# Patient Record
Sex: FEMALE | Race: AMERICAN INDIAN OR ALASKA NATIVE | NOT HISPANIC OR LATINO | Employment: OTHER | URBAN - METROPOLITAN AREA
[De-identification: names, ages, dates, MRNs, and addresses within clinical notes are randomized per-mention and may not be internally consistent; named-entity substitution may affect disease eponyms.]

---

## 2019-05-13 PROBLEM — E11.9 DIABETES MELLITUS TYPE 2, NONINSULIN DEPENDENT: Status: ACTIVE | Noted: 2019-05-13

## 2019-05-13 PROBLEM — G89.4 CHRONIC PAIN SYNDROME: Status: ACTIVE | Noted: 2019-05-13

## 2019-05-13 PROBLEM — F41.9 ANXIETY AND DEPRESSION: Status: ACTIVE | Noted: 2019-05-13

## 2019-05-13 PROBLEM — F51.01 PRIMARY INSOMNIA: Status: ACTIVE | Noted: 2019-05-13

## 2019-05-13 PROBLEM — R32 ABSENCE OF BLADDER CONTINENCE: Status: ACTIVE | Noted: 2019-05-13

## 2019-05-13 PROBLEM — F32.A ANXIETY AND DEPRESSION: Status: ACTIVE | Noted: 2019-05-13

## 2019-05-13 PROBLEM — M19.90 ARTHRITIS: Status: ACTIVE | Noted: 2019-05-13

## 2019-05-13 PROBLEM — I10 ESSENTIAL HYPERTENSION: Status: ACTIVE | Noted: 2019-05-13

## 2019-05-13 PROBLEM — E66.9 OBESITY (BMI 30-39.9): Status: ACTIVE | Noted: 2019-05-13

## 2019-06-10 PROBLEM — E66.01 SEVERE OBESITY (BMI 35.0-35.9 WITH COMORBIDITY): Status: ACTIVE | Noted: 2019-06-10

## 2019-06-10 PROBLEM — E78.5 DYSLIPIDEMIA: Status: ACTIVE | Noted: 2019-06-10

## 2019-06-10 PROBLEM — E66.9 OBESITY (BMI 30-39.9): Status: RESOLVED | Noted: 2019-05-13 | Resolved: 2019-06-10

## 2019-06-10 PROBLEM — E11.65 UNCONTROLLED TYPE 2 DIABETES MELLITUS WITH HYPERGLYCEMIA: Status: ACTIVE | Noted: 2019-06-10

## 2019-06-10 PROBLEM — T38.3X5A ADVERSE EFFECT OF METFORMIN: Status: ACTIVE | Noted: 2019-06-10

## 2019-06-10 PROBLEM — E11.9 DIABETES MELLITUS TYPE 2, NONINSULIN DEPENDENT: Status: RESOLVED | Noted: 2019-05-13 | Resolved: 2019-06-10

## 2019-10-14 PROBLEM — A41.9 SEPSIS WITH ACUTE ORGAN DYSFUNCTION WITHOUT SEPTIC SHOCK: Status: ACTIVE | Noted: 2019-10-14

## 2019-10-14 PROBLEM — N12 PYELONEPHRITIS: Status: ACTIVE | Noted: 2019-10-14

## 2019-10-14 PROBLEM — E87.1 HYPONATREMIA: Status: ACTIVE | Noted: 2019-10-14

## 2019-10-14 PROBLEM — R65.20 SEPSIS WITH ACUTE ORGAN DYSFUNCTION WITHOUT SEPTIC SHOCK: Status: ACTIVE | Noted: 2019-10-14

## 2019-10-14 PROBLEM — I21.4 NSTEMI (NON-ST ELEVATED MYOCARDIAL INFARCTION): Status: ACTIVE | Noted: 2019-10-14

## 2019-10-14 PROBLEM — A41.9 SEPSIS: Status: ACTIVE | Noted: 2019-10-14

## 2019-10-14 PROBLEM — E87.29 HIGH ANION GAP METABOLIC ACIDOSIS: Status: ACTIVE | Noted: 2019-10-14

## 2019-10-14 PROBLEM — E11.9 TYPE 2 DIABETES MELLITUS, WITHOUT LONG-TERM CURRENT USE OF INSULIN: Status: ACTIVE | Noted: 2019-10-14

## 2019-10-15 PROBLEM — N17.9 AKI (ACUTE KIDNEY INJURY): Status: ACTIVE | Noted: 2019-10-15

## 2019-10-15 PROBLEM — R11.0 NAUSEA: Status: ACTIVE | Noted: 2019-10-15

## 2019-10-16 PROBLEM — A41.9 SEPSIS: Status: ACTIVE | Noted: 2019-10-16

## 2019-10-17 PROBLEM — R78.81 BACTEREMIA DUE TO ESCHERICHIA COLI: Status: ACTIVE | Noted: 2019-10-17

## 2019-10-17 PROBLEM — B96.20 BACTEREMIA DUE TO ESCHERICHIA COLI: Status: ACTIVE | Noted: 2019-10-17

## 2019-10-19 ENCOUNTER — NURSE TRIAGE (OUTPATIENT)
Dept: ADMINISTRATIVE | Facility: CLINIC | Age: 79
End: 2019-10-19

## 2019-10-19 NOTE — TELEPHONE ENCOUNTER
Reason for Disposition   General information question, no triage required and triager able to answer question    Additional Information   Negative: [1] Caller is not with the adult (patient) AND [2] reporting urgent symptoms   Negative: Lab result questions   Negative: Medication questions   Negative: Caller can't be reached by phone   Negative: Caller has already spoken to PCP or another triager   Negative: RN needs further essential information from caller in order to complete triage   Negative: Requesting regular office appointment   Negative: [1] Caller requesting NON-URGENT health information AND [2] PCP's office is the best resource   Negative: Health Information question, no triage required and triager able to answer question    Protocols used: INFORMATION ONLY CALL-A-    Patient's mother in the hospital at [facility] and was trying to get in touch with the nursing station

## 2019-11-11 PROBLEM — E87.1 HYPONATREMIA: Status: RESOLVED | Noted: 2019-10-14 | Resolved: 2019-11-11

## 2019-11-11 PROBLEM — A41.9 SEPSIS WITH ACUTE ORGAN DYSFUNCTION WITHOUT SEPTIC SHOCK: Status: RESOLVED | Noted: 2019-10-14 | Resolved: 2019-11-11

## 2019-11-11 PROBLEM — E87.29 HIGH ANION GAP METABOLIC ACIDOSIS: Status: RESOLVED | Noted: 2019-10-14 | Resolved: 2019-11-11

## 2019-11-11 PROBLEM — R65.20 SEPSIS WITH ACUTE ORGAN DYSFUNCTION WITHOUT SEPTIC SHOCK: Status: RESOLVED | Noted: 2019-10-14 | Resolved: 2019-11-11

## 2019-11-13 PROBLEM — J45.909 REACTIVE AIRWAY DISEASE WITHOUT COMPLICATION: Status: ACTIVE | Noted: 2019-11-13

## 2019-11-13 PROBLEM — R29.818 SUSPECTED SLEEP APNEA: Status: ACTIVE | Noted: 2019-11-13

## 2019-11-13 PROBLEM — K58.0 IRRITABLE BOWEL SYNDROME WITH DIARRHEA: Status: ACTIVE | Noted: 2019-11-13

## 2019-11-13 PROBLEM — I27.20 PULMONARY HYPERTENSION: Status: ACTIVE | Noted: 2019-11-13

## 2019-11-13 PROBLEM — Z79.4 LONG TERM (CURRENT) USE OF INSULIN: Status: ACTIVE | Noted: 2019-11-13

## 2019-11-13 PROBLEM — F32.4 MAJOR DEPRESSIVE DISORDER WITH SINGLE EPISODE, IN PARTIAL REMISSION: Status: ACTIVE | Noted: 2019-05-13

## 2019-11-13 PROBLEM — I50.30 HEART FAILURE WITH PRESERVED EJECTION FRACTION, NYHA CLASS II: Status: ACTIVE | Noted: 2019-11-13

## 2019-11-13 PROBLEM — E55.9 VITAMIN D DEFICIENCY: Status: ACTIVE | Noted: 2019-11-13

## 2019-11-15 PROBLEM — N39.0 RECURRENT UTI: Status: ACTIVE | Noted: 2019-11-15

## 2019-11-27 PROBLEM — I51.9 LEFT VENTRICULAR SYSTOLIC DYSFUNCTION: Status: ACTIVE | Noted: 2019-11-27

## 2019-11-27 PROBLEM — I51.89 LEFT VENTRICULAR SYSTOLIC DYSFUNCTION: Status: ACTIVE | Noted: 2019-11-27

## 2019-12-16 PROBLEM — G47.33 OSA ON CPAP: Status: ACTIVE | Noted: 2019-11-13

## 2019-12-16 PROBLEM — E11.9 TYPE 2 DIABETES MELLITUS, WITHOUT LONG-TERM CURRENT USE OF INSULIN: Status: RESOLVED | Noted: 2019-10-14 | Resolved: 2019-12-16

## 2019-12-16 PROBLEM — N17.9 AKI (ACUTE KIDNEY INJURY): Status: RESOLVED | Noted: 2019-10-15 | Resolved: 2019-12-16

## 2019-12-19 PROBLEM — R35.0 URINARY FREQUENCY: Status: ACTIVE | Noted: 2019-12-19

## 2019-12-31 PROBLEM — N81.10 FEMALE CYSTOCELE: Status: ACTIVE | Noted: 2019-12-31

## 2019-12-31 PROBLEM — Q64.32 CONGENITAL URETHRAL STENOSIS: Status: ACTIVE | Noted: 2019-12-31

## 2020-01-16 PROBLEM — K11.20 PAROTIDITIS: Status: ACTIVE | Noted: 2020-01-16

## 2020-02-04 PROBLEM — Z85.9 HISTORY OF CANCER: Status: ACTIVE | Noted: 2020-02-04

## 2020-02-24 ENCOUNTER — TELEPHONE (OUTPATIENT)
Dept: HOME HEALTH SERVICES | Facility: HOSPITAL | Age: 80
End: 2020-02-24

## 2020-02-26 PROBLEM — I25.10 NON-OCCLUSIVE CORONARY ARTERY DISEASE: Status: ACTIVE | Noted: 2020-02-26

## 2020-03-30 ENCOUNTER — EXTERNAL HOME HEALTH (OUTPATIENT)
Dept: HOME HEALTH SERVICES | Facility: HOSPITAL | Age: 80
End: 2020-03-30

## 2020-04-15 PROBLEM — D47.2 MGUS (MONOCLONAL GAMMOPATHY OF UNKNOWN SIGNIFICANCE): Status: ACTIVE | Noted: 2020-04-15

## 2020-04-15 PROBLEM — G60.9 IDIOPATHIC PERIPHERAL NEUROPATHY: Status: ACTIVE | Noted: 2020-04-15

## 2020-06-04 ENCOUNTER — EXTERNAL HOME HEALTH (OUTPATIENT)
Dept: HOME HEALTH SERVICES | Facility: HOSPITAL | Age: 80
End: 2020-06-04

## 2020-06-24 PROBLEM — F32.5 MAJOR DEPRESSIVE DISORDER WITH SINGLE EPISODE, IN FULL REMISSION: Status: ACTIVE | Noted: 2019-05-13

## 2020-06-24 PROBLEM — R35.0 URINARY FREQUENCY: Status: RESOLVED | Noted: 2019-12-19 | Resolved: 2020-06-24

## 2020-06-24 PROBLEM — N12 PYELONEPHRITIS: Status: RESOLVED | Noted: 2019-10-14 | Resolved: 2020-06-24

## 2020-06-24 PROBLEM — R78.81 BACTEREMIA DUE TO ESCHERICHIA COLI: Status: RESOLVED | Noted: 2019-10-17 | Resolved: 2020-06-24

## 2020-06-24 PROBLEM — B96.20 BACTEREMIA DUE TO ESCHERICHIA COLI: Status: RESOLVED | Noted: 2019-10-17 | Resolved: 2020-06-24

## 2020-09-09 ENCOUNTER — EXTERNAL HOME HEALTH (OUTPATIENT)
Dept: HOME HEALTH SERVICES | Facility: HOSPITAL | Age: 80
End: 2020-09-09

## 2020-10-02 ENCOUNTER — LAB VISIT (OUTPATIENT)
Dept: LAB | Facility: HOSPITAL | Age: 80
End: 2020-10-02
Attending: NURSE PRACTITIONER
Payer: MEDICARE

## 2020-10-02 DIAGNOSIS — R82.90 FOUL SMELLING URINE: ICD-10-CM

## 2020-10-02 LAB
BACTERIA #/AREA URNS HPF: ABNORMAL /HPF
BILIRUB UR QL STRIP: NEGATIVE
CLARITY UR: ABNORMAL
COLOR UR: YELLOW
GLUCOSE UR QL STRIP: NEGATIVE
HGB UR QL STRIP: NEGATIVE
HYALINE CASTS #/AREA URNS LPF: 0 /LPF
KETONES UR QL STRIP: NEGATIVE
LEUKOCYTE ESTERASE UR QL STRIP: ABNORMAL
MICROSCOPIC COMMENT: ABNORMAL
NITRITE UR QL STRIP: POSITIVE
PH UR STRIP: 6 [PH] (ref 5–8)
PROT UR QL STRIP: ABNORMAL
RBC #/AREA URNS HPF: 9 /HPF (ref 0–4)
SP GR UR STRIP: 1.02 (ref 1–1.03)
SQUAMOUS #/AREA URNS HPF: 17 /HPF
URN SPEC COLLECT METH UR: ABNORMAL
UROBILINOGEN UR STRIP-ACNC: NEGATIVE EU/DL
WBC #/AREA URNS HPF: >100 /HPF (ref 0–5)

## 2020-10-02 PROCEDURE — 87086 URINE CULTURE/COLONY COUNT: CPT

## 2020-10-02 PROCEDURE — 81000 URINALYSIS NONAUTO W/SCOPE: CPT

## 2020-10-04 LAB
BACTERIA UR CULT: NORMAL
BACTERIA UR CULT: NORMAL

## 2020-10-14 ENCOUNTER — EXTERNAL HOME HEALTH (OUTPATIENT)
Dept: HOME HEALTH SERVICES | Facility: HOSPITAL | Age: 80
End: 2020-10-14

## 2020-10-19 ENCOUNTER — HOSPITAL ENCOUNTER (OUTPATIENT)
Dept: PREADMISSION TESTING | Facility: HOSPITAL | Age: 80
Discharge: HOME | End: 2020-10-19
Attending: INTERNAL MEDICINE
Payer: MEDICARE

## 2020-10-19 DIAGNOSIS — Z01.812 PRE-PROCEDURE LAB EXAM: ICD-10-CM

## 2020-10-19 DIAGNOSIS — D47.2 MONOCLONAL GAMMOPATHY OF UNKNOWN SIGNIFICANCE (MGUS): ICD-10-CM

## 2020-10-19 DIAGNOSIS — E78.5 DYSLIPIDEMIA: ICD-10-CM

## 2020-10-19 DIAGNOSIS — E11.65 UNCONTROLLED TYPE 2 DIABETES MELLITUS WITH HYPERGLYCEMIA: ICD-10-CM

## 2020-10-19 LAB — SARS-COV-2 RNA RESP QL NAA+PROBE: NOT DETECTED

## 2020-10-19 PROCEDURE — U0002 COVID-19 LAB TEST NON-CDC: HCPCS

## 2020-10-21 ENCOUNTER — DOCUMENT SCAN (OUTPATIENT)
Dept: HOME HEALTH SERVICES | Facility: HOSPITAL | Age: 80
End: 2020-10-21

## 2020-10-21 PROBLEM — Z85.3 HISTORY OF BREAST CANCER: Chronic | Status: ACTIVE | Noted: 2020-02-04

## 2020-10-21 PROBLEM — Z85.038 HISTORY OF COLON CANCER: Status: ACTIVE | Noted: 2020-10-21

## 2020-10-21 PROBLEM — J30.1 SEASONAL ALLERGIC RHINITIS DUE TO POLLEN: Status: ACTIVE | Noted: 2020-10-21

## 2020-10-21 PROBLEM — R93.89 ABNORMAL CT OF THE CHEST: Status: ACTIVE | Noted: 2020-10-21

## 2020-12-08 ENCOUNTER — DOCUMENT SCAN (OUTPATIENT)
Dept: HOME HEALTH SERVICES | Facility: HOSPITAL | Age: 80
End: 2020-12-08

## 2020-12-17 ENCOUNTER — EXTERNAL HOME HEALTH (OUTPATIENT)
Dept: HOME HEALTH SERVICES | Facility: HOSPITAL | Age: 80
End: 2020-12-17

## 2021-01-06 ENCOUNTER — HOSPITAL ENCOUNTER (EMERGENCY)
Facility: HOSPITAL | Age: 81
Discharge: HOME | End: 2021-01-06
Attending: EMERGENCY MEDICINE
Payer: MEDICARE

## 2021-01-06 VITALS
RESPIRATION RATE: 18 BRPM | TEMPERATURE: 98 F | DIASTOLIC BLOOD PRESSURE: 65 MMHG | HEIGHT: 64 IN | SYSTOLIC BLOOD PRESSURE: 143 MMHG | HEART RATE: 68 BPM | BODY MASS INDEX: 37.39 KG/M2 | WEIGHT: 219 LBS | OXYGEN SATURATION: 95 %

## 2021-01-06 DIAGNOSIS — M79.89 FOOT SWELLING: Primary | ICD-10-CM

## 2021-01-06 DIAGNOSIS — R60.9 SWELLING: ICD-10-CM

## 2021-01-06 PROBLEM — R33.9 URINARY RETENTION: Status: ACTIVE | Noted: 2021-01-06

## 2021-01-06 PROCEDURE — 99283 EMERGENCY DEPT VISIT LOW MDM: CPT | Mod: 25

## 2021-01-06 RX ORDER — SULFAMETHOXAZOLE AND TRIMETHOPRIM 800; 160 MG/1; MG/1
1 TABLET ORAL 2 TIMES DAILY
Qty: 20 TABLET | Refills: 0 | Status: SHIPPED | OUTPATIENT
Start: 2021-01-06 | End: 2021-01-16

## 2021-02-05 ENCOUNTER — DOCUMENT SCAN (OUTPATIENT)
Dept: HOME HEALTH SERVICES | Facility: HOSPITAL | Age: 81
End: 2021-02-05

## 2021-02-11 ENCOUNTER — EXTERNAL HOME HEALTH (OUTPATIENT)
Dept: HOME HEALTH SERVICES | Facility: HOSPITAL | Age: 81
End: 2021-02-11

## 2021-02-24 ENCOUNTER — LAB VISIT (OUTPATIENT)
Dept: LAB | Facility: HOSPITAL | Age: 81
End: 2021-02-24
Attending: INTERNAL MEDICINE
Payer: MEDICARE

## 2021-02-24 DIAGNOSIS — E11.69 TYPE 2 DIABETES MELLITUS WITH OTHER SPECIFIED COMPLICATION, WITH LONG-TERM CURRENT USE OF INSULIN: ICD-10-CM

## 2021-02-24 DIAGNOSIS — Z79.4 TYPE 2 DIABETES MELLITUS WITH OTHER SPECIFIED COMPLICATION, WITH LONG-TERM CURRENT USE OF INSULIN: ICD-10-CM

## 2021-02-24 DIAGNOSIS — I25.10 NON-OCCLUSIVE CORONARY ARTERY DISEASE: ICD-10-CM

## 2021-02-24 DIAGNOSIS — E66.01 CLASS 2 SEVERE OBESITY DUE TO EXCESS CALORIES WITH SERIOUS COMORBIDITY AND BODY MASS INDEX (BMI) OF 35.0 TO 35.9 IN ADULT: ICD-10-CM

## 2021-02-24 DIAGNOSIS — I51.9 LEFT VENTRICULAR SYSTOLIC DYSFUNCTION: ICD-10-CM

## 2021-02-24 DIAGNOSIS — I10 ESSENTIAL HYPERTENSION: ICD-10-CM

## 2021-02-24 DIAGNOSIS — I50.30 HEART FAILURE WITH PRESERVED EJECTION FRACTION, NYHA CLASS II: ICD-10-CM

## 2021-02-24 LAB
ALBUMIN SERPL BCP-MCNC: 3.4 G/DL (ref 3.5–5.2)
ALP SERPL-CCNC: 191 U/L (ref 55–135)
ALT SERPL W/O P-5'-P-CCNC: 21 U/L (ref 10–44)
ANION GAP SERPL CALC-SCNC: 6 MMOL/L (ref 8–16)
ANION GAP SERPL CALC-SCNC: 6 MMOL/L (ref 8–16)
AST SERPL-CCNC: 12 U/L (ref 10–40)
BASOPHILS # BLD AUTO: 0.09 K/UL (ref 0–0.2)
BASOPHILS NFR BLD: 0.7 % (ref 0–1.9)
BILIRUB SERPL-MCNC: 0.4 MG/DL (ref 0.1–1)
BUN SERPL-MCNC: 42 MG/DL (ref 8–23)
BUN SERPL-MCNC: 42 MG/DL (ref 8–23)
CALCIUM SERPL-MCNC: 9.6 MG/DL (ref 8.7–10.5)
CALCIUM SERPL-MCNC: 9.6 MG/DL (ref 8.7–10.5)
CHLORIDE SERPL-SCNC: 103 MMOL/L (ref 95–110)
CHLORIDE SERPL-SCNC: 103 MMOL/L (ref 95–110)
CHOLEST SERPL-MCNC: 141 MG/DL (ref 120–199)
CHOLEST/HDLC SERPL: 2.6 {RATIO} (ref 2–5)
CO2 SERPL-SCNC: 29 MMOL/L (ref 23–29)
CO2 SERPL-SCNC: 29 MMOL/L (ref 23–29)
CREAT SERPL-MCNC: 1.3 MG/DL (ref 0.5–1.4)
CREAT SERPL-MCNC: 1.3 MG/DL (ref 0.5–1.4)
DIFFERENTIAL METHOD: ABNORMAL
EOSINOPHIL # BLD AUTO: 0.4 K/UL (ref 0–0.5)
EOSINOPHIL NFR BLD: 3.4 % (ref 0–8)
ERYTHROCYTE [DISTWIDTH] IN BLOOD BY AUTOMATED COUNT: 14 % (ref 11.5–14.5)
EST. GFR  (AFRICAN AMERICAN): 45.4 ML/MIN/1.73 M^2
EST. GFR  (AFRICAN AMERICAN): 45.4 ML/MIN/1.73 M^2
EST. GFR  (NON AFRICAN AMERICAN): 39.4 ML/MIN/1.73 M^2
EST. GFR  (NON AFRICAN AMERICAN): 39.4 ML/MIN/1.73 M^2
ESTIMATED AVG GLUCOSE: 157 MG/DL (ref 68–131)
GLUCOSE SERPL-MCNC: 174 MG/DL (ref 70–110)
GLUCOSE SERPL-MCNC: 174 MG/DL (ref 70–110)
HBA1C MFR BLD: 7.1 % (ref 4–5.6)
HCT VFR BLD AUTO: 37.9 % (ref 37–48.5)
HDLC SERPL-MCNC: 55 MG/DL (ref 40–75)
HDLC SERPL: 39 % (ref 20–50)
HGB BLD-MCNC: 12.3 G/DL (ref 12–16)
IMM GRANULOCYTES # BLD AUTO: 0.07 K/UL (ref 0–0.04)
IMM GRANULOCYTES NFR BLD AUTO: 0.5 % (ref 0–0.5)
LDLC SERPL CALC-MCNC: 58 MG/DL (ref 63–159)
LYMPHOCYTES # BLD AUTO: 2 K/UL (ref 1–4.8)
LYMPHOCYTES NFR BLD: 15.8 % (ref 18–48)
MCH RBC QN AUTO: 27.2 PG (ref 27–31)
MCHC RBC AUTO-ENTMCNC: 32.5 G/DL (ref 32–36)
MCV RBC AUTO: 84 FL (ref 82–98)
MONOCYTES # BLD AUTO: 0.6 K/UL (ref 0.3–1)
MONOCYTES NFR BLD: 4.7 % (ref 4–15)
NEUTROPHILS # BLD AUTO: 9.7 K/UL (ref 1.8–7.7)
NEUTROPHILS NFR BLD: 74.9 % (ref 38–73)
NONHDLC SERPL-MCNC: 86 MG/DL
NRBC BLD-RTO: 0 /100 WBC
PLATELET # BLD AUTO: 370 K/UL (ref 150–350)
PMV BLD AUTO: 10.3 FL (ref 9.2–12.9)
POTASSIUM SERPL-SCNC: 3.9 MMOL/L (ref 3.5–5.1)
POTASSIUM SERPL-SCNC: 3.9 MMOL/L (ref 3.5–5.1)
PROT SERPL-MCNC: 8.1 G/DL (ref 6–8.4)
RBC # BLD AUTO: 4.52 M/UL (ref 4–5.4)
SODIUM SERPL-SCNC: 138 MMOL/L (ref 136–145)
SODIUM SERPL-SCNC: 138 MMOL/L (ref 136–145)
TRIGL SERPL-MCNC: 140 MG/DL (ref 30–150)
WBC # BLD AUTO: 12.89 K/UL (ref 3.9–12.7)

## 2021-02-24 PROCEDURE — 83036 HEMOGLOBIN GLYCOSYLATED A1C: CPT

## 2021-02-24 PROCEDURE — 85025 COMPLETE CBC W/AUTO DIFF WBC: CPT

## 2021-02-24 PROCEDURE — 80061 LIPID PANEL: CPT

## 2021-02-24 PROCEDURE — 80053 COMPREHEN METABOLIC PANEL: CPT

## 2021-02-24 PROCEDURE — 36415 COLL VENOUS BLD VENIPUNCTURE: CPT

## 2021-03-10 PROBLEM — Z79.4 TYPE 2 DIABETES MELLITUS WITHOUT COMPLICATION, WITH LONG-TERM CURRENT USE OF INSULIN: Status: ACTIVE | Noted: 2019-06-10

## 2021-03-10 PROBLEM — E11.9 TYPE 2 DIABETES MELLITUS WITHOUT COMPLICATION, WITH LONG-TERM CURRENT USE OF INSULIN: Status: ACTIVE | Noted: 2019-06-10

## 2021-03-11 PROBLEM — N18.32 STAGE 3B CHRONIC KIDNEY DISEASE: Status: ACTIVE | Noted: 2021-03-11

## 2021-04-09 ENCOUNTER — HOSPITAL ENCOUNTER (EMERGENCY)
Facility: HOSPITAL | Age: 81
Discharge: HOME | End: 2021-04-09
Attending: FAMILY MEDICINE
Payer: MEDICARE

## 2021-04-09 VITALS
HEART RATE: 62 BPM | HEIGHT: 64 IN | DIASTOLIC BLOOD PRESSURE: 66 MMHG | OXYGEN SATURATION: 96 % | WEIGHT: 202 LBS | TEMPERATURE: 98 F | BODY MASS INDEX: 34.49 KG/M2 | SYSTOLIC BLOOD PRESSURE: 164 MMHG | RESPIRATION RATE: 16 BRPM

## 2021-04-09 DIAGNOSIS — Z93.59 SUPRAPUBIC CATHETER: Primary | ICD-10-CM

## 2021-04-09 PROCEDURE — 99283 EMERGENCY DEPT VISIT LOW MDM: CPT

## 2021-06-11 ENCOUNTER — EXTERNAL HOME HEALTH (OUTPATIENT)
Dept: HOME HEALTH SERVICES | Facility: HOSPITAL | Age: 81
End: 2021-06-11

## 2021-06-14 ENCOUNTER — HOSPITAL ENCOUNTER (EMERGENCY)
Facility: HOSPITAL | Age: 81
Discharge: HOME | End: 2021-06-14
Attending: FAMILY MEDICINE
Payer: MEDICARE

## 2021-06-14 VITALS
DIASTOLIC BLOOD PRESSURE: 81 MMHG | RESPIRATION RATE: 18 BRPM | TEMPERATURE: 99 F | HEIGHT: 64 IN | OXYGEN SATURATION: 98 % | HEART RATE: 80 BPM | BODY MASS INDEX: 36.7 KG/M2 | WEIGHT: 215 LBS | SYSTOLIC BLOOD PRESSURE: 167 MMHG

## 2021-06-14 DIAGNOSIS — R19.7 DIARRHEA, UNSPECIFIED TYPE: ICD-10-CM

## 2021-06-14 DIAGNOSIS — R11.10 VOMITING, INTRACTABILITY OF VOMITING NOT SPECIFIED, PRESENCE OF NAUSEA NOT SPECIFIED, UNSPECIFIED VOMITING TYPE: Primary | ICD-10-CM

## 2021-06-14 DIAGNOSIS — R53.1 WEAKNESS: ICD-10-CM

## 2021-06-14 DIAGNOSIS — E86.0 DEHYDRATION: ICD-10-CM

## 2021-06-14 DIAGNOSIS — N30.00 ACUTE CYSTITIS WITHOUT HEMATURIA: ICD-10-CM

## 2021-06-14 LAB
ALBUMIN SERPL BCP-MCNC: 3 G/DL (ref 3.5–5.2)
ALP SERPL-CCNC: 123 U/L (ref 55–135)
ALT SERPL W/O P-5'-P-CCNC: 20 U/L (ref 10–44)
AMORPH CRY UR QL COMP ASSIST: ABNORMAL
ANION GAP SERPL CALC-SCNC: 14 MMOL/L (ref 8–16)
AST SERPL-CCNC: 18 U/L (ref 10–40)
BACTERIA #/AREA URNS HPF: ABNORMAL /HPF
BASOPHILS # BLD AUTO: 0.06 K/UL (ref 0–0.2)
BASOPHILS NFR BLD: 0.5 % (ref 0–1.9)
BILIRUB SERPL-MCNC: 0.7 MG/DL (ref 0.1–1)
BILIRUB UR QL STRIP: NEGATIVE
BUN SERPL-MCNC: 21 MG/DL (ref 8–23)
CALCIUM SERPL-MCNC: 8.8 MG/DL (ref 8.7–10.5)
CHLORIDE SERPL-SCNC: 110 MMOL/L (ref 95–110)
CLARITY UR: ABNORMAL
CO2 SERPL-SCNC: 18 MMOL/L (ref 23–29)
COLOR UR: YELLOW
CREAT SERPL-MCNC: 0.9 MG/DL (ref 0.5–1.4)
DIFFERENTIAL METHOD: ABNORMAL
EOSINOPHIL # BLD AUTO: 0 K/UL (ref 0–0.5)
EOSINOPHIL NFR BLD: 0.3 % (ref 0–8)
ERYTHROCYTE [DISTWIDTH] IN BLOOD BY AUTOMATED COUNT: 13.8 % (ref 11.5–14.5)
EST. GFR  (AFRICAN AMERICAN): >60 ML/MIN/1.73 M^2
EST. GFR  (NON AFRICAN AMERICAN): >60 ML/MIN/1.73 M^2
GLUCOSE SERPL-MCNC: 165 MG/DL (ref 70–110)
GLUCOSE UR QL STRIP: ABNORMAL
HCT VFR BLD AUTO: 38.7 % (ref 37–48.5)
HGB BLD-MCNC: 12.5 G/DL (ref 12–16)
HGB UR QL STRIP: NEGATIVE
HYALINE CASTS #/AREA URNS LPF: 0 /LPF
IMM GRANULOCYTES # BLD AUTO: 0.05 K/UL (ref 0–0.04)
IMM GRANULOCYTES NFR BLD AUTO: 0.4 % (ref 0–0.5)
KETONES UR QL STRIP: ABNORMAL
LACTATE SERPL-SCNC: 2.1 MMOL/L (ref 0.5–2.2)
LEUKOCYTE ESTERASE UR QL STRIP: ABNORMAL
LIPASE SERPL-CCNC: 63 U/L (ref 23–300)
LYMPHOCYTES # BLD AUTO: 2 K/UL (ref 1–4.8)
LYMPHOCYTES NFR BLD: 15.3 % (ref 18–48)
MCH RBC QN AUTO: 26.5 PG (ref 27–31)
MCHC RBC AUTO-ENTMCNC: 32.3 G/DL (ref 32–36)
MCV RBC AUTO: 82 FL (ref 82–98)
MICROSCOPIC COMMENT: ABNORMAL
MONOCYTES # BLD AUTO: 0.8 K/UL (ref 0.3–1)
MONOCYTES NFR BLD: 5.8 % (ref 4–15)
NEUTROPHILS # BLD AUTO: 10 K/UL (ref 1.8–7.7)
NEUTROPHILS NFR BLD: 77.7 % (ref 38–73)
NITRITE UR QL STRIP: POSITIVE
NRBC BLD-RTO: 0 /100 WBC
PH UR STRIP: >8 [PH] (ref 5–8)
PLATELET # BLD AUTO: 310 K/UL (ref 150–450)
PMV BLD AUTO: 10.3 FL (ref 9.2–12.9)
POTASSIUM SERPL-SCNC: 3.7 MMOL/L (ref 3.5–5.1)
PROT SERPL-MCNC: 7.3 G/DL (ref 6–8.4)
PROT UR QL STRIP: ABNORMAL
RBC # BLD AUTO: 4.71 M/UL (ref 4–5.4)
RBC #/AREA URNS HPF: 0 /HPF (ref 0–4)
SODIUM SERPL-SCNC: 142 MMOL/L (ref 136–145)
SP GR UR STRIP: 1.02 (ref 1–1.03)
SQUAMOUS #/AREA URNS HPF: 18 /HPF
TRI-PHOS CRY UR QL COMP ASSIST: ABNORMAL
TROPONIN I SERPL DL<=0.01 NG/ML-MCNC: <0.02 NG/ML (ref 0–0.03)
URN SPEC COLLECT METH UR: ABNORMAL
UROBILINOGEN UR STRIP-ACNC: 1 EU/DL
WBC # BLD AUTO: 12.87 K/UL (ref 3.9–12.7)
WBC #/AREA URNS HPF: 12 /HPF (ref 0–5)

## 2021-06-14 PROCEDURE — 36415 COLL VENOUS BLD VENIPUNCTURE: CPT | Performed by: FAMILY MEDICINE

## 2021-06-14 PROCEDURE — 80053 COMPREHEN METABOLIC PANEL: CPT | Performed by: FAMILY MEDICINE

## 2021-06-14 PROCEDURE — 99285 EMERGENCY DEPT VISIT HI MDM: CPT | Mod: 25

## 2021-06-14 PROCEDURE — 93010 EKG 12-LEAD: ICD-10-PCS | Mod: ,,, | Performed by: INTERNAL MEDICINE

## 2021-06-14 PROCEDURE — 25000003 PHARM REV CODE 250: Performed by: FAMILY MEDICINE

## 2021-06-14 PROCEDURE — 93005 ELECTROCARDIOGRAM TRACING: CPT

## 2021-06-14 PROCEDURE — 25500020 PHARM REV CODE 255: Performed by: FAMILY MEDICINE

## 2021-06-14 PROCEDURE — 83605 ASSAY OF LACTIC ACID: CPT | Performed by: FAMILY MEDICINE

## 2021-06-14 PROCEDURE — 96367 TX/PROPH/DG ADDL SEQ IV INF: CPT

## 2021-06-14 PROCEDURE — 96365 THER/PROPH/DIAG IV INF INIT: CPT

## 2021-06-14 PROCEDURE — 81000 URINALYSIS NONAUTO W/SCOPE: CPT | Performed by: FAMILY MEDICINE

## 2021-06-14 PROCEDURE — 63600175 PHARM REV CODE 636 W HCPCS: Performed by: FAMILY MEDICINE

## 2021-06-14 PROCEDURE — 85025 COMPLETE CBC W/AUTO DIFF WBC: CPT | Performed by: FAMILY MEDICINE

## 2021-06-14 PROCEDURE — 84484 ASSAY OF TROPONIN QUANT: CPT | Performed by: FAMILY MEDICINE

## 2021-06-14 PROCEDURE — 93010 ELECTROCARDIOGRAM REPORT: CPT | Mod: ,,, | Performed by: INTERNAL MEDICINE

## 2021-06-14 PROCEDURE — 83690 ASSAY OF LIPASE: CPT | Performed by: FAMILY MEDICINE

## 2021-06-14 RX ORDER — CIPROFLOXACIN 500 MG/1
500 TABLET ORAL 2 TIMES DAILY
Qty: 20 TABLET | Refills: 0 | Status: SHIPPED | OUTPATIENT
Start: 2021-06-14 | End: 2021-06-24

## 2021-06-14 RX ORDER — ONDANSETRON 4 MG/1
4 TABLET, ORALLY DISINTEGRATING ORAL EVERY 8 HOURS PRN
Qty: 12 TABLET | Refills: 0 | Status: SHIPPED | OUTPATIENT
Start: 2021-06-14 | End: 2022-01-20

## 2021-06-14 RX ORDER — PROMETHAZINE HYDROCHLORIDE 25 MG/1
12.5 TABLET ORAL EVERY 6 HOURS PRN
Qty: 5 TABLET | Refills: 0 | Status: SHIPPED | OUTPATIENT
Start: 2021-06-14 | End: 2021-06-14 | Stop reason: SDUPTHER

## 2021-06-14 RX ADMIN — PROMETHAZINE HYDROCHLORIDE 12.5 MG: 25 INJECTION INTRAMUSCULAR; INTRAVENOUS at 03:06

## 2021-06-14 RX ADMIN — CEFTRIAXONE 1 G: 1 INJECTION, SOLUTION INTRAVENOUS at 06:06

## 2021-06-14 RX ADMIN — IOHEXOL 100 ML: 350 INJECTION, SOLUTION INTRAVENOUS at 04:06

## 2021-06-14 RX ADMIN — SODIUM CHLORIDE 500 ML: 0.9 INJECTION, SOLUTION INTRAVENOUS at 03:06

## 2021-11-15 ENCOUNTER — OFFICE VISIT (OUTPATIENT)
Dept: OBSTETRICS AND GYNECOLOGY | Facility: CLINIC | Age: 81
End: 2021-11-15
Payer: MEDICARE

## 2021-11-15 VITALS
WEIGHT: 222 LBS | SYSTOLIC BLOOD PRESSURE: 132 MMHG | HEIGHT: 64 IN | DIASTOLIC BLOOD PRESSURE: 59 MMHG | HEART RATE: 85 BPM | BODY MASS INDEX: 37.9 KG/M2

## 2021-11-15 DIAGNOSIS — L90.0 LICHEN SCLEROSUS: ICD-10-CM

## 2021-11-15 DIAGNOSIS — N63.0 LUMP OR MASS IN BREAST: Primary | ICD-10-CM

## 2021-11-15 DIAGNOSIS — N63.11 UNSPECIFIED LUMP IN THE RIGHT BREAST, UPPER OUTER QUADRANT: ICD-10-CM

## 2021-11-15 PROCEDURE — 99999 PR PBB SHADOW E&M-EST. PATIENT-LVL V: CPT | Mod: PBBFAC,,, | Performed by: OBSTETRICS & GYNECOLOGY

## 2021-11-15 PROCEDURE — 99204 OFFICE O/P NEW MOD 45 MIN: CPT | Mod: S$PBB,,, | Performed by: OBSTETRICS & GYNECOLOGY

## 2021-11-15 PROCEDURE — 99999 PR PBB SHADOW E&M-EST. PATIENT-LVL V: ICD-10-PCS | Mod: PBBFAC,,, | Performed by: OBSTETRICS & GYNECOLOGY

## 2021-11-15 PROCEDURE — 99215 OFFICE O/P EST HI 40 MIN: CPT | Mod: PBBFAC | Performed by: OBSTETRICS & GYNECOLOGY

## 2021-11-15 PROCEDURE — 99204 PR OFFICE/OUTPT VISIT, NEW, LEVL IV, 45-59 MIN: ICD-10-PCS | Mod: S$PBB,,, | Performed by: OBSTETRICS & GYNECOLOGY

## 2021-11-15 RX ORDER — CLOBETASOL PROPIONATE 0.5 MG/G
CREAM TOPICAL
Qty: 60 G | Refills: 2 | Status: SHIPPED | OUTPATIENT
Start: 2021-11-15

## 2021-11-19 ENCOUNTER — HOSPITAL ENCOUNTER (EMERGENCY)
Facility: HOSPITAL | Age: 81
Discharge: HOME | End: 2021-11-19
Attending: EMERGENCY MEDICINE
Payer: MEDICARE

## 2021-11-19 VITALS
DIASTOLIC BLOOD PRESSURE: 65 MMHG | BODY MASS INDEX: 37.56 KG/M2 | TEMPERATURE: 98 F | RESPIRATION RATE: 20 BRPM | WEIGHT: 220 LBS | SYSTOLIC BLOOD PRESSURE: 134 MMHG | HEIGHT: 64 IN | HEART RATE: 64 BPM | OXYGEN SATURATION: 97 %

## 2021-11-19 DIAGNOSIS — N39.0 URINARY TRACT INFECTION ASSOCIATED WITH INDWELLING URETHRAL CATHETER, INITIAL ENCOUNTER: ICD-10-CM

## 2021-11-19 DIAGNOSIS — T83.511A URINARY TRACT INFECTION ASSOCIATED WITH INDWELLING URETHRAL CATHETER, INITIAL ENCOUNTER: ICD-10-CM

## 2021-11-19 DIAGNOSIS — N30.00 ACUTE CYSTITIS WITHOUT HEMATURIA: Primary | ICD-10-CM

## 2021-11-19 LAB
BACTERIA #/AREA URNS HPF: NEGATIVE /HPF
BILIRUB UR QL STRIP: NEGATIVE
CLARITY UR: CLEAR
COLOR UR: YELLOW
GLUCOSE UR QL STRIP: NEGATIVE
HGB UR QL STRIP: ABNORMAL
HYALINE CASTS #/AREA URNS LPF: 5 /LPF
KETONES UR QL STRIP: NEGATIVE
LEUKOCYTE ESTERASE UR QL STRIP: ABNORMAL
MICROSCOPIC COMMENT: ABNORMAL
NITRITE UR QL STRIP: NEGATIVE
PH UR STRIP: 6 [PH] (ref 5–8)
PROT UR QL STRIP: ABNORMAL
RBC #/AREA URNS HPF: 6 /HPF (ref 0–4)
SP GR UR STRIP: 1.01 (ref 1–1.03)
SQUAMOUS #/AREA URNS HPF: 1 /HPF
URN SPEC COLLECT METH UR: ABNORMAL
UROBILINOGEN UR STRIP-ACNC: NEGATIVE EU/DL
WBC #/AREA URNS HPF: 65 /HPF (ref 0–5)

## 2021-11-19 PROCEDURE — 51702 INSERT TEMP BLADDER CATH: CPT

## 2021-11-19 PROCEDURE — 96372 THER/PROPH/DIAG INJ SC/IM: CPT

## 2021-11-19 PROCEDURE — 87086 URINE CULTURE/COLONY COUNT: CPT | Performed by: EMERGENCY MEDICINE

## 2021-11-19 PROCEDURE — 99284 EMERGENCY DEPT VISIT MOD MDM: CPT | Mod: 25

## 2021-11-19 PROCEDURE — 81000 URINALYSIS NONAUTO W/SCOPE: CPT | Performed by: EMERGENCY MEDICINE

## 2021-11-19 PROCEDURE — 63600175 PHARM REV CODE 636 W HCPCS: Performed by: EMERGENCY MEDICINE

## 2021-11-19 RX ORDER — CEFTRIAXONE 1 G/1
1 INJECTION, POWDER, FOR SOLUTION INTRAMUSCULAR; INTRAVENOUS
Status: COMPLETED | OUTPATIENT
Start: 2021-11-19 | End: 2021-11-19

## 2021-11-19 RX ORDER — LEVOFLOXACIN 500 MG/1
500 TABLET, FILM COATED ORAL DAILY
Qty: 7 TABLET | Refills: 0 | Status: SHIPPED | OUTPATIENT
Start: 2021-11-19 | End: 2021-11-26

## 2021-11-19 RX ADMIN — CEFTRIAXONE SODIUM 1 G: 1 INJECTION, POWDER, FOR SOLUTION INTRAMUSCULAR; INTRAVENOUS at 10:11

## 2021-11-21 LAB
BACTERIA UR CULT: NORMAL
BACTERIA UR CULT: NORMAL

## 2021-11-24 ENCOUNTER — HOSPITAL ENCOUNTER (OUTPATIENT)
Dept: RADIOLOGY | Facility: HOSPITAL | Age: 81
Discharge: HOME | End: 2021-11-24
Attending: OBSTETRICS & GYNECOLOGY
Payer: MEDICARE

## 2021-11-24 VITALS — HEIGHT: 64 IN | WEIGHT: 222 LBS | BODY MASS INDEX: 37.9 KG/M2

## 2021-11-24 DIAGNOSIS — N63.11 UNSPECIFIED LUMP IN THE RIGHT BREAST, UPPER OUTER QUADRANT: ICD-10-CM

## 2021-11-24 DIAGNOSIS — N63.0 LUMP OR MASS IN BREAST: ICD-10-CM

## 2021-11-24 PROCEDURE — 77066 DX MAMMO INCL CAD BI: CPT | Mod: TC

## 2021-11-24 PROCEDURE — 76642 ULTRASOUND BREAST LIMITED: CPT | Mod: TC,RT

## 2021-11-26 DIAGNOSIS — N63.0 LUMP OR MASS IN BREAST: Primary | ICD-10-CM

## 2021-12-03 ENCOUNTER — OFFICE VISIT (OUTPATIENT)
Dept: SURGERY | Facility: CLINIC | Age: 81
End: 2021-12-03
Payer: MEDICARE

## 2021-12-03 VITALS
BODY MASS INDEX: 37.9 KG/M2 | WEIGHT: 222 LBS | SYSTOLIC BLOOD PRESSURE: 179 MMHG | DIASTOLIC BLOOD PRESSURE: 74 MMHG | OXYGEN SATURATION: 96 % | HEART RATE: 77 BPM | HEIGHT: 64 IN

## 2021-12-03 DIAGNOSIS — N63.0 LUMP OR MASS IN BREAST: Primary | ICD-10-CM

## 2021-12-03 DIAGNOSIS — R22.31 MASS OF AXILLA, RIGHT: ICD-10-CM

## 2021-12-03 DIAGNOSIS — N63.10 BREAST MASS, RIGHT: ICD-10-CM

## 2021-12-03 PROCEDURE — 19100 BX BREAST PERCUT W/O IMAGE: CPT | Mod: PBBFAC | Performed by: STUDENT IN AN ORGANIZED HEALTH CARE EDUCATION/TRAINING PROGRAM

## 2021-12-03 PROCEDURE — 99215 OFFICE O/P EST HI 40 MIN: CPT | Mod: PBBFAC | Performed by: STUDENT IN AN ORGANIZED HEALTH CARE EDUCATION/TRAINING PROGRAM

## 2021-12-03 PROCEDURE — 19100 PR BIOPSY OF BREAST, NEEDLE CORE: ICD-10-PCS | Mod: S$PBB,RT,, | Performed by: STUDENT IN AN ORGANIZED HEALTH CARE EDUCATION/TRAINING PROGRAM

## 2021-12-03 PROCEDURE — 99999 PR PBB SHADOW E&M-EST. PATIENT-LVL V: ICD-10-PCS | Mod: PBBFAC,,, | Performed by: STUDENT IN AN ORGANIZED HEALTH CARE EDUCATION/TRAINING PROGRAM

## 2021-12-03 PROCEDURE — 99205 OFFICE O/P NEW HI 60 MIN: CPT | Mod: 25,S$PBB,, | Performed by: STUDENT IN AN ORGANIZED HEALTH CARE EDUCATION/TRAINING PROGRAM

## 2021-12-03 PROCEDURE — 99205 PR OFFICE/OUTPT VISIT, NEW, LEVL V, 60-74 MIN: ICD-10-PCS | Mod: 25,S$PBB,, | Performed by: STUDENT IN AN ORGANIZED HEALTH CARE EDUCATION/TRAINING PROGRAM

## 2021-12-03 PROCEDURE — 99999 PR PBB SHADOW E&M-EST. PATIENT-LVL V: CPT | Mod: PBBFAC,,, | Performed by: STUDENT IN AN ORGANIZED HEALTH CARE EDUCATION/TRAINING PROGRAM

## 2021-12-03 PROCEDURE — 19100 BX BREAST PERCUT W/O IMAGE: CPT | Mod: S$PBB,RT,, | Performed by: STUDENT IN AN ORGANIZED HEALTH CARE EDUCATION/TRAINING PROGRAM

## 2021-12-09 ENCOUNTER — TELEPHONE (OUTPATIENT)
Dept: SURGERY | Facility: CLINIC | Age: 81
End: 2021-12-09
Payer: MEDICARE

## 2021-12-09 ENCOUNTER — EXTERNAL HOME HEALTH (OUTPATIENT)
Dept: HOME HEALTH SERVICES | Facility: HOSPITAL | Age: 81
End: 2021-12-09
Payer: MEDICARE

## 2021-12-10 ENCOUNTER — DOCUMENT SCAN (OUTPATIENT)
Dept: HOME HEALTH SERVICES | Facility: HOSPITAL | Age: 81
End: 2021-12-10
Payer: MEDICARE

## 2021-12-13 ENCOUNTER — TELEPHONE (OUTPATIENT)
Dept: OBSTETRICS AND GYNECOLOGY | Facility: CLINIC | Age: 81
End: 2021-12-13
Payer: MEDICARE

## 2021-12-16 ENCOUNTER — OFFICE VISIT (OUTPATIENT)
Dept: SURGERY | Facility: CLINIC | Age: 81
End: 2021-12-16
Payer: MEDICARE

## 2021-12-16 ENCOUNTER — EXTERNAL HOME HEALTH (OUTPATIENT)
Dept: HOME HEALTH SERVICES | Facility: HOSPITAL | Age: 81
End: 2021-12-16
Payer: MEDICARE

## 2021-12-16 VITALS
HEART RATE: 76 BPM | SYSTOLIC BLOOD PRESSURE: 151 MMHG | DIASTOLIC BLOOD PRESSURE: 67 MMHG | WEIGHT: 222 LBS | OXYGEN SATURATION: 95 % | HEIGHT: 64 IN | BODY MASS INDEX: 37.9 KG/M2

## 2021-12-16 DIAGNOSIS — R59.0 AXILLARY ADENOPATHY: Primary | ICD-10-CM

## 2021-12-16 DIAGNOSIS — C50.111 MALIGNANT NEOPLASM OF CENTRAL PORTION OF RIGHT FEMALE BREAST, UNSPECIFIED ESTROGEN RECEPTOR STATUS: ICD-10-CM

## 2021-12-16 PROCEDURE — 99215 OFFICE O/P EST HI 40 MIN: CPT | Mod: PBBFAC | Performed by: STUDENT IN AN ORGANIZED HEALTH CARE EDUCATION/TRAINING PROGRAM

## 2021-12-16 PROCEDURE — 99999 PR PBB SHADOW E&M-EST. PATIENT-LVL V: ICD-10-PCS | Mod: PBBFAC,,, | Performed by: STUDENT IN AN ORGANIZED HEALTH CARE EDUCATION/TRAINING PROGRAM

## 2021-12-16 PROCEDURE — 99214 OFFICE O/P EST MOD 30 MIN: CPT | Mod: S$PBB,,, | Performed by: STUDENT IN AN ORGANIZED HEALTH CARE EDUCATION/TRAINING PROGRAM

## 2021-12-16 PROCEDURE — 99214 PR OFFICE/OUTPT VISIT, EST, LEVL IV, 30-39 MIN: ICD-10-PCS | Mod: S$PBB,,, | Performed by: STUDENT IN AN ORGANIZED HEALTH CARE EDUCATION/TRAINING PROGRAM

## 2021-12-16 PROCEDURE — 99999 PR PBB SHADOW E&M-EST. PATIENT-LVL V: CPT | Mod: PBBFAC,,, | Performed by: STUDENT IN AN ORGANIZED HEALTH CARE EDUCATION/TRAINING PROGRAM

## 2021-12-20 ENCOUNTER — HOSPITAL ENCOUNTER (OUTPATIENT)
Dept: RADIOLOGY | Facility: HOSPITAL | Age: 81
Discharge: HOME | End: 2021-12-20
Attending: STUDENT IN AN ORGANIZED HEALTH CARE EDUCATION/TRAINING PROGRAM
Payer: MEDICARE

## 2021-12-20 DIAGNOSIS — R59.0 AXILLARY ADENOPATHY: ICD-10-CM

## 2021-12-20 DIAGNOSIS — C50.111 MALIGNANT NEOPLASM OF CENTRAL PORTION OF RIGHT FEMALE BREAST, UNSPECIFIED ESTROGEN RECEPTOR STATUS: ICD-10-CM

## 2021-12-20 PROCEDURE — 76882 US LMTD JT/FCL EVL NVASC XTR: CPT | Mod: TC,RT

## 2021-12-27 ENCOUNTER — TELEPHONE (OUTPATIENT)
Dept: SURGERY | Facility: CLINIC | Age: 81
End: 2021-12-27
Payer: MEDICARE

## 2022-01-10 ENCOUNTER — OFFICE VISIT (OUTPATIENT)
Dept: SURGERY | Facility: CLINIC | Age: 82
End: 2022-01-10
Payer: MEDICARE

## 2022-01-10 VITALS
DIASTOLIC BLOOD PRESSURE: 76 MMHG | WEIGHT: 203 LBS | BODY MASS INDEX: 34.66 KG/M2 | HEIGHT: 64 IN | OXYGEN SATURATION: 96 % | HEART RATE: 73 BPM | SYSTOLIC BLOOD PRESSURE: 184 MMHG

## 2022-01-10 DIAGNOSIS — C50.111 MALIGNANT NEOPLASM OF CENTRAL PORTION OF RIGHT BREAST IN FEMALE, ESTROGEN RECEPTOR POSITIVE: Primary | ICD-10-CM

## 2022-01-10 DIAGNOSIS — Z01.818 PRE-OP TESTING: ICD-10-CM

## 2022-01-10 DIAGNOSIS — Z17.0 MALIGNANT NEOPLASM OF CENTRAL PORTION OF RIGHT BREAST IN FEMALE, ESTROGEN RECEPTOR POSITIVE: Primary | ICD-10-CM

## 2022-01-10 DIAGNOSIS — Z97.8 CHRONIC INDWELLING FOLEY CATHETER: ICD-10-CM

## 2022-01-10 PROCEDURE — 99999 PR PBB SHADOW E&M-EST. PATIENT-LVL V: ICD-10-PCS | Mod: PBBFAC,,, | Performed by: STUDENT IN AN ORGANIZED HEALTH CARE EDUCATION/TRAINING PROGRAM

## 2022-01-10 PROCEDURE — 99214 OFFICE O/P EST MOD 30 MIN: CPT | Mod: S$PBB,,, | Performed by: STUDENT IN AN ORGANIZED HEALTH CARE EDUCATION/TRAINING PROGRAM

## 2022-01-10 PROCEDURE — 99215 OFFICE O/P EST HI 40 MIN: CPT | Mod: PBBFAC | Performed by: STUDENT IN AN ORGANIZED HEALTH CARE EDUCATION/TRAINING PROGRAM

## 2022-01-10 PROCEDURE — 99214 PR OFFICE/OUTPT VISIT, EST, LEVL IV, 30-39 MIN: ICD-10-PCS | Mod: S$PBB,,, | Performed by: STUDENT IN AN ORGANIZED HEALTH CARE EDUCATION/TRAINING PROGRAM

## 2022-01-10 PROCEDURE — 99999 PR PBB SHADOW E&M-EST. PATIENT-LVL V: CPT | Mod: PBBFAC,,, | Performed by: STUDENT IN AN ORGANIZED HEALTH CARE EDUCATION/TRAINING PROGRAM

## 2022-01-10 RX ORDER — INSULIN GLARGINE 100 [IU]/ML
24 INJECTION, SOLUTION SUBCUTANEOUS NIGHTLY
Status: ON HOLD | COMMUNITY
End: 2022-01-28 | Stop reason: SDUPTHER

## 2022-01-11 RX ORDER — SODIUM CHLORIDE 9 MG/ML
INJECTION, SOLUTION INTRAVENOUS CONTINUOUS
Status: CANCELLED | OUTPATIENT
Start: 2022-01-11

## 2022-01-13 ENCOUNTER — HOSPITAL ENCOUNTER (EMERGENCY)
Facility: HOSPITAL | Age: 82
Discharge: HOME | End: 2022-01-14
Attending: EMERGENCY MEDICINE
Payer: MEDICARE

## 2022-01-13 DIAGNOSIS — R06.82 TACHYPNEA: ICD-10-CM

## 2022-01-13 DIAGNOSIS — R53.83 MALAISE AND FATIGUE: Primary | ICD-10-CM

## 2022-01-13 DIAGNOSIS — R11.0 NAUSEA: ICD-10-CM

## 2022-01-13 DIAGNOSIS — R53.81 MALAISE AND FATIGUE: Primary | ICD-10-CM

## 2022-01-13 LAB
ALBUMIN SERPL BCP-MCNC: 2.9 G/DL (ref 3.5–5.2)
ALP SERPL-CCNC: 179 U/L (ref 55–135)
ALT SERPL W/O P-5'-P-CCNC: 26 U/L (ref 10–44)
ANION GAP SERPL CALC-SCNC: 6 MMOL/L (ref 8–16)
AST SERPL-CCNC: 17 U/L (ref 10–40)
BACTERIA #/AREA URNS HPF: NEGATIVE /HPF
BASOPHILS # BLD AUTO: 0.02 K/UL (ref 0–0.2)
BASOPHILS NFR BLD: 0.2 % (ref 0–1.9)
BILIRUB SERPL-MCNC: 0.3 MG/DL (ref 0.1–1)
BILIRUB UR QL STRIP: NEGATIVE
BUN SERPL-MCNC: 25 MG/DL (ref 8–23)
CALCIUM SERPL-MCNC: 8.9 MG/DL (ref 8.7–10.5)
CHLORIDE SERPL-SCNC: 105 MMOL/L (ref 95–110)
CLARITY UR: CLEAR
CO2 SERPL-SCNC: 26 MMOL/L (ref 23–29)
COLOR UR: YELLOW
CREAT SERPL-MCNC: 1.1 MG/DL (ref 0.5–1.4)
CTP QC/QA: YES
CTP QC/QA: YES
DIFFERENTIAL METHOD: ABNORMAL
EOSINOPHIL # BLD AUTO: 0.3 K/UL (ref 0–0.5)
EOSINOPHIL NFR BLD: 3.3 % (ref 0–8)
ERYTHROCYTE [DISTWIDTH] IN BLOOD BY AUTOMATED COUNT: 14.6 % (ref 11.5–14.5)
EST. GFR  (AFRICAN AMERICAN): 55.2 ML/MIN/1.73 M^2
EST. GFR  (NON AFRICAN AMERICAN): 47.9 ML/MIN/1.73 M^2
GLUCOSE SERPL-MCNC: 183 MG/DL (ref 70–110)
GLUCOSE UR QL STRIP: NEGATIVE
HCT VFR BLD AUTO: 37.2 % (ref 37–48.5)
HGB BLD-MCNC: 12 G/DL (ref 12–16)
HGB UR QL STRIP: NEGATIVE
HYALINE CASTS #/AREA URNS LPF: 2 /LPF
IMM GRANULOCYTES # BLD AUTO: 0.04 K/UL (ref 0–0.04)
IMM GRANULOCYTES NFR BLD AUTO: 0.4 % (ref 0–0.5)
KETONES UR QL STRIP: NEGATIVE
LACTATE SERPL-SCNC: 1.9 MMOL/L (ref 0.5–2.2)
LEUKOCYTE ESTERASE UR QL STRIP: ABNORMAL
LIPASE SERPL-CCNC: 96 U/L (ref 23–300)
LYMPHOCYTES # BLD AUTO: 0.2 K/UL (ref 1–4.8)
LYMPHOCYTES NFR BLD: 2.4 % (ref 18–48)
MAGNESIUM SERPL-MCNC: 1.8 MG/DL (ref 1.6–2.6)
MCH RBC QN AUTO: 27.2 PG (ref 27–31)
MCHC RBC AUTO-ENTMCNC: 32.3 G/DL (ref 32–36)
MCV RBC AUTO: 84 FL (ref 82–98)
MICROSCOPIC COMMENT: ABNORMAL
MONOCYTES # BLD AUTO: 0.2 K/UL (ref 0.3–1)
MONOCYTES NFR BLD: 2.5 % (ref 4–15)
NEUTROPHILS # BLD AUTO: 8.8 K/UL (ref 1.8–7.7)
NEUTROPHILS NFR BLD: 91.2 % (ref 38–73)
NITRITE UR QL STRIP: NEGATIVE
NRBC BLD-RTO: 0 /100 WBC
PH UR STRIP: 6 [PH] (ref 5–8)
PLATELET # BLD AUTO: 278 K/UL (ref 150–450)
PMV BLD AUTO: 10 FL (ref 9.2–12.9)
POC MOLECULAR INFLUENZA A AGN: NEGATIVE
POC MOLECULAR INFLUENZA B AGN: NEGATIVE
POTASSIUM SERPL-SCNC: 3.9 MMOL/L (ref 3.5–5.1)
PROT SERPL-MCNC: 7.5 G/DL (ref 6–8.4)
PROT UR QL STRIP: ABNORMAL
RBC # BLD AUTO: 4.41 M/UL (ref 4–5.4)
RBC #/AREA URNS HPF: 4 /HPF (ref 0–4)
SARS-COV-2 RDRP RESP QL NAA+PROBE: NEGATIVE
SODIUM SERPL-SCNC: 137 MMOL/L (ref 136–145)
SP GR UR STRIP: 1.02 (ref 1–1.03)
SQUAMOUS #/AREA URNS HPF: 2 /HPF
URN SPEC COLLECT METH UR: ABNORMAL
UROBILINOGEN UR STRIP-ACNC: NEGATIVE EU/DL
WBC # BLD AUTO: 9.61 K/UL (ref 3.9–12.7)
WBC #/AREA URNS HPF: 18 /HPF (ref 0–5)

## 2022-01-13 PROCEDURE — 25000003 PHARM REV CODE 250: Performed by: EMERGENCY MEDICINE

## 2022-01-13 PROCEDURE — U0002 COVID-19 LAB TEST NON-CDC: HCPCS | Performed by: EMERGENCY MEDICINE

## 2022-01-13 PROCEDURE — 85025 COMPLETE CBC W/AUTO DIFF WBC: CPT | Performed by: EMERGENCY MEDICINE

## 2022-01-13 PROCEDURE — 93010 EKG 12-LEAD: ICD-10-PCS | Mod: ,,, | Performed by: INTERNAL MEDICINE

## 2022-01-13 PROCEDURE — 83735 ASSAY OF MAGNESIUM: CPT | Performed by: EMERGENCY MEDICINE

## 2022-01-13 PROCEDURE — 63600175 PHARM REV CODE 636 W HCPCS: Performed by: EMERGENCY MEDICINE

## 2022-01-13 PROCEDURE — 83690 ASSAY OF LIPASE: CPT | Performed by: EMERGENCY MEDICINE

## 2022-01-13 PROCEDURE — 81000 URINALYSIS NONAUTO W/SCOPE: CPT | Performed by: EMERGENCY MEDICINE

## 2022-01-13 PROCEDURE — 99285 EMERGENCY DEPT VISIT HI MDM: CPT | Mod: 25

## 2022-01-13 PROCEDURE — 87040 BLOOD CULTURE FOR BACTERIA: CPT | Performed by: EMERGENCY MEDICINE

## 2022-01-13 PROCEDURE — 80053 COMPREHEN METABOLIC PANEL: CPT | Performed by: EMERGENCY MEDICINE

## 2022-01-13 PROCEDURE — 96374 THER/PROPH/DIAG INJ IV PUSH: CPT

## 2022-01-13 PROCEDURE — 93010 ELECTROCARDIOGRAM REPORT: CPT | Mod: ,,, | Performed by: INTERNAL MEDICINE

## 2022-01-13 PROCEDURE — 83605 ASSAY OF LACTIC ACID: CPT | Performed by: EMERGENCY MEDICINE

## 2022-01-13 PROCEDURE — 36415 COLL VENOUS BLD VENIPUNCTURE: CPT | Performed by: EMERGENCY MEDICINE

## 2022-01-13 PROCEDURE — 93005 ELECTROCARDIOGRAM TRACING: CPT

## 2022-01-13 PROCEDURE — 87086 URINE CULTURE/COLONY COUNT: CPT | Performed by: EMERGENCY MEDICINE

## 2022-01-13 PROCEDURE — 96361 HYDRATE IV INFUSION ADD-ON: CPT

## 2022-01-13 RX ORDER — ONDANSETRON 4 MG/1
8 TABLET, FILM COATED ORAL EVERY 6 HOURS PRN
Qty: 8 TABLET | Refills: 0 | Status: SHIPPED | OUTPATIENT
Start: 2022-01-13 | End: 2022-01-20

## 2022-01-13 RX ORDER — ONDANSETRON 2 MG/ML
8 INJECTION INTRAMUSCULAR; INTRAVENOUS
Status: COMPLETED | OUTPATIENT
Start: 2022-01-13 | End: 2022-01-13

## 2022-01-13 RX ORDER — ACETAMINOPHEN 500 MG
1000 TABLET ORAL
Status: COMPLETED | OUTPATIENT
Start: 2022-01-13 | End: 2022-01-13

## 2022-01-13 RX ADMIN — SODIUM CHLORIDE 1000 ML: 0.9 INJECTION, SOLUTION INTRAVENOUS at 11:01

## 2022-01-13 RX ADMIN — ACETAMINOPHEN 1000 MG: 500 TABLET ORAL at 10:01

## 2022-01-13 RX ADMIN — ONDANSETRON HYDROCHLORIDE 8 MG: 2 SOLUTION INTRAMUSCULAR; INTRAVENOUS at 10:01

## 2022-01-14 VITALS
TEMPERATURE: 98 F | RESPIRATION RATE: 18 BRPM | DIASTOLIC BLOOD PRESSURE: 72 MMHG | SYSTOLIC BLOOD PRESSURE: 164 MMHG | HEART RATE: 88 BPM | BODY MASS INDEX: 37.76 KG/M2 | OXYGEN SATURATION: 92 % | WEIGHT: 220 LBS

## 2022-01-14 NOTE — ED NOTES
Patient discharged home. Patient advised to f/u with pcp and return to ER if symptoms worsen. Patient verbalized understanding. GCS 15, pt voices no concerns at this time.

## 2022-01-14 NOTE — DISCHARGE INSTRUCTIONS
Try to rest, stay hydrated, and treat your symptoms at home.  If you feel like your symptoms are progressing, getting worse, or becoming more concerning please return to the ER for re-evaluation.

## 2022-01-14 NOTE — ED PROVIDER NOTES
EMERGENCY DEPARTMENT HISTORY AND PHYSICAL EXAM          Date: 1/13/2022   Patient Name: Kelsea Cadet       History of Presenting Illness           Chief Complaint   Patient presents with    Fatigue     Pt c/o weakness, nausea, vomiting, and ams beginning today. Pt has Land catheter and was giving antibiotics yesterday for possible UTI.         History Provided By: Patient and Daughter    0423   Kelsea Cadet is a 81 y.o. female with PMHX of hypertension, hyperlipidemia, ?  Neurogenic bladder with indwelling Land for the past several years, recent diagnosis of right-sided breast cancer without Mets, previous diagnosis of left-sided breast cancer and colon cancer who presents to the emergency department C/O malaise.    Patient in dire report flu-like illness that began acutely this afternoon.  Patient complaining of pain all over, chills, nausea and dry heaves.  Patient also reports foul-smelling urine in Land.  Daughter states patient has a history of recurrent UTIs and sepsis from UTIs.  She has had a Land in for the past 2 weeks it is typically changed monthly.  Was seen at her doctor's offices yesterday and saw her urologist yesterday who gave her Macrobid and did not exchange the Land.    No fever however daughter notes that patient rarely has a fever and her symptoms today are similar to her prior symptoms when she had urinary tract infections and sepsis      PCP: Merry Leary MD        Current Facility-Administered Medications   Medication Dose Route Frequency Provider Last Rate Last Admin    sodium chloride 0.9% bolus 1,000 mL  1,000 mL Intravenous ED 1 Time Reynold Crooks  mL/hr at 01/13/22 2305 1,000 mL at 01/13/22 2305     Current Outpatient Medications   Medication Sig Dispense Refill    albuterol (PROVENTIL/VENTOLIN HFA) 90 mcg/actuation inhaler Inhale 2 puffs into the lungs every 6 hours as needed for wheezing 18 g 11    ALLERGY RELIEF, LORATADINE, 10 mg tablet TAKE 1  TABLET BY MOUTH ONCE DAILY AS NEEDED FOR ALLERGIES 90 tablet 0    aspirin (ECOTRIN) 81 MG EC tablet Take 81 mg by mouth once daily.      atorvastatin (LIPITOR) 80 MG tablet Take 1 tablet by mouth in the evening 90 tablet 0    blood sugar diagnostic (ONETOUCH ULTRA BLUE TEST STRIP) Strp USE 1 STRIP TO CHECK GLUCOSE ONCE DAILY. DX Code: E11.9 100 strip 11    blood-glucose meter Misc 1 Device by Misc.(Non-Drug; Combo Route) route once daily. One Touch Dx. Code:E11.9 1 each 0    brivaracetam (BRIVIACT) 100 mg Tab Take 50 mg by mouth every evening.       carvediloL (COREG) 6.25 MG tablet Take 1 tablet (6.25 mg total) by mouth 2 (two) times daily with meals. 180 tablet 3    cholecalciferol, vitamin D3, (VITAMIN D3) 2,000 unit Tab Take 1 tablet (2,000 Units total) by mouth once daily. (Patient taking differently: Take 1 tablet by mouth twice a week.)      clobetasoL (TEMOVATE) 0.05 % cream Apply twice a day for 2 weeks then nightly for 2 months. May decrease to three nights a week after that. 60 g 2    co-enzyme Q-10 30 mg capsule Take 1 capsule (30 mg total) by mouth every evening.      dicyclomine (BENTYL) 20 mg tablet       docusate sodium (COLACE) 100 MG capsule Take 100 mg by mouth daily as needed.       FLUoxetine 10 MG capsule Take 1 capsule (10 mg total) by mouth once daily. 90 capsule 3    furosemide (LASIX) 40 MG tablet Take 1 tablet (40 mg total) by mouth once daily. 30 tablet 1    gabapentin (NEURONTIN) 600 MG tablet Take 1 tablet (600 mg total) by mouth 3 (three) times daily. 90 tablet 11    hydroCHLOROthiazide (HYDRODIURIL) 25 MG tablet Take 1 tablet by mouth once daily 90 tablet 3    insulin (LANTUS SOLOSTAR U-100 INSULIN) glargine 100 units/mL (3mL) SubQ pen Inject 24 mLs into the skin once daily.      lacosamide (VIMPAT) 100 mg Tab Take 100 mg by mouth every 12 (twelve) hours.       lancets (ONETOUCH DELICA PLUS LANCET) 33 gauge Misc USE 1 LANCET TO CHECK GLUCOSE ONCE DAILY, Dx:  E11.9  "100 each 11    LEVEMIR FLEXTOUCH U-100 INSULN 100 unit/mL (3 mL) InPn pen INJECT 22 UNITS SUBCUTANEOUSLY IN THE EVENING 15 mL 0    linaGLIPtin (TRADJENTA) 5 mg Tab tablet Take 1 tablet (5 mg total) by mouth once daily. 90 tablet 0    lisinopriL (PRINIVIL,ZESTRIL) 20 MG tablet TAKE 1 TABLET BY MOUTH ONCE DAILY IN THE EVENING 90 tablet 3    loperamide (IMODIUM) 2 mg capsule Take 2 mg by mouth 4 (four) times daily as needed for Diarrhea.      miconazole NITRATE 2 % (MICOTIN) 2 % top powder Apply topically as needed for Itching.      mucus clearing device (AEROBIKA OSCILLATING PEP SYSTM MISC) 12 puffs by Misc.(Non-Drug; Combo Route) route daily as needed.      multivitamin capsule Take 1 capsule by mouth once daily.      nebulizer accessories Kit Use as directed 1 kit 5    nystatin (MYCOSTATIN) cream Apply topically 2 (two) times daily. Apply to affected area 30 g 1    ondansetron (ZOFRAN) 4 MG tablet Take 2 tablets (8 mg total) by mouth every 6 (six) hours as needed for Nausea. 8 tablet 0    ondansetron (ZOFRAN-ODT) 4 MG TbDL Take 1 tablet (4 mg total) by mouth every 8 (eight) hours as needed (Nausea and vomiting). 12 tablet 0    pen needle, diabetic (ULTICARE PEN NEEDLE) 31 gauge x 1/4" Ndle USE 1 PEN NEEDLE  AT BEDTIME, Dx:  E11.9 100 each 11    sulfamethoxazole-trimethoprim 800-160mg (BACTRIM DS) 800-160 mg Tab Take 1 tablet by mouth 2 (two) times daily. 10 tablet 0    traMADoL (ULTRAM) 50 mg tablet Take 1 tablet (50 mg total) by mouth every 12 (twelve) hours as needed for Pain. 60 tablet 0           Past History     Past Medical History:   Past Medical History:   Diagnosis Date    Arthritis     Asthmatic bronchitis     Breast cancer 1996    bilateral - radiation pellets for tx    Colon cancer 1963    Congestive heart failure (CHF)     Coronary artery disease     Depression     Diabetes mellitus, type 2     Encounter for blood transfusion     Environmental and seasonal allergies     Hard of " hearing     Hyperlipidemia     Hypertension     IBS (irritable bowel syndrome)     Kidney disease     Neuropathy     Presence of indwelling Land catheter     Shingles     Stroke 1993    tia    UTI (urinary tract infection)     with sepsis    Vitamin D deficiency     Wound, open, buttock     being treated    Yeast infection         Past Surgical History:   Past Surgical History:   Procedure Laterality Date    BLADDER SUSPENSION      BREAST BIOPSY Bilateral 1996    BREAST SURGERY      lumpectomy, isotopes    CHOLECYSTECTOMY      had gal bladder removed    COLECTOMY      had part of colon removed    CYSTOSCOPY W/ RETROGRADES Bilateral 12/19/2019    Procedure: CYSTOSCOPY, WITH RETROGRADE PYELOGRAM;  Surgeon: Prasad Rocha MD;  Location: [facility] OR;  Service: Urology;  Laterality: Bilateral;    DILATION OF URETHRA N/A 12/19/2019    Procedure: DILATION, URETHRA;  Surgeon: Prasad Rocha MD;  Location: [facility] OR;  Service: Urology;  Laterality: N/A;    HYSTERECTOMY      LEFT HEART CATHETERIZATION Left 11/27/2019    Procedure: Left heart cath;  Surgeon: Urban Paiz MD;  Location[facility] CATH LAB;  Service: Cardiology;  Laterality: Left;    OOPHORECTOMY          Family History:   Family History   Problem Relation Age of Onset    Cancer Mother         Bone    Bone cancer Mother     Breast cancer Mother     Cancer Father         Bone    Bone cancer Father     Breast cancer Sister     Bell's palsy Sister     Breast cancer Sister         Social History:   Social History     Tobacco Use    Smoking status: Never Smoker    Smokeless tobacco: Never Used   Substance Use Topics    Alcohol use: Not Currently     Comment: rare    Drug use: Never        Allergies:   Review of patient's allergies indicates:   Allergen Reactions    Codeine     Latex, natural rubber           Review of Systems   Review of Systems   Constitutional: Positive for activity change, appetite change, chills and  fatigue. Negative for fever.   Respiratory: Negative for cough, chest tightness and shortness of breath.    Cardiovascular: Positive for leg swelling (chronic). Negative for chest pain.   Gastrointestinal: Positive for nausea. Negative for abdominal pain, diarrhea and vomiting.   Genitourinary: Negative for decreased urine volume.   Musculoskeletal: Positive for arthralgias, back pain, myalgias and neck pain.   Skin: Negative for rash and wound.   Neurological: Negative for facial asymmetry, weakness and light-headedness.   All other systems reviewed and are negative.               Physical Exam     Vitals:    01/13/22 2208 01/13/22 2211 01/13/22 2213   BP:  (!) 154/70    Pulse: 100     Resp: (!) 22     Temp:  98.2 °F (36.8 °C)    TempSrc:  Oral    SpO2: (!) 92%     Weight:   99.8 kg (220 lb)      Physical Exam  Vitals and nursing note reviewed.   Constitutional:       General: She is not in acute distress.     Appearance: Normal appearance. She is overweight. She is not ill-appearing.   HENT:      Head: Normocephalic and atraumatic.      Nose: Nose normal. No congestion or rhinorrhea.      Mouth/Throat:      Mouth: Mucous membranes are moist.   Eyes:      Extraocular Movements: Extraocular movements intact.      Pupils: Pupils are equal, round, and reactive to light.   Cardiovascular:      Rate and Rhythm: Normal rate and regular rhythm.      Pulses: Normal pulses.      Heart sounds: Normal heart sounds.   Pulmonary:      Effort: Pulmonary effort is normal. No respiratory distress.      Breath sounds: Normal breath sounds.   Abdominal:      General: Bowel sounds are normal. There is no distension.      Palpations: Abdomen is soft.      Tenderness: There is no abdominal tenderness. There is no guarding or rebound.   Musculoskeletal:         General: No tenderness or deformity. Normal range of motion.      Cervical back: Normal range of motion.      Right lower leg: Edema present.      Left lower leg: Edema present.    Skin:     General: Skin is warm and dry.      Capillary Refill: Capillary refill takes less than 2 seconds.      Findings: No rash.   Neurological:      General: No focal deficit present.      Mental Status: She is alert and oriented to person, place, and time. Mental status is at baseline.   Psychiatric:         Mood and Affect: Mood normal.         Behavior: Behavior normal.              Diagnostic Study Results      Labs -   Recent Results (from the past 12 hour(s))   Comprehensive metabolic panel    Collection Time: 01/13/22 10:20 PM   Result Value Ref Range    Sodium 137 136 - 145 mmol/L    Potassium 3.9 3.5 - 5.1 mmol/L    Chloride 105 95 - 110 mmol/L    CO2 26 23 - 29 mmol/L    Glucose 183 (H) 70 - 110 mg/dL    BUN 25 (H) 8 - 23 mg/dL    Creatinine 1.1 0.5 - 1.4 mg/dL    Calcium 8.9 8.7 - 10.5 mg/dL    Total Protein 7.5 6.0 - 8.4 g/dL    Albumin 2.9 (L) 3.5 - 5.2 g/dL    Total Bilirubin 0.3 0.1 - 1.0 mg/dL    Alkaline Phosphatase 179 (H) 55 - 135 U/L    AST 17 10 - 40 U/L    ALT 26 10 - 44 U/L    Anion Gap 6 (L) 8 - 16 mmol/L    eGFR if African American 55.2 (A) >60 mL/min/1.73 m^2    eGFR if non  47.9 (A) >60 mL/min/1.73 m^2   CBC auto differential    Collection Time: 01/13/22 10:20 PM   Result Value Ref Range    WBC 9.61 3.90 - 12.70 K/uL    RBC 4.41 4.00 - 5.40 M/uL    Hemoglobin 12.0 12.0 - 16.0 g/dL    Hematocrit 37.2 37.0 - 48.5 %    MCV 84 82 - 98 fL    MCH 27.2 27.0 - 31.0 pg    MCHC 32.3 32.0 - 36.0 g/dL    RDW 14.6 (H) 11.5 - 14.5 %    Platelets 278 150 - 450 K/uL    MPV 10.0 9.2 - 12.9 fL    Immature Granulocytes 0.4 0.0 - 0.5 %    Gran # (ANC) 8.8 (H) 1.8 - 7.7 K/uL    Immature Grans (Abs) 0.04 0.00 - 0.04 K/uL    Lymph # 0.2 (L) 1.0 - 4.8 K/uL    Mono # 0.2 (L) 0.3 - 1.0 K/uL    Eos # 0.3 0.0 - 0.5 K/uL    Baso # 0.02 0.00 - 0.20 K/uL    nRBC 0 0 /100 WBC    Gran % 91.2 (H) 38.0 - 73.0 %    Lymph % 2.4 (L) 18.0 - 48.0 %    Mono % 2.5 (L) 4.0 - 15.0 %    Eosinophil % 3.3 0.0 -  8.0 %    Basophil % 0.2 0.0 - 1.9 %    Differential Method Automated    Magnesium    Collection Time: 01/13/22 10:20 PM   Result Value Ref Range    Magnesium 1.8 1.6 - 2.6 mg/dL   Lipase    Collection Time: 01/13/22 10:20 PM   Result Value Ref Range    Lipase Result 96 23 - 300 U/L   POCT COVID-19 Rapid Screening    Collection Time: 01/13/22 10:29 PM   Result Value Ref Range    POC Rapid COVID Negative Negative     Acceptable Yes    Urinalysis, Reflex to Urine Culture Urine, Clean Catch    Collection Time: 01/13/22 10:49 PM    Specimen: Urine   Result Value Ref Range    Specimen UA Urine, Clean Catch     Color, UA Yellow Yellow, Straw, Neli    Appearance, UA Clear Clear    pH, UA 6.0 5.0 - 8.0    Specific Gravity, UA 1.020 1.005 - 1.030    Protein, UA Trace (A) Negative    Glucose, UA Negative Negative    Ketones, UA Negative Negative    Bilirubin (UA) Negative Negative    Occult Blood UA Negative Negative    Nitrite, UA Negative Negative    Urobilinogen, UA Negative <2.0 EU/dL    Leukocytes, UA 1+ (A) Negative   Urinalysis Microscopic    Collection Time: 01/13/22 10:49 PM   Result Value Ref Range    RBC, UA 4 0 - 4 /hpf    WBC, UA 18 (H) 0 - 5 /hpf    Bacteria Negative None-Occ /hpf    Squam Epithel, UA 2 /hpf    Hyaline Casts, UA 2 (A) 0-1/lpf /lpf    Microscopic Comment SEE COMMENT    Lactic acid, plasma #1    Collection Time: 01/13/22 10:52 PM   Result Value Ref Range    Lactate (Lactic Acid) 1.9 0.5 - 2.2 mmol/L   POCT Influenza A/B Molecular    Collection Time: 01/13/22 11:41 PM   Result Value Ref Range    POC Molecular Influenza A Ag Negative Negative, Not Reported    POC Molecular Influenza B Ag Negative Negative, Not Reported     Acceptable Yes         Radiologic Studies -    X-Ray Chest 1 View    (Results Pending)        Medications given in the ED-   Medications   sodium chloride 0.9% bolus 1,000 mL (1,000 mLs Intravenous New Bag 1/13/22 6155)   acetaminophen tablet 1,000 mg  (1,000 mg Oral Given 1/13/22 2259)   ondansetron injection 8 mg (8 mg Intravenous Given 1/13/22 2259)           Medical Decision Making    I am the first provider for this patient.     I reviewed the vital signs, available nursing notes, past medical history, past surgical history, family history and social history.     Vital Signs-Reviewed the patient's vital signs.     Pulse Oximetry Analysis and Interpretation:    92% on Room Air, low normal    EKG interpretation: (Preliminary)   Interpreted by Reynold Crooks MD at 2254   Sinus tachycardia rate of 101, right bundle branch block pattern, no ST elevation, normal axis, nonspecific EKG     CXR  interpretation: (Preliminary)   CXR read by Dr. Reynold Crooks      Normal cardiac silhouette, no pneumothorax, no focal consolidation, no acute findings    Records Reviewed: Old medical records.  Previous electrocardiograms.  Nursing notes.  Previous radiology studies.    Provider Notes (Medical Decision Making): Kelsea Cadet is a 81 y.o. female here with flu-like illness and malaise with in the setting of recurrent UTI infections.  COVID test negative.  Patient initially slightly tachycardic and tachypneic so sepsis protocol started.  Patient nontoxic appearing.  Complaining of body aches and chills all over.  Will plan on fluids, we have replaced her Land and sent a urine culture, labs, and sepsis workup      Procedures:   Procedures      ED Course:    11:52 PM  Patient remains hemodynamically stable.  Symptoms have improved.  Still having mild nausea but has not thrown up in the ER.  Workup overall has been unremarkable.  CBC normal.  Chemistry demonstrates mild hyperglycemia and mildly elevated alk-phos otherwise normal electrolytes and normal renal function.  COVID and flu were both negative.  Her urine was not consistent with UTI.    Patient not not having any anginal-type symptoms or chest pain.  She denies shortness of breath.  She is not having a pleurisy  or PE symptoms.    Workup is more consistent with a viral syndrome type of picture due to her myalgias and nausea.  Advised family to stay hydrated and take symptomatic medications at home.  Blood cultures were sent if a them come back positive we will call the patient back in.  Her lactate was normal.  Overall patient is safe for discharge.  Return precautions given to patient and daughter and they are comfortable with being discharged home.           Diagnosis and Disposition     Critical Care:      DISCHARGE NOTE:       Kelsea Cadet's  results have been reviewed with her.  She has been counseled regarding her diagnosis, treatment, and plan.  She verbally conveys understanding and agreement of the signs, symptoms, diagnosis, treatment and prognosis and additionally agrees to follow up as discussed.  She also agrees with the care-plan and conveys that all of her questions have been answered.  I have also provided discharge instructions for her that include: educational information regarding their diagnosis and treatment, and list of reasons why they would want to return to the ED prior to their follow-up appointment, should her condition change. She has been provided with education for proper emergency department utilization.         CLINICAL IMPRESSION:        1. Malaise and fatigue    2. Tachypnea    3. Nausea              PLAN:   1. Discharge Home  2.      Medication List      START taking these medications    ondansetron 4 MG tablet  Commonly known as: ZOFRAN  Take 2 tablets (8 mg total) by mouth every 6 (six) hours as needed for Nausea.        ASK your doctor about these medications    AEROBIKA OSCILLATING PEP SYSTM MISC     albuterol 90 mcg/actuation inhaler  Commonly known as: PROVENTIL/VENTOLIN HFA  Inhale 2 puffs into the lungs every 6 hours as needed for wheezing     ALLERGY RELIEF (LORATADINE) 10 mg tablet  Generic drug: loratadine  TAKE 1 TABLET BY MOUTH ONCE DAILY AS NEEDED FOR ALLERGIES      aspirin 81 MG EC tablet  Commonly known as: ECOTRIN     atorvastatin 80 MG tablet  Commonly known as: LIPITOR  Take 1 tablet by mouth in the evening     blood sugar diagnostic Strp  Commonly known as: ONETOUCH ULTRA BLUE TEST STRIP  USE 1 STRIP TO CHECK GLUCOSE ONCE DAILY. DX Code: E11.9     blood-glucose meter Misc  1 Device by Misc.(Non-Drug; Combo Route) route once daily. One Touch Dx. Code:E11.9     brivaracetam 100 mg Tab  Commonly known as: Briviact     carvediloL 6.25 MG tablet  Commonly known as: COREG  Take 1 tablet (6.25 mg total) by mouth 2 (two) times daily with meals.     cholecalciferol (vitamin D3) 50 mcg (2,000 unit) Tab  Commonly known as: VITAMIN D3  Take 1 tablet (2,000 Units total) by mouth once daily.     clobetasoL 0.05 % cream  Commonly known as: TEMOVATE  Apply twice a day for 2 weeks then nightly for 2 months. May decrease to three nights a week after that.     co-enzyme Q-10 30 mg capsule  Take 1 capsule (30 mg total) by mouth every evening.     dicyclomine 20 mg tablet  Commonly known as: BENTYL     docusate sodium 100 MG capsule  Commonly known as: COLACE     FLUoxetine 10 MG capsule  Take 1 capsule (10 mg total) by mouth once daily.     furosemide 40 MG tablet  Commonly known as: LASIX  Take 1 tablet (40 mg total) by mouth once daily.     gabapentin 600 MG tablet  Commonly known as: NEURONTIN  Take 1 tablet (600 mg total) by mouth 3 (three) times daily.     hydroCHLOROthiazide 25 MG tablet  Commonly known as: HYDRODIURIL  Take 1 tablet by mouth once daily     lacosamide 100 mg Tab  Commonly known as: VIMPAT     lancets 33 gauge Misc  Commonly known as: ONETOUCH DELICA PLUS LANCET  USE 1 LANCET TO CHECK GLUCOSE ONCE DAILY, Dx:  E11.9     LANTUS SOLOSTAR U-100 INSULIN glargine 100 units/mL (3mL) SubQ pen  Generic drug: insulin     LEVEMIR FLEXTOUCH U-100 INSULN 100 unit/mL (3 mL) Inpn pen  Generic drug: insulin detemir U-100  INJECT 22 UNITS SUBCUTANEOUSLY IN THE EVENING     lisinopriL  "20 MG tablet  Commonly known as: PRINIVIL,ZESTRIL  TAKE 1 TABLET BY MOUTH ONCE DAILY IN THE EVENING     loperamide 2 mg capsule  Commonly known as: IMODIUM     miconazole NITRATE 2 % 2 % top powder  Commonly known as: MICOTIN     multivitamin capsule     nebulizer accessories Kit  Use as directed     nystatin cream  Commonly known as: MYCOSTATIN  Apply topically 2 (two) times daily. Apply to affected area     ondansetron 4 MG Tbdl  Commonly known as: ZOFRAN-ODT  Take 1 tablet (4 mg total) by mouth every 8 (eight) hours as needed (Nausea and vomiting).     pen needle, diabetic 31 gauge x 1/4" Ndle  Commonly known as: ULTICARE PEN NEEDLE  USE 1 PEN NEEDLE  AT BEDTIME, Dx:  E11.9     sulfamethoxazole-trimethoprim 800-160mg 800-160 mg Tab  Commonly known as: BACTRIM DS  Take 1 tablet by mouth 2 (two) times daily.     TRADJENTA 5 mg Tab tablet  Generic drug: linaGLIPtin  Take 1 tablet (5 mg total) by mouth once daily.     traMADoL 50 mg tablet  Commonly known as: ULTRAM  Take 1 tablet (50 mg total) by mouth every 12 (twelve) hours as needed for Pain.           Where to Get Your Medications      These medications were sent to [facility] [address] [place] [id number]    Phone: [phone]   · ondansetron 4 MG tablet        3. Merry Leary MD  [address] [address]  [phone]    Call   Primary care follow up    [facility] Department  [address] [place] [id number] [phone]  Go to   If symptoms worsen       _______________________________     Please note that this dictation was completed with [employer], the computer voice recognition software.  Quite often unanticipated grammatical, syntax, homophones, and other interpretive errors are inadvertently transcribed by the computer software.  Please disregard these errors.  Please excuse any errors that have escaped final proofreading.           "   Reynold Crooks MD  01/13/22 8467

## 2022-01-15 LAB — BACTERIA UR CULT: NO GROWTH

## 2022-01-18 NOTE — H&P
[facility] H&P        HPI: Pt is a 81 y.o.   female with PMH sig for L breast cancer and newly diagnosed R invasive lobular carcinoma, ER/ND +ivan, Gd 1, HER2 neg who presents for pre-op planning.  PET/CT obtained via oncology negative for metastatic disease.  US R axilla also negative for adenopathy.  Patient currently reports weakness and also depression over gravity of diagnosis.  Says she can no longer feel right axillary abnormality.        PMH:   Past Medical History:   Diagnosis Date    Arthritis     Asthmatic bronchitis     Breast cancer 1996    bilateral - radiation pellets for tx    Colon cancer 1963    Congestive heart failure (CHF)     Coronary artery disease     Depression     Diabetes mellitus, type 2     Encounter for blood transfusion     Environmental and seasonal allergies     Hard of hearing     Hyperlipidemia     Hypertension     IBS (irritable bowel syndrome)     Kidney disease     Neuropathy     Presence of indwelling Land catheter     Shingles     Stroke 1993    tia    UTI (urinary tract infection)     with sepsis    Vitamin D deficiency     Wound, open, buttock     being treated    Yeast infection        PSH:   Past Surgical History:   Procedure Laterality Date    BLADDER SUSPENSION      BREAST BIOPSY Bilateral 1996    BREAST SURGERY      lumpectomy, isotopes    CHOLECYSTECTOMY      had gal bladder removed    COLECTOMY      had part of colon removed    CYSTOSCOPY W/ RETROGRADES Bilateral 12/19/2019    Procedure: CYSTOSCOPY, WITH RETROGRADE PYELOGRAM;  Surgeon: Prasad Rocha MD;  Location: [facility] OR;  Service: Urology;  Laterality: Bilateral;    DILATION OF URETHRA N/A 12/19/2019    Procedure: DILATION, URETHRA;  Surgeon: Prasad Rocha MD;  Location: [facility] OR;  Service: Urology;  Laterality: N/A;    HYSTERECTOMY      LEFT HEART CATHETERIZATION Left 11/27/2019    Procedure: Left heart cath;  Surgeon: Urban Paiz MD;   "Location: [facility] CATH LAB;  Service: Cardiology;  Laterality: Left;    OOPHORECTOMY         Meds: See medication list;  On ASA    ALL:   Review of patient's allergies indicates:   Allergen Reactions    Codeine     Latex, natural rubber        FHX: non contributory    SOC:   Social History     Socioeconomic History    Marital status:    Tobacco Use    Smoking status: Never Smoker    Smokeless tobacco: Never Used   Substance and Sexual Activity    Alcohol use: Not Currently     Comment: rare    Drug use: Never    Sexual activity: Not Currently     Partners: Male       ROS: As per HPI    Physical Exam:  BP (!) 184/76 (BP Location: Right arm, Patient Position: Sitting, BP Method: Large (Automatic))   Pulse 73   Ht 5' 4" (1.626 m)   Wt 92.1 kg (203 lb)   LMP  (LMP Unknown)   SpO2 96%   BMI 34.84 kg/m²   GEN: NAD, A/Ox4  HEENT: Anicteric sclera, EOMI  Neck: Supple, no LAD  CV: RRR  Pulm: CTAB; breaths equal and nonlabored  ABD: Soft, NTTP, ND  Ext: no c/c/e  Breast: Firm, palpable periareolar R breast mass.  Mild nipple retraction.  No overlying skin changes or axillary adenopathy  Left - no palpable masses, skin changes, nipple discharge or inversion or axillary lymphadenopathy      Imaging:   PET/CT 1/5/2022:  Impression:     Mild increased metabolic activity within the right breast likely corresponding to the location of previously demonstrated right breast mass.  No identified evidence for hypermetabolic geno/metastatic disease.    MRI Brain 1/3/2022:    Impression:     1. Evidence of chronic microvascular disease.  No evidence of acute ischemia.  2. No evidence of intracranial metastatic disease.       US Axilla 12/20/2021:  Impression:  No right axillary lymph node enlargement.     MMG R Breast/US R breast 11/24/2021:  Ultrasound: Spiculated 3 cm shadowing hypoechoic solid mass 12:00 o'clock right breast corresponds to the mammographic abnormality.   IMPRESSION: Spiculated 3 cm mass suspicious " for malignancy, 12:00 o'clock right breast.   Biopsy recommended.   BI-RADS 4. Suspicious for malignancy.       A/P: Pt is a 81 y.o.   female with R lobular Ca ER/OH+ who presents for surgical planning  --Recommend Right mastectomy due to size of mass and potential undesirable cosmetic defect.  Patient also requesting left mastectomy due to history of cancer in the breast and fear of later recurrence  --To OR 1/26/2022 for right mastectomy with R SNL Bx with possible axillary dissection with L simple mastectomy   --Pathology on site for frozen LN analysis to evaluate need for axillary dissection  --Cardiac clearance obtained - patient deemed low to moderate risk -   --Hold ASA 1wk prior to procedure  --Procedure in detail including risks and benefits explained in detail to patient - patient voiced understanding and cardiac risk worth benefits of undergoing procedure   --Pt with recent ED visit for UTI - will repeat UA prior to procedure  --CXR, EKG UTD  --Bowel prep given  --CLD day before procedure  --Hold ASA 7 days prior to procedure  --Pt to be admitted post-op   --NPO midnight   --Farhan Cortez MD  General Surgery   [phone]

## 2022-01-18 NOTE — H&P (VIEW-ONLY)
[facility] H&P        HPI: Pt is a 81 y.o.   female with PMH sig for L breast cancer and newly diagnosed R invasive lobular carcinoma, ER/[place] +ivan, Gd 1, HER2 neg who presents for pre-op planning.  PET/CT obtained via oncology negative for metastatic disease.  US R axilla also negative for adenopathy.  Patient currently reports weakness and also depression over gravity of diagnosis.  Says she can no longer feel right axillary abnormality.        PMH:   Past Medical History:   Diagnosis Date    Arthritis     Asthmatic bronchitis     Breast cancer 1996    bilateral - radiation pellets for tx    Colon cancer 1963    Congestive heart failure (CHF)     Coronary artery disease     Depression     Diabetes mellitus, type 2     Encounter for blood transfusion     Environmental and seasonal allergies     Hard of hearing     Hyperlipidemia     Hypertension     IBS (irritable bowel syndrome)     Kidney disease     Neuropathy     Presence of indwelling Land catheter     Shingles     Stroke 1993    tia    UTI (urinary tract infection)     with sepsis    Vitamin D deficiency     Wound, open, buttock     being treated    Yeast infection        PSH:   Past Surgical History:   Procedure Laterality Date    BLADDER SUSPENSION      BREAST BIOPSY Bilateral 1996    BREAST SURGERY      lumpectomy, isotopes    CHOLECYSTECTOMY      had gal bladder removed    COLECTOMY      had part of colon removed    CYSTOSCOPY W/ RETROGRADES Bilateral 12/19/2019    Procedure: CYSTOSCOPY, WITH RETROGRADE PYELOGRAM;  Surgeon: Prasad Rocha MD;  Location: [facility] OR;  Service: Urology;  Laterality: Bilateral;    DILATION OF URETHRA N/A 12/19/2019    Procedure: DILATION, URETHRA;  Surgeon: Prasad Rocha MD;  Location: [facility] OR;  Service: Urology;  Laterality: N/A;    HYSTERECTOMY      LEFT HEART CATHETERIZATION Left 11/27/2019    Procedure: Left heart cath;  Surgeon: Urban Paiz MD;   "Location: [facility] CATH LAB;  Service: Cardiology;  Laterality: Left;    OOPHORECTOMY         Meds: See medication list;  On ASA    ALL:   Review of patient's allergies indicates:   Allergen Reactions    Codeine     Latex, natural rubber        FHX: non contributory    SOC:   Social History     Socioeconomic History    Marital status:    Tobacco Use    Smoking status: Never Smoker    Smokeless tobacco: Never Used   Substance and Sexual Activity    Alcohol use: Not Currently     Comment: rare    Drug use: Never    Sexual activity: Not Currently     Partners: Male       ROS: As per HPI    Physical Exam:  BP (!) 184/76 (BP Location: Right arm, Patient Position: Sitting, BP Method: Large (Automatic))   Pulse 73   Ht 5' 4" (1.626 m)   Wt 92.1 kg (203 lb)   LMP  (LMP Unknown)   SpO2 96%   BMI 34.84 kg/m²   GEN: NAD, A/Ox4  HEENT: Anicteric sclera, EOMI  Neck: Supple, no LAD  CV: RRR  Pulm: CTAB; breaths equal and nonlabored  ABD: Soft, NTTP, ND  Ext: no c/c/e  Breast: Firm, palpable periareolar R breast mass.  Mild nipple retraction.  No overlying skin changes or axillary adenopathy  Left - no palpable masses, skin changes, nipple discharge or inversion or axillary lymphadenopathy      Imaging:   PET/CT 1/5/2022:  Impression:     Mild increased metabolic activity within the right breast likely corresponding to the location of previously demonstrated right breast mass.  No identified evidence for hypermetabolic geno/metastatic disease.    MRI Brain 1/3/2022:    Impression:     1. Evidence of chronic microvascular disease.  No evidence of acute ischemia.  2. No evidence of intracranial metastatic disease.       US Axilla 12/20/2021:  Impression:  No right axillary lymph node enlargement.     MMG R Breast/US R breast 11/24/2021:  Ultrasound: Spiculated 3 cm shadowing hypoechoic solid mass 12:00 o'clock right breast corresponds to the mammographic abnormality.   IMPRESSION: Spiculated 3 cm mass suspicious " for malignancy, 12:00 o'clock right breast.   Biopsy recommended.   BI-RADS 4. Suspicious for malignancy.       A/P: Pt is a 81 y.o.   female with R lobular Ca ER/ND+ who presents for surgical planning  --Recommend Right mastectomy due to size of mass and potential undesirable cosmetic defect.  Patient also requesting left mastectomy due to history of cancer in the breast and fear of later recurrence  --To OR 1/26/2022 for right mastectomy with R SNL Bx with possible axillary dissection with L simple mastectomy   --Pathology on site for frozen LN analysis to evaluate need for axillary dissection  --Cardiac clearance obtained - patient deemed low to moderate risk -   --Hold ASA 1wk prior to procedure  --Procedure in detail including risks and benefits explained in detail to patient - patient voiced understanding and cardiac risk worth benefits of undergoing procedure   --Pt with recent ED visit for UTI - will repeat UA prior to procedure  --CXR, EKG UTD  --Bowel prep given  --CLD day before procedure  --Hold ASA 7 days prior to procedure  --Pt to be admitted post-op   --NPO midnight   --Farhan Cortez MD  General Surgery   [phone]

## 2022-01-19 LAB
BACTERIA BLD CULT: NORMAL
BACTERIA BLD CULT: NORMAL

## 2022-01-19 NOTE — DISCHARGE INSTRUCTIONS
BEFORE THE PROCEDURE:    REPORT ANY CHANGE IN YOUR PHYSICAL CONDITION TO YOUR DOCTOR IMMEDIATELY.  SELF ISOLATE AND CHECK TEMPERATURE DAILY, IF TEMP OVER 100, CALL PHYSICIAN IMMEDIATELY.   NO SMOKING OR ALCOHOL FOR 72 HOURS BEFORE YOUR PROCEDURE.   DO NOT EAT OR DRINK ANYTHING AFTER MIDNIGHT THE NIGHT BEFORE YOUR PROCEDURE.  NO MAKE UP, NAIL POLISH OR JEWELRY.      SURGERY PREP    SOMEONE WILL CALL YOU THE DAY BEFORE YOUR PROCEDURE WITH A CHECK-IN TIME FOR YOUR PROCEDURE.    DAY OF YOUR PROCEDURE:    TAKE BLOOD PRESSURE MEDICATIONS THE MORNING OF YOUR PROCEDURE, WITH SMALL SIPS WATER, AS DIRECTED BY YOUR PHYSICIAN.   DO NOT TAKE ANY DIABETIC MEDICATIONS UNLESS DIRECTED TO DO SO BY YOUR PHYSICIAN.   CONTACT LENSES AND DENTURES MUST BE REMOVED.  A RESPONSIBLE ADULT MUST ACCOMPANY YOU HOME UPON DISCHARGE.   ONLY 1 VISITOR ALLOWED PER ROOM.     COVID TESTING Tuesday January 25, 2022    YOUR THOUGHTS AND OPINIONS HELP US TO BETTER SERVE YOU.     PLEASE PARTICIPATE IN SURVEYS ABOUT YOUR CARE.    THANK YOU FOR CHOOSING [facility].

## 2022-01-20 ENCOUNTER — HOSPITAL ENCOUNTER (OUTPATIENT)
Dept: PREADMISSION TESTING | Facility: HOSPITAL | Age: 82
Discharge: HOME | End: 2022-01-20
Attending: STUDENT IN AN ORGANIZED HEALTH CARE EDUCATION/TRAINING PROGRAM
Payer: MEDICARE

## 2022-01-20 VITALS — BODY MASS INDEX: 37.56 KG/M2 | WEIGHT: 220 LBS | HEIGHT: 64 IN

## 2022-01-20 DIAGNOSIS — Z01.818 PRE-OP TESTING: ICD-10-CM

## 2022-01-20 DIAGNOSIS — Z01.818 PRE-OP EXAM: Primary | ICD-10-CM

## 2022-01-20 LAB — TISSUE SPECIMEN TO PATHOLOGY: NORMAL

## 2022-01-20 RX ORDER — MAGNESIUM 30 MG
200 TABLET ORAL ONCE
COMMUNITY
End: 2022-10-26

## 2022-01-20 RX ORDER — POTASSIUM CHLORIDE 750 MG/1
99 TABLET, EXTENDED RELEASE ORAL ONCE
COMMUNITY

## 2022-01-24 ENCOUNTER — ANESTHESIA EVENT (OUTPATIENT)
Dept: SURGERY | Facility: HOSPITAL | Age: 82
End: 2022-01-24
Payer: MEDICARE

## 2022-01-24 RX ORDER — SULFAMETHOXAZOLE AND TRIMETHOPRIM 800; 160 MG/1; MG/1
1 TABLET ORAL 2 TIMES DAILY
Qty: 10 TABLET | Refills: 0 | Status: SHIPPED | OUTPATIENT
Start: 2022-01-24 | End: 2022-01-29

## 2022-01-24 NOTE — ANESTHESIA PREPROCEDURE EVALUATION
01/24/2022  Kelsea Cadet is a 81 y.o., female.    Anesthesia Evaluation    I have reviewed the Patient Summary Reports.   I have reviewed the NPO Status.   I have reviewed the Medications.     Review of Systems  Anesthesia Hx:  No problems with previous Anesthesia Hx of Anesthetic complications  Denies Family Hx of Anesthesia complications.  Personal Hx of Anesthesia complications, Post-Operative Nausea/Vomiting, with every anesthetic, treatment not known   Social:  Non-Smoker    Hematology/Oncology:        Current/Recent Cancer. Oncology Comments: HX COLON CA   Cardiovascular:   Hypertension Past MI CAD asymptomatic  CHF ECG has been reviewed. CIS CLEARANCE   Pulmonary:  Pulmonary Normal Asthma mild Sleep Apnea    Education provided regarding risk of obstructive sleep apnea     Renal/:   Chronic Renal Disease, CRI    Hepatic/GI:  Hepatic/GI Normal    Musculoskeletal:   Arthritis     Neurological:   CVA, no residual symptoms   Peripheral Neuropathy    Endocrine:   Diabetes, well controlled, type 2    Psych:   Psychiatric History depression        Lab Results   Component Value Date    WBC 9.61 01/13/2022    HGB 12.0 01/13/2022    HCT 37.2 01/13/2022    MCV 84 01/13/2022     01/13/2022     CMP  Sodium   Date Value Ref Range Status   01/20/2022 142 136 - 145 mmol/L Final     Potassium   Date Value Ref Range Status   01/20/2022 3.9 3.5 - 5.1 mmol/L Final     Chloride   Date Value Ref Range Status   01/20/2022 104 95 - 110 mmol/L Final     CO2   Date Value Ref Range Status   01/20/2022 26 23 - 29 mmol/L Final     Glucose   Date Value Ref Range Status   01/20/2022 185 (H) 70 - 110 mg/dL Final     BUN   Date Value Ref Range Status   01/20/2022 17 8 - 23 mg/dL Final     Creatinine   Date Value Ref Range Status   01/20/2022 1.1 0.5 - 1.4 mg/dL Final     Calcium   Date Value Ref Range Status   01/20/2022  Presentation/Diagnosis: Pt admitted 2/27 due to . Pt diagnosed with alcohol withdrawal.   History: Alcohol abuse (drinks half gallon of vodka per day, last drink 2/27 at 0100), chronic back pain due to DDD, brain aneurysm (clipped in 2013), cocaine use December 2018, current everyday smoker (Nicotine patch in place to R arm).   Labs/Protocols: K and Mag protocol. K: 4.4, Hgb: 15.4, Ethanol: 0.33, Troponin negative x2.   Vitals: BP elevated. Tachycardic in 110s-130s. Other VSS on RA. Pt complains of pain to back throughout shift (chronic back pain). Lidocaine patches in place to lower back. PRN ibuprofen given once this shift, next available at 1900. PRN tylenol given once this shift, next available at 1830.  Cardiac: Tachycardic.   Telemetry: Sinus tachycardia.   Respiratory: WDL except frequent cough.   Neuro: A&Ox4. Calm and cooperative with cares. CIWA protocol in place. Scores this shift: 12 and 5. 1.5mg PO ativan given this shift. Pt admits to suicidal thoughts within the past month. Denies current ideation or plan.   GI/: Intermittent nausea. PRN zofran given once this shift.   Skin: Scabbing to R lower leg. Bruising from lab draws.   LDAs: PIV to L hand, infusing NS+KCl at 125mL/hr.   Diet: Regular diet.   Activity: SBA with transfers and ambulation.   Teaching: Pt oriented to room. Pt educated on call light use. Admission packet reviewed with pt. Pt educated on plan of care.   Plan: Continue CIWA protocol and scheduled ativan for detox.     Will continue to monitor.          8.9 8.7 - 10.5 mg/dL Final     Total Protein   Date Value Ref Range Status   01/20/2022 7.6 6.0 - 8.4 g/dL Final     Albumin   Date Value Ref Range Status   01/20/2022 2.9 (L) 3.5 - 5.2 g/dL Final     Total Bilirubin   Date Value Ref Range Status   01/20/2022 0.5 0.1 - 1.0 mg/dL Final     Comment:     For infants and newborns, interpretation of results should be based  on gestational age, weight and in agreement with clinical  observations.    Premature Infant recommended reference ranges:  Up to 24 hours.............<8.0 mg/dL  Up to 48 hours............<12.0 mg/dL  3-5 days..................<15.0 mg/dL  6-29 days.................<15.0 mg/dL    For patients on Eltrombopag therapy, use of Dimension [place] TBIL is   not   recommended.       Alkaline Phosphatase   Date Value Ref Range Status   01/20/2022 178 (H) 55 - 135 U/L Final     AST   Date Value Ref Range Status   01/20/2022 28 10 - 40 U/L Final     ALT   Date Value Ref Range Status   01/20/2022 61 (H) 10 - 44 U/L Final     Anion Gap   Date Value Ref Range Status   01/20/2022 12 8 - 16 mmol/L Final     eGFR if    Date Value Ref Range Status   01/20/2022 55.2 (A) >60 mL/min/1.73 m^2 Final     eGFR if non    Date Value Ref Range Status   01/20/2022 47.9 (A) >60 mL/min/1.73 m^2 Final     Comment:     Calculation used to obtain the estimated glomerular filtration  rate (eGFR) is the CKD-EPI equation.          Physical Exam  General:  Well nourished    Airway/Jaw/Neck:  Airway Findings: Mouth Opening: Normal Tongue: Normal  General Airway Assessment: Adult  Mallampati: III  Improves to III with phonation.  TM Distance: Normal, at least 6 cm  Jaw/Neck Findings:  Neck ROM: Normal ROM      Dental:  Dental Findings: In tact, Upper partial dentures   Chest/Lungs:  Chest/Lungs Findings: Clear to auscultation, Normal Respiratory Rate     Heart/Vascular:  Heart Findings: Rate: Normal  Rhythm: Regular Rhythm  Sounds: Normal        Mental  Status:  Mental Status Findings:  Cooperative         Anesthesia Plan  Type of Anesthesia, risks & benefits discussed:  Anesthesia Type:  general    Patient's Preference:   Plan Factors:          Intra-op Monitoring Plan: standard ASA monitors  Intra-op Monitoring Plan Comments:   Post Op Pain Control Plan: multimodal analgesia  Post Op Pain Control Plan Comments:     Induction:    Beta Blocker:  Patient is on a Beta-Blocker and has received one dose within the past 24 hours (No further documentation required).       Informed Consent: Patient understands risks and agrees with Anesthesia plan.  Questions answered. Anesthesia consent signed with patient.  ASA Score: 4     Day of Surgery Review of History & Physical: I have interviewed and examined the patient. I have reviewed the patient's H&P dated:  There are no significant changes.  H&P update referred to the surgeon.         Ready For Surgery From Anesthesia Perspective.

## 2022-01-25 ENCOUNTER — LAB VISIT (OUTPATIENT)
Dept: LAB | Facility: HOSPITAL | Age: 82
End: 2022-01-25
Attending: STUDENT IN AN ORGANIZED HEALTH CARE EDUCATION/TRAINING PROGRAM
Payer: MEDICARE

## 2022-01-25 DIAGNOSIS — Z01.818 PRE-OP TESTING: ICD-10-CM

## 2022-01-25 LAB — SARS-COV-2 RNA RESP QL NAA+PROBE: NOT DETECTED

## 2022-01-25 PROCEDURE — U0002 COVID-19 LAB TEST NON-CDC: HCPCS | Performed by: STUDENT IN AN ORGANIZED HEALTH CARE EDUCATION/TRAINING PROGRAM

## 2022-01-26 ENCOUNTER — ANESTHESIA (OUTPATIENT)
Dept: SURGERY | Facility: HOSPITAL | Age: 82
End: 2022-01-26
Payer: MEDICARE

## 2022-01-26 ENCOUNTER — HOSPITAL ENCOUNTER (OUTPATIENT)
Facility: HOSPITAL | Age: 82
Discharge: HOME | End: 2022-01-28
Attending: STUDENT IN AN ORGANIZED HEALTH CARE EDUCATION/TRAINING PROGRAM | Admitting: STUDENT IN AN ORGANIZED HEALTH CARE EDUCATION/TRAINING PROGRAM
Payer: MEDICARE

## 2022-01-26 ENCOUNTER — HOSPITAL ENCOUNTER (OUTPATIENT)
Dept: RADIOLOGY | Facility: HOSPITAL | Age: 82
Discharge: HOME | End: 2022-01-26
Attending: STUDENT IN AN ORGANIZED HEALTH CARE EDUCATION/TRAINING PROGRAM
Payer: MEDICARE

## 2022-01-26 DIAGNOSIS — C50.911 BREAST CANCER, RIGHT: ICD-10-CM

## 2022-01-26 DIAGNOSIS — R59.0 AXILLARY ADENOPATHY: ICD-10-CM

## 2022-01-26 DIAGNOSIS — C50.919 BREAST CANCER: ICD-10-CM

## 2022-01-26 DIAGNOSIS — C50.111 MALIGNANT NEOPLASM OF CENTRAL PORTION OF RIGHT FEMALE BREAST: Primary | ICD-10-CM

## 2022-01-26 LAB
ABO + RH BLD: NORMAL
BLD GP AB SCN CELLS X3 SERPL QL: NORMAL
POCT GLUCOSE: 169 MG/DL (ref 70–110)

## 2022-01-26 PROCEDURE — 88307 TISSUE EXAM BY PATHOLOGIST: CPT | Mod: TC | Performed by: ANESTHESIOLOGY

## 2022-01-26 PROCEDURE — 25000003 PHARM REV CODE 250: Performed by: STUDENT IN AN ORGANIZED HEALTH CARE EDUCATION/TRAINING PROGRAM

## 2022-01-26 PROCEDURE — 19303 PR MASTECTOMY, SIMPLE, COMPLETE: ICD-10-PCS | Mod: 51,LT,, | Performed by: STUDENT IN AN ORGANIZED HEALTH CARE EDUCATION/TRAINING PROGRAM

## 2022-01-26 PROCEDURE — 63600175 PHARM REV CODE 636 W HCPCS: Performed by: NURSE ANESTHETIST, CERTIFIED REGISTERED

## 2022-01-26 PROCEDURE — 63600175 PHARM REV CODE 636 W HCPCS: Performed by: STUDENT IN AN ORGANIZED HEALTH CARE EDUCATION/TRAINING PROGRAM

## 2022-01-26 PROCEDURE — G0378 HOSPITAL OBSERVATION PER HR: HCPCS

## 2022-01-26 PROCEDURE — 19307 PR MASTECTOMY, MODIFIED RADICAL: ICD-10-PCS | Mod: RT,,, | Performed by: STUDENT IN AN ORGANIZED HEALTH CARE EDUCATION/TRAINING PROGRAM

## 2022-01-26 PROCEDURE — 36000706: Performed by: STUDENT IN AN ORGANIZED HEALTH CARE EDUCATION/TRAINING PROGRAM

## 2022-01-26 PROCEDURE — 36415 COLL VENOUS BLD VENIPUNCTURE: CPT | Performed by: STUDENT IN AN ORGANIZED HEALTH CARE EDUCATION/TRAINING PROGRAM

## 2022-01-26 PROCEDURE — 25000242 PHARM REV CODE 250 ALT 637 W/ HCPCS: Performed by: STUDENT IN AN ORGANIZED HEALTH CARE EDUCATION/TRAINING PROGRAM

## 2022-01-26 PROCEDURE — 38792 RA TRACER ID OF SENTINL NODE: CPT | Mod: TC

## 2022-01-26 PROCEDURE — 27000221 HC OXYGEN, UP TO 24 HOURS

## 2022-01-26 PROCEDURE — 88342 IMHCHEM/IMCYTCHM 1ST ANTB: CPT | Mod: TC | Performed by: ANESTHESIOLOGY

## 2022-01-26 PROCEDURE — 86901 BLOOD TYPING SEROLOGIC RH(D): CPT | Performed by: STUDENT IN AN ORGANIZED HEALTH CARE EDUCATION/TRAINING PROGRAM

## 2022-01-26 PROCEDURE — 37000008 HC ANESTHESIA 1ST 15 MINUTES: Performed by: STUDENT IN AN ORGANIZED HEALTH CARE EDUCATION/TRAINING PROGRAM

## 2022-01-26 PROCEDURE — 94640 AIRWAY INHALATION TREATMENT: CPT

## 2022-01-26 PROCEDURE — 36000707: Performed by: STUDENT IN AN ORGANIZED HEALTH CARE EDUCATION/TRAINING PROGRAM

## 2022-01-26 PROCEDURE — 99900035 HC TECH TIME PER 15 MIN (STAT)

## 2022-01-26 PROCEDURE — 94761 N-INVAS EAR/PLS OXIMETRY MLT: CPT

## 2022-01-26 PROCEDURE — 71000033 HC RECOVERY, INTIAL HOUR: Performed by: STUDENT IN AN ORGANIZED HEALTH CARE EDUCATION/TRAINING PROGRAM

## 2022-01-26 PROCEDURE — C1729 CATH, DRAINAGE: HCPCS | Performed by: STUDENT IN AN ORGANIZED HEALTH CARE EDUCATION/TRAINING PROGRAM

## 2022-01-26 PROCEDURE — 25000003 PHARM REV CODE 250: Performed by: NURSE ANESTHETIST, CERTIFIED REGISTERED

## 2022-01-26 PROCEDURE — 19303 MAST SIMPLE COMPLETE: CPT | Mod: 51,LT,, | Performed by: STUDENT IN AN ORGANIZED HEALTH CARE EDUCATION/TRAINING PROGRAM

## 2022-01-26 PROCEDURE — 88331 PATH CONSLTJ SURG 1 BLK 1SPC: CPT | Mod: TC,59 | Performed by: ANESTHESIOLOGY

## 2022-01-26 PROCEDURE — 88341 IMHCHEM/IMCYTCHM EA ADD ANTB: CPT | Mod: TC | Performed by: ANESTHESIOLOGY

## 2022-01-26 PROCEDURE — 37000009 HC ANESTHESIA EA ADD 15 MINS: Performed by: STUDENT IN AN ORGANIZED HEALTH CARE EDUCATION/TRAINING PROGRAM

## 2022-01-26 PROCEDURE — 25000003 PHARM REV CODE 250: Performed by: ANESTHESIOLOGY

## 2022-01-26 PROCEDURE — 25000003 PHARM REV CODE 250

## 2022-01-26 PROCEDURE — 19307 MAST MOD RAD: CPT | Mod: RT,,, | Performed by: STUDENT IN AN ORGANIZED HEALTH CARE EDUCATION/TRAINING PROGRAM

## 2022-01-26 PROCEDURE — 99900031 HC PATIENT EDUCATION (STAT)

## 2022-01-26 PROCEDURE — 88309 TISSUE EXAM BY PATHOLOGIST: CPT | Mod: TC | Performed by: ANESTHESIOLOGY

## 2022-01-26 RX ORDER — SODIUM CHLORIDE 9 MG/ML
INJECTION, SOLUTION INTRAVENOUS CONTINUOUS
Status: DISCONTINUED | OUTPATIENT
Start: 2022-01-26 | End: 2022-01-26

## 2022-01-26 RX ORDER — ONDANSETRON 2 MG/ML
4 INJECTION INTRAMUSCULAR; INTRAVENOUS DAILY PRN
Status: DISCONTINUED | OUTPATIENT
Start: 2022-01-26 | End: 2022-01-26

## 2022-01-26 RX ORDER — EPHEDRINE SULFATE 50 MG/ML
INJECTION, SOLUTION INTRAVENOUS
Status: DISCONTINUED | OUTPATIENT
Start: 2022-01-26 | End: 2022-01-26

## 2022-01-26 RX ORDER — CEFAZOLIN SODIUM 2 G/50ML
2 SOLUTION INTRAVENOUS
Status: COMPLETED | OUTPATIENT
Start: 2022-01-26 | End: 2022-01-28

## 2022-01-26 RX ORDER — FUROSEMIDE 40 MG/1
40 TABLET ORAL DAILY
Status: DISCONTINUED | OUTPATIENT
Start: 2022-01-27 | End: 2022-01-27

## 2022-01-26 RX ORDER — MEPERIDINE HYDROCHLORIDE 25 MG/ML
25 INJECTION INTRAMUSCULAR; INTRAVENOUS; SUBCUTANEOUS EVERY 10 MIN PRN
Status: DISCONTINUED | OUTPATIENT
Start: 2022-01-26 | End: 2022-01-26

## 2022-01-26 RX ORDER — METHOCARBAMOL 500 MG/1
500 TABLET, FILM COATED ORAL 4 TIMES DAILY
Status: DISCONTINUED | OUTPATIENT
Start: 2022-01-26 | End: 2022-01-26

## 2022-01-26 RX ORDER — MEPERIDINE HYDROCHLORIDE 25 MG/ML
25 INJECTION INTRAMUSCULAR; INTRAVENOUS; SUBCUTANEOUS EVERY 10 MIN PRN
Status: ACTIVE | OUTPATIENT
Start: 2022-01-26 | End: 2022-01-26

## 2022-01-26 RX ORDER — DIAZEPAM 5 MG/1
10 TABLET ORAL
Status: COMPLETED | OUTPATIENT
Start: 2022-01-26 | End: 2022-01-26

## 2022-01-26 RX ORDER — FLUOXETINE 10 MG/1
10 CAPSULE ORAL DAILY
Status: DISCONTINUED | OUTPATIENT
Start: 2022-01-27 | End: 2022-01-28 | Stop reason: HOSPADM

## 2022-01-26 RX ORDER — SCOLOPAMINE TRANSDERMAL SYSTEM 1 MG/1
1 PATCH, EXTENDED RELEASE TRANSDERMAL
Status: DISCONTINUED | OUTPATIENT
Start: 2022-01-26 | End: 2022-01-28 | Stop reason: HOSPADM

## 2022-01-26 RX ORDER — INSULIN ASPART 100 [IU]/ML
1-10 INJECTION, SOLUTION INTRAVENOUS; SUBCUTANEOUS
Status: DISCONTINUED | OUTPATIENT
Start: 2022-01-26 | End: 2022-01-28 | Stop reason: HOSPADM

## 2022-01-26 RX ORDER — ONDANSETRON 4 MG/1
8 TABLET, ORALLY DISINTEGRATING ORAL EVERY 8 HOURS PRN
Status: DISCONTINUED | OUTPATIENT
Start: 2022-01-26 | End: 2022-01-28 | Stop reason: HOSPADM

## 2022-01-26 RX ORDER — ACETAMINOPHEN 10 MG/ML
1000 INJECTION, SOLUTION INTRAVENOUS ONCE
Status: COMPLETED | OUTPATIENT
Start: 2022-01-26 | End: 2022-01-26

## 2022-01-26 RX ORDER — MORPHINE SULFATE 10 MG/ML
4 INJECTION INTRAMUSCULAR; INTRAVENOUS; SUBCUTANEOUS EVERY 4 HOURS PRN
Status: DISCONTINUED | OUTPATIENT
Start: 2022-01-26 | End: 2022-01-28 | Stop reason: HOSPADM

## 2022-01-26 RX ORDER — ONDANSETRON HYDROCHLORIDE 2 MG/ML
INJECTION, SOLUTION INTRAMUSCULAR; INTRAVENOUS
Status: DISCONTINUED | OUTPATIENT
Start: 2022-01-26 | End: 2022-01-26

## 2022-01-26 RX ORDER — PROPOFOL 10 MG/ML
VIAL (ML) INTRAVENOUS
Status: DISCONTINUED | OUTPATIENT
Start: 2022-01-26 | End: 2022-01-26

## 2022-01-26 RX ORDER — MIDAZOLAM HYDROCHLORIDE 1 MG/ML
INJECTION INTRAMUSCULAR; INTRAVENOUS
Status: DISCONTINUED | OUTPATIENT
Start: 2022-01-26 | End: 2022-01-26

## 2022-01-26 RX ORDER — IBUPROFEN 200 MG
16 TABLET ORAL
Status: DISCONTINUED | OUTPATIENT
Start: 2022-01-26 | End: 2022-01-28 | Stop reason: HOSPADM

## 2022-01-26 RX ORDER — ACETAMINOPHEN 325 MG/1
650 TABLET ORAL EVERY 8 HOURS PRN
Status: DISCONTINUED | OUTPATIENT
Start: 2022-01-26 | End: 2022-01-26

## 2022-01-26 RX ORDER — GLUCAGON 1 MG
1 KIT INJECTION
Status: DISCONTINUED | OUTPATIENT
Start: 2022-01-26 | End: 2022-01-28 | Stop reason: HOSPADM

## 2022-01-26 RX ORDER — LIDOCAINE HYDROCHLORIDE 10 MG/ML
1 INJECTION, SOLUTION EPIDURAL; INFILTRATION; INTRACAUDAL; PERINEURAL ONCE
Status: DISCONTINUED | OUTPATIENT
Start: 2022-01-26 | End: 2022-01-28 | Stop reason: HOSPADM

## 2022-01-26 RX ORDER — SODIUM CHLORIDE 9 MG/ML
INJECTION, SOLUTION INTRAVENOUS CONTINUOUS
Status: DISCONTINUED | OUTPATIENT
Start: 2022-01-26 | End: 2022-01-27

## 2022-01-26 RX ORDER — ATORVASTATIN CALCIUM 80 MG/1
80 TABLET, FILM COATED ORAL NIGHTLY
Status: DISCONTINUED | OUTPATIENT
Start: 2022-01-27 | End: 2022-01-28 | Stop reason: HOSPADM

## 2022-01-26 RX ORDER — ACETAMINOPHEN 325 MG/1
650 TABLET ORAL EVERY 4 HOURS
Status: DISCONTINUED | OUTPATIENT
Start: 2022-01-26 | End: 2022-01-28 | Stop reason: HOSPADM

## 2022-01-26 RX ORDER — IPRATROPIUM BROMIDE AND ALBUTEROL SULFATE 2.5; .5 MG/3ML; MG/3ML
3 SOLUTION RESPIRATORY (INHALATION)
Status: DISCONTINUED | OUTPATIENT
Start: 2022-01-26 | End: 2022-01-28 | Stop reason: HOSPADM

## 2022-01-26 RX ORDER — HYDROMORPHONE HYDROCHLORIDE 2 MG/ML
INJECTION, SOLUTION INTRAMUSCULAR; INTRAVENOUS; SUBCUTANEOUS
Status: DISCONTINUED | OUTPATIENT
Start: 2022-01-26 | End: 2022-01-26

## 2022-01-26 RX ORDER — TALC
6 POWDER (GRAM) TOPICAL NIGHTLY
Status: DISCONTINUED | OUTPATIENT
Start: 2022-01-26 | End: 2022-01-28 | Stop reason: HOSPADM

## 2022-01-26 RX ORDER — GABAPENTIN 300 MG/1
600 CAPSULE ORAL 3 TIMES DAILY
Status: DISCONTINUED | OUTPATIENT
Start: 2022-01-26 | End: 2022-01-28 | Stop reason: HOSPADM

## 2022-01-26 RX ORDER — CEFAZOLIN SODIUM 1 G/3ML
INJECTION, POWDER, FOR SOLUTION INTRAMUSCULAR; INTRAVENOUS
Status: DISCONTINUED | OUTPATIENT
Start: 2022-01-26 | End: 2022-01-26

## 2022-01-26 RX ORDER — METHOCARBAMOL 750 MG/1
750 TABLET, FILM COATED ORAL 4 TIMES DAILY
Status: DISCONTINUED | OUTPATIENT
Start: 2022-01-26 | End: 2022-01-28 | Stop reason: HOSPADM

## 2022-01-26 RX ORDER — DIPHENHYDRAMINE HYDROCHLORIDE 50 MG/ML
25 INJECTION INTRAMUSCULAR; INTRAVENOUS EVERY 6 HOURS PRN
Status: DISCONTINUED | OUTPATIENT
Start: 2022-01-26 | End: 2022-01-26

## 2022-01-26 RX ORDER — ROCURONIUM BROMIDE 10 MG/ML
INJECTION, SOLUTION INTRAVENOUS
Status: DISCONTINUED | OUTPATIENT
Start: 2022-01-26 | End: 2022-01-26

## 2022-01-26 RX ORDER — SUCCINYLCHOLINE CHLORIDE 20 MG/ML
INJECTION INTRAMUSCULAR; INTRAVENOUS
Status: DISCONTINUED | OUTPATIENT
Start: 2022-01-26 | End: 2022-01-26

## 2022-01-26 RX ORDER — CARVEDILOL 3.12 MG/1
6.25 TABLET ORAL 2 TIMES DAILY WITH MEALS
Status: DISCONTINUED | OUTPATIENT
Start: 2022-01-27 | End: 2022-01-28 | Stop reason: HOSPADM

## 2022-01-26 RX ORDER — LISINOPRIL 20 MG/1
20 TABLET ORAL NIGHTLY
Status: DISCONTINUED | OUTPATIENT
Start: 2022-01-27 | End: 2022-01-27

## 2022-01-26 RX ORDER — FENTANYL CITRATE 50 UG/ML
INJECTION, SOLUTION INTRAMUSCULAR; INTRAVENOUS
Status: DISCONTINUED | OUTPATIENT
Start: 2022-01-26 | End: 2022-01-26

## 2022-01-26 RX ORDER — HYDROCODONE BITARTRATE AND ACETAMINOPHEN 7.5; 325 MG/1; MG/1
1 TABLET ORAL EVERY 4 HOURS PRN
Status: DISCONTINUED | OUTPATIENT
Start: 2022-01-26 | End: 2022-01-28 | Stop reason: HOSPADM

## 2022-01-26 RX ORDER — IBUPROFEN 200 MG
24 TABLET ORAL
Status: DISCONTINUED | OUTPATIENT
Start: 2022-01-26 | End: 2022-01-28 | Stop reason: HOSPADM

## 2022-01-26 RX ORDER — LIDOCAINE HYDROCHLORIDE 10 MG/ML
INJECTION, SOLUTION INTRAVENOUS
Status: DISCONTINUED | OUTPATIENT
Start: 2022-01-26 | End: 2022-01-26

## 2022-01-26 RX ORDER — METHOCARBAMOL 500 MG/1
TABLET, FILM COATED ORAL
Status: COMPLETED
Start: 2022-01-26 | End: 2022-01-26

## 2022-01-26 RX ORDER — HYDROCHLOROTHIAZIDE 25 MG/1
25 TABLET ORAL DAILY
Status: DISCONTINUED | OUTPATIENT
Start: 2022-01-27 | End: 2022-01-27

## 2022-01-26 RX ORDER — ISOSULFAN BLUE 50 MG/5ML
INJECTION, SOLUTION SUBCUTANEOUS
Status: DISCONTINUED | OUTPATIENT
Start: 2022-01-26 | End: 2022-01-26 | Stop reason: HOSPADM

## 2022-01-26 RX ADMIN — ACETAMINOPHEN 1000 MG: 10 INJECTION, SOLUTION INTRAVENOUS at 05:01

## 2022-01-26 RX ADMIN — HYDROMORPHONE HYDROCHLORIDE 0.5 MG: 2 INJECTION, SOLUTION INTRAMUSCULAR; INTRAVENOUS; SUBCUTANEOUS at 05:01

## 2022-01-26 RX ADMIN — MEPERIDINE HYDROCHLORIDE 25 MG: 25 INJECTION INTRAMUSCULAR; INTRAVENOUS; SUBCUTANEOUS at 06:01

## 2022-01-26 RX ADMIN — HYDROCODONE BITARTRATE AND ACETAMINOPHEN 1 TABLET: 7.5; 325 TABLET ORAL at 10:01

## 2022-01-26 RX ADMIN — LIDOCAINE HYDROCHLORIDE 50 MG: 10 INJECTION, SOLUTION INTRAVENOUS at 01:01

## 2022-01-26 RX ADMIN — METHOCARBAMOL 750 MG: 500 TABLET ORAL at 10:01

## 2022-01-26 RX ADMIN — IPRATROPIUM BROMIDE AND ALBUTEROL SULFATE 3 ML: 2.5; .5 SOLUTION RESPIRATORY (INHALATION) at 08:01

## 2022-01-26 RX ADMIN — CEFAZOLIN 2 G: 330 INJECTION, POWDER, FOR SOLUTION INTRAMUSCULAR; INTRAVENOUS at 04:01

## 2022-01-26 RX ADMIN — FENTANYL CITRATE 50 MCG: 50 INJECTION INTRAMUSCULAR; INTRAVENOUS at 03:01

## 2022-01-26 RX ADMIN — FENTANYL CITRATE 100 MCG: 50 INJECTION INTRAMUSCULAR; INTRAVENOUS at 01:01

## 2022-01-26 RX ADMIN — MELATONIN TAB 3 MG 6 MG: 3 TAB at 09:01

## 2022-01-26 RX ADMIN — FENTANYL CITRATE 50 MCG: 50 INJECTION INTRAMUSCULAR; INTRAVENOUS at 01:01

## 2022-01-26 RX ADMIN — EPHEDRINE SULFATE 10 MG: 50 INJECTION, SOLUTION INTRAVENOUS at 01:01

## 2022-01-26 RX ADMIN — EPHEDRINE SULFATE 20 MG: 50 INJECTION, SOLUTION INTRAVENOUS at 05:01

## 2022-01-26 RX ADMIN — SCOPALAMINE 1 PATCH: 1 PATCH, EXTENDED RELEASE TRANSDERMAL at 09:01

## 2022-01-26 RX ADMIN — FENTANYL CITRATE 50 MCG: 50 INJECTION INTRAMUSCULAR; INTRAVENOUS at 04:01

## 2022-01-26 RX ADMIN — FENTANYL CITRATE 50 MCG: 50 INJECTION INTRAMUSCULAR; INTRAVENOUS at 02:01

## 2022-01-26 RX ADMIN — GLYCOPYRROLATE 0.2 MG: 0.2 INJECTION, SOLUTION INTRAMUSCULAR; INTRAVITREAL at 01:01

## 2022-01-26 RX ADMIN — ONDANSETRON 4 MG: 2 INJECTION INTRAMUSCULAR; INTRAVENOUS at 05:01

## 2022-01-26 RX ADMIN — FENTANYL CITRATE 100 MCG: 50 INJECTION INTRAMUSCULAR; INTRAVENOUS at 02:01

## 2022-01-26 RX ADMIN — EPHEDRINE SULFATE 20 MG: 50 INJECTION, SOLUTION INTRAVENOUS at 01:01

## 2022-01-26 RX ADMIN — Medication 100 MG: at 01:01

## 2022-01-26 RX ADMIN — ONDANSETRON 4 MG: 2 INJECTION INTRAMUSCULAR; INTRAVENOUS at 01:01

## 2022-01-26 RX ADMIN — METHOCARBAMOL 750 MG: 750 TABLET ORAL at 10:01

## 2022-01-26 RX ADMIN — GABAPENTIN 600 MG: 300 CAPSULE ORAL at 10:01

## 2022-01-26 RX ADMIN — SODIUM CHLORIDE: 0.9 INJECTION, SOLUTION INTRAVENOUS at 02:01

## 2022-01-26 RX ADMIN — CEFAZOLIN SODIUM 2 G: 2 SOLUTION INTRAVENOUS at 11:01

## 2022-01-26 RX ADMIN — DIAZEPAM 10 MG: 5 TABLET ORAL at 09:01

## 2022-01-26 RX ADMIN — SODIUM CHLORIDE: 0.9 INJECTION, SOLUTION INTRAVENOUS at 09:01

## 2022-01-26 RX ADMIN — SODIUM CHLORIDE: 0.9 INJECTION, SOLUTION INTRAVENOUS at 08:01

## 2022-01-26 RX ADMIN — SUCCINYLCHOLINE CHLORIDE 100 MG: 20 INJECTION, SOLUTION INTRAMUSCULAR; INTRAVENOUS at 01:01

## 2022-01-26 RX ADMIN — ROCURONIUM BROMIDE 5 MG: 10 INJECTION, SOLUTION INTRAVENOUS at 01:01

## 2022-01-26 RX ADMIN — MIDAZOLAM 2 MG: 1 INJECTION INTRAMUSCULAR; INTRAVENOUS at 01:01

## 2022-01-26 NOTE — ANESTHESIA PROCEDURE NOTES
Intubation    Date/Time: 1/26/2022 1:10 PM  Performed by: Rush Paredes CRNA  Authorized by: Rush Paredes CRNA     Intubation:     Induction:  Intravenous    Intubated:  Postinduction    Mask Ventilation:  Easy mask    Attempts:  1    Attempted By:  CRNA    Method of Intubation:  Direct and bougie    Blade:  Alexis 2    Laryngeal View Grade: Grade IIA - cords partially seen      Difficult Airway Encountered?: No      Complications:  None    Airway Device:  Oral endotracheal tube    Airway Device Size:  7.0    Style/Cuff Inflation:  Cuffed    Inflation Amount (mL):  6    Tube secured:  22    Secured at:  The lips    Placement Verified By:  Capnometry    Complicating Factors:  None    Findings Post-Intubation:  BS equal bilateral and atraumatic/condition of teeth unchanged

## 2022-01-26 NOTE — INTERVAL H&P NOTE
Patient seen and examined.  No interval change since the below obtained H&P  Informed consent verified and surgical site marked  NPO since midnight  All questions and concerns addressed  To OR for Bilateral mastectomy with right SLN versus ALND     Keisha Cortez MD  General Surgery   [phone]

## 2022-01-26 NOTE — OP NOTE
[facility] - OR Periop Services  General Surgery Department  Operative Note    SUMMARY     Date of Procedure: 1/26/2022    Procedure: Procedure(s) (LRB):  Left simple mastectomy  Right Modified Radical Mastectomy     Surgeon(s) and Role:  Surgeon(s):  MD Keisha Sampson MD    Pre-Operative Diagnosis: Pre-Op Diagnosis Codes:     * Malignant neoplasm of central portion of right female breast, unspecified estrogen receptor status [C50.111]    Post-Operative Diagnosis: Same        Anesthesia: General    Findings:  1.  Blue, enlarged, palpable sentinel lymph node sent for frozen - positive for carcinoma per pathology  2.  Right mastectomy with right axillary lymph node dissection   3.  Simple left mastectomy performed by Dr. Mccann     Indications for the Procedure:  80yo female with history of distant L beast Ca who presents with Invasive lobular carcinoma of the right breast (ER/OK+).  After initial diagnosis, patient was referred to Oncology where his skin was performed in order to rule out presence of metastatic disease.  Once this had been ruled out, patient was then counseled on surgical intervention for her condition.  Those sided the patient would undergo a sentinel lymph node biopsy due to the fact that although a right axillary lymph node was palpable in clinic, which did not show up on PET scan or dedicated axillary ultrasound.  Liver palpable no was encountered and deemed positive on frozen section, we are proceeding with axillary lymph node dissection as well as a right mastectomy.  Right mastectomy with decided upon due to the size of the mass and the resulting cosmetic defect that will result.  Patient also requested that she undergo a rectal mastectomy due to her previous cancer in that breast the inferior of recurrent bilateral recurrence.  After risks and benefits including bleeding infection damage to nerves vessels surrounding structures, nerve dysfunction, right upper  extremity edema, seroma, and recurrence was discussed with the patient, she elected to undergo a right mastectomy with sentinel lymph node biopsy with possible axillary node dissection and simple left mastectomy.    Operative Conduct in Detail:   After informed consent was obtained, the patient was rolled to the OR and placed in the supine position where endotracheal intubation was initiated.  Pre-op antibiotics were given.  A time out was performed in the OR with the surgeon present and all were in agreement.  The patient's right arm and bilateral breasts were prepped and draped in sterile fashion.  It was confirmed with anesthesia that the patient was no longer paralyzed.     On the right side, we then proceeded with the sentinel lymph node biopsy.  A transverse incision was made with the axilla and carried down with electrocautery through skin subcutaneous tissue into the axillary fascia was encountered.  The fascial was incised, and the probe detected a small amount of activity near the lateral border of the pectoralis major.  Carefully dissecting the axillary fat a large approximately 7 mm blue lymph node was encountered.  This was excised and passed off for frozen section.  A large collection of lymph nodes within the axillary fat was also encountered.  Some nodes were blue, and some yet all with a weak signal of only 13 or 14.  The rest of the axilla, however, was without signal completely.  The axilla was then packed open as we awaited frozen.    We began the mastectomy portion of the procedure by making an elliptical incision around both of the breasts to the latissimus dorsi superiorly and inferiorly and carried this down through the deep dermis. We then made flaps superiorly and inferiorly with Bovie electrocautery through the breast tissue in the appropriate plane to the clavicle, sternum and down through the inframammary fold. The breast and pectoralis fascia were then peeled off pectoralis major using  Bovie electrocautery.    The left breast was amputated laterally at this level.  At this time, pathology came back with the 1st large enlarged blue lymph node positive for invasive carcinoma.  This then decided to proceed on the right axillary lymph node dissection.  Pathology could not determine the presence of malignancy in the second collection of lymph nodes.     After we had come to the axillary contents we then used Bovie electrocautery to dissect out the pectoralis minor and used sharp dissection to identify the axillary vein. We dissected out the axillary vein distal to the latissimus dorsi. Once we identified the latissimus dorsi we then turned our attention medially and dissected out the thoracodorsal nerve.    We then the level-II lymph nodes from pectoralis minor with blunt dissection. After this was done we easily identified the long thoracic nerve and removed the axillary contents without noted nerve injury, ligating off any bleeders. We then copiously irrigated the area and inserted two KATH drains, one on top of the chest and one in the axilla. We irrigated and closed in a layered fashion.    Complications: No    Estimated Blood Loss (EBL): 50cc           Implants: 10 Flat KATH drains x2 bilaterally    Specimens:   1.  Sesser lymph node #1 - frozen  2.  Sesser lymph node #2/Axillary lymph node collection - frozen  3.  Right breast - short superior; lateral long  4.  Right axillary contents   5.  Left breast            Condition: Stable    Disposition: PACU - hemodynamically stable.        Keisha Cortez MD  General Surgery

## 2022-01-27 LAB
ANION GAP SERPL CALC-SCNC: 7 MMOL/L (ref 8–16)
BUN SERPL-MCNC: 25 MG/DL (ref 8–23)
CALCIUM SERPL-MCNC: 7.1 MG/DL (ref 8.7–10.5)
CHLORIDE SERPL-SCNC: 107 MMOL/L (ref 95–110)
CO2 SERPL-SCNC: 23 MMOL/L (ref 23–29)
CREAT SERPL-MCNC: 1.5 MG/DL (ref 0.5–1.4)
ERYTHROCYTE [DISTWIDTH] IN BLOOD BY AUTOMATED COUNT: 15.3 % (ref 11.5–14.5)
EST. GFR  (AFRICAN AMERICAN): 37.9 ML/MIN/1.73 M^2
EST. GFR  (NON AFRICAN AMERICAN): 32.9 ML/MIN/1.73 M^2
ESTIMATED AVG GLUCOSE: 169 MG/DL (ref 68–131)
GLUCOSE SERPL-MCNC: 132 MG/DL (ref 70–110)
HBA1C MFR BLD: 7.5 % (ref 4–5.6)
HCT VFR BLD AUTO: 33.8 % (ref 37–48.5)
HGB BLD-MCNC: 10.7 G/DL (ref 12–16)
MAGNESIUM SERPL-MCNC: 2.1 MG/DL (ref 1.6–2.6)
MCH RBC QN AUTO: 27.2 PG (ref 27–31)
MCHC RBC AUTO-ENTMCNC: 31.7 G/DL (ref 32–36)
MCV RBC AUTO: 86 FL (ref 82–98)
PHOSPHATE SERPL-MCNC: 4.5 MG/DL (ref 2.7–4.5)
PLATELET # BLD AUTO: 337 K/UL (ref 150–450)
PMV BLD AUTO: 10.3 FL (ref 9.2–12.9)
POCT GLUCOSE: 147 MG/DL (ref 70–110)
POCT GLUCOSE: 170 MG/DL (ref 70–110)
POCT GLUCOSE: 180 MG/DL (ref 70–110)
POTASSIUM SERPL-SCNC: 4.5 MMOL/L (ref 3.5–5.1)
RBC # BLD AUTO: 3.93 M/UL (ref 4–5.4)
SODIUM SERPL-SCNC: 137 MMOL/L (ref 136–145)
WBC # BLD AUTO: 19.82 K/UL (ref 3.9–12.7)

## 2022-01-27 PROCEDURE — 80048 BASIC METABOLIC PNL TOTAL CA: CPT | Performed by: STUDENT IN AN ORGANIZED HEALTH CARE EDUCATION/TRAINING PROGRAM

## 2022-01-27 PROCEDURE — G0378 HOSPITAL OBSERVATION PER HR: HCPCS

## 2022-01-27 PROCEDURE — 63600175 PHARM REV CODE 636 W HCPCS: Performed by: STUDENT IN AN ORGANIZED HEALTH CARE EDUCATION/TRAINING PROGRAM

## 2022-01-27 PROCEDURE — 99900031 HC PATIENT EDUCATION (STAT)

## 2022-01-27 PROCEDURE — 85027 COMPLETE CBC AUTOMATED: CPT | Performed by: STUDENT IN AN ORGANIZED HEALTH CARE EDUCATION/TRAINING PROGRAM

## 2022-01-27 PROCEDURE — 84100 ASSAY OF PHOSPHORUS: CPT | Performed by: STUDENT IN AN ORGANIZED HEALTH CARE EDUCATION/TRAINING PROGRAM

## 2022-01-27 PROCEDURE — 36415 COLL VENOUS BLD VENIPUNCTURE: CPT | Performed by: STUDENT IN AN ORGANIZED HEALTH CARE EDUCATION/TRAINING PROGRAM

## 2022-01-27 PROCEDURE — C9399 UNCLASSIFIED DRUGS OR BIOLOG: HCPCS | Performed by: STUDENT IN AN ORGANIZED HEALTH CARE EDUCATION/TRAINING PROGRAM

## 2022-01-27 PROCEDURE — 96372 THER/PROPH/DIAG INJ SC/IM: CPT | Performed by: STUDENT IN AN ORGANIZED HEALTH CARE EDUCATION/TRAINING PROGRAM

## 2022-01-27 PROCEDURE — 94761 N-INVAS EAR/PLS OXIMETRY MLT: CPT

## 2022-01-27 PROCEDURE — 99900035 HC TECH TIME PER 15 MIN (STAT)

## 2022-01-27 PROCEDURE — 25000003 PHARM REV CODE 250: Performed by: STUDENT IN AN ORGANIZED HEALTH CARE EDUCATION/TRAINING PROGRAM

## 2022-01-27 PROCEDURE — 83036 HEMOGLOBIN GLYCOSYLATED A1C: CPT | Performed by: STUDENT IN AN ORGANIZED HEALTH CARE EDUCATION/TRAINING PROGRAM

## 2022-01-27 PROCEDURE — 83735 ASSAY OF MAGNESIUM: CPT | Performed by: STUDENT IN AN ORGANIZED HEALTH CARE EDUCATION/TRAINING PROGRAM

## 2022-01-27 PROCEDURE — 27000221 HC OXYGEN, UP TO 24 HOURS

## 2022-01-27 PROCEDURE — 94640 AIRWAY INHALATION TREATMENT: CPT

## 2022-01-27 PROCEDURE — 25000242 PHARM REV CODE 250 ALT 637 W/ HCPCS: Performed by: STUDENT IN AN ORGANIZED HEALTH CARE EDUCATION/TRAINING PROGRAM

## 2022-01-27 PROCEDURE — 94799 UNLISTED PULMONARY SVC/PX: CPT

## 2022-01-27 RX ORDER — SODIUM CHLORIDE, SODIUM LACTATE, POTASSIUM CHLORIDE, CALCIUM CHLORIDE 600; 310; 30; 20 MG/100ML; MG/100ML; MG/100ML; MG/100ML
INJECTION, SOLUTION INTRAVENOUS CONTINUOUS
Status: DISCONTINUED | OUTPATIENT
Start: 2022-01-27 | End: 2022-01-28 | Stop reason: HOSPADM

## 2022-01-27 RX ORDER — ENOXAPARIN SODIUM 100 MG/ML
40 INJECTION SUBCUTANEOUS EVERY 24 HOURS
Status: DISCONTINUED | OUTPATIENT
Start: 2022-01-27 | End: 2022-01-28 | Stop reason: HOSPADM

## 2022-01-27 RX ORDER — SULFAMETHOXAZOLE AND TRIMETHOPRIM 800; 160 MG/1; MG/1
1 TABLET ORAL 2 TIMES DAILY
Status: DISCONTINUED | OUTPATIENT
Start: 2022-01-27 | End: 2022-01-28 | Stop reason: HOSPADM

## 2022-01-27 RX ORDER — FUROSEMIDE 40 MG/1
40 TABLET ORAL DAILY
Status: DISCONTINUED | OUTPATIENT
Start: 2022-01-28 | End: 2022-01-28 | Stop reason: HOSPADM

## 2022-01-27 RX ADMIN — METHOCARBAMOL 750 MG: 750 TABLET ORAL at 05:01

## 2022-01-27 RX ADMIN — IPRATROPIUM BROMIDE AND ALBUTEROL SULFATE 3 ML: 2.5; .5 SOLUTION RESPIRATORY (INHALATION) at 07:01

## 2022-01-27 RX ADMIN — ACETAMINOPHEN 650 MG: 325 TABLET ORAL at 05:01

## 2022-01-27 RX ADMIN — SODIUM CHLORIDE, SODIUM LACTATE, POTASSIUM CHLORIDE, AND CALCIUM CHLORIDE: .6; .31; .03; .02 INJECTION, SOLUTION INTRAVENOUS at 01:01

## 2022-01-27 RX ADMIN — INSULIN ASPART 2 UNITS: 100 INJECTION, SOLUTION INTRAVENOUS; SUBCUTANEOUS at 11:01

## 2022-01-27 RX ADMIN — MELATONIN TAB 3 MG 6 MG: 3 TAB at 09:01

## 2022-01-27 RX ADMIN — GABAPENTIN 600 MG: 300 CAPSULE ORAL at 02:01

## 2022-01-27 RX ADMIN — METHOCARBAMOL 750 MG: 750 TABLET ORAL at 10:01

## 2022-01-27 RX ADMIN — ATORVASTATIN CALCIUM 80 MG: 80 TABLET, FILM COATED ORAL at 09:01

## 2022-01-27 RX ADMIN — ACETAMINOPHEN 650 MG: 325 TABLET ORAL at 10:01

## 2022-01-27 RX ADMIN — ACETAMINOPHEN 650 MG: 325 TABLET ORAL at 09:01

## 2022-01-27 RX ADMIN — ENOXAPARIN SODIUM 40 MG: 40 INJECTION SUBCUTANEOUS at 05:01

## 2022-01-27 RX ADMIN — ACETAMINOPHEN 650 MG: 325 TABLET ORAL at 02:01

## 2022-01-27 RX ADMIN — IPRATROPIUM BROMIDE AND ALBUTEROL SULFATE 3 ML: 2.5; .5 SOLUTION RESPIRATORY (INHALATION) at 04:01

## 2022-01-27 RX ADMIN — CARVEDILOL 6.25 MG: 3.12 TABLET, FILM COATED ORAL at 10:01

## 2022-01-27 RX ADMIN — CARVEDILOL 6.25 MG: 3.12 TABLET, FILM COATED ORAL at 05:01

## 2022-01-27 RX ADMIN — GABAPENTIN 600 MG: 300 CAPSULE ORAL at 09:01

## 2022-01-27 RX ADMIN — METHOCARBAMOL 750 MG: 750 TABLET ORAL at 09:01

## 2022-01-27 RX ADMIN — IPRATROPIUM BROMIDE AND ALBUTEROL SULFATE 3 ML: 2.5; .5 SOLUTION RESPIRATORY (INHALATION) at 12:01

## 2022-01-27 RX ADMIN — GABAPENTIN 600 MG: 300 CAPSULE ORAL at 10:01

## 2022-01-27 RX ADMIN — HYDROCODONE BITARTRATE AND ACETAMINOPHEN 1 TABLET: 7.5; 325 TABLET ORAL at 07:01

## 2022-01-27 RX ADMIN — INSULIN DETEMIR 24 UNITS: 100 INJECTION, SOLUTION SUBCUTANEOUS at 09:01

## 2022-01-27 RX ADMIN — SULFAMETHOXAZOLE AND TRIMETHOPRIM 1 TABLET: 800; 160 TABLET ORAL at 09:01

## 2022-01-27 RX ADMIN — FLUOXETINE 10 MG: 10 CAPSULE ORAL at 10:01

## 2022-01-27 RX ADMIN — INSULIN ASPART 1 UNITS: 100 INJECTION, SOLUTION INTRAVENOUS; SUBCUTANEOUS at 09:01

## 2022-01-27 NOTE — PLAN OF CARE
01/27/22 1447   BOLANOS Message   Medicare Outpatient and Observation Notification regarding financial responsibility Given to patient/caregiver;Explained to patient/caregiver;Signed/date by patient/caregiver   Date BOLANOS was signed 01/27/22   Time BOLANOS was signed 1444       BOLANOS discussed with patient in detail. Signature obtained and documented.

## 2022-01-27 NOTE — ASSESSMENT & PLAN NOTE
-Not using CPAP at home currently  -Sat 97% on RA  -Requesting script for new nebulizer device upon discharge  -Tatum q4WA  -Encourage IS use

## 2022-01-27 NOTE — ASSESSMENT & PLAN NOTE
-UA +ivan for nitrites and leukocytes on 1/20 after 3 days of macrobid  -On Bactrim ppx leading up to surgery  -Continue Bactrim BID for additional 5 days   -Chronic machado in place;  UOP adequate  -Cr 1.5 today - continue gentle IV hydration - repeat RFP in AM

## 2022-01-27 NOTE — TRANSFER OF CARE
Anesthesia Transfer of Care Note    Patient: Kelsea Cadet    Procedure(s) Performed: Procedure(s) (LRB):  BIOPSY, LYMPH NODE, SENTINEL (Right)  MASTECTOMY, SIMPLE (Left)  MASTECTOMY, MODIFIED RADICAL (Right)    Patient location: Other: OR A    Anesthesia Type: general    Post pain: adequate analgesia    Post assessment: no apparent anesthetic complications    Post vital signs: stable    Level of consciousness: sedated    Complications: none    Transfer of care protocol was followed      Last vitals:   /59  HR 82  T 36.5  RR16  SPO2 94

## 2022-01-27 NOTE — CARE UPDATE
PT WAS ASSISTED WITH DANGLING ON THE SIDE OF THE BED. DID WELL. THEN ASSISTED TO STAND AND TAKE A FEW STEPS. ALSO DID WELL. COUGHING AND DEEP BREATHING ENCOURAGED. PRODUCTIVE COUGH NOTED. NASAL CANNULA AT 2 1/2 LITER. ASSISTED BACK TO BED. CALL MASSEY IN REACH. PT SAYS THANKS. YALL HAVE BEEN SO NICE. ASKED TO CALL FOR ANY NEEDS

## 2022-01-27 NOTE — ANESTHESIA POSTPROCEDURE EVALUATION
Anesthesia Post Evaluation    Patient: Kelsea Cadet    Procedure(s) Performed: Procedure(s) (LRB):  BIOPSY, LYMPH NODE, SENTINEL (Right)  MASTECTOMY, SIMPLE (Left)  MASTECTOMY, MODIFIED RADICAL (Right)    Final Anesthesia Type: general      Patient location during evaluation: med/surg floor  Patient participation: Yes- Able to Participate  Level of consciousness: awake  Post-procedure vital signs: reviewed and stable  Pain management: adequate  Airway patency: patent    PONV status at discharge: No PONV  Anesthetic complications: no      Cardiovascular status: blood pressure returned to baseline  Respiratory status: spontaneous ventilation  Hydration status: euvolemic  Follow-up not needed.          Vitals Value Taken Time   /76 01/27/22 0000   Temp 36.9 °C (98.5 °F) 01/27/22 0541   Pulse 85 01/27/22 0000   Resp 18 01/27/22 0000   SpO2 98 % 01/27/22 0000         Event Time   Out of Recovery 19:20:00         Pain/Ирина Score: Pain Rating Prior to Med Admin: 3 (1/27/2022  5:41 AM)  Pain Rating Post Med Admin: 3 (1/27/2022  3:00 AM)  Ирина Score: 8 (1/26/2022  7:19 PM)

## 2022-01-27 NOTE — ASSESSMENT & PLAN NOTE
POD #1 s/p Right MRM with left simple mastectomy  -Drains with serosanguinous drainage and no signs of hematoma  -Continue KATH drains until output < 10cc/24hrs  -Continue multi-modal pain control  -Out of bed to chair as tolerated  -Encourage IS use  -WBC 19 - afebrile;  Likely reactive; will repeat CBC in AM   -Lovenox tonight   -Anticipate discharge tomorrow

## 2022-01-27 NOTE — SUBJECTIVE & OBJECTIVE
Interval History:   Patient seen and examined.  STEPHENEON.  VSS; afebrile.  Pain controlled with medication.  Ambulated out of bed to chair twice today without issue.  Says she feels good.     Medications:  Continuous Infusions:   lactated ringers 75 mL/hr at 01/27/22 1327     Scheduled Meds:   acetaminophen  650 mg Oral Q4H    albuterol-ipratropium  3 mL Nebulization Q4H WAKE    atorvastatin  80 mg Oral QHS    carvediloL  6.25 mg Oral BID WM    FLUoxetine  10 mg Oral Daily    gabapentin  600 mg Oral TID    LIDOcaine (PF) 10 mg/ml (1%)  1 mL Other Once    melatonin  6 mg Oral Nightly    methocarbamoL  750 mg Oral QID    scopolamine  1 patch Transdermal Once Pre-Op     PRN Meds:dextrose 50%, dextrose 50%, glucagon (human recombinant), glucose, glucose, HYDROcodone-acetaminophen, insulin aspart U-100, morphine, ondansetron     Review of patient's allergies indicates:   Allergen Reactions    Codeine Other (See Comments)     headache    Latex, natural rubber Rash     Objective:     Vital Signs (Most Recent):  Temp: 97.1 °F (36.2 °C) (01/27/22 1545)  Pulse: 76 (01/27/22 1615)  Resp: 18 (01/27/22 1615)  BP: (!) 136/49 (01/27/22 1545)  SpO2: 97 % (01/27/22 1615) Vital Signs (24h Range):  Temp:  [97.1 °F (36.2 °C)-98.8 °F (37.1 °C)] 97.1 °F (36.2 °C)  Pulse:  [73-90] 76  Resp:  [16-20] 18  SpO2:  [93 %-98 %] 97 %  BP: ()/(46-76) 136/49        There is no height or weight on file to calculate BMI.    Intake/Output - Last 3 Shifts       01/25 0700  01/26 0659 01/26 0700 01/27 0659 01/27 0700 01/28 0659    P.O.   600    I.V.  1000     Total Intake  1000 600    Urine  1575     Drains  150 130    Total Output  1725 130    Net  -725 +470                 Physical Exam  Gen: AAOx3; NAD  CV: RRR  Resp: Soft bilateral upper lobe wheezing;  Sat 97% on RA;  IS at bedside   Abd: Soft, NT, ND;  Ext: no c/c/e  Breast: Surgical dressing in place;  KATH drains in place with serosanguinous drainage in bulbs.  No signs of genet  bleeding or hematoma formation.    #1 - 40cc  #2 - 40cc  #3 - 30cc  #4 - 20cc    Significant Labs:  CBC:   Recent Labs   Lab 01/27/22  0739   WBC 19.82*   RBC 3.93*   HGB 10.7*   HCT 33.8*      MCV 86   MCH 27.2   MCHC 31.7*     BMP:   Recent Labs   Lab 01/27/22  0739   *      K 4.5      CO2 23   BUN 25*   CREATININE 1.5*   CALCIUM 7.1*   MG 2.1       Significant Diagnostics:  I have reviewed all pertinent imaging results/findings within the past 24 hours.

## 2022-01-27 NOTE — CARE UPDATE
PATIENT WAS ASSISTED TO THE SIDE OF THE BED, THEN TO STAND. DID WELL. SHE THEN WAS ASSISTED TO HER WHEELCHAIR FOR LUNCH. NO DISTRESS NOTED

## 2022-01-27 NOTE — PLAN OF CARE
[facility] -   Initial Discharge Assessment       Primary Care Provider: Merry Leary MD    Admission Diagnosis: Malignant neoplasm of central portion of right female breast, unspecified estrogen receptor status [C50.111]  Breast cancer, right [C50.911]    Admission Date: 2022  Expected Discharge Date:          Payor: MEDICARE / Plan: MEDICARE PART A & B / Product Type: Government /     Extended Emergency Contact Information  Primary Emergency Contact: Elizabeth Reed   [facility]  Home Phone: [phone]  Mobile Phone: [phone]  Relation: Daughter    Discharge Plan A: Home with family,Home  Discharge Plan B: Home Health,Home with family      [facility] [address] [place] [address] [address] [address] [facility]  [address] [address] [facility] [address]  Phone: [phone] Fax: [phone]    ANDRES GILLIAM [facility] [place] [address]  [facility] [address]  Phone: [phone] Fax: [phone]      Initial Assessment (most recent)     Adult Discharge Assessment - 22 1401        Discharge Assessment    Assessment Type Discharge Planning Assessment     Confirmed/corrected address, phone number and insurance Yes     Confirmed Demographics Correct on Facesheet     Source of Information patient     When was your last doctors appointment? --   The patient was unable to recall.    Lives With grandchild(eloy);child(eloy), adult     Do you expect to return to your current living situation? Yes     Do you have help at home or someone to help you manage your care at home? Yes     Who are your caregiver(s) and their phone number(s)? Elizabeth ([phone]     Prior to hospitilization cognitive status: Alert/Oriented     Current cognitive status: Alert/Oriented     Walking or Climbing Stairs Difficulty ambulation difficulty, requires equipment;ambulation difficulty, assistance 1 person     Mobility Management rolling walker and wheelchair     Dressing/Bathing Difficulty  bathing difficulty, requires equipment     Dressing/Bathing Management showerchair     Equipment Currently Used at Home walker, rolling;shower chair;wheelchair;glucometer;CPAP;bedside commode   inhaler    Do you currently have service(s) that help you manage your care at home? Yes     Name and Contact number of agency [facility]-[phone]     Is the pt/caregiver preference to resume services with current agency Yes     Do you take prescription medications? Yes     Do you have prescription coverage? Yes     Do you have any problems affording any of your prescribed medications? No     Is the patient taking medications as prescribed? yes     Who is going to help you get home at discharge? Elizabeth( daughter)     How do you get to doctors appointments? family or friend will provide     Are you on dialysis? No     Discharge Plan A Home with family;Home     Discharge Plan B Home Health;Home with family     DME Needed Upon Discharge  other (see comments)   TBD    Discharge Plan discussed with: Patient               Initial discharge assessment is completed.SW went to see the patient in her room. She was alert and oriented to the questions being asked. The patient lives with her daughter and adult grandson in an apartment. The patient's stated that her daughter is able to assist her as needed. She explained to the SW that she had to relocate to an apartment with her family due to a pipe bursting in her home. The incident was so severe that it caused black mold. The patient currently has home health care with [facility], and she plans to continue once she is discharged. The patient has a CPAP machine, but she does not like to wear it.  She also has an inhaler, wheelchair (PRN), shower chair, rolling walker, and gulcometer.     SW will continue to monitor.

## 2022-01-27 NOTE — CARE UPDATE
ASSISTED PT BACK TO BED. TOLERATED WELL. J.P DRAINS X4 MAINTAINED. NO DRAINAGE NOTED ON DRESSING. DEEP BREATHING AND COUGHING ENCOURAGED. RESP TECH AT THE BEDSIDE

## 2022-01-27 NOTE — CARE UPDATE
CORRECTION. 2 UNITS OF NOVOLOG WAS GIVEN IN THE RIGHT LOWER ABDOMEN. PT REMAINS SEATED IN WHEELCHAIR. DAUGHTER AT THE BEDSIDE. UPDATE GIVEN TO HER. THEY BOTH ARE EXCITED ABOUT THE POSSIBILITY OF GOING HOME THIS PM.

## 2022-01-27 NOTE — PLAN OF CARE
Patient is post op double mastectomy. She is comfortable and is reporting only slight pain/soreness. POC  discussed with patient and her daughter. Patient is progressing in all goals set in care plan. Vitals/RN assessment all WNL. No acute distress noted at this time. No issues or concerns at this time.

## 2022-01-27 NOTE — CARE UPDATE
DR SILVA HERE AND REMOVED THE SCOPOLAMINE PATCH FROM BEHIND THE LEFT EAR .  HERE TO ATTEMPT BLOOD WORK THAT WAS REQUESTED

## 2022-01-27 NOTE — PROGRESS NOTES
[facility]  General Surgery  Progress Note    Subjective:     History of Present Illness:  81 y.o.   female with PMH sig for L breast cancer and newly diagnosed R invasive lobular carcinoma, ER/[place] +ivan, Gd 1, HER2 neg who presents s/p right MRM with simple left mastectomy.     Post-Op Info:  Procedure(s) (LRB):  BIOPSY, LYMPH NODE, SENTINEL (Right)  MASTECTOMY, SIMPLE (Left)  MASTECTOMY, MODIFIED RADICAL (Right)   1 Day Post-Op     Interval History:   Patient seen and examined.  NAEON.  VSS; afebrile.  Pain controlled with medication.  Ambulated out of bed to chair twice today without issue.  Says she feels good.     Medications:  Continuous Infusions:   lactated ringers 75 mL/hr at 22 1327     Scheduled Meds:   acetaminophen  650 mg Oral Q4H    albuterol-ipratropium  3 mL Nebulization Q4H WAKE    atorvastatin  80 mg Oral QHS    carvediloL  6.25 mg Oral BID WM    FLUoxetine  10 mg Oral Daily    gabapentin  600 mg Oral TID    LIDOcaine (PF) 10 mg/ml (1%)  1 mL Other Once    melatonin  6 mg Oral Nightly    methocarbamoL  750 mg Oral QID    scopolamine  1 patch Transdermal Once Pre-Op     PRN Meds:dextrose 50%, dextrose 50%, glucagon (human recombinant), glucose, glucose, HYDROcodone-acetaminophen, insulin aspart U-100, morphine, ondansetron     Review of patient's allergies indicates:   Allergen Reactions    Codeine Other (See Comments)     headache    Latex, natural rubber Rash     Objective:     Vital Signs (Most Recent):  Temp: 97.1 °F (36.2 °C) (22 1545)  Pulse: 76 (22 1615)  Resp: 18 (22 1615)  BP: (!) 136/49 (22 1545)  SpO2: 97 % (22 1615) Vital Signs (24h Range):  Temp:  [97.1 °F (36.2 °C)-98.8 °F (37.1 °C)] 97.1 °F (36.2 °C)  Pulse:  [73-90] 76  Resp:  [16-20] 18  SpO2:  [93 %-98 %] 97 %  BP: ()/(46-76) 136/49        There is no height or weight on file to calculate BMI.    Intake/Output - Last 3 Shifts        0700   0659   0700  01/27 0659 01/27 0700  01/28 0659    P.O.   600    I.V.  1000     Total Intake  1000 600    Urine  1575     Drains  150 130    Total Output  1725 130    Net  -725 +470                 Physical Exam  Gen: AAOx3; NAD  CV: RRR  Resp: Soft bilateral upper lobe wheezing;  Sat 97% on RA;  IS at bedside   Abd: Soft, NT, ND;  Ext: no c/c/e  Breast: Surgical dressing in place;  KATH drains in place with serosanguinous drainage in bulbs.  No signs of genet bleeding or hematoma formation.    #1 - 40cc  #2 - 40cc  #3 - 30cc  #4 - 20cc    Significant Labs:  CBC:   Recent Labs   Lab 01/27/22  0739   WBC 19.82*   RBC 3.93*   HGB 10.7*   HCT 33.8*      MCV 86   MCH 27.2   MCHC 31.7*     BMP:   Recent Labs   Lab 01/27/22  0739   *      K 4.5      CO2 23   BUN 25*   CREATININE 1.5*   CALCIUM 7.1*   MG 2.1       Significant Diagnostics:  I have reviewed all pertinent imaging results/findings within the past 24 hours.    Assessment/Plan:     * Malignant neoplasm of central portion of right female breast  POD #1 s/p Right MRM with left simple mastectomy  -Drains with serosanguinous drainage and no signs of hematoma  -Continue KATH drains until output < 10cc/24hrs  -Continue multi-modal pain control  -Out of bed to chair as tolerated  -Encourage IS use  -WBC 19 - afebrile;  Likely reactive; will repeat CBC in AM   -Lovenox tonight   -Anticipate discharge tomorrow     Recurrent UTI  -UA +ivan for nitrites and leukocytes on 1/20 after 3 days of macrobid  -On Bactrim ppx leading up to surgery  -Continue Bactrim BID for additional 5 days   -Chronic machado in place;  UOP adequate  -Cr 1.5 today - continue gentle IV hydration - repeat RFP in AM    Long term (current) use of insulin  -DM diet  -SSI  -24U insulin nightly     KRISTINA on CPAP  -Not using CPAP at home currently  -Sat 97% on RA  -Requesting script for new nebulizer device upon discharge  -DuoNeb q4WA  -Encourage IS use     Reactive airway disease without  complication  -DuoNeb q4WA  -IS use       Heart failure with preserved ejection fraction, NYHA class II  -Lasix held to day due to soft BP and need for IVF   -Will restart lasix in AM - 40mg PO     Essential hypertension  -Lisinopril held; Coreg given          Keisha Cortez MD  General Surgery  [facility]

## 2022-01-28 VITALS
SYSTOLIC BLOOD PRESSURE: 114 MMHG | HEIGHT: 64 IN | TEMPERATURE: 98 F | HEART RATE: 75 BPM | BODY MASS INDEX: 37.56 KG/M2 | WEIGHT: 220 LBS | RESPIRATION RATE: 18 BRPM | OXYGEN SATURATION: 96 % | DIASTOLIC BLOOD PRESSURE: 93 MMHG

## 2022-01-28 LAB
ALBUMIN SERPL BCP-MCNC: 2.3 G/DL (ref 3.5–5.2)
ANION GAP SERPL CALC-SCNC: 6 MMOL/L (ref 8–16)
BUN SERPL-MCNC: 25 MG/DL (ref 8–23)
CALCIUM SERPL-MCNC: 7.7 MG/DL (ref 8.7–10.5)
CHLORIDE SERPL-SCNC: 108 MMOL/L (ref 95–110)
CO2 SERPL-SCNC: 24 MMOL/L (ref 23–29)
CREAT SERPL-MCNC: 1.3 MG/DL (ref 0.5–1.4)
ERYTHROCYTE [DISTWIDTH] IN BLOOD BY AUTOMATED COUNT: 15.5 % (ref 11.5–14.5)
EST. GFR  (AFRICAN AMERICAN): 45.1 ML/MIN/1.73 M^2
EST. GFR  (NON AFRICAN AMERICAN): 39.1 ML/MIN/1.73 M^2
GLUCOSE SERPL-MCNC: 84 MG/DL (ref 70–110)
HCT VFR BLD AUTO: 29.8 % (ref 37–48.5)
HGB BLD-MCNC: 9.2 G/DL (ref 12–16)
MCH RBC QN AUTO: 26.4 PG (ref 27–31)
MCHC RBC AUTO-ENTMCNC: 30.9 G/DL (ref 32–36)
MCV RBC AUTO: 86 FL (ref 82–98)
PHOSPHATE SERPL-MCNC: 3.3 MG/DL (ref 2.7–4.5)
PLATELET # BLD AUTO: 347 K/UL (ref 150–450)
PMV BLD AUTO: 9.8 FL (ref 9.2–12.9)
POCT GLUCOSE: 74 MG/DL (ref 70–110)
POTASSIUM SERPL-SCNC: 4.5 MMOL/L (ref 3.5–5.1)
RBC # BLD AUTO: 3.48 M/UL (ref 4–5.4)
SODIUM SERPL-SCNC: 138 MMOL/L (ref 136–145)
WBC # BLD AUTO: 11.05 K/UL (ref 3.9–12.7)

## 2022-01-28 PROCEDURE — G0378 HOSPITAL OBSERVATION PER HR: HCPCS

## 2022-01-28 PROCEDURE — 94761 N-INVAS EAR/PLS OXIMETRY MLT: CPT

## 2022-01-28 PROCEDURE — 85027 COMPLETE CBC AUTOMATED: CPT | Performed by: STUDENT IN AN ORGANIZED HEALTH CARE EDUCATION/TRAINING PROGRAM

## 2022-01-28 PROCEDURE — 99900031 HC PATIENT EDUCATION (STAT)

## 2022-01-28 PROCEDURE — 25000242 PHARM REV CODE 250 ALT 637 W/ HCPCS: Performed by: STUDENT IN AN ORGANIZED HEALTH CARE EDUCATION/TRAINING PROGRAM

## 2022-01-28 PROCEDURE — 94640 AIRWAY INHALATION TREATMENT: CPT

## 2022-01-28 PROCEDURE — 36415 COLL VENOUS BLD VENIPUNCTURE: CPT | Performed by: STUDENT IN AN ORGANIZED HEALTH CARE EDUCATION/TRAINING PROGRAM

## 2022-01-28 PROCEDURE — 25000003 PHARM REV CODE 250: Performed by: STUDENT IN AN ORGANIZED HEALTH CARE EDUCATION/TRAINING PROGRAM

## 2022-01-28 PROCEDURE — 99900035 HC TECH TIME PER 15 MIN (STAT)

## 2022-01-28 PROCEDURE — 80069 RENAL FUNCTION PANEL: CPT | Performed by: STUDENT IN AN ORGANIZED HEALTH CARE EDUCATION/TRAINING PROGRAM

## 2022-01-28 RX ORDER — ONDANSETRON 8 MG/1
8 TABLET, ORALLY DISINTEGRATING ORAL EVERY 8 HOURS PRN
Qty: 30 TABLET | Refills: 0 | Status: SHIPPED | OUTPATIENT
Start: 2022-01-28 | End: 2022-02-07

## 2022-01-28 RX ORDER — METHOCARBAMOL 750 MG/1
750 TABLET, FILM COATED ORAL 4 TIMES DAILY
Qty: 56 TABLET | Refills: 0 | Status: SHIPPED | OUTPATIENT
Start: 2022-01-28 | End: 2022-02-11

## 2022-01-28 RX ORDER — INSULIN GLARGINE 100 [IU]/ML
24 INJECTION, SOLUTION SUBCUTANEOUS NIGHTLY
Qty: 7.2 ML | Refills: 11 | Status: SHIPPED | OUTPATIENT
Start: 2022-01-28 | End: 2022-04-20 | Stop reason: SDUPTHER

## 2022-01-28 RX ORDER — POLYETHYLENE GLYCOL 3350 17 G/17G
17 POWDER, FOR SOLUTION ORAL DAILY
Qty: 14 EACH | Refills: 0 | Status: SHIPPED | OUTPATIENT
Start: 2022-01-28 | End: 2022-02-11

## 2022-01-28 RX ORDER — DOCUSATE SODIUM 100 MG/1
100 CAPSULE, LIQUID FILLED ORAL 2 TIMES DAILY
Qty: 28 CAPSULE | Refills: 0 | Status: SHIPPED | OUTPATIENT
Start: 2022-01-28 | End: 2022-02-11

## 2022-01-28 RX ORDER — HYDROCODONE BITARTRATE AND ACETAMINOPHEN 7.5; 325 MG/1; MG/1
1 TABLET ORAL EVERY 6 HOURS PRN
Qty: 28 TABLET | Refills: 0 | Status: SHIPPED | OUTPATIENT
Start: 2022-01-28 | End: 2022-02-01

## 2022-01-28 RX ADMIN — GABAPENTIN 600 MG: 300 CAPSULE ORAL at 07:01

## 2022-01-28 RX ADMIN — CARVEDILOL 6.25 MG: 3.12 TABLET, FILM COATED ORAL at 07:01

## 2022-01-28 RX ADMIN — METHOCARBAMOL 750 MG: 750 TABLET ORAL at 12:01

## 2022-01-28 RX ADMIN — HYDROCODONE BITARTRATE AND ACETAMINOPHEN 1 TABLET: 7.5; 325 TABLET ORAL at 12:01

## 2022-01-28 RX ADMIN — METHOCARBAMOL 750 MG: 750 TABLET ORAL at 07:01

## 2022-01-28 RX ADMIN — ACETAMINOPHEN 650 MG: 325 TABLET ORAL at 02:01

## 2022-01-28 RX ADMIN — FUROSEMIDE 40 MG: 40 TABLET ORAL at 07:01

## 2022-01-28 RX ADMIN — ACETAMINOPHEN 650 MG: 325 TABLET ORAL at 06:01

## 2022-01-28 RX ADMIN — SULFAMETHOXAZOLE AND TRIMETHOPRIM 1 TABLET: 800; 160 TABLET ORAL at 09:01

## 2022-01-28 RX ADMIN — FLUOXETINE 10 MG: 10 CAPSULE ORAL at 07:01

## 2022-01-28 RX ADMIN — ACETAMINOPHEN 650 MG: 325 TABLET ORAL at 09:01

## 2022-01-28 RX ADMIN — IPRATROPIUM BROMIDE AND ALBUTEROL SULFATE 3 ML: 2.5; .5 SOLUTION RESPIRATORY (INHALATION) at 08:01

## 2022-01-28 NOTE — NURSING
"Patient's daughter at bedside. Daughter and patient request that no one be given any information on the patient if they call. The only two that are allowed to receive any information is the daughter (Elizabeth En) or the patient's son (Reece Cadet). Daughter has come up with a "password" in order to make sure information is not given to the wrong person. Will pass along in report to day shift. All information needed for this is located in chart.   "
2015: MD called to verify fluid orders. Also asked about lab draws since patient is to have no sticks/BP to both arms. MD states labs can be drawn from foot. MD also stated to keep an eye on output from each individual drain and what it looks like. No other questions/concerns at this time.   
Discharge instructions provided, all questions answered. Patient verbalized understanding. Instructed to call for wheelchair when ready.    
Patient lying in bed resting quietly. BG of 124. Glucometer not transferring over results. No acute distress noted at this time.   
Patient off unit for discharge via wheelchair in stable condition with daughter. Accompanied by staff x1    
Patient sleeping in bed resting quietly. Oxygen via NC at 3 L. Vitals stable and WNL all throughout shift. Pain tolerated well with tylenol and oral pain medication given once at beginning of shift. Patient is voiding via indwelling catheter in which she has had previously before arriving at the hospital. Patient states she is feeling well and is more sore than having actual pain. No acute distress noted. Patient reports no questions/concerns at this time.   
Patient up to floor into room 416 at 1932. Report received from Marva Tena RN. Patient lying in bed, feeling sore but reports no serious pain at this time. Vitals were taken and patient was situated in bed getting her comfortable. Drains were observed along with IV and machado catheter all patent and intact, no issues. Patient placed on continuous pulse ox and RN assessment was done. Receiving oxygen at 2L via NC. Patient's daughter at bedside. No acute distress noted at this time. All questions/concerns addressed.   
stretcher

## 2022-01-28 NOTE — HOSPITAL COURSE
Patient underwent successful right MRM with simple left mastectomy on 1/26/2022.  Patient admitted to floor in stable condition.  On POD #1 patient tolerating regular diet with pain moderately controlled with medication.  WBC 19 with mild MARIA FERNANDA with CR of 1.5.  Patient continued on IV fluids and observed overnight.  On POD #2, WBC normalized with Cr downtrending.  Patient was deemed eligible for discharge and will follow up with Dr. Cortez on 2/7/2022

## 2022-01-28 NOTE — DISCHARGE SUMMARY
Physician Discharge Summary   [facility]      Patient ID:  Kelseamoy Cadet  1940  [id number]    Admit date: 1/26/2022    Discharge date: 01/28/2022    Admitting Physician: Keisha Cortez MD    Discharge Physician: Keisha Cortez MD     Admission Diagnoses:   Malignant neoplasm of central portion of right female breast, unspecified estrogen receptor status [C50.111]  Breast cancer, right [C50.911]     Discharge Diagnoses:   Active Problem List with Overview Notes    Diagnosis Date Noted    Malignant neoplasm of central portion of right female breast 12/16/2021    Axillary adenopathy 12/16/2021    Stage 3b chronic kidney disease 03/11/2021    Urinary retention 01/06/2021    Abnormal CT of the chest 10/21/2020    Seasonal allergic rhinitis due to pollen 10/21/2020    History of colon cancer 10/21/2020    Atelectasis     MGUS (monoclonal gammopathy of unknown significance) 04/15/2020    Idiopathic peripheral neuropathy 04/15/2020    Non-occlusive coronary artery disease 02/26/2020    History of breast cancer 02/04/2020    Parotiditis 01/16/2020    Congenital urethral stenosis 12/31/2019    Female cystocele 12/31/2019    Left ventricular systolic dysfunction 11/27/2019    Recurrent UTI 11/15/2019    Irritable bowel syndrome with diarrhea 11/13/2019    Heart failure with preserved ejection fraction, NYHA class II 11/13/2019    Pulmonary hypertension 11/13/2019    Reactive airway disease without complication 11/13/2019    Vitamin D deficiency 11/13/2019    KRISTINA on CPAP 11/13/2019    Long term (current) use of insulin 11/13/2019    Body mass index 33.0-33.9, adult 06/10/2019    Type 2 diabetes mellitus without complication, with long-term current use of insulin 06/10/2019    Dyslipidemia 06/10/2019    Adverse effect of metformin 06/10/2019    Major depressive disorder with single episode, in full remission 05/13/2019    Absence of bladder continence 05/13/2019    Chronic pain  syndrome 05/13/2019    Arthritis 05/13/2019    Primary insomnia 05/13/2019    Essential hypertension 05/13/2019       Admission Condition: good    Discharged Condition: good    Indication for Admission: 81 y.o.   female with PMH sig for L breast cancer and newly diagnosed R invasive lobular carcinoma, ER/MO +ivan, Gd 1, HER2 neg who presents s/p right MRM with simple left mastectomy.     Hospital Course:   Patient underwent successful right MRM with simple left mastectomy on 1/26/2022.  Patient admitted to floor in stable condition.  On POD #1 patient tolerating regular diet with pain moderately controlled with medication.  WBC 19 with mild MARIA FERNANDA with CR of 1.5.  Patient continued on IV fluids and observed overnight.  On POD #2, WBC normalized with Cr downtrending.  Patient was deemed eligible for discharge and will follow up with Dr. Cortez on 2/7/2022      Consults: See above    Significant Diagnostic Studies: See above    Treatments: See above    Discharge Exam:  Gen: AAOx3; NAD  CV: RRR  Resp: breaths equal and nonlabored  Abd: Soft, NT, ND;  Ext: no c/c/e  Breast: Mastectomy dressing in place.  Bilateral chest wall incisions C/D/I with steri strips in place.  No signs of active bleeding or hematoma formation  KATH drains x4 in place with serosanguinous drainage in the bulbs.   #1 - 105cc  #2 - 135cc  #3 - 65cc  #4 - 55cc      Disposition: Home or Self Care    Patient Instructions:      Medication List      START taking these medications    HYDROcodone-acetaminophen 7.5-325 mg per tablet  Commonly known as: NORCO  Take 1 tablet by mouth every 6 (six) hours as needed.     methocarbamoL 750 MG Tab  Commonly known as: ROBAXIN  Take 1 tablet (750 mg total) by mouth 4 (four) times daily. for 14 days     ondansetron 8 MG Tbdl  Commonly known as: ZOFRAN-ODT  Take 1 tablet (8 mg total) by mouth every 8 (eight) hours as needed (nausea).     polyethylene glycol 17 gram Pwpk  Commonly known as: GLYCOLAX  Take 17 g by mouth  once daily. for 14 days        CHANGE how you take these medications    cholecalciferol (vitamin D3) 50 mcg (2,000 unit) Tab  Commonly known as: VITAMIN D3  Take 1 tablet (2,000 Units total) by mouth once daily.  What changed: when to take this     * docusate sodium 100 MG capsule  Commonly known as: COLACE  What changed: Another medication with the same name was added. Make sure you understand how and when to take each.     * docusate sodium 100 MG capsule  Commonly known as: COLACE  Take 1 capsule (100 mg total) by mouth 2 (two) times daily. for 14 days  What changed: You were already taking a medication with the same name, and this prescription was added. Make sure you understand how and when to take each.     LANTUS SOLOSTAR U-100 INSULIN glargine 100 units/mL (3mL) SubQ pen  Generic drug: insulin  Inject 24 Units into the skin every evening.  What changed: how much to take         * This list has 2 medication(s) that are the same as other medications prescribed for you. Read the directions carefully, and ask your doctor or other care provider to review them with you.            CONTINUE taking these medications    AEROBIKA OSCILLATING PEP SYSTM MISC     albuterol 90 mcg/actuation inhaler  Commonly known as: PROVENTIL/VENTOLIN HFA  Inhale 2 puffs into the lungs every 6 hours as needed for wheezing     ALLERGY RELIEF (LORATADINE) 10 mg tablet  Generic drug: loratadine  TAKE 1 TABLET BY MOUTH ONCE DAILY AS NEEDED FOR ALLERGIES     aspirin 81 MG EC tablet  Commonly known as: ECOTRIN     atorvastatin 80 MG tablet  Commonly known as: LIPITOR  Take 1 tablet by mouth in the evening     blood sugar diagnostic Strp  Commonly known as: ONETOUCH ULTRA BLUE TEST STRIP  USE 1 STRIP TO CHECK GLUCOSE ONCE DAILY. DX Code: E11.9     blood-glucose meter Misc  1 Device by Misc.(Non-Drug; Combo Route) route once daily. One Touch Dx. Code:E11.9     brivaracetam 100 mg Tab  Commonly known as: Briviact     carvediloL 6.25 MG  "tablet  Commonly known as: COREG  TAKE 1 TABLET BY MOUTH TWICE DAILY WITH MEALS     clobetasoL 0.05 % cream  Commonly known as: TEMOVATE  Apply twice a day for 2 weeks then nightly for 2 months. May decrease to three nights a week after that.     co-enzyme Q-10 30 mg capsule  Take 1 capsule (30 mg total) by mouth every evening.     CRANBERRY CONCENTRATE ORAL     dicyclomine 20 mg tablet  Commonly known as: BENTYL     FLUoxetine 10 MG capsule  Take 1 capsule (10 mg total) by mouth once daily.     furosemide 40 MG tablet  Commonly known as: LASIX  Take 1 tablet (40 mg total) by mouth once daily.     gabapentin 600 MG tablet  Commonly known as: NEURONTIN  Take 1 tablet (600 mg total) by mouth 3 (three) times daily.     hydroCHLOROthiazide 25 MG tablet  Commonly known as: HYDRODIURIL  Take 1 tablet by mouth once daily     lancets 33 gauge Misc  Commonly known as: ONETOUCH DELICA PLUS LANCET  USE 1 LANCET TO CHECK GLUCOSE ONCE DAILY, Dx:  E11.9     lisinopriL 20 MG tablet  Commonly known as: PRINIVIL,ZESTRIL  TAKE 1 TABLET BY MOUTH ONCE DAILY IN THE EVENING     loperamide 2 mg capsule  Commonly known as: IMODIUM     LYSINE ORAL     magnesium 30 mg Tab     multivitamin capsule     nebulizer accessories Kit  Use as directed     pen needle, diabetic 31 gauge x 1/4" Ndle  Commonly known as: ULTICARE PEN NEEDLE  USE 1 PEN NEEDLE  AT BEDTIME, Dx:  E11.9     potassium chloride 10 MEQ Tbsr  Commonly known as: KLOR-CON     sulfamethoxazole-trimethoprim 800-160mg 800-160 mg Tab  Commonly known as: BACTRIM DS  Take 1 tablet by mouth 2 (two) times daily. for 5 days     TRADJENTA 5 mg Tab tablet  Generic drug: linaGLIPtin  Take 1 tablet (5 mg total) by mouth once daily.           Where to Get Your Medications      These medications were sent to [facility] [address] [place] [id number]    Phone: [phone]   · docusate sodium 100 MG capsule  · HYDROcodone-acetaminophen 7.5-325 " mg per tablet  · LANTUS SOLOSTAR U-100 INSULIN glargine 100 units/mL (3mL) SubQ pen  · methocarbamoL 750 MG Tab  · ondansetron 8 MG Tbdl  · polyethylene glycol 17 gram Pwpk         Activity: no heavy lifting for 4 weeks;  Please refer to mastectomy exercise sheet provided     Diet: diabetic diet     Wound Care:   Remove dressing tonight  Shower daily and allow soapy water to run over incisions  You may get KATH drains wet in the shower;  Support drains with cloth strip wrapped around lower chest or waist  Do not scrub or submerge in water for two weeks    Pad chest wall with soft fabric or gauze, and wrap with ACE bandage for support.  It does not need to be tight.   Apply gauze around the drain sites daily after shower.     KATH Drain care:  Strip KATH drains at least twice a day   Measure drain output twice a day - write down measurements on a piece of paper with the time and date  Bring this paper to your next clinic appointment for Dr. Cortez       Follow-up with Dr. Cortez on 2/7/2022    Keisha Cortez MD  General Surgery   [phone]      01/28/2022

## 2022-01-28 NOTE — PLAN OF CARE
POST OP DAY 1. HAS REMAINED AFEBRILE. HAS BEEN OUT OF THE BED TIMES 2 THIS SHIFT. TOLERATED WELL. EXPELLING FLATUS RECTALLY. HAS NOT HAD ANY NAUSEA NOR VOMITING. SUMMERS CONTINUES TO DRAIN CLEAR LIGHT GREEN URINE. REMAINS IN GOOD SPIRITS. DAUGHTER HAS BEEN AT THE BEDSIDE. PROGRESSING WELL

## 2022-01-30 ENCOUNTER — HOSPITAL ENCOUNTER (EMERGENCY)
Facility: HOSPITAL | Age: 82
Discharge: HOME | End: 2022-01-30
Attending: STUDENT IN AN ORGANIZED HEALTH CARE EDUCATION/TRAINING PROGRAM
Payer: MEDICARE

## 2022-01-30 ENCOUNTER — NURSE TRIAGE (OUTPATIENT)
Dept: ADMINISTRATIVE | Facility: CLINIC | Age: 82
End: 2022-01-30
Payer: MEDICARE

## 2022-01-30 VITALS
RESPIRATION RATE: 18 BRPM | WEIGHT: 220 LBS | HEIGHT: 64 IN | BODY MASS INDEX: 37.56 KG/M2 | SYSTOLIC BLOOD PRESSURE: 135 MMHG | TEMPERATURE: 98 F | OXYGEN SATURATION: 96 % | DIASTOLIC BLOOD PRESSURE: 70 MMHG | HEART RATE: 73 BPM

## 2022-01-30 DIAGNOSIS — L76.82 OTHER POSTOPERATIVE COMPLICATION OF SKIN: Primary | ICD-10-CM

## 2022-01-30 PROCEDURE — 99282 EMERGENCY DEPT VISIT SF MDM: CPT

## 2022-01-30 NOTE — ED PROVIDER NOTES
Encounter Date: 1/30/2022       History     Chief Complaint   Patient presents with    Post-op Problem     Patient recently had a double mastectomy and one of the drian tubes came out on the right side.      81-year-old female with history of breast cancer with recent double mastectomy presents for evaluation of drain to dislodgement on right side.  Patient denies any other associated symptoms.  P        Review of patient's allergies indicates:   Allergen Reactions    Codeine Other (See Comments)     headache    Latex, natural rubber Rash     Past Medical History:   Diagnosis Date    Arthritis     Asthmatic bronchitis     Breast cancer 1996    bilateral - radiation pellets for tx    Breast cancer 01/2022    right    Colon cancer 1963    Congestive heart failure (CHF)     Coronary artery disease     Depression     Diabetes mellitus, type 2     Encounter for blood transfusion     Environmental and seasonal allergies     Land catheter in place     Hard of hearing     Hyperlipidemia     Hypertension     IBS (irritable bowel syndrome)     Kidney disease     ckd stage 3b    Myocardial infarction 2019    Neuropathy     PONV (postoperative nausea and vomiting)     Presence of indwelling Land catheter     Shingles     Sleep apnea     no cpap used    Stroke 1993    tia    Thyroid disease     UTI (urinary tract infection)     with sepsis    Vitamin D deficiency     Wound, open, buttock     being treated    Yeast infection      Past Surgical History:   Procedure Laterality Date    BLADDER SUSPENSION      BREAST BIOPSY Bilateral 1996    BREAST SURGERY      lumpectomy, isotopes    CHOLECYSTECTOMY      had gal bladder removed    COLECTOMY      had part of colon removed    CYSTOSCOPY W/ RETROGRADES Bilateral 12/19/2019    Procedure: CYSTOSCOPY, WITH RETROGRADE PYELOGRAM;  Surgeon: Prasad Rocha MD;  Location: [facility];  Service: Urology;  Laterality: Bilateral;    DILATION OF URETHRA N/A  12/19/2019    Procedure: DILATION, URETHRA;  Surgeon: Prasad Rocha MD;  Location: [facility] OR;  Service: Urology;  Laterality: N/A;    HYSTERECTOMY      LEFT HEART CATHETERIZATION Left 11/27/2019    Procedure: Left heart cath;  Surgeon: Urban Paiz MD;  Location: [facility] CATH LAB;  Service: Cardiology;  Laterality: Left;    MODIFIED RADICAL MASTECTOMY W/ AXILLARY LYMPH NODE DISSECTION Right 1/26/2022    Procedure: MASTECTOMY, MODIFIED RADICAL;  Surgeon: Keisha Cortez MD;  Location: [facility] OR;  Service: General;  Laterality: Right;    OOPHORECTOMY      SENTINEL LYMPH NODE BIOPSY Right 1/26/2022    Procedure: BIOPSY, LYMPH NODE, SENTINEL;  Surgeon: Keisha Cortez MD;  Location: [facility] OR;  Service: General;  Laterality: Right;  0800    SIMPLE MASTECTOMY Left 1/26/2022    Procedure: MASTECTOMY, SIMPLE;  Surgeon: Keisha Cortez MD;  Location[facility];  Service: General;  Laterality: Left;  FROZEN SECTION     Family History   Problem Relation Age of Onset    Cancer Mother 42        Bone    Bone cancer Mother     Breast cancer Mother     Heart disease Mother     Cancer Father         Bone    Bone cancer Father 86    Breast cancer Sister     Bell's palsy Sister     Breast cancer Sister      Social History     Tobacco Use    Smoking status: Never Smoker    Smokeless tobacco: Never Used   Substance Use Topics    Alcohol use: Not Currently     Comment: rare    Drug use: Never     Review of Systems   Constitutional: Negative.    HENT: Negative.    Respiratory: Negative.    Cardiovascular: Negative.    Gastrointestinal: Negative.    Genitourinary: Negative.    Musculoskeletal: Negative.    Skin: Negative.    Neurological: Negative.    Psychiatric/Behavioral: Negative.    All other systems reviewed and are negative.      Physical Exam     Initial Vitals [01/30/22 1116]   BP Pulse Resp Temp SpO2   135/70 73 18 98 °F (36.7 °C) 96 %      MAP       --         Physical Exam    Nursing note and vitals  reviewed.  Constitutional: Vital signs are normal. She appears well-developed and well-nourished.   HENT:   Head: Normocephalic and atraumatic.   Eyes: Conjunctivae and lids are normal.   Neck: Trachea normal. Neck supple.   Cardiovascular: Normal rate, regular rhythm and normal pulses.   Pulmonary/Chest: Breath sounds normal. No respiratory distress.   Abdominal: Abdomen is soft. Bowel sounds are normal.   Musculoskeletal:         General: Normal range of motion.      Cervical back: Neck supple.     Neurological: She is alert and oriented to person, place, and time.   Skin: Capillary refill takes less than 2 seconds.   wellhealing post surgical scar on bilateral chest without surround cellulitis or drainage. j drain present bilat x4 with partial dislodgement of 4   Psychiatric: She has a normal mood and affect. Her speech is normal.         ED Course   Procedures  Labs Reviewed - No data to display       Imaging Results    None          Medications - No data to display  Medical Decision Making:   Initial Assessment:   81-year-old female with history of breast cancer with recent double mastectomy presents for evaluation of drain to dislodgement on right side. Afebrile and vitals stable. Drain removed. Follow up with surgeon             ED Course as of 01/30/22 1201   Sun Jan 30, 2022   1156 Spoke to patient surgeon who agrees to take the remaining part of the drain out and dressed wound.  Patient has follow-up tomorrow.  Strict return precaution [HD]      ED Course User Index  [HD] Jaison Porras MD             Clinical Impression:   Final diagnoses:  [L76.82] Other postoperative complication of skin (Primary)          ED Disposition Condition    Discharge Stable        ED Prescriptions     None        Follow-up Information     Follow up With Specialties Details Why Contact Info    Keisha Cortez MD General Surgery  As needed, If symptoms worsen [address] [place] [id number]  [phone]              Jaison Porras MD  01/30/22 1203       Jaison Porras MD  01/30/22 1205

## 2022-01-30 NOTE — ED NOTES
Pt has 4 Oziel carcamo drains. Two on each chest. Right, 4th drain partially out of skin. Drainage in Left 1 and 2 drain.

## 2022-01-30 NOTE — TELEPHONE ENCOUNTER
Speaking to daughter    Recently had double mastectomy, has 4 sorin drains, today increased drainage to gauze that's around #4 SORIN drain. Still looks intact and small amount has collected in that sorin drain but so much drainage around the gauze completely saturated and saturated ace wrap. Color looks like Koolaid. No fever, s/s of infection, or foul smelling discharge and pain well controlled.     @ 1020: No one on call for surgery. Pt's daughter states SORIN drain is now hanging out of incision. No bright red blood. Some pale pink drainage but no longer intact.     Pt's daughter agreed will bring to ED now.       Reason for Disposition   Dressing soaked with blood or body fluid (e.g., drainage)    Additional Information   Negative: [1] Major abdominal surgical incision AND [2] wound gaping open AND [3] visible internal organs   Negative: Sounds like a life-threatening emergency to the triager   Negative: [1] Bleeding from incision AND [2] won't stop after 10 minutes of direct pressure   Negative: [1] Widespread rash AND [2] bright red, sunburn-like   Negative: Severe pain in the incision   Negative: [1] Incision gaping open AND [2] < 48 hours since wound re-opened   Negative: [1] Incision gaping open AND [2] length of opening > 2 inches (5 cm)   Negative: Patient sounds very sick or weak to the triager   Negative: Sounds like a serious complication to the triager   Negative: Fever > 100.4 F (38.0 C)   Negative: [1] Incision looks infected (spreading redness, pain) AND [2] fever > 99.5 F (37.5 C)   Negative: [1] Incision looks infected (spreading redness, pain) AND [2] large red area (> 2 in. or 5 cm)   Negative: [1] Incision looks infected (spreading redness, pain) AND [2] face wound   Negative: [1] Red streak runs from the incision AND [2] longer than 1 inch (2.5 cm)   Negative: [1] Pus or bad-smelling fluid draining from incision AND [2] no fever   Negative: [1] Post-op pain AND [2] not controlled with  pain medications    Protocols used: POST-OP INCISION SYMPTOMS AND [id number]

## 2022-02-01 RX ORDER — HYDROCODONE BITARTRATE AND ACETAMINOPHEN 10; 325 MG/1; MG/1
1 TABLET ORAL EVERY 6 HOURS PRN
Qty: 28 TABLET | Refills: 0 | Status: SHIPPED | OUTPATIENT
Start: 2022-02-01 | End: 2022-02-08

## 2022-02-03 ENCOUNTER — OFFICE VISIT (OUTPATIENT)
Dept: SURGERY | Facility: CLINIC | Age: 82
End: 2022-02-03
Payer: MEDICARE

## 2022-02-03 DIAGNOSIS — C50.111 MALIGNANT NEOPLASM OF CENTRAL PORTION OF RIGHT BREAST IN FEMALE, ESTROGEN RECEPTOR POSITIVE: Primary | ICD-10-CM

## 2022-02-03 DIAGNOSIS — Z17.0 MALIGNANT NEOPLASM OF CENTRAL PORTION OF RIGHT BREAST IN FEMALE, ESTROGEN RECEPTOR POSITIVE: Primary | ICD-10-CM

## 2022-02-03 PROCEDURE — 99024 POSTOP FOLLOW-UP VISIT: CPT | Mod: POP,,, | Performed by: STUDENT IN AN ORGANIZED HEALTH CARE EDUCATION/TRAINING PROGRAM

## 2022-02-03 PROCEDURE — 99213 OFFICE O/P EST LOW 20 MIN: CPT | Mod: PBBFAC | Performed by: STUDENT IN AN ORGANIZED HEALTH CARE EDUCATION/TRAINING PROGRAM

## 2022-02-03 PROCEDURE — 99999 PR PBB SHADOW E&M-EST. PATIENT-LVL III: CPT | Mod: PBBFAC,,, | Performed by: STUDENT IN AN ORGANIZED HEALTH CARE EDUCATION/TRAINING PROGRAM

## 2022-02-03 PROCEDURE — 99999 PR PBB SHADOW E&M-EST. PATIENT-LVL III: ICD-10-PCS | Mod: PBBFAC,,, | Performed by: STUDENT IN AN ORGANIZED HEALTH CARE EDUCATION/TRAINING PROGRAM

## 2022-02-03 PROCEDURE — 99024 PR POST-OP FOLLOW-UP VISIT: ICD-10-PCS | Mod: POP,,, | Performed by: STUDENT IN AN ORGANIZED HEALTH CARE EDUCATION/TRAINING PROGRAM

## 2022-02-03 RX ORDER — SULFAMETHOXAZOLE AND TRIMETHOPRIM 800; 160 MG/1; MG/1
1 TABLET ORAL 2 TIMES DAILY
Qty: 14 TABLET | Refills: 0 | Status: SHIPPED | OUTPATIENT
Start: 2022-02-03 | End: 2022-02-10

## 2022-02-04 NOTE — PROGRESS NOTES
[facility]  General Surgery Clinic Progress Note    HPI:  Kelsea Cadet is a 81 y.o. female with Right breast ILCa s/p right MRM with left simple mastectomy who presents for follow up.  Right axillary drain accidentally removed on POD #1.  Additional right breast drain 80% removed and dangling when patient presented to clinic.  Per patient and daughter, drain was in place that AM.  Pain controlled with medication.  Report stiffness in left shoulder, but has full ROM in RUE.  Per daughter, left drains producing 30cc/day since discharge.  Has been complaint with exercises.  Denies CP, SOB, abdominal pain, nausea, vomiting, fevers, or chills.       ROS:  Negative except above    PE:  There were no vitals filed for this visit.    Gen: Alert and oriented x3, judgement intact, NAD  CV: RRR  Resp: CTAB; breaths non-labored and symmetrical  Abd: Soft, NT, ND, BS+  Extremities: No c/c/e  Breast: Left mastectomy incision C/D/I with steri strips in place;   KATH drains x2 in place with 15cc of serous fluid in the bulb   Right - Axilla soft with incision C/D/I.  Small, soft, right lateral dependent fluid collection.  Axillary drain site healed with no SOI;  Chest wall drain partially removed;  Drain site erythematous with serous drainage.  Mastectomy incision intact with steri strips in place.  Skin erythematous with two patches of superficial skin ischemia along suture line.      A/P:  81 y.o. female with right breast ILCa s/p R MRM and left simple mastectomy who presents for follow up  -R MRM incision with superficial cellulitis with small lateral seroma due to premature drain removal and possible retrograde infection  -Bactrim DS for 7 days  -Betadine to be applied to axillary, mastectomy, and drain sites daily;  Cover with gauze and wrap with ACE wrap.  Avoid taping to skin   -Left drains to remain in place another week  -Pt still to keep chest wall clean and dry   -RTC Monday 2/7 for follow up  -F/u pathology      Keisha Cortez MD  General Surgery   [phone]  [phone]        2/4/2022  9:58 AM

## 2022-02-06 ENCOUNTER — HOSPITAL ENCOUNTER (EMERGENCY)
Facility: HOSPITAL | Age: 82
Discharge: HOME | End: 2022-02-06
Attending: EMERGENCY MEDICINE
Payer: MEDICARE

## 2022-02-06 VITALS
RESPIRATION RATE: 16 BRPM | TEMPERATURE: 98 F | WEIGHT: 220 LBS | DIASTOLIC BLOOD PRESSURE: 85 MMHG | BODY MASS INDEX: 37.76 KG/M2 | OXYGEN SATURATION: 97 % | SYSTOLIC BLOOD PRESSURE: 120 MMHG | HEART RATE: 68 BPM

## 2022-02-06 DIAGNOSIS — S20.211S: Primary | ICD-10-CM

## 2022-02-06 PROCEDURE — 99282 EMERGENCY DEPT VISIT SF MDM: CPT

## 2022-02-06 NOTE — ED PROVIDER NOTES
Encounter Date: 2/6/2022       History     Chief Complaint   Patient presents with    Post-op Problem     Pt had recent double mastectomy, states home health nurse advised her to come in due to hardening around incision site on the right / possible infection. Surgeon Dr. Cortez      81 year old female with complaints of hard area at site of recent  Mastectomy. Daughter states that home health nurse informed her of the area and she decided to to come to hospital to get checked out. She as started on Bactrim Thursday by her surgeon. Denies fever, pain,.         Review of patient's allergies indicates:   Allergen Reactions    Codeine Other (See Comments)     headache    Latex, natural rubber Rash     Past Medical History:   Diagnosis Date    Arthritis     Asthmatic bronchitis     Breast cancer 1996    bilateral - radiation pellets for tx    Breast cancer 01/2022    right    Colon cancer 1963    Congestive heart failure (CHF)     Coronary artery disease     Depression     Diabetes mellitus, type 2     Encounter for blood transfusion     Environmental and seasonal allergies     Land catheter in place     Hard of hearing     Hyperlipidemia     Hypertension     IBS (irritable bowel syndrome)     Kidney disease     ckd stage 3b    Myocardial infarction 2019    Neuropathy     PONV (postoperative nausea and vomiting)     Presence of indwelling Land catheter     Shingles     Sleep apnea     no cpap used    Stroke 1993    tia    Thyroid disease     UTI (urinary tract infection)     with sepsis    Vitamin D deficiency     Wound, open, buttock     being treated    Yeast infection      Past Surgical History:   Procedure Laterality Date    BLADDER SUSPENSION      BREAST BIOPSY Bilateral 1996    BREAST SURGERY      lumpectomy, isotopes    CHOLECYSTECTOMY      had gal bladder removed    COLECTOMY      had part of colon removed    CYSTOSCOPY W/ RETROGRADES Bilateral 12/19/2019    Procedure:  CYSTOSCOPY, WITH RETROGRADE PYELOGRAM;  Surgeon: Prasad Rocha MD;  Location: [facility] OR;  Service: Urology;  Laterality: Bilateral;    DILATION OF URETHRA N/A 12/19/2019    Procedure: DILATION, URETHRA;  Surgeon: Prasad Rocha MD;  Location: [facility] OR;  Service: Urology;  Laterality: N/A;    HYSTERECTOMY      LEFT HEART CATHETERIZATION Left 11/27/2019    Procedure: Left heart cath;  Surgeon: Urban Paiz MD;  Location: [facility] CATH LAB;  Service: Cardiology;  Laterality: Left;    MODIFIED RADICAL MASTECTOMY W/ AXILLARY LYMPH NODE DISSECTION Right 1/26/2022    Procedure: MASTECTOMY, MODIFIED RADICAL;  Surgeon: Keisha Cortez MD;  Location: [facility] OR;  Service: General;  Laterality: Right;    OOPHORECTOMY      SENTINEL LYMPH NODE BIOPSY Right 1/26/2022    Procedure: BIOPSY, LYMPH NODE, SENTINEL;  Surgeon: Keisha Cortez MD;  Location: [facility] OR;  Service: General;  Laterality: Right;  0800    SIMPLE MASTECTOMY Left 1/26/2022    Procedure: MASTECTOMY, SIMPLE;  Surgeon: Keisha Cortez MD;  Location: [facility] OR;  Service: General;  Laterality: Left;  FROZEN SECTION     Family History   Problem Relation Age of Onset    Cancer Mother 42        Bone    Bone cancer Mother     Breast cancer Mother     Heart disease Mother     Cancer Father         Bone    Bone cancer Father 86    Breast cancer Sister     Bell's palsy Sister     Breast cancer Sister      Social History     Tobacco Use    Smoking status: Never Smoker    Smokeless tobacco: Never Used   Substance Use Topics    Alcohol use: Not Currently     Comment: rare    Drug use: Never     Review of Systems   Constitutional: Negative for fever.   HENT: Negative for sore throat.    Respiratory: Negative for shortness of breath.    Cardiovascular: Negative for chest pain.   Gastrointestinal: Negative for nausea.   Genitourinary: Negative for dysuria.   Musculoskeletal: Negative for back pain.   Skin: Positive for wound. Negative for rash.         Surgical wound to bilateral breast with indurated area near right breast   Neurological: Negative for weakness.   Hematological: Does not bruise/bleed easily.       Physical Exam     Initial Vitals [02/06/22 1216]   BP Pulse Resp Temp SpO2   120/85 68 16 97.7 °F (36.5 °C) 97 %      MAP       --         Physical Exam    Nursing note and vitals reviewed.  Constitutional: Vital signs are normal. She appears well-developed and well-nourished. She is cooperative.   HENT:   Head: Normocephalic and atraumatic.   Right Ear: Hearing and external ear normal.   Left Ear: Hearing and external ear normal.   Nose: Nose normal.   Mouth/Throat: Uvula is midline, oropharynx is clear and moist and mucous membranes are normal.   Eyes: Conjunctivae, EOM and lids are normal. Pupils are equal, round, and reactive to light.   Neck: Trachea normal. Neck supple.   Normal range of motion.   Full passive range of motion without pain.     Cardiovascular: Normal rate, regular rhythm, S1 normal, S2 normal, normal heart sounds and normal pulses.   Pulmonary/Chest: Effort normal and breath sounds normal.   Musculoskeletal:      Cervical back: Full passive range of motion without pain, normal range of motion and neck supple.     Neurological: She is alert.   Skin: Skin is warm and dry.              ED Course   Procedures  Labs Reviewed - No data to display       Imaging Results    None          Medications - No data to display  Medical Decision Making:   Differential Diagnosis:   Hematoma, abscess, cellulitis  ED Management:  After history and physical exam discussed with patient and daughter that this is probably a hematoma as opposed to an abscess. Patient will see her surgeon tomorrow..                      Clinical Impression:   Final diagnoses:  [S20.211S] Hematoma of chest wall, right, sequela (Primary)          ED Disposition Condition    Discharge Stable        ED Prescriptions     None        Follow-up Information     Follow up With  Specialties Details Why Contact Info    surgeon  Go in 1 day go to scheduled appointment            Jason Breen III, NP  02/06/22 9851

## 2022-02-07 ENCOUNTER — OFFICE VISIT (OUTPATIENT)
Dept: SURGERY | Facility: CLINIC | Age: 82
End: 2022-02-07
Payer: MEDICARE

## 2022-02-07 VITALS
HEART RATE: 83 BPM | SYSTOLIC BLOOD PRESSURE: 123 MMHG | HEIGHT: 64 IN | OXYGEN SATURATION: 96 % | DIASTOLIC BLOOD PRESSURE: 74 MMHG | WEIGHT: 218.94 LBS | BODY MASS INDEX: 37.38 KG/M2

## 2022-02-07 DIAGNOSIS — Z90.13 S/P MASTECTOMY, BILATERAL: ICD-10-CM

## 2022-02-07 DIAGNOSIS — C50.111 MALIGNANT NEOPLASM OF CENTRAL PORTION OF RIGHT BREAST IN FEMALE, ESTROGEN RECEPTOR POSITIVE: Primary | ICD-10-CM

## 2022-02-07 DIAGNOSIS — L76.82 POSTOPERATIVE COMPLICATION OF SKIN INVOLVING DRAINAGE FROM SURGICAL WOUND: ICD-10-CM

## 2022-02-07 DIAGNOSIS — Z17.0 MALIGNANT NEOPLASM OF CENTRAL PORTION OF RIGHT BREAST IN FEMALE, ESTROGEN RECEPTOR POSITIVE: Primary | ICD-10-CM

## 2022-02-07 PROCEDURE — 99024 POSTOP FOLLOW-UP VISIT: CPT | Mod: POP,,, | Performed by: STUDENT IN AN ORGANIZED HEALTH CARE EDUCATION/TRAINING PROGRAM

## 2022-02-07 PROCEDURE — 99999 PR PBB SHADOW E&M-EST. PATIENT-LVL V: ICD-10-PCS | Mod: PBBFAC,,, | Performed by: STUDENT IN AN ORGANIZED HEALTH CARE EDUCATION/TRAINING PROGRAM

## 2022-02-07 PROCEDURE — 99215 OFFICE O/P EST HI 40 MIN: CPT | Mod: PBBFAC | Performed by: STUDENT IN AN ORGANIZED HEALTH CARE EDUCATION/TRAINING PROGRAM

## 2022-02-07 PROCEDURE — 99999 PR PBB SHADOW E&M-EST. PATIENT-LVL V: CPT | Mod: PBBFAC,,, | Performed by: STUDENT IN AN ORGANIZED HEALTH CARE EDUCATION/TRAINING PROGRAM

## 2022-02-07 PROCEDURE — 99024 PR POST-OP FOLLOW-UP VISIT: ICD-10-PCS | Mod: POP,,, | Performed by: STUDENT IN AN ORGANIZED HEALTH CARE EDUCATION/TRAINING PROGRAM

## 2022-02-08 NOTE — PROGRESS NOTES
[facility]  General Surgery Clinic Progress Note    HPI:  Kelsea Cadet is a 81 y.o. female with history of R ILCa s/p Right MRM and left simple mastectomy complicated by right axillary seroma who presents for follow up.  Daughter reports 10cc from left drains daily.  Says drainage from right drain removal site has decreased, and fluid collection has hardened some.  Pain controled with PO medication.  Denies CP, SOB, abdominal pain, nausea, vomiting, fevers, or chills.       ROS:  Negative except above    PE:  Vitals:    02/07/22 0947   BP: 123/74   Pulse: 83       Gen: Alert and oriented x3, judgement intact, NAD  CV: RRR  Resp: CTAB; breaths non-labored and symmetrical  Abd: Soft, NT, ND, BS+  Extremities: No c/c/e  Wound:   -Right axillary incision C/D/I with small segment of skin ischemia to most medial portion with stable eschar.  No erythema, induration, or drainage   -Right mastectomy incision intact with intermittent segments of skin ischemia and eschar formation;  Mild erythema; no serous or purulent drainage appreciated   -Previous right KATH drain sites well healed with no SOI  -Left mastectomy incision C/D/I with steri strips in place   -Left KATH drains x2 in place with 5cc of serosanguinous drainage in the bulbs     Pathology:              A/P:  81 y.o. female with Stage [id number] right ILCa s/p R MRM and left simple mastectomy who presents for follow up and wound check  -Patient to follow up with oncology for adjuvant therapy   -Left KATH drains removed in clinic  -Wound care  --Right axillary incision:  Cleanse wound daily; patient may shower.  Apply betadine to wound daily and cover with nonstick dressing of choice - telfa, xeroform, or adaptic.  Telfa preferred.  --Right mastectomy incision: Cleanse daily; patient may shower.  Apply betadine to wound daily and cover with nonstick dressing of choice - telfa, xeroform, or adaptic.  Telfa preferred.  --Right KATH drain sites:  Cleanse area daily;  patient may shower.  Cover with bandage of choice - band-aid; telfa island dressing, gauze with tape, mepilex, etc   --Left mastectomy incision:  Cleanse daily; patient may shower.  No dressing needed   --Left drain sites:  Cleanse daily; patient may shower.  Cover with bandage of choice - band-aid; telfa island dressing, gauze with tape, mepilex, etc      RTC 2/17  Return precautions given     Keisha Cortez MD  General Surgery   [phone]        2/8/2022  12:13 PM

## 2022-02-09 RX ORDER — SULFAMETHOXAZOLE AND TRIMETHOPRIM 800; 160 MG/1; MG/1
1 TABLET ORAL 2 TIMES DAILY
Qty: 14 TABLET | Refills: 0 | Status: SHIPPED | OUTPATIENT
Start: 2022-02-09 | End: 2022-02-16

## 2022-02-10 PROBLEM — L76.82 POSTOPERATIVE COMPLICATION OF SKIN INVOLVING DRAINAGE FROM SURGICAL WOUND: Status: ACTIVE | Noted: 2022-02-10

## 2022-02-10 PROBLEM — Z90.13 S/P MASTECTOMY, BILATERAL: Status: ACTIVE | Noted: 2022-02-10

## 2022-02-18 ENCOUNTER — OFFICE VISIT (OUTPATIENT)
Dept: SURGERY | Facility: CLINIC | Age: 82
End: 2022-02-18
Payer: MEDICARE

## 2022-02-18 VITALS
DIASTOLIC BLOOD PRESSURE: 69 MMHG | WEIGHT: 227.38 LBS | BODY MASS INDEX: 38.82 KG/M2 | HEART RATE: 72 BPM | HEIGHT: 64 IN | SYSTOLIC BLOOD PRESSURE: 141 MMHG | OXYGEN SATURATION: 96 %

## 2022-02-18 DIAGNOSIS — Z90.13 S/P MASTECTOMY, BILATERAL: Primary | ICD-10-CM

## 2022-02-18 DIAGNOSIS — C50.111 MALIGNANT NEOPLASM OF CENTRAL PORTION OF RIGHT BREAST IN FEMALE, ESTROGEN RECEPTOR POSITIVE: ICD-10-CM

## 2022-02-18 DIAGNOSIS — Z17.0 MALIGNANT NEOPLASM OF CENTRAL PORTION OF RIGHT BREAST IN FEMALE, ESTROGEN RECEPTOR POSITIVE: ICD-10-CM

## 2022-02-18 PROCEDURE — 99024 POSTOP FOLLOW-UP VISIT: CPT | Mod: POP,,, | Performed by: STUDENT IN AN ORGANIZED HEALTH CARE EDUCATION/TRAINING PROGRAM

## 2022-02-18 PROCEDURE — 99024 PR POST-OP FOLLOW-UP VISIT: ICD-10-PCS | Mod: POP,,, | Performed by: STUDENT IN AN ORGANIZED HEALTH CARE EDUCATION/TRAINING PROGRAM

## 2022-02-18 PROCEDURE — 99999 PR PBB SHADOW E&M-EST. PATIENT-LVL III: CPT | Mod: PBBFAC,,, | Performed by: STUDENT IN AN ORGANIZED HEALTH CARE EDUCATION/TRAINING PROGRAM

## 2022-02-18 PROCEDURE — 99999 PR PBB SHADOW E&M-EST. PATIENT-LVL III: ICD-10-PCS | Mod: PBBFAC,,, | Performed by: STUDENT IN AN ORGANIZED HEALTH CARE EDUCATION/TRAINING PROGRAM

## 2022-02-18 PROCEDURE — 99213 OFFICE O/P EST LOW 20 MIN: CPT | Mod: PBBFAC | Performed by: STUDENT IN AN ORGANIZED HEALTH CARE EDUCATION/TRAINING PROGRAM

## 2022-02-18 RX ORDER — TRAMADOL HYDROCHLORIDE 50 MG/1
50 TABLET ORAL EVERY 6 HOURS PRN
Qty: 28 TABLET | Refills: 0 | Status: SHIPPED | OUTPATIENT
Start: 2022-02-18 | End: 2022-02-25

## 2022-02-18 RX ORDER — METHOCARBAMOL 750 MG/1
750 TABLET, FILM COATED ORAL 4 TIMES DAILY
Qty: 120 TABLET | Refills: 0 | Status: SHIPPED | OUTPATIENT
Start: 2022-02-18 | End: 2022-03-20

## 2022-02-18 RX ORDER — GABAPENTIN 600 MG/1
600 TABLET ORAL 3 TIMES DAILY
Qty: 90 TABLET | Refills: 11 | Status: SHIPPED | OUTPATIENT
Start: 2022-02-18 | End: 2024-02-06

## 2022-02-18 NOTE — PROGRESS NOTES
[facility]  General Surgery Clinic Progress Note    HPI:  Kelsea Cadet is a 81 y.o. female with history of R ILCa s/p Right MRM and left simple mastectomy complicated by right axillary seroma who presents for follow up.  Complains of sharp shooting pains along the chest wall mostly at night.  Otherwise has been doing her arm exercises daily.  Denies CP, SOB, abdominal pain, nausea, vomiting, fevers, or chills.       ROS:  Negative except above    PE:  Vitals:    02/18/22 1429   BP: (!) 141/69   Pulse: 72       Gen: Alert and oriented x3, judgement intact, NAD  CV: RRR  Resp: Wheezing to bilateral lung fields.  Patient slightly SOB after ambulating   Abd: Soft, NT, ND, BS+  Extremities: No c/c/e  Breast:  Left mastectomy incision C/D/I with steri strips in place  Right mastectomy incision intact with stable eschar along lateral portion.  Surrounding skin without erythema or induration.   Right axillary incision C/D/I with small eschar along medial portion.  No SOI     A/P:  81 y.o. female with Stage [id number] right ILCa s/p R MRM and left simple mastectomy who presents for follow up and wound check  --Wound healing well with stable eschar;  No signs of infection      -Wound care instructions:  --Right axillary incision:  Cleanse wound daily; patient may shower and gently cleanse with washcloth.  Apply betadine to wound daily and cover with nonstick dressing of choice - telfa, xeroform, or adaptic.  Wound was not dressed with nonstick dressing when patient presented to clinic.  --Right mastectomy incision: Cleanse daily; patient may shower.  Apply betadine to eschar daily and cover with nonstick dressing of choice - telfa, xeroform, or adaptic.   --Right KATH drain sites:  No wound care needed;  Patient to shower like normal  --Left mastectomy incision:  No wound care needed;  Patient to shower like normal;  Steri strips will loosen   --Left drain sites:  No wound care needed; patient to shower like  normal    --RTC 1 week    Keisha Cortez MD  General Surgery   [phone]        2/18/2022  3:01 PM

## 2022-02-25 DIAGNOSIS — I50.30 HEART FAILURE WITH PRESERVED EJECTION FRACTION, NYHA CLASS II: ICD-10-CM

## 2022-02-25 DIAGNOSIS — I10 ESSENTIAL HYPERTENSION: Primary | ICD-10-CM

## 2022-02-25 RX ORDER — LISINOPRIL 20 MG/1
20 TABLET ORAL NIGHTLY
Qty: 30 TABLET | Refills: 0 | Status: SHIPPED | OUTPATIENT
Start: 2022-02-25 | End: 2024-02-06

## 2022-02-28 ENCOUNTER — OFFICE VISIT (OUTPATIENT)
Dept: SURGERY | Facility: CLINIC | Age: 82
End: 2022-02-28
Payer: MEDICARE

## 2022-02-28 VITALS
DIASTOLIC BLOOD PRESSURE: 65 MMHG | HEART RATE: 68 BPM | WEIGHT: 227.31 LBS | OXYGEN SATURATION: 95 % | SYSTOLIC BLOOD PRESSURE: 144 MMHG | BODY MASS INDEX: 39.01 KG/M2

## 2022-02-28 DIAGNOSIS — Z90.13 S/P MASTECTOMY, BILATERAL: Primary | ICD-10-CM

## 2022-02-28 PROCEDURE — 99024 POSTOP FOLLOW-UP VISIT: CPT | Mod: POP,,, | Performed by: STUDENT IN AN ORGANIZED HEALTH CARE EDUCATION/TRAINING PROGRAM

## 2022-02-28 PROCEDURE — 99999 PR PBB SHADOW E&M-EST. PATIENT-LVL V: CPT | Mod: PBBFAC,,, | Performed by: STUDENT IN AN ORGANIZED HEALTH CARE EDUCATION/TRAINING PROGRAM

## 2022-02-28 PROCEDURE — 99999 PR PBB SHADOW E&M-EST. PATIENT-LVL V: ICD-10-PCS | Mod: PBBFAC,,, | Performed by: STUDENT IN AN ORGANIZED HEALTH CARE EDUCATION/TRAINING PROGRAM

## 2022-02-28 PROCEDURE — 99215 OFFICE O/P EST HI 40 MIN: CPT | Mod: PBBFAC | Performed by: STUDENT IN AN ORGANIZED HEALTH CARE EDUCATION/TRAINING PROGRAM

## 2022-02-28 PROCEDURE — 99024 PR POST-OP FOLLOW-UP VISIT: ICD-10-PCS | Mod: POP,,, | Performed by: STUDENT IN AN ORGANIZED HEALTH CARE EDUCATION/TRAINING PROGRAM

## 2022-03-07 NOTE — PROGRESS NOTES
[facility]  General Surgery Clinic Progress Note    HPI:  Kelsea Cadet is a 81 y.o. female with history of R breast ILCa s/p R MRM with L simple mastectomy who presents for follow up.  Continues to complain of chest wall pain at night.  Is complaint with daily arm exercises.  Otherwise Denies CP, SOB, abdominal pain, nausea, vomiting, fevers, or chills.       ROS:  Negative except above    PE:  Vitals:    02/28/22 1322   BP: (!) 144/65   Pulse: 68       Gen: Alert and oriented x3, judgement intact, NAD  CV: RRR  Resp: CTAB; breaths non-labored and symmetrical  Abd: Soft, NT, ND, BS+  Extremities: No c/c/e  Breast:  R mastectomy incision C/D/I with small eschar over most lateral portion - erythema, induration, or drainage appreciated ;  R axilla incision C/D/I with small eschar.  No SOI  L mastectomy incision C/D/I with no SOI     A/P:  81 y.o. female with [id number] Right breast ILCa s/p R MRM with L simple mastectomy who presents for follow up  -Incisions intact and healing well;  Stable eschar slowly  from healing tissue;  No signs of wound dehiscence or seroma  -Patient may shower like normal, allowing soapy water to run over incisions;  Continue chest ACE wrap  -No deodorant until axilla incision eschar resolved   -Follow up with oncology on 3/8 for adjuvant recommendations  -RTC 3 weeks     Keisha Cortez MD  General Surgery   [phone]        3/7/2022  3:37 PM

## 2022-03-10 ENCOUNTER — TELEPHONE (OUTPATIENT)
Dept: GENETICS | Facility: CLINIC | Age: 82
End: 2022-03-10
Payer: MEDICARE

## 2022-03-10 NOTE — TELEPHONE ENCOUNTER
----- Message from Bryanna Tran MS sent at 3/9/2022 11:25 AM CST -----  GC only   ----- Message -----  From: Pippa Paredes MA  Sent: 3/9/2022  11:18 AM CST  To: Neda Yuen, MS, Bryanna Tran MS    Does this pt need to be seen by GC or provider? Please advise / C50.411,Z17.0 (ICD-10-CM) - Malignant neoplasm of upper-outer quadrant of right breast in female, estrogen receptor positive  Z85.3 (ICD-10-CM) - History of left breast cancer  Z85.038 (ICD-10-CM) - History of colon cancer  ----- Message -----  From: Pippa Paredes MA  Sent: 3/8/2022   4:42 PM CST  To: Pippa Paredes MA      ----- Message -----  From: Lizabeth Reece  Sent: 3/8/2022   2:08 PM CST  To: Goodman Gould StaffMary Grace Staff    Cornelio nicholson, Dr. Brody placed a referral for this patient to see Genetics. Can you please schedule patient's appointment and reach out to them. Thanks.

## 2022-03-10 NOTE — TELEPHONE ENCOUNTER
Spoke with pt daughter in reference to scheduling a Genetics appointment from a referral for C50.411,Z17.0 (ICD-10-CM) - Malignant neoplasm of upper-outer quadrant of right breast in female, estrogen receptor positive  Z85.3 (ICD-10-CM) - History of left breast cancer  Z85.038 (ICD-10-CM) - History of colon cancer on 4/26/22 at 1 pm with Bryanna Tran. Pt daughter verbalized understanding.

## 2022-03-30 ENCOUNTER — HOSPITAL ENCOUNTER (OUTPATIENT)
Dept: RADIOLOGY | Facility: HOSPITAL | Age: 82
Discharge: HOME | End: 2022-03-30
Attending: INTERNAL MEDICINE
Payer: MEDICARE

## 2022-03-30 DIAGNOSIS — R52 PAIN: Primary | ICD-10-CM

## 2022-03-30 DIAGNOSIS — R52 PAIN: ICD-10-CM

## 2022-03-30 PROCEDURE — 93970 EXTREMITY STUDY: CPT | Mod: TC

## 2022-03-31 ENCOUNTER — OFFICE VISIT (OUTPATIENT)
Dept: SURGERY | Facility: CLINIC | Age: 82
End: 2022-03-31
Payer: MEDICARE

## 2022-03-31 DIAGNOSIS — Z90.13 S/P MASTECTOMY, BILATERAL: Primary | ICD-10-CM

## 2022-03-31 DIAGNOSIS — Z17.0 MALIGNANT NEOPLASM OF CENTRAL PORTION OF RIGHT BREAST IN FEMALE, ESTROGEN RECEPTOR POSITIVE: ICD-10-CM

## 2022-03-31 DIAGNOSIS — C50.111 MALIGNANT NEOPLASM OF CENTRAL PORTION OF RIGHT BREAST IN FEMALE, ESTROGEN RECEPTOR POSITIVE: ICD-10-CM

## 2022-03-31 PROCEDURE — 99213 OFFICE O/P EST LOW 20 MIN: CPT | Mod: PBBFAC | Performed by: STUDENT IN AN ORGANIZED HEALTH CARE EDUCATION/TRAINING PROGRAM

## 2022-03-31 PROCEDURE — 99024 PR POST-OP FOLLOW-UP VISIT: ICD-10-PCS | Mod: POP,,, | Performed by: STUDENT IN AN ORGANIZED HEALTH CARE EDUCATION/TRAINING PROGRAM

## 2022-03-31 PROCEDURE — 99999 PR PBB SHADOW E&M-EST. PATIENT-LVL III: ICD-10-PCS | Mod: PBBFAC,,, | Performed by: STUDENT IN AN ORGANIZED HEALTH CARE EDUCATION/TRAINING PROGRAM

## 2022-03-31 PROCEDURE — 99999 PR PBB SHADOW E&M-EST. PATIENT-LVL III: CPT | Mod: PBBFAC,,, | Performed by: STUDENT IN AN ORGANIZED HEALTH CARE EDUCATION/TRAINING PROGRAM

## 2022-03-31 PROCEDURE — 99024 POSTOP FOLLOW-UP VISIT: CPT | Mod: POP,,, | Performed by: STUDENT IN AN ORGANIZED HEALTH CARE EDUCATION/TRAINING PROGRAM

## 2022-04-04 ENCOUNTER — TELEPHONE (OUTPATIENT)
Dept: SURGERY | Facility: CLINIC | Age: 82
End: 2022-04-04
Payer: MEDICARE

## 2022-04-04 RX ORDER — COLLAGENASE SANTYL 250 [ARB'U]/G
OINTMENT TOPICAL DAILY
Qty: 30 G | Refills: 0 | Status: SHIPPED | OUTPATIENT
Start: 2022-04-04 | End: 2022-05-04

## 2022-04-04 NOTE — PROGRESS NOTES
[facility]  General Surgery Clinic Progress Note    HPI:  Kelsea Cadet is a 82 y.o. 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  Voices no complaints.  Recently treated for UTI.  C/o of some stiffness in L shoulder and chest wall discomfort with abduction.  Otherwise Denies CP, SOB, abdominal pain, nausea, vomiting, fevers, or chills.     ROS:  Negative except above    PE:  There were no vitals filed for this visit.    Gen: Alert and oriented x3, judgement intact, NAD  CV: RRR  Resp: CTAB; breaths non-labored and symmetrical  Abd: Soft, NT, ND, BS+  Extremities: No c/c/e  Wound: R mastectomy incision C/D/I with partially detached lateral eschar;  No erythema, induration, or drainage   R axilla incision well healed with small partially detached eschar.  No SOI  L mastectomy incision C/D/I with no SOI      A/P:  82 y.o. female with history of R breast ILCa s/p R MRM and left simple mastectomy who presents for follow up  -Incisions well healed save small area where eschar removed in clinic today  -Being followed by oncology for adjuvant therapy  -Local wound care instructions given - cleanse daily + Santyl to small open area + bandage of choice  -RTC 6mo for follow up    Keisha Cortez MD  General Surgery   [phone]        4/4/2022  2:22 PM

## 2022-04-25 ENCOUNTER — TELEPHONE (OUTPATIENT)
Dept: GENETICS | Facility: CLINIC | Age: 82
End: 2022-04-25
Payer: MEDICARE

## 2022-04-26 ENCOUNTER — PATIENT MESSAGE (OUTPATIENT)
Dept: GENETICS | Facility: CLINIC | Age: 82
End: 2022-04-26

## 2022-04-26 ENCOUNTER — TELEPHONE (OUTPATIENT)
Dept: GENETICS | Facility: CLINIC | Age: 82
End: 2022-04-26
Payer: MEDICARE

## 2022-04-26 ENCOUNTER — OFFICE VISIT (OUTPATIENT)
Dept: GENETICS | Facility: CLINIC | Age: 82
End: 2022-04-26
Payer: MEDICARE

## 2022-04-26 DIAGNOSIS — Z17.0 MALIGNANT NEOPLASM OF UPPER-OUTER QUADRANT OF RIGHT BREAST IN FEMALE, ESTROGEN RECEPTOR POSITIVE: ICD-10-CM

## 2022-04-26 DIAGNOSIS — Z85.3 HISTORY OF LEFT BREAST CANCER: ICD-10-CM

## 2022-04-26 DIAGNOSIS — Z85.038 HISTORY OF COLON CANCER: ICD-10-CM

## 2022-04-26 DIAGNOSIS — C50.411 MALIGNANT NEOPLASM OF UPPER-OUTER QUADRANT OF RIGHT BREAST IN FEMALE, ESTROGEN RECEPTOR POSITIVE: ICD-10-CM

## 2022-04-26 PROCEDURE — 99499 NO LOS: ICD-10-PCS | Mod: 95,,, | Performed by: MEDICAL GENETICS

## 2022-04-26 PROCEDURE — 99499 UNLISTED E&M SERVICE: CPT | Mod: 95,,, | Performed by: MEDICAL GENETICS

## 2022-04-26 PROCEDURE — 96040 PR GENETIC COUNSELING, EACH 30 MIN: ICD-10-PCS | Mod: 95,,, | Performed by: GENETIC COUNSELOR, MS

## 2022-04-26 PROCEDURE — 96040 PR GENETIC COUNSELING, EACH 30 MIN: CPT | Mod: 95,,, | Performed by: GENETIC COUNSELOR, MS

## 2022-04-26 NOTE — TELEPHONE ENCOUNTER
----- Message from Cristopher Crouch sent at 4/26/2022 11:37 AM CDT -----  Contact: PT @ [phone] or [phone]  Patient called and is requesting today's appointment be rescheduled to a Audio appointment instead.Please advise.

## 2022-04-26 NOTE — TELEPHONE ENCOUNTER
Returned pt's daughter call to schedule a virtual new patient appointment today @ 1pm with Bryanna instead of in person. She was given instructions on how to conduct virtual visit and was told to contact the clinic if she had any questions or concerns.

## 2022-05-18 ENCOUNTER — TELEPHONE (OUTPATIENT)
Dept: SURGERY | Facility: CLINIC | Age: 82
End: 2022-05-18

## 2022-06-21 ENCOUNTER — APPOINTMENT (OUTPATIENT)
Dept: LAB | Facility: HOSPITAL | Age: 82
End: 2022-06-21
Attending: INTERNAL MEDICINE
Payer: MEDICARE

## 2022-06-21 DIAGNOSIS — U07.1 COVID-19 VIRUS DETECTED: ICD-10-CM

## 2022-06-21 DIAGNOSIS — U07.1 CLINICAL DIAGNOSIS OF COVID-19: Primary | ICD-10-CM

## 2022-06-21 LAB — SARS-COV-2 RNA RESP QL NAA+PROBE: DETECTED

## 2022-06-21 PROCEDURE — U0002 COVID-19 LAB TEST NON-CDC: HCPCS | Performed by: INTERNAL MEDICINE

## 2022-06-30 ENCOUNTER — PATIENT OUTREACH (OUTPATIENT)
Dept: INFECTIOUS DISEASES | Facility: HOSPITAL | Age: 82
End: 2022-06-30
Payer: MEDICARE

## 2022-07-06 ENCOUNTER — LAB VISIT (OUTPATIENT)
Dept: LAB | Facility: HOSPITAL | Age: 82
End: 2022-07-06
Attending: INTERNAL MEDICINE
Payer: MEDICARE

## 2022-07-06 DIAGNOSIS — I13.0 HYPERTENSIVE HEART AND RENAL DISEASE WITH CONGESTIVE HEART FAILURE: ICD-10-CM

## 2022-07-06 DIAGNOSIS — C50.111 MALIGNANT NEOPLASM OF CENTRAL PORTION OF RIGHT FEMALE BREAST: Primary | ICD-10-CM

## 2022-07-06 DIAGNOSIS — R63.4 LOSS OF WEIGHT: ICD-10-CM

## 2022-07-06 DIAGNOSIS — E11.42 DIABETIC POLYNEUROPATHY: ICD-10-CM

## 2022-07-06 DIAGNOSIS — N18.32 CHRONIC KIDNEY DISEASE (CKD) STAGE G3B/A1, MODERATELY DECREASED GLOMERULAR FILTRATION RATE (GFR) BETWEEN 30-44 ML/MIN/1.73 SQUARE METER AND ALBUMINURIA CREATININE RATIO LESS THAN 30 MG/G: ICD-10-CM

## 2022-07-06 LAB
ALBUMIN SERPL BCP-MCNC: 3.2 G/DL (ref 3.5–5.2)
ALP SERPL-CCNC: 166 U/L (ref 55–135)
ALT SERPL W/O P-5'-P-CCNC: 20 U/L (ref 10–44)
ANION GAP SERPL CALC-SCNC: 6 MMOL/L (ref 8–16)
AST SERPL-CCNC: 19 U/L (ref 10–40)
BASOPHILS # BLD AUTO: 0.07 K/UL (ref 0–0.2)
BASOPHILS NFR BLD: 0.7 % (ref 0–1.9)
BILIRUB SERPL-MCNC: 0.5 MG/DL (ref 0.1–1)
BUN SERPL-MCNC: 32 MG/DL (ref 8–23)
CALCIUM SERPL-MCNC: 9 MG/DL (ref 8.7–10.5)
CHLORIDE SERPL-SCNC: 107 MMOL/L (ref 95–110)
CHOLEST SERPL-MCNC: 122 MG/DL (ref 120–199)
CHOLEST/HDLC SERPL: 2.6 {RATIO} (ref 2–5)
CO2 SERPL-SCNC: 27 MMOL/L (ref 23–29)
CREAT SERPL-MCNC: 1.1 MG/DL (ref 0.5–1.4)
DIFFERENTIAL METHOD: ABNORMAL
EOSINOPHIL # BLD AUTO: 0.9 K/UL (ref 0–0.5)
EOSINOPHIL NFR BLD: 9 % (ref 0–8)
ERYTHROCYTE [DISTWIDTH] IN BLOOD BY AUTOMATED COUNT: 15.2 % (ref 11.5–14.5)
EST. GFR  (AFRICAN AMERICAN): 54.8 ML/MIN/1.73 M^2
EST. GFR  (NON AFRICAN AMERICAN): 47.5 ML/MIN/1.73 M^2
ESTIMATED AVG GLUCOSE: 166 MG/DL (ref 68–131)
GLUCOSE SERPL-MCNC: 142 MG/DL (ref 70–110)
HBA1C MFR BLD: 7.4 % (ref 4–5.6)
HCT VFR BLD AUTO: 37.9 % (ref 37–48.5)
HDLC SERPL-MCNC: 47 MG/DL (ref 40–75)
HDLC SERPL: 38.5 % (ref 20–50)
HGB BLD-MCNC: 12.1 G/DL (ref 12–16)
IMM GRANULOCYTES # BLD AUTO: 0.02 K/UL (ref 0–0.04)
IMM GRANULOCYTES NFR BLD AUTO: 0.2 % (ref 0–0.5)
LDLC SERPL CALC-MCNC: 50.6 MG/DL (ref 63–159)
LYMPHOCYTES # BLD AUTO: 1.1 K/UL (ref 1–4.8)
LYMPHOCYTES NFR BLD: 11.6 % (ref 18–48)
MCH RBC QN AUTO: 26.2 PG (ref 27–31)
MCHC RBC AUTO-ENTMCNC: 31.9 G/DL (ref 32–36)
MCV RBC AUTO: 82 FL (ref 82–98)
MONOCYTES # BLD AUTO: 0.8 K/UL (ref 0.3–1)
MONOCYTES NFR BLD: 7.7 % (ref 4–15)
NEUTROPHILS # BLD AUTO: 7 K/UL (ref 1.8–7.7)
NEUTROPHILS NFR BLD: 70.8 % (ref 38–73)
NONHDLC SERPL-MCNC: 75 MG/DL
NRBC BLD-RTO: 0 /100 WBC
PLATELET # BLD AUTO: 341 K/UL (ref 150–450)
PMV BLD AUTO: 10.5 FL (ref 9.2–12.9)
POTASSIUM SERPL-SCNC: 3.6 MMOL/L (ref 3.5–5.1)
PROT SERPL-MCNC: 7.7 G/DL (ref 6–8.4)
RBC # BLD AUTO: 4.61 M/UL (ref 4–5.4)
SODIUM SERPL-SCNC: 140 MMOL/L (ref 136–145)
TRIGL SERPL-MCNC: 122 MG/DL (ref 30–150)
TSH SERPL DL<=0.005 MIU/L-ACNC: 1.53 UIU/ML (ref 0.4–4)
VIT B12 SERPL-MCNC: 755 PG/ML (ref 210–950)
WBC # BLD AUTO: 9.86 K/UL (ref 3.9–12.7)

## 2022-07-06 PROCEDURE — 85025 COMPLETE CBC W/AUTO DIFF WBC: CPT | Performed by: INTERNAL MEDICINE

## 2022-07-06 PROCEDURE — 83036 HEMOGLOBIN GLYCOSYLATED A1C: CPT | Performed by: INTERNAL MEDICINE

## 2022-07-06 PROCEDURE — 36415 COLL VENOUS BLD VENIPUNCTURE: CPT | Performed by: INTERNAL MEDICINE

## 2022-07-06 PROCEDURE — 80061 LIPID PANEL: CPT | Performed by: INTERNAL MEDICINE

## 2022-07-06 PROCEDURE — 84443 ASSAY THYROID STIM HORMONE: CPT | Performed by: INTERNAL MEDICINE

## 2022-07-06 PROCEDURE — 82607 VITAMIN B-12: CPT | Mod: GA | Performed by: INTERNAL MEDICINE

## 2022-07-06 PROCEDURE — 80053 COMPREHEN METABOLIC PANEL: CPT | Performed by: INTERNAL MEDICINE

## 2022-07-08 ENCOUNTER — HOSPITAL ENCOUNTER (EMERGENCY)
Facility: HOSPITAL | Age: 82
Discharge: HOME | End: 2022-07-08
Attending: STUDENT IN AN ORGANIZED HEALTH CARE EDUCATION/TRAINING PROGRAM
Payer: MEDICARE

## 2022-07-08 VITALS
OXYGEN SATURATION: 94 % | HEART RATE: 92 BPM | HEIGHT: 64 IN | RESPIRATION RATE: 20 BRPM | BODY MASS INDEX: 38.58 KG/M2 | SYSTOLIC BLOOD PRESSURE: 144 MMHG | DIASTOLIC BLOOD PRESSURE: 82 MMHG | WEIGHT: 226 LBS | TEMPERATURE: 97 F

## 2022-07-08 DIAGNOSIS — K52.9 GASTROENTERITIS: Primary | ICD-10-CM

## 2022-07-08 LAB
ALBUMIN SERPL BCP-MCNC: 3.1 G/DL (ref 3.5–5.2)
ALP SERPL-CCNC: 143 U/L (ref 55–135)
ALT SERPL W/O P-5'-P-CCNC: 19 U/L (ref 10–44)
ANION GAP SERPL CALC-SCNC: 6 MMOL/L (ref 8–16)
AST SERPL-CCNC: 16 U/L (ref 10–40)
BACTERIA #/AREA URNS HPF: NEGATIVE /HPF
BASOPHILS # BLD AUTO: 0.03 K/UL (ref 0–0.2)
BASOPHILS NFR BLD: 0.3 % (ref 0–1.9)
BILIRUB SERPL-MCNC: 0.5 MG/DL (ref 0.1–1)
BILIRUB UR QL STRIP: NEGATIVE
BUN SERPL-MCNC: 29 MG/DL (ref 8–23)
CALCIUM SERPL-MCNC: 9 MG/DL (ref 8.7–10.5)
CHLORIDE SERPL-SCNC: 108 MMOL/L (ref 95–110)
CLARITY UR: CLEAR
CO2 SERPL-SCNC: 27 MMOL/L (ref 23–29)
COLOR UR: YELLOW
CREAT SERPL-MCNC: 0.9 MG/DL (ref 0.5–1.4)
DIFFERENTIAL METHOD: ABNORMAL
EOSINOPHIL # BLD AUTO: 0.7 K/UL (ref 0–0.5)
EOSINOPHIL NFR BLD: 6.2 % (ref 0–8)
ERYTHROCYTE [DISTWIDTH] IN BLOOD BY AUTOMATED COUNT: 15.4 % (ref 11.5–14.5)
EST. GFR  (AFRICAN AMERICAN): >60 ML/MIN/1.73 M^2
EST. GFR  (NON AFRICAN AMERICAN): >60 ML/MIN/1.73 M^2
GLUCOSE SERPL-MCNC: 173 MG/DL (ref 70–110)
GLUCOSE UR QL STRIP: NEGATIVE
HCT VFR BLD AUTO: 38.2 % (ref 37–48.5)
HGB BLD-MCNC: 12.4 G/DL (ref 12–16)
HGB UR QL STRIP: NEGATIVE
HYALINE CASTS #/AREA URNS LPF: 4.3 /LPF
IMM GRANULOCYTES # BLD AUTO: 0.03 K/UL (ref 0–0.04)
IMM GRANULOCYTES NFR BLD AUTO: 0.3 % (ref 0–0.5)
KETONES UR QL STRIP: NEGATIVE
LEUKOCYTE ESTERASE UR QL STRIP: ABNORMAL
LIPASE SERPL-CCNC: 95 U/L (ref 23–300)
LYMPHOCYTES # BLD AUTO: 0.4 K/UL (ref 1–4.8)
LYMPHOCYTES NFR BLD: 3.5 % (ref 18–48)
MCH RBC QN AUTO: 26.6 PG (ref 27–31)
MCHC RBC AUTO-ENTMCNC: 32.5 G/DL (ref 32–36)
MCV RBC AUTO: 82 FL (ref 82–98)
MICROSCOPIC COMMENT: ABNORMAL
MONOCYTES # BLD AUTO: 0.5 K/UL (ref 0.3–1)
MONOCYTES NFR BLD: 4.7 % (ref 4–15)
NEUTROPHILS # BLD AUTO: 9 K/UL (ref 1.8–7.7)
NEUTROPHILS NFR BLD: 85 % (ref 38–73)
NITRITE UR QL STRIP: NEGATIVE
NRBC BLD-RTO: 0 /100 WBC
PH UR STRIP: 7 [PH] (ref 5–8)
PLATELET # BLD AUTO: 301 K/UL (ref 150–450)
PMV BLD AUTO: 10.3 FL (ref 9.2–12.9)
POTASSIUM SERPL-SCNC: 4.1 MMOL/L (ref 3.5–5.1)
PROT SERPL-MCNC: 7.7 G/DL (ref 6–8.4)
PROT UR QL STRIP: ABNORMAL
RBC # BLD AUTO: 4.66 M/UL (ref 4–5.4)
RBC #/AREA URNS HPF: 4 /HPF (ref 0–4)
SODIUM SERPL-SCNC: 141 MMOL/L (ref 136–145)
SP GR UR STRIP: 1.02 (ref 1–1.03)
SQUAMOUS #/AREA URNS HPF: 0 /HPF
URN SPEC COLLECT METH UR: ABNORMAL
UROBILINOGEN UR STRIP-ACNC: NEGATIVE EU/DL
WBC # BLD AUTO: 10.52 K/UL (ref 3.9–12.7)
WBC #/AREA URNS HPF: 32 /HPF (ref 0–5)

## 2022-07-08 PROCEDURE — 36415 COLL VENOUS BLD VENIPUNCTURE: CPT | Performed by: CLINICAL NURSE SPECIALIST

## 2022-07-08 PROCEDURE — 83690 ASSAY OF LIPASE: CPT | Performed by: CLINICAL NURSE SPECIALIST

## 2022-07-08 PROCEDURE — 87086 URINE CULTURE/COLONY COUNT: CPT | Performed by: STUDENT IN AN ORGANIZED HEALTH CARE EDUCATION/TRAINING PROGRAM

## 2022-07-08 PROCEDURE — 96374 THER/PROPH/DIAG INJ IV PUSH: CPT

## 2022-07-08 PROCEDURE — 80053 COMPREHEN METABOLIC PANEL: CPT | Performed by: CLINICAL NURSE SPECIALIST

## 2022-07-08 PROCEDURE — 85025 COMPLETE CBC W/AUTO DIFF WBC: CPT | Performed by: CLINICAL NURSE SPECIALIST

## 2022-07-08 PROCEDURE — 96361 HYDRATE IV INFUSION ADD-ON: CPT

## 2022-07-08 PROCEDURE — 99284 EMERGENCY DEPT VISIT MOD MDM: CPT | Mod: 25

## 2022-07-08 PROCEDURE — 81000 URINALYSIS NONAUTO W/SCOPE: CPT | Performed by: STUDENT IN AN ORGANIZED HEALTH CARE EDUCATION/TRAINING PROGRAM

## 2022-07-08 PROCEDURE — 25000003 PHARM REV CODE 250: Performed by: CLINICAL NURSE SPECIALIST

## 2022-07-08 PROCEDURE — 63600175 PHARM REV CODE 636 W HCPCS: Performed by: CLINICAL NURSE SPECIALIST

## 2022-07-08 RX ORDER — DIPHENOXYLATE HYDROCHLORIDE AND ATROPINE SULFATE 2.5; .025 MG/1; MG/1
1 TABLET ORAL 4 TIMES DAILY PRN
Qty: 10 TABLET | Refills: 0 | Status: SHIPPED | OUTPATIENT
Start: 2022-07-08 | End: 2022-07-18

## 2022-07-08 RX ORDER — DIPHENOXYLATE HYDROCHLORIDE AND ATROPINE SULFATE 2.5; .025 MG/1; MG/1
2 TABLET ORAL
Status: COMPLETED | OUTPATIENT
Start: 2022-07-08 | End: 2022-07-08

## 2022-07-08 RX ORDER — ONDANSETRON 2 MG/ML
4 INJECTION INTRAMUSCULAR; INTRAVENOUS
Status: COMPLETED | OUTPATIENT
Start: 2022-07-08 | End: 2022-07-08

## 2022-07-08 RX ORDER — PROMETHAZINE HYDROCHLORIDE 25 MG/1
25 SUPPOSITORY RECTAL EVERY 6 HOURS PRN
Qty: 10 SUPPOSITORY | Refills: 0 | Status: SHIPPED | OUTPATIENT
Start: 2022-07-08 | End: 2022-07-08

## 2022-07-08 RX ORDER — ONDANSETRON 4 MG/1
4 TABLET, FILM COATED ORAL EVERY 6 HOURS
Qty: 12 TABLET | Refills: 0 | Status: SHIPPED | OUTPATIENT
Start: 2022-07-08 | End: 2023-07-12

## 2022-07-08 RX ADMIN — DIPHENOXYLATE HYDROCHLORIDE AND ATROPINE SULFATE 2 TABLET: 2.5; .025 TABLET ORAL at 04:07

## 2022-07-08 RX ADMIN — ONDANSETRON 4 MG: 2 INJECTION INTRAMUSCULAR; INTRAVENOUS at 04:07

## 2022-07-08 RX ADMIN — SODIUM CHLORIDE 1000 ML: 0.9 INJECTION, SOLUTION INTRAVENOUS at 03:07

## 2022-07-08 NOTE — ED PROVIDER NOTES
Encounter Date: 7/8/2022       History     Chief Complaint   Patient presents with    Vomiting     N/V/D, onset this morning. Threw up OTC meds. Unable to take morning meds.      Kelsea Cadet is an 82 y.o. female who complains of nausea, vomiting, diarrhea since this morning.  Unable to keep anything down.  Through up Imodium.  Patient undergoing radiation for breast cancer treatment.        Review of patient's allergies indicates:   Allergen Reactions    Macrobid [nitrofurantoin monohyd/m-cryst] Swelling    Codeine Other (See Comments)     headache    Latex, natural rubber Rash     Past Medical History:   Diagnosis Date    Arthritis     Asthmatic bronchitis     Breast cancer 1996    bilateral - radiation pellets for tx    Breast cancer 01/2022    right    Colon cancer 1963    Congestive heart failure (CHF)     Coronary artery disease     Depression     Diabetes mellitus, type 2     Encounter for blood transfusion     Environmental and seasonal allergies     Land catheter in place     Hard of hearing     Hyperlipidemia     Hypertension     IBS (irritable bowel syndrome)     Kidney disease     ckd stage 3b    Myocardial infarction 2019    Neuropathy     PONV (postoperative nausea and vomiting)     Presence of indwelling Land catheter     Shingles     Sleep apnea     no cpap used    Stroke 1993    tia    Thyroid disease     UTI (urinary tract infection)     with sepsis    Vitamin D deficiency     Wound, open, buttock     being treated    Yeast infection      Past Surgical History:   Procedure Laterality Date    BLADDER SUSPENSION      BREAST BIOPSY Bilateral 1996    BREAST SURGERY      lumpectomy, isotopes    CHOLECYSTECTOMY      had gal bladder removed    COLECTOMY      had part of colon removed    CYSTOSCOPY W/ RETROGRADES Bilateral 12/19/2019    Procedure: CYSTOSCOPY, WITH RETROGRADE PYELOGRAM;  Surgeon: Prasad Rocha MD;  Location: [facility] OR;  Service: Urology;   Laterality: Bilateral;    DILATION OF URETHRA N/A 12/19/2019    Procedure: DILATION, URETHRA;  Surgeon: Prasad Rocha MD;  Location: [facility] OR;  Service: Urology;  Laterality: N/A;    HYSTERECTOMY      LEFT HEART CATHETERIZATION Left 11/27/2019    Procedure: Left heart cath;  Surgeon: Urban Paiz MD;  Location: [facility] CATH LAB;  Service: Cardiology;  Laterality: Left;    MODIFIED RADICAL MASTECTOMY W/ AXILLARY LYMPH NODE DISSECTION Right 1/26/2022    Procedure: MASTECTOMY, MODIFIED RADICAL;  Surgeon: Keisha Cortez MD;  Location: [facility] OR;  Service: General;  Laterality: Right;    OOPHORECTOMY      SENTINEL LYMPH NODE BIOPSY Right 1/26/2022    Procedure: BIOPSY, LYMPH NODE, SENTINEL;  Surgeon: Keisah Cortez MD;  Location: [facility] OR;  Service: General;  Laterality: Right;  0800    SIMPLE MASTECTOMY Left 1/26/2022    Procedure: MASTECTOMY, SIMPLE;  Surgeon: Keisha Cortez MD;  Location: [facility];  Service: General;  Laterality: Left;  FROZEN SECTION     Family History   Problem Relation Age of Onset    Cancer Mother 42        Bone    Bone cancer Mother     Breast cancer Mother     Heart disease Mother     Cancer Father         Bone    Bone cancer Father 86    Breast cancer Sister     Bell's palsy Sister     Breast cancer Sister      Social History     Tobacco Use    Smoking status: Never Smoker    Smokeless tobacco: Never Used   Substance Use Topics    Alcohol use: Not Currently     Comment: rare    Drug use: Never     Review of Systems   Constitutional: Negative for fever.   HENT: Negative for sore throat.    Respiratory: Negative for shortness of breath.    Cardiovascular: Negative for chest pain.   Gastrointestinal: Positive for diarrhea, nausea and vomiting.   Genitourinary: Negative for dysuria.   Musculoskeletal: Negative for back pain.   Skin: Negative for rash.   Neurological: Negative for weakness.   Hematological: Does not bruise/bleed easily.   All other systems reviewed  and are negative.      Physical Exam     Initial Vitals [07/08/22 1359]   BP Pulse Resp Temp SpO2   123/77 91 18 98.5 °F (36.9 °C) 96 %      MAP       --         Physical Exam    Nursing note and vitals reviewed.  Constitutional: She appears well-developed and well-nourished.   HENT:   Head: Normocephalic and atraumatic.   Eyes: Pupils are equal, round, and reactive to light.   Neck:   Normal range of motion.  Cardiovascular: Normal rate and regular rhythm.   Pulmonary/Chest: Breath sounds normal.   Abdominal: Abdomen is soft. Bowel sounds are normal.   Musculoskeletal:         General: Normal range of motion.      Cervical back: Normal range of motion.     Neurological: She is alert and oriented to person, place, and time.   Skin: Skin is warm and dry.   Psychiatric: She has a normal mood and affect.         ED Course   Procedures  Labs Reviewed   COMPREHENSIVE METABOLIC PANEL - Abnormal; Notable for the following components:       Result Value    Glucose 173 (*)     BUN 29 (*)     Albumin 3.1 (*)     Alkaline Phosphatase 143 (*)     Anion Gap 6 (*)     All other components within normal limits   CBC W/ AUTO DIFFERENTIAL - Abnormal; Notable for the following components:    MCH 26.6 (*)     RDW 15.4 (*)     Gran # (ANC) 9.0 (*)     Lymph # 0.4 (*)     Eos # 0.7 (*)     Gran % 85.0 (*)     Lymph % 3.5 (*)     All other components within normal limits   URINALYSIS, REFLEX TO URINE CULTURE - Abnormal; Notable for the following components:    Protein, UA Trace (*)     Leukocytes, UA 2+ (*)     All other components within normal limits    Narrative:     Preferred Collection Type->Urine, Clean Catch  Specimen Source->Urine  Recoll. [id number] by CARLA at 07/08/2022 17:24, reason: Specimen   unlabelled.   URINALYSIS MICROSCOPIC - Abnormal; Notable for the following components:    WBC, UA 32 (*)     Hyaline Casts, UA 4.3 (*)     All other components within normal limits    Narrative:     Preferred Collection Type->Urine,  Clean Catch  Specimen Source->Urine  Recoll. [id number] by CARLA at 07/08/2022 17:24, reason: Specimen   unlabelled.   CLOSTRIDIUM DIFFICILE   CULTURE, URINE    Narrative:     Recoll. [id number] by CARLA at 07/08/2022 17:24, reason: Specimen   unlabelled.   LIPASE   POCT GLUCOSE MONITORING CONTINUOUS          Imaging Results    None          Medications   sodium chloride 0.9% bolus 1,000 mL (0 mLs Intravenous Stopped 7/8/22 1648)   ondansetron injection 4 mg (4 mg Intravenous Given 7/8/22 1628)   diphenoxylate-atropine 2.5-0.025 mg per tablet 2 tablet (2 tablets Oral Given 7/8/22 1629)     Medical Decision Making:   Differential Diagnosis:   Viral syndrome, dehydration  Clinical Tests:   Lab Tests: Ordered and Reviewed            Attending Attestation:             Attending ED Notes:   82-year-old female with history of breast cancer currently receiving radiation presents with nausea, vomiting nonbloody nonbilious, nonbloody diarrhea since earlier this morning.  Patient does have chronic Land placement.  Daughter says that patient also notes of more cloudy appearing urine.  Denies any fever, antibiotic use, travels.  Abdomen soft non peritonitic.  Will check for electrolyte derangement.  Rule out UTI.  Fluid resuscitate                Clinical Impression:   Final diagnoses:  [K52.9] Gastroenteritis (Primary)          ED Disposition Condition    Discharge Stable        ED Prescriptions     Medication Sig Dispense Start Date End Date Auth. Provider    ondansetron (ZOFRAN) 4 MG tablet Take 1 tablet (4 mg total) by mouth every 6 (six) hours. 12 tablet 7/8/2022  Neda King NP    promethazine (PHENERGAN) 25 MG suppository  (Status: Discontinued) Place 1 suppository (25 mg total) rectally every 6 (six) hours as needed for Nausea. 10 suppository 7/8/2022 7/8/2022 Neda King NP    diphenoxylate-atropine 2.5-0.025 mg (LOMOTIL) 2.5-0.025 mg per tablet Take 1 tablet by mouth 4 (four) times daily as needed  for Diarrhea. 10 tablet 7/8/2022 7/18/2022 Neda King NP        Follow-up Information     Follow up With Specialties Details Why Contact Info    Merry Leary MD Internal Medicine  As needed [address] [address]  [phone]             Neda King NP  07/08/22 1910

## 2022-07-09 LAB
BACTERIA UR CULT: NORMAL
BACTERIA UR CULT: NORMAL

## 2022-10-06 ENCOUNTER — LAB VISIT (OUTPATIENT)
Dept: LAB | Facility: HOSPITAL | Age: 82
End: 2022-10-06
Attending: INTERNAL MEDICINE
Payer: MEDICARE

## 2022-10-06 DIAGNOSIS — D64.9 ANEMIA, UNSPECIFIED: Primary | ICD-10-CM

## 2022-10-06 PROBLEM — R53.81 PHYSICAL DECONDITIONING: Status: ACTIVE | Noted: 2022-10-06

## 2022-10-06 LAB
BASOPHILS # BLD AUTO: 0.08 K/UL (ref 0–0.2)
BASOPHILS NFR BLD: 0.6 % (ref 0–1.9)
DIFFERENTIAL METHOD: ABNORMAL
EOSINOPHIL # BLD AUTO: 0.3 K/UL (ref 0–0.5)
EOSINOPHIL NFR BLD: 2.4 % (ref 0–8)
ERYTHROCYTE [DISTWIDTH] IN BLOOD BY AUTOMATED COUNT: 16.4 % (ref 11.5–14.5)
HCT VFR BLD AUTO: 33.4 % (ref 37–48.5)
HGB BLD-MCNC: 10.5 G/DL (ref 12–16)
IMM GRANULOCYTES # BLD AUTO: 0.05 K/UL (ref 0–0.04)
IMM GRANULOCYTES NFR BLD AUTO: 0.4 % (ref 0–0.5)
LYMPHOCYTES # BLD AUTO: 1.1 K/UL (ref 1–4.8)
LYMPHOCYTES NFR BLD: 8.3 % (ref 18–48)
MCH RBC QN AUTO: 26.1 PG (ref 27–31)
MCHC RBC AUTO-ENTMCNC: 31.4 G/DL (ref 32–36)
MCV RBC AUTO: 83 FL (ref 82–98)
MONOCYTES # BLD AUTO: 0.4 K/UL (ref 0.3–1)
MONOCYTES NFR BLD: 3.1 % (ref 4–15)
NEUTROPHILS # BLD AUTO: 10.9 K/UL (ref 1.8–7.7)
NEUTROPHILS NFR BLD: 85.2 % (ref 38–73)
NRBC BLD-RTO: 0 /100 WBC
PLATELET # BLD AUTO: 340 K/UL (ref 150–450)
PMV BLD AUTO: 11.2 FL (ref 9.2–12.9)
RBC # BLD AUTO: 4.02 M/UL (ref 4–5.4)
WBC # BLD AUTO: 12.76 K/UL (ref 3.9–12.7)

## 2022-10-06 PROCEDURE — 36415 COLL VENOUS BLD VENIPUNCTURE: CPT | Performed by: INTERNAL MEDICINE

## 2022-10-06 PROCEDURE — 85025 COMPLETE CBC W/AUTO DIFF WBC: CPT | Performed by: INTERNAL MEDICINE

## 2022-10-13 ENCOUNTER — OFFICE VISIT (OUTPATIENT)
Dept: SURGERY | Facility: CLINIC | Age: 82
End: 2022-10-13
Payer: MEDICARE

## 2022-10-13 VITALS — HEART RATE: 88 BPM | BODY MASS INDEX: 36.88 KG/M2 | WEIGHT: 216 LBS | OXYGEN SATURATION: 90 % | HEIGHT: 64 IN

## 2022-10-13 DIAGNOSIS — Z90.13 S/P MASTECTOMY, BILATERAL: Primary | ICD-10-CM

## 2022-10-13 PROCEDURE — 99213 OFFICE O/P EST LOW 20 MIN: CPT | Mod: S$PBB,,, | Performed by: STUDENT IN AN ORGANIZED HEALTH CARE EDUCATION/TRAINING PROGRAM

## 2022-10-13 PROCEDURE — 99214 OFFICE O/P EST MOD 30 MIN: CPT | Mod: PBBFAC | Performed by: STUDENT IN AN ORGANIZED HEALTH CARE EDUCATION/TRAINING PROGRAM

## 2022-10-13 PROCEDURE — 99213 PR OFFICE/OUTPT VISIT, EST, LEVL III, 20-29 MIN: ICD-10-PCS | Mod: S$PBB,,, | Performed by: STUDENT IN AN ORGANIZED HEALTH CARE EDUCATION/TRAINING PROGRAM

## 2022-10-13 PROCEDURE — 99999 PR PBB SHADOW E&M-EST. PATIENT-LVL IV: CPT | Mod: PBBFAC,,, | Performed by: STUDENT IN AN ORGANIZED HEALTH CARE EDUCATION/TRAINING PROGRAM

## 2022-10-13 PROCEDURE — 99999 PR PBB SHADOW E&M-EST. PATIENT-LVL IV: ICD-10-PCS | Mod: PBBFAC,,, | Performed by: STUDENT IN AN ORGANIZED HEALTH CARE EDUCATION/TRAINING PROGRAM

## 2022-10-30 NOTE — PROGRESS NOTES
[facility]  General Surgery Clinic Progress Note    HPI:  Kelsea Cadet is a 82 y.o. 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  Finished radiation.  Voices no complaints.      ROS:  Negative except above    PE:  Vitals:    10/13/22 1403   Pulse: 88       Gen: Alert and oriented x3, judgement intact, NAD  CV: RRR  Resp: CTAB; breaths non-labored and symmetrical  Abd: Soft, NT, ND, BS+  Extremities: No c/c/e  Wound: R mastectomy incision well healed with no SOI  L mastectomy incision well healed with no SOI      A/P:  82 y.o. female with history of R breast ILCa s/p R MRM and left simple mastectomy who presents for follow up  -Incisions well healed   -RTC 1yr for follow up    Keisha Cortez MD  General Surgery   [phone]        10/30/2022  2:22 PM

## 2022-11-10 ENCOUNTER — APPOINTMENT (OUTPATIENT)
Dept: LAB | Facility: HOSPITAL | Age: 82
End: 2022-11-10
Attending: INTERNAL MEDICINE
Payer: MEDICARE

## 2022-11-10 DIAGNOSIS — N39.0 URINARY TRACT INFECTION, SITE NOT SPECIFIED: Primary | ICD-10-CM

## 2022-11-10 LAB
BACTERIA #/AREA URNS HPF: ABNORMAL /HPF
BILIRUB UR QL STRIP: NEGATIVE
CLARITY UR: ABNORMAL
COLOR UR: YELLOW
GLUCOSE UR QL STRIP: NEGATIVE
HGB UR QL STRIP: NEGATIVE
HYALINE CASTS #/AREA URNS LPF: 8.3 /LPF
KETONES UR QL STRIP: NEGATIVE
LEUKOCYTE ESTERASE UR QL STRIP: ABNORMAL
MICROSCOPIC COMMENT: ABNORMAL
NITRITE UR QL STRIP: POSITIVE
PH UR STRIP: 7 [PH] (ref 5–8)
PROT UR QL STRIP: NEGATIVE
RBC #/AREA URNS HPF: 2 /HPF (ref 0–4)
SP GR UR STRIP: 1.01 (ref 1–1.03)
SQUAMOUS #/AREA URNS HPF: 1 /HPF
URN SPEC COLLECT METH UR: ABNORMAL
UROBILINOGEN UR STRIP-ACNC: NEGATIVE EU/DL
WBC #/AREA URNS HPF: >100 /HPF (ref 0–5)

## 2022-11-10 PROCEDURE — 81000 URINALYSIS NONAUTO W/SCOPE: CPT | Performed by: INTERNAL MEDICINE

## 2022-11-10 PROCEDURE — 87086 URINE CULTURE/COLONY COUNT: CPT | Performed by: INTERNAL MEDICINE

## 2022-11-11 LAB
BACTERIA UR CULT: NORMAL
BACTERIA UR CULT: NORMAL

## 2022-11-18 ENCOUNTER — APPOINTMENT (OUTPATIENT)
Dept: LAB | Facility: HOSPITAL | Age: 82
End: 2022-11-18
Attending: INTERNAL MEDICINE
Payer: MEDICARE

## 2022-11-18 DIAGNOSIS — E11.9 DIABETES MELLITUS WITHOUT COMPLICATION: ICD-10-CM

## 2022-11-18 DIAGNOSIS — R80.9 PROTEINURIA: Primary | ICD-10-CM

## 2022-11-18 LAB
ALBUMIN/CREAT UR: 47.3 MG/MG (ref 0–30)
BACTERIA #/AREA URNS HPF: ABNORMAL /HPF
BILIRUB UR QL STRIP: NEGATIVE
CLARITY UR: ABNORMAL
COLOR UR: YELLOW
CREAT UR-MCNC: 160 MG/DL (ref 15–325)
GLUCOSE UR QL STRIP: NEGATIVE
HGB UR QL STRIP: ABNORMAL
HYALINE CASTS #/AREA URNS LPF: 0 /LPF
KETONES UR QL STRIP: ABNORMAL
LEUKOCYTE ESTERASE UR QL STRIP: ABNORMAL
MICROALBUMIN UR DL<=1MG/L-MCNC: 75.6 MG/L
MICROSCOPIC COMMENT: ABNORMAL
NITRITE UR QL STRIP: POSITIVE
PH UR STRIP: 6 [PH] (ref 5–8)
PROT UR QL STRIP: ABNORMAL
RBC #/AREA URNS HPF: 13 /HPF (ref 0–4)
SP GR UR STRIP: 1.02 (ref 1–1.03)
SQUAMOUS #/AREA URNS HPF: 11 /HPF
URN SPEC COLLECT METH UR: ABNORMAL
UROBILINOGEN UR STRIP-ACNC: 1 EU/DL
WBC #/AREA URNS HPF: >100 /HPF (ref 0–5)
WBC CLUMPS URNS QL MICRO: ABNORMAL

## 2022-11-18 PROCEDURE — 81000 URINALYSIS NONAUTO W/SCOPE: CPT | Performed by: INTERNAL MEDICINE

## 2022-11-18 PROCEDURE — 82043 UR ALBUMIN QUANTITATIVE: CPT | Performed by: INTERNAL MEDICINE

## 2022-11-18 PROCEDURE — 82570 ASSAY OF URINE CREATININE: CPT | Performed by: INTERNAL MEDICINE

## 2022-12-13 ENCOUNTER — APPOINTMENT (OUTPATIENT)
Dept: LAB | Facility: HOSPITAL | Age: 82
End: 2022-12-13
Attending: INTERNAL MEDICINE
Payer: MEDICARE

## 2022-12-13 DIAGNOSIS — N39.0 URINARY TRACT INFECTION, SITE NOT SPECIFIED: Primary | ICD-10-CM

## 2022-12-13 LAB
BACTERIA #/AREA URNS HPF: NEGATIVE /HPF
BILIRUB UR QL STRIP: NEGATIVE
CLARITY UR: CLEAR
COLOR UR: YELLOW
GLUCOSE UR QL STRIP: NEGATIVE
HGB UR QL STRIP: NEGATIVE
HYALINE CASTS #/AREA URNS LPF: 0 /LPF
KETONES UR QL STRIP: NEGATIVE
LEUKOCYTE ESTERASE UR QL STRIP: ABNORMAL
MICROSCOPIC COMMENT: ABNORMAL
NITRITE UR QL STRIP: NEGATIVE
PH UR STRIP: 7 [PH] (ref 5–8)
PROT UR QL STRIP: NEGATIVE
RBC #/AREA URNS HPF: 0 /HPF (ref 0–4)
SP GR UR STRIP: <=1.005 (ref 1–1.03)
SQUAMOUS #/AREA URNS HPF: 2 /HPF
URN SPEC COLLECT METH UR: ABNORMAL
UROBILINOGEN UR STRIP-ACNC: NEGATIVE EU/DL
WBC #/AREA URNS HPF: 6 /HPF (ref 0–5)
YEAST URNS QL MICRO: ABNORMAL

## 2022-12-13 PROCEDURE — 81000 URINALYSIS NONAUTO W/SCOPE: CPT | Performed by: INTERNAL MEDICINE

## 2022-12-13 PROCEDURE — 87086 URINE CULTURE/COLONY COUNT: CPT | Performed by: INTERNAL MEDICINE

## 2022-12-15 LAB
BACTERIA UR CULT: NORMAL
BACTERIA UR CULT: NORMAL

## 2023-03-16 ENCOUNTER — LAB VISIT (OUTPATIENT)
Dept: LAB | Facility: HOSPITAL | Age: 83
End: 2023-03-16
Attending: SPECIALIST
Payer: MEDICARE

## 2023-03-16 DIAGNOSIS — N39.0 URINARY TRACT INFECTION, SITE NOT SPECIFIED: Primary | ICD-10-CM

## 2023-03-16 DIAGNOSIS — C50.111 MALIGNANT NEOPLASM OF CENTRAL PORTION OF RIGHT FEMALE BREAST: ICD-10-CM

## 2023-03-16 LAB
ALBUMIN SERPL BCP-MCNC: 2.9 G/DL (ref 3.5–5.2)
ALP SERPL-CCNC: 138 U/L (ref 55–135)
ALT SERPL W/O P-5'-P-CCNC: 26 U/L (ref 10–44)
ANION GAP SERPL CALC-SCNC: 4 MMOL/L (ref 8–16)
AST SERPL-CCNC: 18 U/L (ref 10–40)
BACTERIA #/AREA URNS HPF: ABNORMAL /HPF
BASOPHILS # BLD AUTO: 0.08 K/UL (ref 0–0.2)
BASOPHILS NFR BLD: 0.9 % (ref 0–1.9)
BILIRUB SERPL-MCNC: 0.2 MG/DL (ref 0.1–1)
BILIRUB UR QL STRIP: NEGATIVE
BUN SERPL-MCNC: 29 MG/DL (ref 8–23)
CALCIUM SERPL-MCNC: 8.6 MG/DL (ref 8.7–10.5)
CHLORIDE SERPL-SCNC: 105 MMOL/L (ref 95–110)
CLARITY UR: ABNORMAL
CO2 SERPL-SCNC: 28 MMOL/L (ref 23–29)
COLOR UR: YELLOW
CREAT SERPL-MCNC: 1.1 MG/DL (ref 0.5–1.4)
DIFFERENTIAL METHOD: ABNORMAL
EOSINOPHIL # BLD AUTO: 0.5 K/UL (ref 0–0.5)
EOSINOPHIL NFR BLD: 6 % (ref 0–8)
ERYTHROCYTE [DISTWIDTH] IN BLOOD BY AUTOMATED COUNT: 17.6 % (ref 11.5–14.5)
EST. GFR  (NO RACE VARIABLE): 50.5 ML/MIN/1.73 M^2
GLUCOSE SERPL-MCNC: 200 MG/DL (ref 70–110)
GLUCOSE UR QL STRIP: NEGATIVE
HCT VFR BLD AUTO: 35.9 % (ref 37–48.5)
HGB BLD-MCNC: 11.1 G/DL (ref 12–16)
HGB UR QL STRIP: ABNORMAL
HYALINE CASTS #/AREA URNS LPF: 4.3 /LPF
IMM GRANULOCYTES # BLD AUTO: 0.03 K/UL (ref 0–0.04)
IMM GRANULOCYTES NFR BLD AUTO: 0.3 % (ref 0–0.5)
KETONES UR QL STRIP: NEGATIVE
LEUKOCYTE ESTERASE UR QL STRIP: ABNORMAL
LYMPHOCYTES # BLD AUTO: 1.4 K/UL (ref 1–4.8)
LYMPHOCYTES NFR BLD: 16.6 % (ref 18–48)
MCH RBC QN AUTO: 25.8 PG (ref 27–31)
MCHC RBC AUTO-ENTMCNC: 30.9 G/DL (ref 32–36)
MCV RBC AUTO: 84 FL (ref 82–98)
MICROSCOPIC COMMENT: ABNORMAL
MONOCYTES # BLD AUTO: 0.6 K/UL (ref 0.3–1)
MONOCYTES NFR BLD: 7.2 % (ref 4–15)
NEUTROPHILS # BLD AUTO: 6 K/UL (ref 1.8–7.7)
NEUTROPHILS NFR BLD: 69 % (ref 38–73)
NITRITE UR QL STRIP: POSITIVE
NRBC BLD-RTO: 0 /100 WBC
PH UR STRIP: 6 [PH] (ref 5–8)
PLATELET # BLD AUTO: 308 K/UL (ref 150–450)
PMV BLD AUTO: 10.5 FL (ref 9.2–12.9)
POTASSIUM SERPL-SCNC: 4.1 MMOL/L (ref 3.5–5.1)
PROT SERPL-MCNC: 7.1 G/DL (ref 6–8.4)
PROT UR QL STRIP: ABNORMAL
RBC # BLD AUTO: 4.3 M/UL (ref 4–5.4)
RBC #/AREA URNS HPF: >100 /HPF (ref 0–4)
SODIUM SERPL-SCNC: 137 MMOL/L (ref 136–145)
SP GR UR STRIP: 1.02 (ref 1–1.03)
SQUAMOUS #/AREA URNS HPF: 1 /HPF
URN SPEC COLLECT METH UR: ABNORMAL
UROBILINOGEN UR STRIP-ACNC: 1 EU/DL
WBC # BLD AUTO: 8.64 K/UL (ref 3.9–12.7)
WBC #/AREA URNS HPF: >100 /HPF (ref 0–5)

## 2023-03-16 PROCEDURE — 87086 URINE CULTURE/COLONY COUNT: CPT | Performed by: SPECIALIST

## 2023-03-16 PROCEDURE — 85025 COMPLETE CBC W/AUTO DIFF WBC: CPT | Performed by: SPECIALIST

## 2023-03-16 PROCEDURE — 36415 COLL VENOUS BLD VENIPUNCTURE: CPT | Performed by: SPECIALIST

## 2023-03-16 PROCEDURE — 87088 URINE BACTERIA CULTURE: CPT | Performed by: SPECIALIST

## 2023-03-16 PROCEDURE — 87186 SC STD MICRODIL/AGAR DIL: CPT | Performed by: SPECIALIST

## 2023-03-16 PROCEDURE — 81000 URINALYSIS NONAUTO W/SCOPE: CPT | Performed by: SPECIALIST

## 2023-03-16 PROCEDURE — 80053 COMPREHEN METABOLIC PANEL: CPT | Performed by: SPECIALIST

## 2023-03-16 PROCEDURE — 87077 CULTURE AEROBIC IDENTIFY: CPT | Performed by: SPECIALIST

## 2023-03-19 LAB — BACTERIA UR CULT: ABNORMAL

## 2023-05-12 ENCOUNTER — HOSPITAL ENCOUNTER (OUTPATIENT)
Dept: RADIOLOGY | Facility: HOSPITAL | Age: 83
Discharge: HOME | End: 2023-05-12
Attending: INTERNAL MEDICINE
Payer: MEDICARE

## 2023-05-12 DIAGNOSIS — C50.411 MALIGNANT NEOPLASM OF UPPER-OUTER QUADRANT OF RIGHT BREAST IN FEMALE, ESTROGEN RECEPTOR POSITIVE: ICD-10-CM

## 2023-05-12 DIAGNOSIS — Z17.0 MALIGNANT NEOPLASM OF UPPER-OUTER QUADRANT OF RIGHT BREAST IN FEMALE, ESTROGEN RECEPTOR POSITIVE: ICD-10-CM

## 2023-05-12 DIAGNOSIS — Z79.811 USE OF AROMATASE INHIBITORS: ICD-10-CM

## 2023-05-12 PROCEDURE — 77080 DXA BONE DENSITY AXIAL: CPT | Mod: TC

## 2023-05-22 ENCOUNTER — APPOINTMENT (OUTPATIENT)
Dept: LAB | Facility: HOSPITAL | Age: 83
End: 2023-05-22
Attending: INTERNAL MEDICINE
Payer: MEDICARE

## 2023-05-22 DIAGNOSIS — R82.90 NONSPECIFIC FINDING ON EXAMINATION OF URINE: Primary | ICD-10-CM

## 2023-05-22 LAB
BACTERIA #/AREA URNS HPF: ABNORMAL /HPF
BILIRUB UR QL STRIP: NEGATIVE
CLARITY UR: ABNORMAL
COLOR UR: YELLOW
GLUCOSE UR QL STRIP: NEGATIVE
HGB UR QL STRIP: NEGATIVE
HYALINE CASTS #/AREA URNS LPF: 1.9 /LPF
KETONES UR QL STRIP: ABNORMAL
LEUKOCYTE ESTERASE UR QL STRIP: ABNORMAL
MICROSCOPIC COMMENT: ABNORMAL
NITRITE UR QL STRIP: POSITIVE
PH UR STRIP: 6 [PH] (ref 5–8)
PROT UR QL STRIP: ABNORMAL
RBC #/AREA URNS HPF: 9 /HPF (ref 0–4)
SP GR UR STRIP: 1.02 (ref 1–1.03)
SQUAMOUS #/AREA URNS HPF: 1 /HPF
URN SPEC COLLECT METH UR: ABNORMAL
UROBILINOGEN UR STRIP-ACNC: NEGATIVE EU/DL
WBC #/AREA URNS HPF: >100 /HPF (ref 0–5)

## 2023-05-22 PROCEDURE — 87186 SC STD MICRODIL/AGAR DIL: CPT | Performed by: INTERNAL MEDICINE

## 2023-05-22 PROCEDURE — 87086 URINE CULTURE/COLONY COUNT: CPT | Performed by: INTERNAL MEDICINE

## 2023-05-22 PROCEDURE — 87088 URINE BACTERIA CULTURE: CPT | Performed by: INTERNAL MEDICINE

## 2023-05-22 PROCEDURE — 87077 CULTURE AEROBIC IDENTIFY: CPT | Performed by: INTERNAL MEDICINE

## 2023-05-22 PROCEDURE — 81000 URINALYSIS NONAUTO W/SCOPE: CPT | Performed by: INTERNAL MEDICINE

## 2023-05-24 LAB — BACTERIA UR CULT: ABNORMAL

## 2023-06-16 ENCOUNTER — APPOINTMENT (OUTPATIENT)
Dept: LAB | Facility: HOSPITAL | Age: 83
End: 2023-06-16
Attending: INTERNAL MEDICINE
Payer: MEDICARE

## 2023-06-16 DIAGNOSIS — N39.0 URINARY TRACT INFECTION, SITE NOT SPECIFIED: Primary | ICD-10-CM

## 2023-06-16 LAB
BACTERIA #/AREA URNS HPF: ABNORMAL /HPF
BILIRUB UR QL STRIP: NEGATIVE
CLARITY UR: ABNORMAL
COLOR UR: YELLOW
GLUCOSE UR QL STRIP: NEGATIVE
HGB UR QL STRIP: NEGATIVE
HYALINE CASTS #/AREA URNS LPF: 1.2 /LPF
KETONES UR QL STRIP: NEGATIVE
LEUKOCYTE ESTERASE UR QL STRIP: ABNORMAL
MICROSCOPIC COMMENT: ABNORMAL
NITRITE UR QL STRIP: NEGATIVE
PH UR STRIP: 6 [PH] (ref 5–8)
PROT UR QL STRIP: NEGATIVE
RBC #/AREA URNS HPF: 3 /HPF (ref 0–4)
SP GR UR STRIP: 1.02 (ref 1–1.03)
SQUAMOUS #/AREA URNS HPF: 0 /HPF
URN SPEC COLLECT METH UR: ABNORMAL
UROBILINOGEN UR STRIP-ACNC: NEGATIVE EU/DL
WBC #/AREA URNS HPF: 71 /HPF (ref 0–5)

## 2023-06-16 PROCEDURE — 81000 URINALYSIS NONAUTO W/SCOPE: CPT | Performed by: INTERNAL MEDICINE

## 2023-06-16 PROCEDURE — 87077 CULTURE AEROBIC IDENTIFY: CPT | Performed by: INTERNAL MEDICINE

## 2023-06-16 PROCEDURE — 87186 SC STD MICRODIL/AGAR DIL: CPT | Performed by: INTERNAL MEDICINE

## 2023-06-16 PROCEDURE — 87088 URINE BACTERIA CULTURE: CPT | Performed by: INTERNAL MEDICINE

## 2023-06-16 PROCEDURE — 87086 URINE CULTURE/COLONY COUNT: CPT | Performed by: INTERNAL MEDICINE

## 2023-06-18 LAB — BACTERIA UR CULT: ABNORMAL

## 2023-06-30 ENCOUNTER — HOSPITAL ENCOUNTER (EMERGENCY)
Facility: HOSPITAL | Age: 83
Discharge: HOME | End: 2023-06-30
Attending: EMERGENCY MEDICINE
Payer: MEDICARE

## 2023-06-30 VITALS
HEIGHT: 64 IN | SYSTOLIC BLOOD PRESSURE: 178 MMHG | DIASTOLIC BLOOD PRESSURE: 84 MMHG | WEIGHT: 220 LBS | OXYGEN SATURATION: 89 % | HEART RATE: 81 BPM | BODY MASS INDEX: 37.56 KG/M2 | TEMPERATURE: 98 F | RESPIRATION RATE: 40 BRPM

## 2023-06-30 DIAGNOSIS — R55 NEAR SYNCOPE: ICD-10-CM

## 2023-06-30 DIAGNOSIS — R42 VERTIGO: Primary | ICD-10-CM

## 2023-06-30 LAB
ALBUMIN SERPL BCP-MCNC: 3 G/DL (ref 3.5–5.2)
ALP SERPL-CCNC: 148 U/L (ref 55–135)
ALT SERPL W/O P-5'-P-CCNC: 23 U/L (ref 10–44)
ANION GAP SERPL CALC-SCNC: 7 MMOL/L (ref 8–16)
AST SERPL-CCNC: 21 U/L (ref 10–40)
BACTERIA #/AREA URNS HPF: NEGATIVE /HPF
BASOPHILS # BLD AUTO: 0.06 K/UL (ref 0–0.2)
BASOPHILS NFR BLD: 0.8 % (ref 0–1.9)
BILIRUB SERPL-MCNC: 0.2 MG/DL (ref 0.1–1)
BILIRUB UR QL STRIP: NEGATIVE
BUN SERPL-MCNC: 27 MG/DL (ref 8–23)
CALCIUM SERPL-MCNC: 8.9 MG/DL (ref 8.7–10.5)
CHLORIDE SERPL-SCNC: 102 MMOL/L (ref 95–110)
CLARITY UR: CLEAR
CO2 SERPL-SCNC: 29 MMOL/L (ref 23–29)
COLOR UR: YELLOW
CREAT SERPL-MCNC: 1.3 MG/DL (ref 0.5–1.4)
CTP QC/QA: YES
CTP QC/QA: YES
DIFFERENTIAL METHOD: ABNORMAL
EOSINOPHIL # BLD AUTO: 0.3 K/UL (ref 0–0.5)
EOSINOPHIL NFR BLD: 3.7 % (ref 0–8)
ERYTHROCYTE [DISTWIDTH] IN BLOOD BY AUTOMATED COUNT: 14.5 % (ref 11.5–14.5)
EST. GFR  (NO RACE VARIABLE): 41.3 ML/MIN/1.73 M^2
GLUCOSE SERPL-MCNC: 180 MG/DL (ref 70–110)
GLUCOSE UR QL STRIP: NEGATIVE
HCT VFR BLD AUTO: 37.9 % (ref 37–48.5)
HGB BLD-MCNC: 12 G/DL (ref 12–16)
HGB UR QL STRIP: NEGATIVE
HYALINE CASTS #/AREA URNS LPF: 0.4 /LPF
IMM GRANULOCYTES # BLD AUTO: 0.03 K/UL (ref 0–0.04)
IMM GRANULOCYTES NFR BLD AUTO: 0.4 % (ref 0–0.5)
KETONES UR QL STRIP: NEGATIVE
LEUKOCYTE ESTERASE UR QL STRIP: ABNORMAL
LYMPHOCYTES # BLD AUTO: 1 K/UL (ref 1–4.8)
LYMPHOCYTES NFR BLD: 13.1 % (ref 18–48)
MCH RBC QN AUTO: 27.1 PG (ref 27–31)
MCHC RBC AUTO-ENTMCNC: 31.7 G/DL (ref 32–36)
MCV RBC AUTO: 86 FL (ref 82–98)
MICROSCOPIC COMMENT: ABNORMAL
MONOCYTES # BLD AUTO: 0.7 K/UL (ref 0.3–1)
MONOCYTES NFR BLD: 9.9 % (ref 4–15)
NEUTROPHILS # BLD AUTO: 5.3 K/UL (ref 1.8–7.7)
NEUTROPHILS NFR BLD: 72.1 % (ref 38–73)
NITRITE UR QL STRIP: NEGATIVE
NRBC BLD-RTO: 0 /100 WBC
PH UR STRIP: 8 [PH] (ref 5–8)
PLATELET # BLD AUTO: 260 K/UL (ref 150–450)
PMV BLD AUTO: 10.1 FL (ref 9.2–12.9)
POC MOLECULAR INFLUENZA A AGN: NEGATIVE
POC MOLECULAR INFLUENZA B AGN: NEGATIVE
POTASSIUM SERPL-SCNC: 3.4 MMOL/L (ref 3.5–5.1)
PROT SERPL-MCNC: 7.4 G/DL (ref 6–8.4)
PROT UR QL STRIP: NEGATIVE
RBC # BLD AUTO: 4.43 M/UL (ref 4–5.4)
RBC #/AREA URNS HPF: 2 /HPF (ref 0–4)
SARS-COV-2 RDRP RESP QL NAA+PROBE: NEGATIVE
SODIUM SERPL-SCNC: 138 MMOL/L (ref 136–145)
SP GR UR STRIP: 1.01 (ref 1–1.03)
SQUAMOUS #/AREA URNS HPF: 1 /HPF
TROPONIN I SERPL DL<=0.01 NG/ML-MCNC: 7.3 PG/ML (ref 0–60)
URN SPEC COLLECT METH UR: ABNORMAL
UROBILINOGEN UR STRIP-ACNC: NEGATIVE EU/DL
WBC # BLD AUTO: 7.38 K/UL (ref 3.9–12.7)
WBC #/AREA URNS HPF: 4 /HPF (ref 0–5)

## 2023-06-30 PROCEDURE — 87502 INFLUENZA DNA AMP PROBE: CPT

## 2023-06-30 PROCEDURE — 93005 ELECTROCARDIOGRAM TRACING: CPT

## 2023-06-30 PROCEDURE — 93010 ELECTROCARDIOGRAM REPORT: CPT | Mod: ,,, | Performed by: INTERNAL MEDICINE

## 2023-06-30 PROCEDURE — 99285 EMERGENCY DEPT VISIT HI MDM: CPT | Mod: 25

## 2023-06-30 PROCEDURE — 25000003 PHARM REV CODE 250: Performed by: EMERGENCY MEDICINE

## 2023-06-30 PROCEDURE — 84484 ASSAY OF TROPONIN QUANT: CPT | Performed by: EMERGENCY MEDICINE

## 2023-06-30 PROCEDURE — 81000 URINALYSIS NONAUTO W/SCOPE: CPT | Performed by: EMERGENCY MEDICINE

## 2023-06-30 PROCEDURE — 36415 COLL VENOUS BLD VENIPUNCTURE: CPT | Performed by: EMERGENCY MEDICINE

## 2023-06-30 PROCEDURE — 85025 COMPLETE CBC W/AUTO DIFF WBC: CPT | Performed by: EMERGENCY MEDICINE

## 2023-06-30 PROCEDURE — 87635 SARS-COV-2 COVID-19 AMP PRB: CPT | Performed by: EMERGENCY MEDICINE

## 2023-06-30 PROCEDURE — 93010 EKG 12-LEAD: ICD-10-PCS | Mod: ,,, | Performed by: INTERNAL MEDICINE

## 2023-06-30 PROCEDURE — 51702 INSERT TEMP BLADDER CATH: CPT

## 2023-06-30 PROCEDURE — 80053 COMPREHEN METABOLIC PANEL: CPT | Performed by: EMERGENCY MEDICINE

## 2023-06-30 RX ORDER — ONDANSETRON 4 MG/1
4 TABLET, ORALLY DISINTEGRATING ORAL
Status: COMPLETED | OUTPATIENT
Start: 2023-06-30 | End: 2023-06-30

## 2023-06-30 RX ORDER — ONDANSETRON 4 MG/1
4 TABLET, FILM COATED ORAL EVERY 6 HOURS PRN
Qty: 12 TABLET | Refills: 0 | Status: SHIPPED | OUTPATIENT
Start: 2023-06-30

## 2023-06-30 RX ORDER — MECLIZINE HYDROCHLORIDE 25 MG/1
25 TABLET ORAL
Status: COMPLETED | OUTPATIENT
Start: 2023-06-30 | End: 2023-06-30

## 2023-06-30 RX ORDER — MECLIZINE HYDROCHLORIDE 25 MG/1
25 TABLET ORAL 3 TIMES DAILY PRN
Qty: 20 TABLET | Refills: 0 | Status: SHIPPED | OUTPATIENT
Start: 2023-06-30

## 2023-06-30 RX ADMIN — MECLIZINE HYDROCHLORIDE 25 MG: 25 TABLET ORAL at 05:06

## 2023-06-30 RX ADMIN — ONDANSETRON 4 MG: 4 TABLET, ORALLY DISINTEGRATING ORAL at 05:06

## 2023-07-01 NOTE — ED PROVIDER NOTES
Encounter Date: 6/30/2023       History     Chief Complaint   Patient presents with    Dizziness     Pt states PTA became diaphoretic, nauseated and dizzy.       82 yo female here with complaint of sudden onset nausea, dizziness and diaphoresis just PTA. No focal numbness, tingling or weakness. No fever/chills. No Cp. No dyspnea. Gradual onset. Similar to previous. History of vertigo. No known sick contacts. Aggravating with movement. Alleviated at rest.     Review of patient's allergies indicates:   Allergen Reactions    Macrobid [nitrofurantoin monohyd/m-cryst] Swelling    Codeine Other (See Comments)     headache    Latex, natural rubber Rash     Past Medical History:   Diagnosis Date    Arthritis     Asthmatic bronchitis     Breast cancer 1996    bilateral - radiation pellets for tx    Breast cancer 01/2022    right    Colon cancer 1963    Congestive heart failure (CHF)     Coronary artery disease     Depression     Diabetes mellitus, type 2     Encounter for blood transfusion     Environmental and seasonal allergies     Land catheter in place     Hard of hearing     Hyperlipidemia     Hypertension     IBS (irritable bowel syndrome)     Kidney disease     ckd stage 3b    Myocardial infarction 2019    Neuropathy     PONV (postoperative nausea and vomiting)     Presence of indwelling Land catheter     Shingles     Sleep apnea     no cpap used    Stroke 1993    tia    Thyroid disease     UTI (urinary tract infection)     with sepsis    Vitamin D deficiency     Wound, open, buttock     being treated    Yeast infection      Past Surgical History:   Procedure Laterality Date    BLADDER SUSPENSION      BREAST BIOPSY Bilateral 1996    BREAST SURGERY      lumpectomy, isotopes    CHOLECYSTECTOMY      had gal bladder removed    COLECTOMY      had part of colon removed    CYSTOSCOPY W/ RETROGRADES Bilateral 12/19/2019    Procedure: CYSTOSCOPY, WITH RETROGRADE PYELOGRAM;  Surgeon: Prasad Rocha MD;  Location: [facility] OR;   Service: Urology;  Laterality: Bilateral;    DILATION OF URETHRA N/A 12/19/2019    Procedure: DILATION, URETHRA;  Surgeon: Prasad Rocha MD;  Location: [facility] OR;  Service: Urology;  Laterality: N/A;    HYSTERECTOMY      LEFT HEART CATHETERIZATION Left 11/27/2019    Procedure: Left heart cath;  Surgeon: Urban Paiz MD;  Location: [facility] CATH LAB;  Service: Cardiology;  Laterality: Left;    MODIFIED RADICAL MASTECTOMY W/ AXILLARY LYMPH NODE DISSECTION Right 1/26/2022    Procedure: MASTECTOMY, MODIFIED RADICAL;  Surgeon: Keisha Cortez MD;  Location: [facility] OR;  Service: General;  Laterality: Right;    OOPHORECTOMY      SENTINEL LYMPH NODE BIOPSY Right 1/26/2022    Procedure: BIOPSY, LYMPH NODE, SENTINEL;  Surgeon: Kiesha Cortez MD;  Location: [facility] OR;  Service: General;  Laterality: Right;  0800    SIMPLE MASTECTOMY Left 1/26/2022    Procedure: MASTECTOMY, SIMPLE;  Surgeon: Keisha Cortez MD;  Location: [facility];  Service: General;  Laterality: Left;  FROZEN SECTION     Family History   Problem Relation Age of Onset    Cancer Mother 42        Bone    Bone cancer Mother     Breast cancer Mother     Heart disease Mother     Cancer Father         Bone    Bone cancer Father 86    Breast cancer Sister     Bell's palsy Sister     Breast cancer Sister      Social History     Tobacco Use    Smoking status: Never    Smokeless tobacco: Never   Substance Use Topics    Alcohol use: Not Currently     Comment: rare    Drug use: Never     Review of Systems   Constitutional: Negative.    Respiratory: Negative.     Cardiovascular: Negative.    Gastrointestinal:  Positive for nausea.   All other systems reviewed and are negative.    Physical Exam     Initial Vitals [06/30/23 1638]   BP Pulse Resp Temp SpO2   (!) 198/93 81 18 98.1 °F (36.7 °C) (!) 93 %      MAP       --         Physical Exam    Nursing note and vitals reviewed.  Constitutional: She appears well-developed and well-nourished. She is not diaphoretic. No  distress.   HENT:   Head: Normocephalic and atraumatic.   Horizontal nystagmus   Eyes: EOM are normal. Pupils are equal, round, and reactive to light.   Neck: Neck supple.   Normal range of motion.  Cardiovascular:  Normal rate, regular rhythm and intact distal pulses.     Exam reveals no gallop and no friction rub.       No murmur heard.  Pulmonary/Chest: Breath sounds normal. No respiratory distress. She has no wheezes. She has no rales.   Abdominal: Abdomen is soft. Bowel sounds are normal. She exhibits no distension. There is no abdominal tenderness. There is no rebound.   Musculoskeletal:         General: No tenderness or edema. Normal range of motion.      Cervical back: Normal range of motion and neck supple.     Neurological: She is alert and oriented to person, place, and time. She has normal strength and normal reflexes. GCS score is 15. GCS eye subscore is 4. GCS verbal subscore is 5. GCS motor subscore is 6.   Finger to nose normal bilaterally   Skin: Skin is warm and dry. Capillary refill takes less than 2 seconds.       ED Course   Procedures  Labs Reviewed   CBC W/ AUTO DIFFERENTIAL - Abnormal; Notable for the following components:       Result Value    MCHC 31.7 (*)     Lymph % 13.1 (*)     All other components within normal limits   COMPREHENSIVE METABOLIC PANEL - Abnormal; Notable for the following components:    Potassium 3.4 (*)     Glucose 180 (*)     BUN 27 (*)     Albumin 3.0 (*)     Alkaline Phosphatase 148 (*)     eGFR 41.3 (*)     Anion Gap 7 (*)     All other components within normal limits   URINALYSIS, REFLEX TO URINE CULTURE - Abnormal; Notable for the following components:    Leukocytes, UA Trace (*)     All other components within normal limits    Narrative:     Preferred Collection Type->Urine, Clean Catch  Specimen Source->Urine   URINALYSIS MICROSCOPIC - Abnormal; Notable for the following components:    Hyaline Casts, UA 0.4 (*)     All other components within normal limits     Narrative:     Preferred Collection Type->Urine, Clean Catch  Specimen Source->Urine   TROPONIN I HIGH SENSITIVITY   SARS-COV-2 RDRP GENE    Narrative:     This test utilizes isothermal nucleic acid amplification technology to detect the SARS-CoV-2 RdRp nucleic acid segment. The analytical sensitivity (limit of detection) is 500 copies/swab.     A POSITIVE result is indicative of the presence of SARS-CoV-2 RNA; clinical correlation with patient history and other diagnostic information is necessary to determine patient infection status.    A NEGATIVE result means that SARS-CoV-2 nucleic acids are not present above the limit of detection. A NEGATIVE result should be treated as presumptive. It does not rule out the possibility of COVID-19 and should not be the sole basis for treatment decisions. If COVID-19 is strongly suspected based on clinical and exposure history, re-testing using an alternate molecular assay should be considered.     This test is only for use under the Food and Drug Administration s Emergency Use Authorization (EUA).     Commercial kits are provided by [employer]. Performance characteristics of the EUA have been independently verified by [facility] Department of Pathology and Laboratory Medicine.   _________________________________________________________________   The authorized Fact Sheet for Healthcare Providers and the authorized Fact Sheet for Patients of the ID NOW COVID-19 are available on the FDA website:    https://www.fda.gov/media/868906/download      https://www.fda.gov/media/806782/download       POCT INFLUENZA A/B MOLECULAR     EKG Readings: (Independently Interpreted)   Initial Reading: No STEMI. Rhythm: Normal Sinus Rhythm. Ectopy: No Ectopy. Clinical Impression: Normal Sinus Rhythm     Imaging Results              X-Ray Chest AP Portable (Final result)  Result time 06/30/23 17:50:42      Final result by Dinorah Boogie MD (06/30/23 17:50:42)                    Impression:      Findings concerning for large right hilar infrahilar mass recommend CT of the chest      Electronically signed by: Dinorah Boogie MD  Date:    06/30/2023  Time:    17:50               Narrative:    EXAMINATION:  XR CHEST AP PORTABLE    CLINICAL HISTORY:  Syncope and collapse    TECHNIQUE:  portable chest x-ray    COMPARISON:  01/13/2022.    FINDINGS:  Suspicion for a large hilar mass on the right.  There is severe degenerative changes of the left shoulder.  Peripheral scattered chronic change or infiltrate in the right lung.                                    X-Rays:   Independently Interpreted Readings:   Chest X-Ray: No acute abnormalities.   Medications   meclizine tablet 25 mg (25 mg Oral Given 6/30/23 1734)   ondansetron disintegrating tablet 4 mg (4 mg Oral Given 6/30/23 1734)     Medical Decision Making:   Differential Diagnosis:   Near syncope, dehydration, electrolyte disturbance  Independently Interpreted Test(s):   I have ordered and independently interpreted X-rays - see prior notes.  I have ordered and independently interpreted EKG Reading(s) - see prior notes  Clinical Tests:   Lab Tests: Ordered and Reviewed  Radiological Study: Reviewed and Ordered  Medical Tests: Ordered and Reviewed  ED Management:  Improved with zofran, antivert. Labs at baseline. Stable for outpatient ENT follow up. No dyspnea.                         Clinical Impression:   Final diagnoses:  [R55] Near syncope  [R42] Vertigo (Primary)        ED Disposition Condition    Discharge Stable          ED Prescriptions       Medication Sig Dispense Start Date End Date Auth. Provider    meclizine (ANTIVERT) 25 mg tablet Take 1 tablet (25 mg total) by mouth 3 (three) times daily as needed. 20 tablet 6/30/2023 -- Jovany Shah MD    ondansetron (ZOFRAN) 4 MG tablet Take 1 tablet (4 mg total) by mouth every 6 (six) hours as needed for Nausea. 12 tablet 6/30/2023 -- Jovany Shah MD          Follow-up Information        Follow up With Specialties Details Why Contact Info    [facility]  Schedule an appointment as soon as possible for a visit   [address] [facility] [place] [address]  [phone]               Jovany Shah MD  07/01/23 0114

## 2023-07-05 ENCOUNTER — HOSPITAL ENCOUNTER (OUTPATIENT)
Dept: RADIOLOGY | Facility: HOSPITAL | Age: 83
Discharge: HOME | End: 2023-07-05
Attending: INTERNAL MEDICINE
Payer: MEDICARE

## 2023-07-05 DIAGNOSIS — R93.89 ABNORMAL CHEST X-RAY: ICD-10-CM

## 2023-07-05 DIAGNOSIS — R91.1 SOLITARY PULMONARY NODULE: ICD-10-CM

## 2023-07-05 DIAGNOSIS — R91.1 SOLITARY PULMONARY NODULE: Primary | ICD-10-CM

## 2023-07-05 PROCEDURE — 71260 CT THORAX DX C+: CPT | Mod: TC

## 2023-07-05 PROCEDURE — 25500020 PHARM REV CODE 255: Performed by: INTERNAL MEDICINE

## 2023-07-05 RX ADMIN — IOHEXOL 100 ML: 350 INJECTION, SOLUTION INTRAVENOUS at 04:07

## 2023-07-13 ENCOUNTER — HOSPITAL ENCOUNTER (OUTPATIENT)
Dept: RADIOLOGY | Facility: HOSPITAL | Age: 83
Discharge: HOME | End: 2023-07-13
Payer: MEDICARE

## 2023-07-13 DIAGNOSIS — R05.1 ACUTE COUGH: Primary | ICD-10-CM

## 2023-07-13 DIAGNOSIS — R05.1 ACUTE COUGH: ICD-10-CM

## 2023-07-13 PROCEDURE — 71046 X-RAY EXAM CHEST 2 VIEWS: CPT | Mod: TC

## 2023-08-03 ENCOUNTER — APPOINTMENT (OUTPATIENT)
Dept: LAB | Facility: HOSPITAL | Age: 83
End: 2023-08-03
Attending: SPECIALIST
Payer: MEDICARE

## 2023-08-03 DIAGNOSIS — N39.0 URINARY TRACT INFECTION, SITE NOT SPECIFIED: Primary | ICD-10-CM

## 2023-08-03 LAB
BACTERIA #/AREA URNS HPF: NEGATIVE /HPF
BILIRUB UR QL STRIP: NEGATIVE
CLARITY UR: CLEAR
COLOR UR: YELLOW
GLUCOSE UR QL STRIP: NEGATIVE
HGB UR QL STRIP: NEGATIVE
HYALINE CASTS #/AREA URNS LPF: 3.5 /LPF
KETONES UR QL STRIP: NEGATIVE
LEUKOCYTE ESTERASE UR QL STRIP: ABNORMAL
MICROSCOPIC COMMENT: ABNORMAL
NITRITE UR QL STRIP: NEGATIVE
PH UR STRIP: 7 [PH] (ref 5–8)
PROT UR QL STRIP: ABNORMAL
RBC #/AREA URNS HPF: 6 /HPF (ref 0–4)
SP GR UR STRIP: 1.01 (ref 1–1.03)
SQUAMOUS #/AREA URNS HPF: 1 /HPF
URN SPEC COLLECT METH UR: ABNORMAL
UROBILINOGEN UR STRIP-ACNC: NEGATIVE EU/DL
WBC #/AREA URNS HPF: 6 /HPF (ref 0–5)
YEAST URNS QL MICRO: ABNORMAL

## 2023-08-03 PROCEDURE — 87086 URINE CULTURE/COLONY COUNT: CPT | Performed by: SPECIALIST

## 2023-08-03 PROCEDURE — 87088 URINE BACTERIA CULTURE: CPT | Performed by: SPECIALIST

## 2023-08-03 PROCEDURE — 81000 URINALYSIS NONAUTO W/SCOPE: CPT | Performed by: SPECIALIST

## 2023-08-03 PROCEDURE — 87106 FUNGI IDENTIFICATION YEAST: CPT | Performed by: SPECIALIST

## 2023-08-05 LAB — BACTERIA UR CULT: ABNORMAL

## 2023-09-20 ENCOUNTER — CLINICAL SUPPORT (OUTPATIENT)
Dept: CARDIOLOGY | Facility: HOSPITAL | Age: 83
End: 2023-09-20
Attending: INTERNAL MEDICINE
Payer: MEDICARE

## 2023-09-20 DIAGNOSIS — I51.9 LEFT VENTRICULAR SYSTOLIC DYSFUNCTION: ICD-10-CM

## 2023-09-20 PROCEDURE — 93306 TTE W/DOPPLER COMPLETE: CPT

## 2023-09-21 LAB
AV INDEX (PROSTH): 0.57
AV MEAN GRADIENT: 5 MMHG
AV PEAK GRADIENT: 10 MMHG
AV VALVE AREA BY VELOCITY RATIO: 1.55 CM²
AV VALVE AREA: 1.85 CM²
AV VELOCITY RATIO: 0.47
CV ECHO LV RWT: 0.34 CM
DOP CALC AO PEAK VEL: 1.56 M/S
DOP CALC AO VTI: 32.6 CM
DOP CALC LVOT AREA: 3.3 CM2
DOP CALC LVOT DIAMETER: 2.04 CM
DOP CALC LVOT PEAK VEL: 0.74 M/S
DOP CALC LVOT STROKE VOLUME: 60.44 CM3
DOP CALC MV VTI: 37.3 CM
DOP CALCLVOT PEAK VEL VTI: 18.5 CM
E WAVE DECELERATION TIME: 287.99 MSEC
E/A RATIO: 0.66
ECHO LV POSTERIOR WALL: 0.77 CM (ref 0.6–1.1)
EJECTION FRACTION: 80 %
FRACTIONAL SHORTENING: 36 % (ref 28–44)
INTERVENTRICULAR SEPTUM: 0.96 CM (ref 0.6–1.1)
LA MAJOR: 5.41 CM
LEFT ATRIUM SIZE: 4.42 CM
LEFT INTERNAL DIMENSION IN SYSTOLE: 2.91 CM (ref 2.1–4)
LEFT VENTRICLE DIASTOLIC VOLUME: 96.55 ML
LEFT VENTRICLE SYSTOLIC VOLUME: 32.58 ML
LEFT VENTRICULAR INTERNAL DIMENSION IN DIASTOLE: 4.58 CM (ref 3.5–6)
LEFT VENTRICULAR MASS: 129.7 G
LV SEPTAL E/E' RATIO: 17.17 M/S
LVOT MG: 1.23 MMHG
LVOT MV: 0.52 CM/S
MV MEAN GRADIENT: 3 MMHG
MV PEAK A VEL: 1.55 M/S
MV PEAK E VEL: 1.03 M/S
MV PEAK GRADIENT: 9 MMHG
MV STENOSIS PRESSURE HALF TIME: 2.2 MS
MV VALVE AREA BY CONTINUITY EQUATION: 1.62 CM2
MV VALVE AREA P 1/2 METHOD: 100 CM2
PISA TR MAX VEL: 2.56 M/S
PULM VEIN S/D RATIO: 1.39
PV PEAK D VEL: 0.28 M/S
PV PEAK GRADIENT: 4 MMHG
PV PEAK S VEL: 0.39 M/S
PV PEAK VELOCITY: 1 M/S
RA MAJOR: 4.98 CM
RIGHT VENTRICULAR END-DIASTOLIC DIMENSION: 2.15 CM
RV TISSUE DOPPLER FREE WALL SYSTOLIC VELOCITY 1 (APICAL 4 CHAMBER VIEW): 12.32 CM/S
TDI SEPTAL: 0.06 M/S
TR MAX PG: 26 MMHG
TRICUSPID ANNULAR PLANE SYSTOLIC EXCURSION: 0.89 CM

## 2023-11-28 ENCOUNTER — LAB VISIT (OUTPATIENT)
Dept: LAB | Facility: HOSPITAL | Age: 83
End: 2023-11-28
Attending: SPECIALIST
Payer: MEDICARE

## 2023-11-28 DIAGNOSIS — R82.991 HYPOCITRATURIA: Primary | ICD-10-CM

## 2023-11-28 LAB
BACTERIA #/AREA URNS HPF: NEGATIVE /HPF
BILIRUB UR QL STRIP: NEGATIVE
CLARITY UR: CLEAR
COLOR UR: YELLOW
GLUCOSE UR QL STRIP: NEGATIVE
HGB UR QL STRIP: NEGATIVE
HYALINE CASTS #/AREA URNS LPF: 1.5 /LPF
KETONES UR QL STRIP: NEGATIVE
LEUKOCYTE ESTERASE UR QL STRIP: ABNORMAL
MICROSCOPIC COMMENT: ABNORMAL
NITRITE UR QL STRIP: NEGATIVE
PH UR STRIP: 8 [PH] (ref 5–8)
PROT UR QL STRIP: NEGATIVE
RBC #/AREA URNS HPF: 3 /HPF (ref 0–4)
SP GR UR STRIP: 1.01 (ref 1–1.03)
SQUAMOUS #/AREA URNS HPF: 1 /HPF
URN SPEC COLLECT METH UR: ABNORMAL
UROBILINOGEN UR STRIP-ACNC: NEGATIVE EU/DL
WBC #/AREA URNS HPF: 8 /HPF (ref 0–5)

## 2023-11-28 PROCEDURE — 81000 URINALYSIS NONAUTO W/SCOPE: CPT | Performed by: SPECIALIST

## 2023-11-28 PROCEDURE — 87086 URINE CULTURE/COLONY COUNT: CPT | Performed by: SPECIALIST

## 2023-11-28 PROCEDURE — 87088 URINE BACTERIA CULTURE: CPT | Performed by: SPECIALIST

## 2023-11-30 LAB — BACTERIA UR CULT: ABNORMAL

## 2023-12-14 LAB — SPECIMEN TO PATHOLOGY - SURGICAL: NORMAL

## 2023-12-27 ENCOUNTER — LAB VISIT (OUTPATIENT)
Dept: LAB | Facility: HOSPITAL | Age: 83
End: 2023-12-27
Attending: INTERNAL MEDICINE
Payer: MEDICARE

## 2023-12-27 DIAGNOSIS — E11.22 TYPE 2 DIABETES MELLITUS WITH END-STAGE RENAL DISEASE: ICD-10-CM

## 2023-12-27 DIAGNOSIS — N18.6 TYPE 2 DIABETES MELLITUS WITH END-STAGE RENAL DISEASE: ICD-10-CM

## 2023-12-27 DIAGNOSIS — N18.9 ANEMIA OF CHRONIC RENAL FAILURE: ICD-10-CM

## 2023-12-27 DIAGNOSIS — E78.5 HYPERLIPEMIA: ICD-10-CM

## 2023-12-27 DIAGNOSIS — I13.0 HYPERTENSIVE HEART AND RENAL DISEASE WITH CONGESTIVE HEART FAILURE: ICD-10-CM

## 2023-12-27 DIAGNOSIS — D63.1 ANEMIA OF CHRONIC RENAL FAILURE: ICD-10-CM

## 2023-12-27 DIAGNOSIS — N18.32 CHRONIC KIDNEY DISEASE (CKD) STAGE G3B/A1, MODERATELY DECREASED GLOMERULAR FILTRATION RATE (GFR) BETWEEN 30-44 ML/MIN/1.73 SQUARE METER AND ALBUMINURIA CREATININE RATIO LESS THAN 30 MG/G: ICD-10-CM

## 2023-12-27 DIAGNOSIS — I25.10 CORONARY ATHEROSCLEROSIS OF NATIVE CORONARY ARTERY: ICD-10-CM

## 2023-12-27 LAB
ALBUMIN SERPL BCP-MCNC: 3.1 G/DL (ref 3.5–5.2)
ALP SERPL-CCNC: 189 U/L (ref 55–135)
ALT SERPL W/O P-5'-P-CCNC: 30 U/L (ref 10–44)
ANION GAP SERPL CALC-SCNC: 7 MMOL/L (ref 3–11)
AST SERPL-CCNC: 25 U/L (ref 10–40)
BASOPHILS # BLD AUTO: 0.08 K/UL (ref 0–0.2)
BASOPHILS NFR BLD: 0.7 % (ref 0–1.9)
BILIRUB SERPL-MCNC: 0.3 MG/DL (ref 0.1–1)
BUN SERPL-MCNC: 34 MG/DL (ref 8–23)
CALCIUM SERPL-MCNC: 9.1 MG/DL (ref 8.7–10.5)
CHLORIDE SERPL-SCNC: 105 MMOL/L (ref 95–110)
CHOLEST SERPL-MCNC: 131 MG/DL (ref 120–199)
CHOLEST/HDLC SERPL: 3 {RATIO} (ref 2–5)
CO2 SERPL-SCNC: 21 MMOL/L (ref 23–29)
CREAT SERPL-MCNC: 1.2 MG/DL (ref 0.5–1.4)
DIFFERENTIAL METHOD BLD: ABNORMAL
EOSINOPHIL # BLD AUTO: 0.4 K/UL (ref 0–0.5)
EOSINOPHIL NFR BLD: 3.9 % (ref 0–8)
ERYTHROCYTE [DISTWIDTH] IN BLOOD BY AUTOMATED COUNT: 14.2 % (ref 11.5–14.5)
EST. GFR  (NO RACE VARIABLE): 45.5 ML/MIN/1.73 M^2
ESTIMATED AVG GLUCOSE: 183 MG/DL (ref 68–131)
FERRITIN SERPL-MCNC: 82 NG/ML (ref 20–300)
GLUCOSE SERPL-MCNC: 214 MG/DL (ref 70–110)
HBA1C MFR BLD: 8 % (ref 4–5.6)
HCT VFR BLD AUTO: 40.5 % (ref 37–48.5)
HDLC SERPL-MCNC: 44 MG/DL (ref 40–75)
HDLC SERPL: 33.6 % (ref 20–50)
HGB BLD-MCNC: 13.1 G/DL (ref 12–16)
IMM GRANULOCYTES # BLD AUTO: 0.05 K/UL (ref 0–0.04)
IMM GRANULOCYTES NFR BLD AUTO: 0.5 % (ref 0–0.5)
IRON SATN MFR SERPL: 14 % (ref 20–50)
IRON SERPL-MCNC: 65 UG/DL (ref 30–160)
LDLC SERPL CALC-MCNC: 44.4 MG/DL (ref 63–159)
LYMPHOCYTES # BLD AUTO: 2.2 K/UL (ref 1–4.8)
LYMPHOCYTES NFR BLD: 20.6 % (ref 18–48)
MCH RBC QN AUTO: 28.9 PG (ref 27–31)
MCHC RBC AUTO-ENTMCNC: 32.3 G/DL (ref 32–36)
MCV RBC AUTO: 89 FL (ref 82–98)
MONOCYTES # BLD AUTO: 0.7 K/UL (ref 0.3–1)
MONOCYTES NFR BLD: 6.7 % (ref 4–15)
NEUTROPHILS # BLD AUTO: 7.3 K/UL (ref 1.8–7.7)
NEUTROPHILS NFR BLD: 67.6 % (ref 38–73)
NONHDLC SERPL-MCNC: 87 MG/DL
NRBC BLD-RTO: 0 /100 WBC
PLATELET # BLD AUTO: 340 K/UL (ref 150–450)
PMV BLD AUTO: 10.6 FL (ref 9.2–12.9)
POTASSIUM SERPL-SCNC: 4.4 MMOL/L (ref 3.5–5.1)
PROT SERPL-MCNC: 7.9 G/DL (ref 6–8.4)
RBC # BLD AUTO: 4.53 M/UL (ref 4–5.4)
SODIUM SERPL-SCNC: 133 MMOL/L (ref 136–145)
T4 SERPL-MCNC: 8.5 UG/DL (ref 4.5–11.5)
TOTAL IRON BINDING CAPACITY: 464 UG/DL (ref 250–450)
TRIGL SERPL-MCNC: 213 MG/DL (ref 30–150)
TSH SERPL DL<=0.005 MIU/L-ACNC: 1.36 UIU/ML (ref 0.4–4)
VIT B12 SERPL-MCNC: 845 PG/ML (ref 210–950)
WBC # BLD AUTO: 10.73 K/UL (ref 3.9–12.7)

## 2023-12-27 PROCEDURE — 83550 IRON BINDING TEST: CPT | Performed by: INTERNAL MEDICINE

## 2023-12-27 PROCEDURE — 80061 LIPID PANEL: CPT | Performed by: INTERNAL MEDICINE

## 2023-12-27 PROCEDURE — 82607 VITAMIN B-12: CPT | Mod: GA | Performed by: INTERNAL MEDICINE

## 2023-12-27 PROCEDURE — 82728 ASSAY OF FERRITIN: CPT | Performed by: INTERNAL MEDICINE

## 2023-12-27 PROCEDURE — 36415 COLL VENOUS BLD VENIPUNCTURE: CPT | Performed by: INTERNAL MEDICINE

## 2023-12-27 PROCEDURE — 84443 ASSAY THYROID STIM HORMONE: CPT | Performed by: INTERNAL MEDICINE

## 2023-12-27 PROCEDURE — 83036 HEMOGLOBIN GLYCOSYLATED A1C: CPT | Performed by: INTERNAL MEDICINE

## 2023-12-27 PROCEDURE — 84436 ASSAY OF TOTAL THYROXINE: CPT | Performed by: INTERNAL MEDICINE

## 2023-12-27 PROCEDURE — 85025 COMPLETE CBC W/AUTO DIFF WBC: CPT | Performed by: INTERNAL MEDICINE

## 2023-12-27 PROCEDURE — 83540 ASSAY OF IRON: CPT | Performed by: INTERNAL MEDICINE

## 2023-12-27 PROCEDURE — 80053 COMPREHEN METABOLIC PANEL: CPT | Performed by: INTERNAL MEDICINE

## 2024-01-03 ENCOUNTER — LAB VISIT (OUTPATIENT)
Dept: LAB | Facility: HOSPITAL | Age: 84
End: 2024-01-03
Attending: INTERNAL MEDICINE
Payer: MEDICARE

## 2024-01-03 DIAGNOSIS — N18.32 CHRONIC KIDNEY DISEASE (CKD) STAGE G3B/A1, MODERATELY DECREASED GLOMERULAR FILTRATION RATE (GFR) BETWEEN 30-44 ML/MIN/1.73 SQUARE METER AND ALBUMINURIA CREATININE RATIO LESS THAN 30 MG/G: ICD-10-CM

## 2024-01-03 DIAGNOSIS — N18.6 TYPE 2 DIABETES MELLITUS WITH END-STAGE RENAL DISEASE: ICD-10-CM

## 2024-01-03 DIAGNOSIS — E11.22 TYPE 2 DIABETES MELLITUS WITH END-STAGE RENAL DISEASE: ICD-10-CM

## 2024-01-03 DIAGNOSIS — I50.32 CHRONIC DIASTOLIC HEART FAILURE: ICD-10-CM

## 2024-01-03 LAB
ANION GAP SERPL CALC-SCNC: 6 MMOL/L (ref 3–11)
BUN SERPL-MCNC: 36 MG/DL (ref 8–23)
CALCIUM SERPL-MCNC: 9.4 MG/DL (ref 8.7–10.5)
CHLORIDE SERPL-SCNC: 101 MMOL/L (ref 95–110)
CO2 SERPL-SCNC: 28 MMOL/L (ref 23–29)
CREAT SERPL-MCNC: 1.1 MG/DL (ref 0.5–1.4)
EST. GFR  (NO RACE VARIABLE): 50.5 ML/MIN/1.73 M^2
GLUCOSE SERPL-MCNC: 318 MG/DL (ref 70–110)
POTASSIUM SERPL-SCNC: 3.5 MMOL/L (ref 3.5–5.1)
SODIUM SERPL-SCNC: 135 MMOL/L (ref 136–145)

## 2024-01-03 PROCEDURE — 36415 COLL VENOUS BLD VENIPUNCTURE: CPT | Performed by: INTERNAL MEDICINE

## 2024-01-03 PROCEDURE — 80048 BASIC METABOLIC PNL TOTAL CA: CPT | Performed by: INTERNAL MEDICINE

## 2024-05-13 ENCOUNTER — LAB VISIT (OUTPATIENT)
Dept: LAB | Facility: HOSPITAL | Age: 84
End: 2024-05-13
Attending: INTERNAL MEDICINE
Payer: MEDICARE

## 2024-05-13 DIAGNOSIS — Z17.0 MALIGNANT NEOPLASM OF UPPER-OUTER QUADRANT OF RIGHT BREAST IN FEMALE, ESTROGEN RECEPTOR POSITIVE: ICD-10-CM

## 2024-05-13 DIAGNOSIS — C50.411 MALIGNANT NEOPLASM OF UPPER-OUTER QUADRANT OF RIGHT BREAST IN FEMALE, ESTROGEN RECEPTOR POSITIVE: ICD-10-CM

## 2024-05-13 LAB
ALBUMIN SERPL BCP-MCNC: 2.9 G/DL (ref 3.5–5.2)
ALP SERPL-CCNC: 135 U/L (ref 55–135)
ALT SERPL W/O P-5'-P-CCNC: 17 U/L (ref 10–44)
ANION GAP SERPL CALC-SCNC: 7 MMOL/L (ref 3–11)
AST SERPL-CCNC: 13 U/L (ref 10–40)
BILIRUB SERPL-MCNC: 0.4 MG/DL (ref 0.1–1)
BUN SERPL-MCNC: 18 MG/DL (ref 8–23)
CALCIUM SERPL-MCNC: 9.3 MG/DL (ref 8.7–10.5)
CHLORIDE SERPL-SCNC: 102 MMOL/L (ref 95–110)
CO2 SERPL-SCNC: 27 MMOL/L (ref 23–29)
CREAT SERPL-MCNC: 1 MG/DL (ref 0.5–1.4)
EST. GFR  (NO RACE VARIABLE): 56.2 ML/MIN/1.73 M^2
GLUCOSE SERPL-MCNC: 261 MG/DL (ref 70–110)
POTASSIUM SERPL-SCNC: 4.1 MMOL/L (ref 3.5–5.1)
PROT SERPL-MCNC: 7 G/DL (ref 6–8.4)
SODIUM SERPL-SCNC: 136 MMOL/L (ref 136–145)

## 2024-05-13 PROCEDURE — 80053 COMPREHEN METABOLIC PANEL: CPT | Performed by: INTERNAL MEDICINE

## 2024-05-13 PROCEDURE — 36415 COLL VENOUS BLD VENIPUNCTURE: CPT | Performed by: INTERNAL MEDICINE

## 2024-05-14 ENCOUNTER — HOSPITAL ENCOUNTER (OUTPATIENT)
Dept: RADIOLOGY | Facility: HOSPITAL | Age: 84
Discharge: HOME | End: 2024-05-14
Attending: INTERNAL MEDICINE
Payer: MEDICARE

## 2024-05-14 ENCOUNTER — HOSPITAL ENCOUNTER (OUTPATIENT)
Dept: RADIOLOGY | Facility: HOSPITAL | Age: 84
Discharge: HOME | End: 2024-05-14
Payer: MEDICARE

## 2024-05-14 DIAGNOSIS — C50.411 MALIGNANT NEOPLASM OF UPPER-OUTER QUADRANT OF RIGHT BREAST IN FEMALE, ESTROGEN RECEPTOR POSITIVE: ICD-10-CM

## 2024-05-14 DIAGNOSIS — Z17.0 MALIGNANT NEOPLASM OF UPPER-OUTER QUADRANT OF RIGHT BREAST IN FEMALE, ESTROGEN RECEPTOR POSITIVE: ICD-10-CM

## 2024-05-14 DIAGNOSIS — Z79.811 USE OF AROMATASE INHIBITORS: ICD-10-CM

## 2024-05-14 DIAGNOSIS — Z85.3 HISTORY OF LEFT BREAST CANCER: ICD-10-CM

## 2024-05-14 DIAGNOSIS — Z85.038 HISTORY OF COLON CANCER: ICD-10-CM

## 2024-05-14 PROCEDURE — 77080 DXA BONE DENSITY AXIAL: CPT | Mod: TC

## 2024-05-14 PROCEDURE — 25500020 PHARM REV CODE 255: Performed by: INTERNAL MEDICINE

## 2024-05-14 PROCEDURE — 74177 CT ABD & PELVIS W/CONTRAST: CPT | Mod: TC

## 2024-05-14 PROCEDURE — 71260 CT THORAX DX C+: CPT | Mod: TC

## 2024-05-14 RX ADMIN — IOHEXOL 100 ML: 350 INJECTION, SOLUTION INTRAVENOUS at 10:05

## 2024-06-05 ENCOUNTER — HOSPITAL ENCOUNTER (OUTPATIENT)
Dept: RADIOLOGY | Facility: HOSPITAL | Age: 84
Discharge: HOME | End: 2024-06-05
Payer: MEDICARE

## 2024-06-05 DIAGNOSIS — M79.89 NODULE OF SOFT TISSUE: ICD-10-CM

## 2024-06-05 PROCEDURE — 76604 US EXAM CHEST: CPT | Mod: TC

## 2024-06-28 ENCOUNTER — HOSPITAL ENCOUNTER (OUTPATIENT)
Dept: RADIOLOGY | Facility: HOSPITAL | Age: 84
Discharge: HOME | End: 2024-06-28
Attending: INTERNAL MEDICINE
Payer: MEDICARE

## 2024-06-28 DIAGNOSIS — E04.1 THYROID NODULE: ICD-10-CM

## 2024-06-28 PROCEDURE — 76536 US EXAM OF HEAD AND NECK: CPT | Mod: TC

## 2024-07-01 ENCOUNTER — LAB VISIT (OUTPATIENT)
Dept: LAB | Facility: HOSPITAL | Age: 84
End: 2024-07-01
Attending: INTERNAL MEDICINE
Payer: MEDICARE

## 2024-07-01 DIAGNOSIS — E07.9 DISEASE OF THYROID GLAND: ICD-10-CM

## 2024-07-01 DIAGNOSIS — E11.22 TYPE 2 DIABETES MELLITUS WITH END-STAGE RENAL DISEASE: Primary | ICD-10-CM

## 2024-07-01 DIAGNOSIS — E53.8 VITAMIN B12 DEFICIENCY (NON ANEMIC): ICD-10-CM

## 2024-07-01 DIAGNOSIS — D63.1 ANEMIA OF CHRONIC RENAL FAILURE: ICD-10-CM

## 2024-07-01 DIAGNOSIS — N18.9 ANEMIA OF CHRONIC RENAL FAILURE: ICD-10-CM

## 2024-07-01 DIAGNOSIS — N18.6 TYPE 2 DIABETES MELLITUS WITH END-STAGE RENAL DISEASE: Primary | ICD-10-CM

## 2024-07-01 DIAGNOSIS — E55.9 AVITAMINOSIS D: ICD-10-CM

## 2024-07-01 DIAGNOSIS — E78.5 HYPERLIPEMIA: ICD-10-CM

## 2024-07-01 LAB
BASOPHILS # BLD AUTO: 0.07 K/UL (ref 0–0.2)
BASOPHILS NFR BLD: 0.8 % (ref 0–1.9)
CHOLEST SERPL-MCNC: 94 MG/DL (ref 120–199)
CHOLEST/HDLC SERPL: 2.4 {RATIO} (ref 2–5)
DIFFERENTIAL METHOD BLD: ABNORMAL
EOSINOPHIL # BLD AUTO: 0.5 K/UL (ref 0–0.5)
EOSINOPHIL NFR BLD: 6 % (ref 0–8)
ERYTHROCYTE [DISTWIDTH] IN BLOOD BY AUTOMATED COUNT: 14.4 % (ref 11.5–14.5)
HCT VFR BLD AUTO: 39.8 % (ref 37–48.5)
HDLC SERPL-MCNC: 40 MG/DL (ref 40–75)
HDLC SERPL: 42.6 % (ref 20–50)
HGB BLD-MCNC: 12.7 G/DL (ref 12–16)
IMM GRANULOCYTES # BLD AUTO: 0.03 K/UL (ref 0–0.04)
IMM GRANULOCYTES NFR BLD AUTO: 0.4 % (ref 0–0.5)
LDLC SERPL CALC-MCNC: 30 MG/DL (ref 63–159)
LYMPHOCYTES # BLD AUTO: 1.7 K/UL (ref 1–4.8)
LYMPHOCYTES NFR BLD: 20.1 % (ref 18–48)
MCH RBC QN AUTO: 27.5 PG (ref 27–31)
MCHC RBC AUTO-ENTMCNC: 31.9 G/DL (ref 32–36)
MCV RBC AUTO: 86 FL (ref 82–98)
MONOCYTES # BLD AUTO: 0.6 K/UL (ref 0.3–1)
MONOCYTES NFR BLD: 6.9 % (ref 4–15)
NEUTROPHILS # BLD AUTO: 5.6 K/UL (ref 1.8–7.7)
NEUTROPHILS NFR BLD: 65.8 % (ref 38–73)
NONHDLC SERPL-MCNC: 54 MG/DL
NRBC BLD-RTO: 0 /100 WBC
PLATELET # BLD AUTO: 298 K/UL (ref 150–450)
PMV BLD AUTO: 10.9 FL (ref 9.2–12.9)
RBC # BLD AUTO: 4.62 M/UL (ref 4–5.4)
TRIGL SERPL-MCNC: 120 MG/DL (ref 30–150)
TSH SERPL DL<=0.005 MIU/L-ACNC: 1.31 UIU/ML (ref 0.4–4)
WBC # BLD AUTO: 8.51 K/UL (ref 3.9–12.7)

## 2024-07-01 PROCEDURE — 36415 COLL VENOUS BLD VENIPUNCTURE: CPT | Performed by: INTERNAL MEDICINE

## 2024-07-01 PROCEDURE — 80061 LIPID PANEL: CPT | Performed by: INTERNAL MEDICINE

## 2024-07-01 PROCEDURE — 85025 COMPLETE CBC W/AUTO DIFF WBC: CPT | Performed by: INTERNAL MEDICINE

## 2024-07-01 PROCEDURE — 84443 ASSAY THYROID STIM HORMONE: CPT | Performed by: INTERNAL MEDICINE

## 2024-07-24 ENCOUNTER — HOSPITAL ENCOUNTER (EMERGENCY)
Facility: HOSPITAL | Age: 84
Discharge: HOME | End: 2024-07-24
Attending: EMERGENCY MEDICINE
Payer: MEDICARE

## 2024-07-24 VITALS
HEART RATE: 72 BPM | OXYGEN SATURATION: 95 % | WEIGHT: 210 LBS | DIASTOLIC BLOOD PRESSURE: 77 MMHG | TEMPERATURE: 97 F | HEIGHT: 64 IN | RESPIRATION RATE: 19 BRPM | SYSTOLIC BLOOD PRESSURE: 157 MMHG | BODY MASS INDEX: 35.85 KG/M2

## 2024-07-24 DIAGNOSIS — E86.0 DEHYDRATION: Primary | ICD-10-CM

## 2024-07-24 DIAGNOSIS — N39.0 URINARY TRACT INFECTION WITHOUT HEMATURIA, SITE UNSPECIFIED: ICD-10-CM

## 2024-07-24 LAB
ALBUMIN SERPL BCP-MCNC: 3.3 G/DL (ref 3.5–5.2)
ALP SERPL-CCNC: 131 U/L (ref 55–135)
ALT SERPL W/O P-5'-P-CCNC: 19 U/L (ref 10–44)
ANION GAP SERPL CALC-SCNC: 11 MMOL/L (ref 3–11)
AST SERPL-CCNC: 19 U/L (ref 10–40)
BACTERIA #/AREA URNS HPF: NEGATIVE /HPF
BASOPHILS # BLD AUTO: 0.07 K/UL (ref 0–0.2)
BASOPHILS NFR BLD: 0.7 % (ref 0–1.9)
BILIRUB SERPL-MCNC: 0.5 MG/DL (ref 0.1–1)
BILIRUB UR QL STRIP: NEGATIVE
BUN SERPL-MCNC: 21 MG/DL (ref 8–23)
CALCIUM SERPL-MCNC: 9.2 MG/DL (ref 8.7–10.5)
CHLORIDE SERPL-SCNC: 98 MMOL/L (ref 95–110)
CLARITY UR: ABNORMAL
CO2 SERPL-SCNC: 26 MMOL/L (ref 23–29)
COLOR UR: YELLOW
CREAT SERPL-MCNC: 1.1 MG/DL (ref 0.5–1.4)
CTP QC/QA: YES
CTP QC/QA: YES
DIFFERENTIAL METHOD BLD: NORMAL
EOSINOPHIL # BLD AUTO: 0.2 K/UL (ref 0–0.5)
EOSINOPHIL NFR BLD: 2.5 % (ref 0–8)
ERYTHROCYTE [DISTWIDTH] IN BLOOD BY AUTOMATED COUNT: 14 % (ref 11.5–14.5)
EST. GFR  (NO RACE VARIABLE): 50.2 ML/MIN/1.73 M^2
GLUCOSE SERPL-MCNC: 162 MG/DL (ref 70–110)
GLUCOSE UR QL STRIP: NEGATIVE
HCT VFR BLD AUTO: 41.3 % (ref 37–48.5)
HGB BLD-MCNC: 14.1 G/DL (ref 12–16)
HGB UR QL STRIP: ABNORMAL
HYALINE CASTS #/AREA URNS LPF: 5.7 /LPF
IMM GRANULOCYTES # BLD AUTO: 0.03 K/UL (ref 0–0.04)
IMM GRANULOCYTES NFR BLD AUTO: 0.3 % (ref 0–0.5)
KETONES UR QL STRIP: ABNORMAL
LEUKOCYTE ESTERASE UR QL STRIP: ABNORMAL
LYMPHOCYTES # BLD AUTO: 1.8 K/UL (ref 1–4.8)
LYMPHOCYTES NFR BLD: 18.7 % (ref 18–48)
MCH RBC QN AUTO: 28.1 PG (ref 27–31)
MCHC RBC AUTO-ENTMCNC: 34.1 G/DL (ref 32–36)
MCV RBC AUTO: 82 FL (ref 82–98)
MICROSCOPIC COMMENT: ABNORMAL
MONOCYTES # BLD AUTO: 0.7 K/UL (ref 0.3–1)
MONOCYTES NFR BLD: 7.7 % (ref 4–15)
NEUTROPHILS # BLD AUTO: 6.6 K/UL (ref 1.8–7.7)
NEUTROPHILS NFR BLD: 70.1 % (ref 38–73)
NITRITE UR QL STRIP: NEGATIVE
NRBC BLD-RTO: 0 /100 WBC
PH UR STRIP: 7 [PH] (ref 5–8)
PLATELET # BLD AUTO: 282 K/UL (ref 150–450)
PMV BLD AUTO: 10.1 FL (ref 9.2–12.9)
POC MOLECULAR INFLUENZA A AGN: NEGATIVE
POC MOLECULAR INFLUENZA B AGN: NEGATIVE
POTASSIUM SERPL-SCNC: 3.5 MMOL/L (ref 3.5–5.1)
PROT SERPL-MCNC: 7.7 G/DL (ref 6–8.4)
PROT UR QL STRIP: ABNORMAL
RBC # BLD AUTO: 5.02 M/UL (ref 4–5.4)
RBC #/AREA URNS HPF: 4 /HPF (ref 0–4)
SARS-COV-2 RDRP RESP QL NAA+PROBE: NEGATIVE
SODIUM SERPL-SCNC: 135 MMOL/L (ref 136–145)
SP GR UR STRIP: 1.01 (ref 1–1.03)
SQUAMOUS #/AREA URNS HPF: 1 /HPF
URN SPEC COLLECT METH UR: ABNORMAL
UROBILINOGEN UR STRIP-ACNC: 1 EU/DL
WBC # BLD AUTO: 9.45 K/UL (ref 3.9–12.7)
WBC #/AREA URNS HPF: >100 /HPF (ref 0–5)
YEAST URNS QL MICRO: ABNORMAL

## 2024-07-24 PROCEDURE — 87635 SARS-COV-2 COVID-19 AMP PRB: CPT | Performed by: EMERGENCY MEDICINE

## 2024-07-24 PROCEDURE — 36415 COLL VENOUS BLD VENIPUNCTURE: CPT | Performed by: EMERGENCY MEDICINE

## 2024-07-24 PROCEDURE — 96361 HYDRATE IV INFUSION ADD-ON: CPT

## 2024-07-24 PROCEDURE — 87088 URINE BACTERIA CULTURE: CPT | Performed by: EMERGENCY MEDICINE

## 2024-07-24 PROCEDURE — 96374 THER/PROPH/DIAG INJ IV PUSH: CPT

## 2024-07-24 PROCEDURE — 87502 INFLUENZA DNA AMP PROBE: CPT

## 2024-07-24 PROCEDURE — 87077 CULTURE AEROBIC IDENTIFY: CPT | Performed by: EMERGENCY MEDICINE

## 2024-07-24 PROCEDURE — 25000003 PHARM REV CODE 250: Performed by: EMERGENCY MEDICINE

## 2024-07-24 PROCEDURE — 63600175 PHARM REV CODE 636 W HCPCS: Performed by: EMERGENCY MEDICINE

## 2024-07-24 PROCEDURE — 85025 COMPLETE CBC W/AUTO DIFF WBC: CPT | Performed by: EMERGENCY MEDICINE

## 2024-07-24 PROCEDURE — 80053 COMPREHEN METABOLIC PANEL: CPT | Performed by: EMERGENCY MEDICINE

## 2024-07-24 PROCEDURE — 99284 EMERGENCY DEPT VISIT MOD MDM: CPT | Mod: 25

## 2024-07-24 PROCEDURE — 87086 URINE CULTURE/COLONY COUNT: CPT | Performed by: EMERGENCY MEDICINE

## 2024-07-24 PROCEDURE — 87186 SC STD MICRODIL/AGAR DIL: CPT | Performed by: EMERGENCY MEDICINE

## 2024-07-24 PROCEDURE — 81000 URINALYSIS NONAUTO W/SCOPE: CPT | Performed by: EMERGENCY MEDICINE

## 2024-07-24 RX ORDER — CEPHALEXIN 500 MG/1
500 CAPSULE ORAL EVERY 8 HOURS
Qty: 21 CAPSULE | Refills: 0 | Status: SHIPPED | OUTPATIENT
Start: 2024-07-24 | End: 2024-07-31

## 2024-07-24 RX ORDER — ONDANSETRON HYDROCHLORIDE 2 MG/ML
4 INJECTION, SOLUTION INTRAVENOUS
Status: COMPLETED | OUTPATIENT
Start: 2024-07-24 | End: 2024-07-24

## 2024-07-24 RX ADMIN — ONDANSETRON 4 MG: 2 INJECTION INTRAMUSCULAR; INTRAVENOUS at 08:07

## 2024-07-24 RX ADMIN — SODIUM CHLORIDE 500 ML: 9 INJECTION, SOLUTION INTRAVENOUS at 08:07

## 2024-07-25 NOTE — ED PROVIDER NOTES
Encounter Date: 7/24/2024       History     Chief Complaint   Patient presents with    Weakness     Generalized weakness, cloudy urine--machado in place, last changed around 7/22 by home health. Reports nausea and some AMS over the last few days. Recently treated for UTI.       83 yo female here with daughter who reports patient is generally weak and suspects her of having a UTI given a history of similar. No fever. No known sick contacts.       Review of patient's allergies indicates:   Allergen Reactions    Macrobid [nitrofurantoin monohyd/m-cryst] Swelling    Codeine Other (See Comments)     headache    Latex, natural rubber Rash     Past Medical History:   Diagnosis Date    Arthritis     Asthmatic bronchitis     Breast cancer 1996    bilateral - radiation pellets for tx    Breast cancer 01/2022    right    Colon cancer 1963    Congestive heart failure (CHF)     Coronary artery disease     Depression     Diabetes mellitus, type 2     Encounter for blood transfusion     Environmental and seasonal allergies     Machado catheter in place     Hard of hearing     Hyperlipidemia     Hypertension     IBS (irritable bowel syndrome)     Kidney disease     ckd stage 3b    Myocardial infarction 2019    Neuropathy     PONV (postoperative nausea and vomiting)     Presence of indwelling Machado catheter     Shingles     Sleep apnea     no cpap used    Stroke 1993    tia    Thyroid disease     UTI (urinary tract infection)     with sepsis    Vitamin D deficiency     Wound, open, buttock     being treated    Yeast infection      Past Surgical History:   Procedure Laterality Date    BLADDER SUSPENSION      BREAST BIOPSY Bilateral 1996    BREAST SURGERY      lumpectomy, isotopes    CHOLECYSTECTOMY      had gal bladder removed    COLECTOMY      had part of colon removed    CYSTOSCOPY W/ RETROGRADES Bilateral 12/19/2019    Procedure: CYSTOSCOPY, WITH RETROGRADE PYELOGRAM;  Surgeon: Prasad Rocha MD;  [facility]  Service:  Urology;  Laterality: Bilateral;    DILATION OF URETHRA N/A 12/19/2019    Procedure: DILATION, URETHRA;  Surgeon: Prasad Rocha MD;  Location: [facility] OR;  Service: Urology;  Laterality: N/A;    HYSTERECTOMY      LEFT HEART CATHETERIZATION Left 11/27/2019    Procedure: Left heart cath;  Surgeon: Urban Paiz MD;  Location: [facility] CATH LAB;  Service: Cardiology;  Laterality: Left;    MODIFIED RADICAL MASTECTOMY W/ AXILLARY LYMPH NODE DISSECTION Right 1/26/2022    Procedure: MASTECTOMY, MODIFIED RADICAL;  Surgeon: Keisha Cortez MD;  Location: [facility] OR;  Service: General;  Laterality: Right;    OOPHORECTOMY      SENTINEL LYMPH NODE BIOPSY Right 1/26/2022    Procedure: BIOPSY, LYMPH NODE, SENTINEL;  Surgeon: Keisha Cortez MD;  Location: [facility] OR;  Service: General;  Laterality: Right;  0800    SIMPLE MASTECTOMY Left 1/26/2022    Procedure: MASTECTOMY, SIMPLE;  Surgeon: Keisha Cortez MD;  Location: [facility] OR;  Service: General;  Laterality: Left;  FROZEN SECTION     Family History   Problem Relation Name Age of Onset    Cancer Mother  42        Bone    Bone cancer Mother      Breast cancer Mother      Heart disease Mother      Cancer Father          Bone    Bone cancer Father  86    Breast cancer Sister      Bell's palsy Sister      Breast cancer Sister       Social History     Tobacco Use    Smoking status: Never    Smokeless tobacco: Never   Substance Use Topics    Alcohol use: Not Currently     Comment: rare    Drug use: Never     Review of Systems   Constitutional:  Positive for fatigue.   Respiratory: Negative.     Cardiovascular: Negative.    Gastrointestinal: Negative.    All other systems reviewed and are negative.      Physical Exam     Initial Vitals [07/24/24 1837]   BP Pulse Resp Temp SpO2   (!) 138/98 83 18 97.4 °F (36.3 °C) 95 %      MAP       --         Physical Exam    Nursing note and vitals reviewed.  Constitutional: She appears well-developed and well-nourished. She is not diaphoretic.  No distress.   HENT:   Head: Normocephalic and atraumatic.   Eyes: EOM are normal. Pupils are equal, round, and reactive to light.   Neck: Neck supple.   Normal range of motion.  Cardiovascular:  Normal rate, regular rhythm and intact distal pulses.           Pulmonary/Chest: Breath sounds normal. No respiratory distress. She has no wheezes. She has no rales.   Abdominal: Abdomen is soft. Bowel sounds are normal. She exhibits no distension. There is no abdominal tenderness. There is no rebound.   Musculoskeletal:         General: No tenderness or edema. Normal range of motion.      Cervical back: Normal range of motion and neck supple.     Neurological: She is alert. She has normal strength and normal reflexes.   Skin: Skin is warm and dry.         ED Course   Procedures  Labs Reviewed   CULTURE, URINE - Abnormal       Result Value    Urine Culture, Routine   (*)     Value: ENTEROCOCCUS FAECALIS  > 100,000 cfu/ml      Narrative:     Preferred Collection Type->Urine, Catheterized  Specimen Source->Urine   COMPREHENSIVE METABOLIC PANEL - Abnormal    Sodium 135 (*)     Potassium 3.5      Chloride 98      CO2 26      Glucose 162 (*)     BUN 21      Creatinine 1.1      Calcium 9.2      Total Protein 7.7      Albumin 3.3 (*)     Total Bilirubin 0.5      Alkaline Phosphatase 131      AST 19      ALT 19      eGFR 50.2 (*)     Anion Gap 11     URINALYSIS, REFLEX TO URINE CULTURE - Abnormal    Specimen UA Urine, Catheterized      Color, UA Yellow      Appearance, UA Cloudy (*)     pH, UA 7.0      Specific Gravity, UA 1.015      Protein, UA Trace (*)     Glucose, UA Negative      Ketones, UA 1+ (*)     Bilirubin (UA) Negative      Occult Blood UA Trace (*)     Nitrite, UA Negative      Urobilinogen, UA 1.0      Leukocytes, UA 3+ (*)     Narrative:     Preferred Collection Type->Urine, Catheterized  Specimen Source->Urine   URINALYSIS MICROSCOPIC - Abnormal    RBC, UA 4      WBC, UA >100 (*)     Bacteria Negative      Yeast, UA  None      Squam Epithel, UA 1      Hyaline Casts, UA 5.7 (*)     Microscopic Comment SEE COMMENT      Narrative:     Preferred Collection Type->Urine, Catheterized  Specimen Source->Urine   CBC W/ AUTO DIFFERENTIAL    WBC 9.45      RBC 5.02      Hemoglobin 14.1      Hematocrit 41.3      MCV 82      MCH 28.1      MCHC 34.1      RDW 14.0      Platelets 282      MPV 10.1      Immature Granulocytes 0.3      Gran # (ANC) 6.6      Immature Grans (Abs) 0.03      Lymph # 1.8      Mono # 0.7      Eos # 0.2      Baso # 0.07      nRBC 0      Gran % 70.1      Lymph % 18.7      Mono % 7.7      Eosinophil % 2.5      Basophil % 0.7      Differential Method Automated     SARS-COV-2 RDRP GENE    POC Rapid COVID Negative       Acceptable Yes      Narrative:     This test utilizes isothermal nucleic acid amplification technology to detect the SARS-CoV-2 RdRp nucleic acid segment. The analytical sensitivity (limit of detection) is 500 copies/swab.     A POSITIVE result is indicative of the presence of SARS-CoV-2 RNA; clinical correlation with patient history and other diagnostic information is necessary to determine patient infection status.    A NEGATIVE result means that SARS-CoV-2 nucleic acids are not present above the limit of detection. A NEGATIVE result should be treated as presumptive. It does not rule out the possibility of COVID-19 and should not be the sole basis for treatment decisions. If COVID-19 is strongly suspected based on clinical and exposure history, re-testing using an alternate molecular assay should be considered.     Commercial kits are provided by [employer].   _________________________________________________________________   The authorized Fact Sheet for Healthcare Providers and the authorized Fact Sheet for Patients of the ID NOW COVID-19 are available on the FDA website:    https://www.fda.gov/media/264928/download      https://www.fda.gov/media/141620/download       POCT INFLUENZA  A/B MOLECULAR    POC Molecular Influenza A Ag Negative      POC Molecular Influenza B Ag Negative       Acceptable Yes            Imaging Results    None          Medications   sodium chloride 0.9% bolus 500 mL 500 mL (0 mLs Intravenous Stopped 7/24/24 2123)   ondansetron injection 4 mg (4 mg Intravenous Given 7/24/24 2044)     Medical Decision Making  UA with UTI. Will start on abx pending culture. Nontoxic appearing. Stable vitals.     Problems Addressed:  Dehydration: acute illness or injury    Amount and/or Complexity of Data Reviewed  Labs: ordered. Decision-making details documented in ED Course.    Risk  Prescription drug management.                                      Clinical Impression:  Final diagnoses:  [E86.0] Dehydration (Primary)  [N39.0] Urinary tract infection without hematuria, site unspecified          ED Disposition Condition    Discharge Stable          ED Prescriptions       Medication Sig Dispense Start Date End Date Auth. Provider    cephALEXin (KEFLEX) 500 MG capsule Take 1 capsule (500 mg total) by mouth every 8 (eight) hours. for 7 days 21 capsule 7/24/2024 7/31/2024 Jovany Shah MD          Follow-up Information       Follow up With Specialties Details Why Contact Info    Merry Leary MD Internal Medicine Schedule an appointment as soon as possible for a visit   [address] [address]  [phone]               Jovany Shah MD  07/30/24 1856

## 2024-07-27 LAB — BACTERIA UR CULT: ABNORMAL

## 2024-08-16 ENCOUNTER — HOSPITAL ENCOUNTER (EMERGENCY)
Facility: HOSPITAL | Age: 84
Discharge: HOME | End: 2024-08-16
Attending: EMERGENCY MEDICINE
Payer: MEDICARE

## 2024-08-16 VITALS
HEIGHT: 64 IN | RESPIRATION RATE: 18 BRPM | DIASTOLIC BLOOD PRESSURE: 87 MMHG | WEIGHT: 212 LBS | BODY MASS INDEX: 36.19 KG/M2 | OXYGEN SATURATION: 96 % | HEART RATE: 67 BPM | TEMPERATURE: 97 F | SYSTOLIC BLOOD PRESSURE: 149 MMHG

## 2024-08-16 DIAGNOSIS — R53.81 MALAISE: Primary | ICD-10-CM

## 2024-08-16 DIAGNOSIS — R53.1 WEAKNESS: ICD-10-CM

## 2024-08-16 LAB
ALBUMIN SERPL BCP-MCNC: 2.9 G/DL (ref 3.5–5.2)
ALP SERPL-CCNC: 112 U/L (ref 55–135)
ALT SERPL W/O P-5'-P-CCNC: 17 U/L (ref 10–44)
ANION GAP SERPL CALC-SCNC: 7 MMOL/L (ref 3–11)
AST SERPL-CCNC: 16 U/L (ref 10–40)
BACTERIA #/AREA URNS HPF: NEGATIVE /HPF
BASOPHILS # BLD AUTO: 0.06 K/UL (ref 0–0.2)
BASOPHILS NFR BLD: 0.6 % (ref 0–1.9)
BILIRUB SERPL-MCNC: 0.2 MG/DL (ref 0.1–1)
BILIRUB UR QL STRIP: NEGATIVE
BUN SERPL-MCNC: 24 MG/DL (ref 8–23)
CALCIUM SERPL-MCNC: 8.9 MG/DL (ref 8.7–10.5)
CHLORIDE SERPL-SCNC: 98 MMOL/L (ref 95–110)
CLARITY UR: CLEAR
CO2 SERPL-SCNC: 30 MMOL/L (ref 23–29)
COLOR UR: YELLOW
CREAT SERPL-MCNC: 1 MG/DL (ref 0.5–1.4)
CTP QC/QA: YES
DIFFERENTIAL METHOD BLD: ABNORMAL
EOSINOPHIL # BLD AUTO: 0.4 K/UL (ref 0–0.5)
EOSINOPHIL NFR BLD: 4.5 % (ref 0–8)
ERYTHROCYTE [DISTWIDTH] IN BLOOD BY AUTOMATED COUNT: 15.1 % (ref 11.5–14.5)
EST. GFR  (NO RACE VARIABLE): 56.2 ML/MIN/1.73 M^2
GLUCOSE SERPL-MCNC: 159 MG/DL (ref 70–110)
GLUCOSE UR QL STRIP: NEGATIVE
HCT VFR BLD AUTO: 36.5 % (ref 37–48.5)
HGB BLD-MCNC: 12 G/DL (ref 12–16)
HGB UR QL STRIP: NEGATIVE
HYALINE CASTS #/AREA URNS LPF: 0.7 /LPF
IMM GRANULOCYTES # BLD AUTO: 0.03 K/UL (ref 0–0.04)
IMM GRANULOCYTES NFR BLD AUTO: 0.3 % (ref 0–0.5)
KETONES UR QL STRIP: NEGATIVE
LEUKOCYTE ESTERASE UR QL STRIP: ABNORMAL
LYMPHOCYTES # BLD AUTO: 2.1 K/UL (ref 1–4.8)
LYMPHOCYTES NFR BLD: 21.6 % (ref 18–48)
MCH RBC QN AUTO: 28.1 PG (ref 27–31)
MCHC RBC AUTO-ENTMCNC: 32.9 G/DL (ref 32–36)
MCV RBC AUTO: 86 FL (ref 82–98)
MICROSCOPIC COMMENT: ABNORMAL
MONOCYTES # BLD AUTO: 0.7 K/UL (ref 0.3–1)
MONOCYTES NFR BLD: 7.1 % (ref 4–15)
NEUTROPHILS # BLD AUTO: 6.3 K/UL (ref 1.8–7.7)
NEUTROPHILS NFR BLD: 65.9 % (ref 38–73)
NITRITE UR QL STRIP: NEGATIVE
NRBC BLD-RTO: 0 /100 WBC
OHS QRS DURATION: 142 MS
OHS QTC CALCULATION: 465 MS
PH UR STRIP: 8 [PH] (ref 5–8)
PLATELET # BLD AUTO: 284 K/UL (ref 150–450)
PMV BLD AUTO: 9.6 FL (ref 9.2–12.9)
POTASSIUM SERPL-SCNC: 4 MMOL/L (ref 3.5–5.1)
PROT SERPL-MCNC: 7 G/DL (ref 6–8.4)
PROT UR QL STRIP: NEGATIVE
RBC # BLD AUTO: 4.27 M/UL (ref 4–5.4)
RBC #/AREA URNS HPF: 2 /HPF (ref 0–4)
SARS-COV-2 RDRP RESP QL NAA+PROBE: NEGATIVE
SODIUM SERPL-SCNC: 135 MMOL/L (ref 136–145)
SP GR UR STRIP: 1.01 (ref 1–1.03)
SQUAMOUS #/AREA URNS HPF: 1 /HPF
URN SPEC COLLECT METH UR: ABNORMAL
UROBILINOGEN UR STRIP-ACNC: NEGATIVE EU/DL
WBC # BLD AUTO: 9.53 K/UL (ref 3.9–12.7)
WBC #/AREA URNS HPF: 40 /HPF (ref 0–5)

## 2024-08-16 PROCEDURE — 80053 COMPREHEN METABOLIC PANEL: CPT | Performed by: EMERGENCY MEDICINE

## 2024-08-16 PROCEDURE — 81000 URINALYSIS NONAUTO W/SCOPE: CPT | Performed by: EMERGENCY MEDICINE

## 2024-08-16 PROCEDURE — 36415 COLL VENOUS BLD VENIPUNCTURE: CPT | Performed by: EMERGENCY MEDICINE

## 2024-08-16 PROCEDURE — 93005 ELECTROCARDIOGRAM TRACING: CPT

## 2024-08-16 PROCEDURE — 87086 URINE CULTURE/COLONY COUNT: CPT | Performed by: EMERGENCY MEDICINE

## 2024-08-16 PROCEDURE — 87635 SARS-COV-2 COVID-19 AMP PRB: CPT | Performed by: EMERGENCY MEDICINE

## 2024-08-16 PROCEDURE — 99285 EMERGENCY DEPT VISIT HI MDM: CPT | Mod: 25

## 2024-08-16 PROCEDURE — 85025 COMPLETE CBC W/AUTO DIFF WBC: CPT | Performed by: EMERGENCY MEDICINE

## 2024-08-16 NOTE — ED PROVIDER NOTES
NAME:  Kelsea Cadet  CSN:     [id number]  MRN:    [id number]  ADMIT DATE: 8/16/2024        eMERGENCY dEPARTMENT eNCOUnter    CHIEF COMPLAINT    Chief Complaint   Patient presents with    Sent by doctor     Pt reports that she was seen here 3 wks ago for UTI. Pt seen Dr. Leary today and was told the abx she was prescribed were not working and to come to the ER for a sepsis work-up.         HPI      Kelsea Cadet is a 84 y.o. female who presents to the ED for evaluation of generalized malaise, headache and weakness.  She reports feeling unwell for the past 2-3 weeks.  She states she was treated for UTI 3 weeks ago but was told to come to the ED for evaluation because the antibiotic she was on did not cover the bacteria cultured in the urine sample.  Patient denies any fevers, back or flank pain, vomiting.  She states that she had discontinued some unspecified hormone therapy around the time her symptoms started.  She denies any chest pain or shortness of breath.          ALLERGIES    Review of patient's allergies indicates:   Allergen Reactions    Macrobid [nitrofurantoin monohyd/m-cryst] Swelling    Codeine Other (See Comments)     headache    Latex, natural rubber Rash       PAST MEDICAL HISTORY  Past Medical History:   Diagnosis Date    Arthritis     Asthmatic bronchitis     Breast cancer 1996    bilateral - radiation pellets for tx    Breast cancer 01/2022    right    Colon cancer 1963    Congestive heart failure (CHF)     Coronary artery disease     Depression     Diabetes mellitus, type 2     Encounter for blood transfusion     Environmental and seasonal allergies     Land catheter in place     Hard of hearing     Hyperlipidemia     Hypertension     IBS (irritable bowel syndrome)     Kidney disease     ckd stage 3b    Myocardial infarction 2019    Neuropathy     PONV (postoperative nausea and vomiting)     Presence of indwelling Land catheter     Shingles     Sleep apnea     no cpap used    Stroke 1993     tia    Thyroid disease     UTI (urinary tract infection)     with sepsis    Vitamin D deficiency     Wound, open, buttock     being treated    Yeast infection        SURGICAL HISTORY    Past Surgical History:   Procedure Laterality Date    BLADDER SUSPENSION      BREAST BIOPSY Bilateral 1996    BREAST SURGERY      lumpectomy, isotopes    CHOLECYSTECTOMY      had gal bladder removed    COLECTOMY      had part of colon removed    CYSTOSCOPY W/ RETROGRADES Bilateral 12/19/2019    Procedure: CYSTOSCOPY, WITH RETROGRADE PYELOGRAM;  Surgeon: Prasad Rocha MD;  Location: [facility];  Service: Urology;  Laterality: Bilateral;    DILATION OF URETHRA N/A 12/19/2019    Procedure: DILATION, URETHRA;  Surgeon: Prasad Rocha MD;  Location: [facility] OR;  Service: Urology;  Laterality: N/A;    HYSTERECTOMY      LEFT HEART CATHETERIZATION Left 11/27/2019    Procedure: Left heart cath;  Surgeon: Urban Paiz MD;  Location: [facility] CATH LAB;  Service: Cardiology;  Laterality: Left;    MODIFIED RADICAL MASTECTOMY W/ AXILLARY LYMPH NODE DISSECTION Right 1/26/2022    Procedure: MASTECTOMY, MODIFIED RADICAL;  Surgeon: Keisha Cortez MD;  Location: [facility];  Service: General;  Laterality: Right;    OOPHORECTOMY      SENTINEL LYMPH NODE BIOPSY Right 1/26/2022    Procedure: BIOPSY, LYMPH NODE, SENTINEL;  Surgeon: Keisha Cortez MD;  Location: [facility];  Service: General;  Laterality: Right;  0800    SIMPLE MASTECTOMY Left 1/26/2022    Procedure: MASTECTOMY, SIMPLE;  Surgeon: Keisha Cortez MD;  Location: [facility];  Service: General;  Laterality: Left;  FROZEN SECTION       SOCIAL HISTORY    Social History     Socioeconomic History    Marital status:    Tobacco Use    Smoking status: Never    Smokeless tobacco: Never   Substance and Sexual Activity    Alcohol use: Not Currently     Comment: rare    Drug use: Never    Sexual activity: Not Currently     Partners: Male     Comment:      Social Determinants of Health  "    Financial Resource Strain: Medium Risk (12/11/2023)    Overall Financial Resource Strain (CARDIA)     Difficulty of Paying Living Expenses: Somewhat hard   Food Insecurity: Food Insecurity Present (12/11/2023)    Hunger Vital Sign     Worried About Running Out of Food in the Last Year: Often true     Ran Out of Food in the Last Year: Sometimes true   Transportation Needs: Unmet Transportation Needs (12/11/2023)    PRAPARE - Transportation     Lack of Transportation (Medical): Yes     Lack of Transportation (Non-Medical): Yes   Physical Activity: Unknown (12/11/2023)    Exercise Vital Sign     Days of Exercise per Week: 0 days   Stress: No Stress Concern Present (12/11/2023)    [employer] - Occupational Stress Questionnaire     Feeling of Stress : Only a little   Housing Stability: High Risk (12/11/2023)    Housing Stability Vital Sign     Unable to Pay for Housing in the Last Year: Yes     Number of Places Lived in the Last Year: 1     Unstable Housing in the Last Year: No       FAMILY HISTORY    Family History   Problem Relation Name Age of Onset    Cancer Mother  42        Bone    Bone cancer Mother      Breast cancer Mother      Heart disease Mother      Cancer Father          Bone    Bone cancer Father  86    Breast cancer Sister      Bell's palsy Sister      Breast cancer Sister         REVIEW OF SYSTEMS   ROS  All Systems otherwise negative except as noted in the History of Present Illness.        PHYSICAL EXAM    Reviewed Triage Note  VITAL SIGNS:   ED Triage Vitals   Enc Vitals Group      BP 08/16/24 1201 (!) 149/87      Pulse 08/16/24 1201 67      Resp 08/16/24 1201 18      Temp 08/16/24 1202 97.4 °F (36.3 °C)      Temp src --       SpO2 08/16/24 1201 96 %      Weight 08/16/24 1201 212 lb      Height 08/16/24 1201 5' 4"      Head Circumference --       Peak Flow --       Pain Score --       Pain Loc --       Pain Education --       Exclude from Growth Chart --  " "      Patient Vitals for the past 24 hrs:   BP Temp Pulse Resp SpO2 Height Weight   08/16/24 1202 -- 97.4 °F (36.3 °C) -- -- -- -- --   08/16/24 1201 (!) 149/87 -- 67 18 96 % 5' 4" (1.626 m) 96.2 kg (212 lb)           Physical Exam    Constitutional:  Well-developed, well-nourished. No acute distress  HENT:  Normocephalic, atraumatic.  Eyes:  EOMI. Conjunctiva normal without discharge.   Neck: Normal range of motion.No stridor. No meningismus.   Respiratory:  Normal breath sounds bilaterally.  No respiratory distress, retractions, or conversational dyspnea. No wheezing. No rhonchi. No rales.   Cardiovascular:  Normal heart rate. Normal rhythm. No pitting lower extremity edema.   GI:  Abdomen soft, non-distended, non-tender. Normal bowel sounds. No guarding, rigidity or rebound.    : No CVA tenderness.   Musculoskeletal:  No gross deformity or limited range of motion of all major joints. No palpable bony deformity. No tenderness to palpation.  Integument:  Warm and dry. No rash.  Neurologic:  Normal motor function. Normal sensory function. No focal deficits noted. Alert and Interactive.  Psychiatric:  Affect normal. Mood normal.         LABS  Pertinent labs reviewed. (See chart for details)   Labs Reviewed   CBC W/ AUTO DIFFERENTIAL - Abnormal       Result Value    WBC 9.53      RBC 4.27      Hemoglobin 12.0      Hematocrit 36.5 (*)     MCV 86      MCH 28.1      MCHC 32.9      RDW 15.1 (*)     Platelets 284      MPV 9.6      Immature Granulocytes 0.3      Gran # (ANC) 6.3      Immature Grans (Abs) 0.03      Lymph # 2.1      Mono # 0.7      Eos # 0.4      Baso # 0.06      nRBC 0      Gran % 65.9      Lymph % 21.6      Mono % 7.1      Eosinophil % 4.5      Basophil % 0.6      Differential Method Automated     COMPREHENSIVE METABOLIC PANEL - Abnormal    Sodium 135 (*)     Potassium 4.0      Chloride 98      CO2 30 (*)     Glucose 159 (*)     BUN 24 (*)     Creatinine 1.0      Calcium 8.9      Total Protein 7.0      " Albumin 2.9 (*)     Total Bilirubin 0.2      Alkaline Phosphatase 112      AST 16      ALT 17      eGFR 56.2 (*)     Anion Gap 7     URINALYSIS, REFLEX TO URINE CULTURE - Abnormal    Specimen UA Urine, Clean Catch      Color, UA Yellow      Appearance, UA Clear      pH, UA 8.0      Specific Gravity, UA 1.010      Protein, UA Negative      Glucose, UA Negative      Ketones, UA Negative      Bilirubin (UA) Negative      Occult Blood UA Negative      Nitrite, UA Negative      Urobilinogen, UA Negative      Leukocytes, UA 2+ (*)     Narrative:     Preferred Collection Type->Urine, Clean Catch  Specimen Source->Urine   URINALYSIS MICROSCOPIC - Abnormal    RBC, UA 2      WBC, UA 40 (*)     Bacteria Negative      Squam Epithel, UA 1      Hyaline Casts, UA 0.7 (*)     Microscopic Comment SEE COMMENT      Narrative:     Preferred Collection Type->Urine, Clean Catch  Specimen Source->Urine   CULTURE, URINE   SARS-COV-2 RDRP GENE    POC Rapid COVID Negative       Acceptable Yes           RADIOLOGY    Imaging Results              X-Ray Chest AP Portable (Final result)  Result time 08/16/24 13:02:37      Final result by Jill Abdullahi MD (08/16/24 13:02:37)                   Impression:      No acute radiographic abnormalities detected    Otherwise, as above      Electronically signed by: Jill Abdullahi MD  Date:    08/16/2024  Time:    13:02               Narrative:    EXAMINATION:  XR CHEST AP PORTABLE    CLINICAL HISTORY:  cough;    COMPARISON:  Radiograph 06/30/2023    FINDINGS:  No acute infiltrate or signs of CHF detected.  No pleural fluid or pneumothorax.    Prominent right paramediastinal opacity, volume loss and scarring within the right lung, as previously described on CT suggesting post therapy changes.                                      PROCEDURES    Procedures      EKG     Interpreted by ERP:     EKG Readings: (Independently Interpreted)   Rhythm: Normal Sinus Rhythm. Heart Rate: 66. Ectopy:  No Ectopy. Conduction: Normal. ST Segments: Normal ST Segments. T Waves: Normal. Clinical Impression: Normal Sinus Rhythm       ED COURSE & MEDICAL DECISION MAKING    Pertinent & Imaging studies reviewed. (See chart for details and specific orders.)        Medications - No data to display       Patient's workup is unremarkable.  I suspect her symptoms have malaise may be related to a hormone therapy that she discontinued a few weeks ago.  Patient was advised to follow-up with her PCP for further evaluation       DISPOSITION  Patient discharged in stable condition at No discharge date for patient encounter.      DISCHARGE INSTRUCTIONS & MEDS       Medication List        ASK your doctor about these medications      acarbose 50 MG Tab  Commonly known as: PRECOSE  Take 1 tablet (50 mg total) by mouth 3 (three) times daily with meals.     AEROBIKA OSCILLATING PEP SYSTM MISC     albuterol 90 mcg/actuation inhaler  Commonly known as: PROVENTIL/VENTOLIN HFA  Inhale 2 puffs into the lungs every 6 hours as needed for wheezing     alendronate 70 MG tablet  Commonly known as: FOSAMAX  Take 1 tablet (70 mg total) by mouth every 7 days.     ALLERGY RELIEF (LORATADINE) 10 mg tablet  Generic drug: loratadine  TAKE 1 TABLET BY MOUTH ONCE DAILY AS NEEDED FOR ALLERGIES     aspirin 81 MG EC tablet  Commonly known as: ECOTRIN     atorvastatin 80 MG tablet  Commonly known as: LIPITOR  Take 1 tablet by mouth in the evening     azelastine 137 mcg (0.1 %) nasal spray  Commonly known as: ASTELIN     BAQSIMI 3 mg/actuation Spry  Generic drug: glucagon  2 pack.  Spray one device (3 mg) in one nostril to treat severe hypoglycemia. Disp 1 box (2 devices)     blood sugar diagnostic Strp  Commonly known as: ONETOUCH ULTRA TEST  Use to check fingerstick glucose readings 1 time a day     blood-glucose meter Misc  1 Device by Misc.(Non-Drug; Combo Route) route once daily. One Touch Dx. Code:E11.9     carvediloL 6.25 MG tablet  Commonly known as:  COREG  Take 1 tablet (6.25 mg total) by mouth 2 (two) times daily with meals.     cholecalciferol (vitamin D3) 50 mcg (2,000 unit) Tab  Commonly known as: VITAMIN D3  Take 1 tablet (2,000 Units total) by mouth once daily.     clobetasoL 0.05 % cream  Commonly known as: TEMOVATE  Apply twice a day for 2 weeks then nightly for 2 months. May decrease to three nights a week after that.     co-enzyme Q-10 30 mg capsule  Take 1 capsule (30 mg total) by mouth every evening.     CRANBERRY CONCENTRATE ORAL     dicyclomine 20 mg tablet  Commonly known as: BENTYL     docusate sodium 100 MG capsule  Commonly known as: COLACE     fish oil-omega-3 fatty acids 300-1,000 mg capsule     FLUoxetine 10 MG capsule  Take 1 capsule (10 mg total) by mouth once daily.     FREESTYLE JOSEPHINE 2 READER Misc  Generic drug: flash glucose scanning reader  Use as instructed     FREESTYLE JOSEPHINE 2 SENSOR Kit  Generic drug: flash glucose sensor  Use as instructed     FREESTYLE JOSEPHINE 3 SENSOR Rose  Generic drug: blood-glucose sensor  Use as instructed     furosemide 20 MG tablet  Commonly known as: LASIX  Take 1 tablet (20 mg total) by mouth once daily.     gabapentin 600 MG tablet  Commonly known as: NEURONTIN  Take 1 tablet (600 mg total) by mouth 3 (three) times daily.     hydroCHLOROthiazide 25 MG tablet  Commonly known as: HYDRODIURIL  Take 1 tablet by mouth once daily     insulin aspart U-100 100 unit/mL (3 mL) Inpn pen  Commonly known as: NovoLOG Flexpen U-100 Insulin  Take sliding scale insulin 4 times a day as instructed- max dose of 20 units/day.     insulin glargine U-100 (Lantus) 100 unit/mL (3 mL) Inpn pen  Commonly known as: LANTUS SOLOSTAR U-100 INSULIN  Inject 16 units once daily     lancets 33 gauge Misc  Commonly known as: ONETOUCH DELICA PLUS LANCET  Use to check glucose readings 4 times a day.     levoFLOXacin 500 MG tablet  Commonly known as: LEVAQUIN     lisinopriL 20 MG tablet  Commonly known as: PRINIVIL,ZESTRIL  Take 1 tablet  "(20 mg total) by mouth every evening.     loperamide 2 mg capsule  Commonly known as: IMODIUM     meclizine 25 mg tablet  Commonly known as: ANTIVERT  Take 1 tablet (25 mg total) by mouth 3 (three) times daily as needed.     metFORMIN 500 MG ER 24hr tablet  Commonly known as: GLUCOPHAGE-XR  Take 1 tablet (500 mg total) by mouth daily with breakfast.     montelukast 10 mg tablet  Commonly known as: SINGULAIR     multivitamin capsule     nebulizer accessories Kit  Use as directed     NYAMYC powder  Generic drug: nystatin     ondansetron 4 MG tablet  Commonly known as: ZOFRAN  Take 1 tablet (4 mg total) by mouth every 6 (six) hours as needed for Nausea.     pen needle, diabetic 32 gauge x 1/4" Ndle  Commonly known as: BD ULTRA-FINE MICRO PEN NEEDLE  use to inject ozempic once a week     potassium chloride 10 MEQ Tbsr  Commonly known as: KLOR-CON     tamoxifen 20 MG Tab  Commonly known as: NOLVADEX  Take 1 tablet (20 mg total) by mouth once daily.     trimethoprim 100 mg Tab  Commonly known as: TRIMPEX                New Prescriptions    No medications on file           FINAL IMPRESSION    1. Malaise    2. Weakness              Blood Pressure Follow-Up Advised  Patient advised to follow up with PCP within 3-5 days for blood pressure re-check if blood pressure is equal to or greater than 120/80.         Critical care time spent with this patient (not including separately billable items) was  0 minutes.     DISCLAIMER: This note was prepared with Dragon NaturallySpeaking voice recognition transcription software. Garbled syntax, mangled pronouns, and other bizarre constructions may be attributed to that software system.      Kentrell Brock MD  08/16/2024  1:37 PM           Kentrell Brock MD  08/16/24 1340    "

## 2024-08-17 LAB — BACTERIA UR CULT: NORMAL

## 2024-09-16 ENCOUNTER — OFFICE VISIT (OUTPATIENT)
Dept: UROLOGY | Facility: CLINIC | Age: 84
End: 2024-09-16
Attending: SPECIALIST
Payer: MEDICARE

## 2024-09-16 VITALS
BODY MASS INDEX: 36.19 KG/M2 | HEIGHT: 64 IN | SYSTOLIC BLOOD PRESSURE: 134 MMHG | HEART RATE: 64 BPM | DIASTOLIC BLOOD PRESSURE: 84 MMHG | WEIGHT: 212 LBS

## 2024-09-16 DIAGNOSIS — Z97.8 CHRONIC INDWELLING FOLEY CATHETER: Primary | ICD-10-CM

## 2024-09-16 PROCEDURE — 99204 OFFICE O/P NEW MOD 45 MIN: CPT | Mod: S$PBB,,, | Performed by: SPECIALIST

## 2024-09-16 PROCEDURE — 99214 OFFICE O/P EST MOD 30 MIN: CPT | Mod: PBBFAC | Performed by: SPECIALIST

## 2024-09-16 PROCEDURE — 99999 PR PBB SHADOW E&M-EST. PATIENT-LVL IV: CPT | Mod: PBBFAC,,, | Performed by: SPECIALIST

## 2024-09-16 RX ORDER — LIDOCAINE 50 MG/G
PATCH TOPICAL
COMMUNITY
Start: 2024-06-15

## 2024-09-16 RX ORDER — INSULIN GLARGINE-YFGN 100 [IU]/ML
INJECTION, SOLUTION SUBCUTANEOUS
COMMUNITY
Start: 2024-07-24

## 2024-09-16 RX ORDER — SULFAMETHOXAZOLE AND TRIMETHOPRIM 800; 160 MG/1; MG/1
1 TABLET ORAL 2 TIMES DAILY
Qty: 28 TABLET | Refills: 2 | Status: SHIPPED | OUTPATIENT
Start: 2024-09-16

## 2024-09-16 NOTE — PROGRESS NOTES
[facility] - Urology   Clinic Note    SUBJECTIVE:     No chief complaint on file.      Referral from: Self, Piyush.    History of Present Illness:  Kelsea Cadet is a 84 y.o. female who presents to clinic for Retention.    Very pleasant 84 y.o. with chronic indwelling machado (x5 yrs)..  Has been managed by Dr. Victro, however, she'd like to transfer urologic care to [facility].  She did not tolerate a SP tube (for unclear reasons).  She has home health change her machado twice a month.  She is on TMP for prophylaxis, and takes levaquin roughly every three months for a breakthrough UTI.      Patient endorses no additional complaints at this time.    Past Medical History:   Diagnosis Date    Arthritis     Asthmatic bronchitis     Breast cancer 1996    bilateral - radiation pellets for tx    Breast cancer 01/2022    right    Colon cancer 1963    Congestive heart failure (CHF)     Coronary artery disease     Depression     Diabetes mellitus, type 2     Encounter for blood transfusion     Environmental and seasonal allergies     Machado catheter in place     Hard of hearing     Hyperlipidemia     Hypertension     IBS (irritable bowel syndrome)     Kidney disease     ckd stage 3b    Myocardial infarction 2019    Neuropathy     PONV (postoperative nausea and vomiting)     Presence of indwelling Machado catheter     Shingles     Sleep apnea     no cpap used    Stroke 1993    tia    Thyroid disease     UTI (urinary tract infection)     with sepsis    Vitamin D deficiency     Wound, open, buttock     being treated    Yeast infection        Past Surgical History:   Procedure Laterality Date    BLADDER SUSPENSION      BREAST BIOPSY Bilateral 1996    BREAST SURGERY      lumpectomy, isotopes    CHOLECYSTECTOMY      had gal bladder removed    COLECTOMY      had part of colon removed    CYSTOSCOPY W/ RETROGRADES Bilateral 12/19/2019    Procedure: CYSTOSCOPY, WITH RETROGRADE PYELOGRAM;  Surgeon: Prasad Rocha MD;  Location: [facility]  OR;  Service: Urology;  Laterality: Bilateral;    DILATION OF URETHRA N/A 12/19/2019    Procedure: DILATION, URETHRA;  Surgeon: Prasad Rocha MD;  Location: [facility] OR;  Service: Urology;  Laterality: N/A;    HYSTERECTOMY      LEFT HEART CATHETERIZATION Left 11/27/2019    Procedure: Left heart cath;  Surgeon: Urban Paiz MD;  Location: [facility] CATH LAB;  Service: Cardiology;  Laterality: Left;    MODIFIED RADICAL MASTECTOMY W/ AXILLARY LYMPH NODE DISSECTION Right 1/26/2022    Procedure: MASTECTOMY, MODIFIED RADICAL;  Surgeon: Keisha Cortez MD;  Location: [facility] OR;  Service: General;  Laterality: Right;    OOPHORECTOMY      SENTINEL LYMPH NODE BIOPSY Right 1/26/2022    Procedure: BIOPSY, LYMPH NODE, SENTINEL;  Surgeon: Keisha Cortez MD;  Location: [facility] OR;  Service: General;  Laterality: Right;  0800    SIMPLE MASTECTOMY Left 1/26/2022    Procedure: MASTECTOMY, SIMPLE;  Surgeon: Keisha Cortez MD;  Location: [facility] OR;  Service: General;  Laterality: Left;  FROZEN SECTION       Family History   Problem Relation Name Age of Onset    Cancer Mother  42        Bone    Bone cancer Mother      Breast cancer Mother      Heart disease Mother      Cancer Father          Bone    Bone cancer Father  86    Breast cancer Sister      Bell's palsy Sister      Breast cancer Sister         Social History     Tobacco Use    Smoking status: Never    Smokeless tobacco: Never   Substance Use Topics    Alcohol use: Not Currently     Comment: rare    Drug use: Never       Current Outpatient Medications on File Prior to Visit   Medication Sig Dispense Refill    acarbose (PRECOSE) 50 MG Tab Take 1 tablet (50 mg total) by mouth 3 (three) times daily with meals. 270 tablet 3    albuterol (PROVENTIL/VENTOLIN HFA) 90 mcg/actuation inhaler Inhale 2 puffs into the lungs every 6 hours as needed for wheezing 18 g 11    alendronate (FOSAMAX) 70 MG tablet Take 1 tablet (70 mg total) by mouth every 7 days. 4 tablet 11    aspirin  (ECOTRIN) 81 MG EC tablet Take 81 mg by mouth once daily. Stopped 01/19/2022      atorvastatin (LIPITOR) 80 MG tablet Take 1 tablet by mouth in the evening 90 tablet 0    blood sugar diagnostic (ONETOUCH ULTRA TEST) Strp Use to check fingerstick glucose readings 1 time a day 100 each 11    blood-glucose meter Misc 1 Device by Misc.(Non-Drug; Combo Route) route once daily. One Touch Dx. Code:E11.9 1 each 0    carvediloL (COREG) 6.25 MG tablet Take 1 tablet (6.25 mg total) by mouth 2 (two) times daily with meals. 180 tablet 3    cholecalciferol, vitamin D3, (VITAMIN D3) 2,000 unit Tab Take 1 tablet (2,000 Units total) by mouth once daily. (Patient taking differently: Take 1 tablet by mouth twice a week. 10,000 twice a week)      clobetasoL (TEMOVATE) 0.05 % cream Apply twice a day for 2 weeks then nightly for 2 months. May decrease to three nights a week after that. 60 g 2    co-enzyme Q-10 30 mg capsule Take 1 capsule (30 mg total) by mouth every evening.      cranberry fruit extract (CRANBERRY CONCENTRATE ORAL) Take 2 capsules by mouth once daily.      dicyclomine (BENTYL) 20 mg tablet       flash glucose scanning reader (FREESTYLE JOSEPHINE 2 READER) Misc Use as instructed 1 each 1    flash glucose sensor (FREESTYLE JOSEPHINE 2 SENSOR) Kit Use as instructed 6 kit 3    FLUoxetine 10 MG capsule Take 1 capsule (10 mg total) by mouth once daily. 90 capsule 3    furosemide (LASIX) 20 MG tablet Take 1 tablet (20 mg total) by mouth once daily. 30 tablet 11    gabapentin (NEURONTIN) 600 MG tablet Take 1 tablet (600 mg total) by mouth 3 (three) times daily. 90 tablet 11    glucagon (BAQSIMI) 3 mg/actuation Spry 2 pack.  Spray one device (3 mg) in one nostril to treat severe hypoglycemia. Disp 1 box (2 devices) 2 each 3    hydroCHLOROthiazide (HYDRODIURIL) 25 MG tablet Take 1 tablet by mouth once daily 90 tablet 0    insulin aspart U-100 (NOVOLOG FLEXPEN U-100 INSULIN) 100 unit/mL (3 mL) InPn pen Take sliding scale insulin 4 times a  "day as instructed- max dose of 20 units/day. 15 mL 3    lancets (ONETOUCH DELICA PLUS LANCET) 33 gauge Misc Use to check glucose readings 4 times a day. 100 each 11    LIDOcaine (LIDODERM) 5 % SMARTSIG:Topical      lisinopriL (PRINIVIL,ZESTRIL) 20 MG tablet Take 1 tablet (20 mg total) by mouth every evening. 30 tablet 0    loperamide (IMODIUM) 2 mg capsule Take 2 mg by mouth 4 (four) times daily as needed for Diarrhea.      metFORMIN (GLUCOPHAGE-XR) 500 MG ER 24hr tablet Take 1 tablet (500 mg total) by mouth daily with breakfast. 90 tablet 3    montelukast (SINGULAIR) 10 mg tablet Take 10 mg by mouth once daily.      mucus clearing device (AEROBIKA OSCILLATING PEP SYSTM MISC) 12 puffs by Misc.(Non-Drug; Combo Route) route daily as needed.      multivitamin capsule Take 1 capsule by mouth once daily.      nebulizer accessories Kit Use as directed 1 kit 5    NYAMYC powder       omega-3 fatty acids/fish oil (FISH OIL-OMEGA-3 FATTY ACIDS) 300-1,000 mg capsule Take by mouth once daily.      pen needle, diabetic (BD ULTRA-FINE MICRO PEN NEEDLE) 32 gauge x 1/4" Ndle use to inject ozempic once a week 100 each 3    potassium chloride (KLOR-CON) 10 MEQ TbSR Take 99 mEq by mouth once.      SEMGLEE,INSULIN GLARG-YFGN, unit/mL (3 mL) InPn INJECT 16 UNITS SUBCUTANEOUSLY ONCE DAILY      tamoxifen (NOLVADEX) 20 MG Tab Take 1 tablet (20 mg total) by mouth once daily. 30 tablet 11    trimethoprim (TRIMPEX) 100 mg Tab Take 100 mg by mouth once daily.      ALLERGY RELIEF, LORATADINE, 10 mg tablet TAKE 1 TABLET BY MOUTH ONCE DAILY AS NEEDED FOR ALLERGIES (Patient not taking: Reported on 9/16/2024) 90 tablet 0    azelastine (ASTELIN) 137 mcg (0.1 %) nasal spray SMARTSIG:Both Nares (Patient not taking: Reported on 9/16/2024)      blood-glucose sensor (FREESTYLE JOSEPHINE 3 SENSOR) Rose Use as instructed (Patient not taking: Reported on 9/16/2024) 6 each 6    docusate sodium (COLACE) 100 MG capsule Take 100 mg by mouth daily as needed.  " "(Patient not taking: Reported on 9/16/2024)      insulin (LANTUS SOLOSTAR U-100 INSULIN) glargine 100 units/mL SubQ pen Inject 16 units once daily (Patient not taking: Reported on 9/16/2024) 7.2 mL 11    levoFLOXacin (LEVAQUIN) 500 MG tablet Take by mouth. (Patient not taking: Reported on 9/16/2024)      meclizine (ANTIVERT) 25 mg tablet Take 1 tablet (25 mg total) by mouth 3 (three) times daily as needed. (Patient not taking: Reported on 9/16/2024) 20 tablet 0    ondansetron (ZOFRAN) 4 MG tablet Take 1 tablet (4 mg total) by mouth every 6 (six) hours as needed for Nausea. (Patient not taking: Reported on 9/16/2024) 12 tablet 0     No current facility-administered medications on file prior to visit.       Review of patient's allergies indicates:   Allergen Reactions    Macrobid [nitrofurantoin monohyd/m-cryst] Swelling    Codeine Other (See Comments)     headache    Latex, natural rubber Rash       Review of Systems   Constitutional:  Negative for chills and fever.   Genitourinary:  Negative for dysuria and flank pain.        Review of Systems:  A review of 10+ systems was conducted with pertinent positive and negative findings documented in HPI with all other systems reviewed and negative.    OBJECTIVE:     Estimated body mass index is 36.39 kg/m² as calculated from the following:    Height as of this encounter: 5' 4" (1.626 m).    Weight as of this encounter: 96.2 kg (212 lb).    Vital Signs (Most Recent)  Vitals:    09/16/24 1502   BP: 134/84   Pulse: 64       Physical Exam:    Physical Exam     GENERAL: patient sitting comfortably  HEENT: normocephalic  NECK: supple, no JVD  PULM: normal chest rise, no increased WOB  HEART: non-diaphoretic  ABDO: soft, nondistended, nontender  BACK: no CVA tenderness bilaterally  SKIN: warm, dry, well perfused  EXT: no bruising or edema  NEURO: grossly normal with no focal deficits  PSYCH: appropriate mood and affect    Genitourinary Exam:  UOP clear, machado to SD      LABS: " "    Lab Results   Component Value Date    BUN 24 (H) 08/16/2024    CREATININE 1.0 08/16/2024    WBC 9.53 08/16/2024    HGB 12.0 08/16/2024    HCT 36.5 (L) 08/16/2024     08/16/2024    AST 16 08/16/2024    ALT 17 08/16/2024    ALKPHOS 112 08/16/2024    ALBUMIN 2.9 (L) 08/16/2024    HGBA1C 8.0 (H) 12/27/2023    INR 1.2 10/15/2019        Urinalysis:   No results found for: "UAREFLEX"       Imaging:  I have personally reviewed all relevant imaging studies.    No results found for this or any previous visit (from the past 2160 hour(s)).  No results found for this or any previous visit (from the past 2160 hour(s)).  X-Ray Chest AP Portable  Narrative: EXAMINATION:  XR CHEST AP PORTABLE    CLINICAL HISTORY:  cough;    COMPARISON:  Radiograph 06/30/2023    FINDINGS:  No acute infiltrate or signs of CHF detected.  No pleural fluid or pneumothorax.    Prominent right paramediastinal opacity, volume loss and scarring within the right lung, as previously described on CT suggesting post therapy changes.  Impression: No acute radiographic abnormalities detected    Otherwise, as above    Electronically signed by: Jill Abdullahi MD  Date:    08/16/2024  Time:    13:02         ASSESSMENT     1. Chronic indwelling Land catheter        PLAN:     Doing well.  Continue TMP for suppression.  Recommend Bactrim over a quinolone for a breakthru UTI.    Jovany Collins MD  Urology  [facility]     Disclaimer: This note has been generated using voice-recognition software. There may be typographical errors that have been missed during proof-reading.     "

## 2024-09-23 NOTE — TELEPHONE ENCOUNTER
----- Message from Alissa Veronica sent at 2024  7:49 AM CDT -----  Kelsea Cadet  MRN: [id number]  : 1940  PCP: Merry Leary  Home Phone      [phone]  Work Phone      Not on file.  Mobile          [phone]  Home Phone      Not on file.      MESSAGE: Patient is needing a refill on Trimethoprim sent to [facility].        Phone: [phone]

## 2024-09-24 RX ORDER — SULFAMETHOXAZOLE AND TRIMETHOPRIM 800; 160 MG/1; MG/1
1 TABLET ORAL 2 TIMES DAILY
Qty: 28 TABLET | Refills: 2 | Status: SHIPPED | OUTPATIENT
Start: 2024-09-24

## 2024-10-18 ENCOUNTER — APPOINTMENT (OUTPATIENT)
Dept: LAB | Facility: HOSPITAL | Age: 84
End: 2024-10-18
Attending: INTERNAL MEDICINE
Payer: MEDICARE

## 2024-10-18 DIAGNOSIS — R53.82 CHRONIC FATIGUE: ICD-10-CM

## 2024-10-18 DIAGNOSIS — G62.9 PERIPHERAL NERVE DISORDER: ICD-10-CM

## 2024-10-18 DIAGNOSIS — M25.50 PAIN IN JOINT, MULTIPLE SITES: ICD-10-CM

## 2024-10-18 DIAGNOSIS — R76.8 FALSE POSITIVE SEROLOGICAL TEST FOR SYPHILIS: Primary | ICD-10-CM

## 2024-10-18 LAB
BACTERIA #/AREA URNS HPF: ABNORMAL /HPF
BILIRUB UR QL STRIP: NEGATIVE
CLARITY UR: ABNORMAL
COLOR UR: YELLOW
GLUCOSE UR QL STRIP: NEGATIVE
HGB UR QL STRIP: ABNORMAL
HYALINE CASTS #/AREA URNS LPF: 2.2 /LPF
KETONES UR QL STRIP: NEGATIVE
LEUKOCYTE ESTERASE UR QL STRIP: ABNORMAL
MICROSCOPIC COMMENT: ABNORMAL
NITRITE UR QL STRIP: POSITIVE
PH UR STRIP: 6 [PH] (ref 5–8)
PROT UR QL STRIP: ABNORMAL
RBC #/AREA URNS HPF: 31 /HPF (ref 0–4)
SP GR UR STRIP: 1.01 (ref 1–1.03)
SQUAMOUS #/AREA URNS HPF: 1 /HPF
URN SPEC COLLECT METH UR: ABNORMAL
UROBILINOGEN UR STRIP-ACNC: NEGATIVE EU/DL
WBC #/AREA URNS HPF: >100 /HPF (ref 0–5)

## 2024-10-18 PROCEDURE — 87088 URINE BACTERIA CULTURE: CPT | Performed by: INTERNAL MEDICINE

## 2024-10-18 PROCEDURE — 87086 URINE CULTURE/COLONY COUNT: CPT | Performed by: INTERNAL MEDICINE

## 2024-10-18 PROCEDURE — 81000 URINALYSIS NONAUTO W/SCOPE: CPT | Performed by: INTERNAL MEDICINE

## 2024-10-18 PROCEDURE — 87186 SC STD MICRODIL/AGAR DIL: CPT | Performed by: INTERNAL MEDICINE

## 2024-10-21 LAB — BACTERIA UR CULT: ABNORMAL

## 2024-10-29 ENCOUNTER — LAB VISIT (OUTPATIENT)
Dept: LAB | Facility: HOSPITAL | Age: 84
End: 2024-10-29
Attending: INTERNAL MEDICINE
Payer: MEDICARE

## 2024-10-29 DIAGNOSIS — R76.8 FALSE POSITIVE SEROLOGICAL TEST FOR SYPHILIS: Primary | ICD-10-CM

## 2024-10-29 DIAGNOSIS — R53.82 CHRONIC FATIGUE: ICD-10-CM

## 2024-10-29 DIAGNOSIS — G62.9 PERIPHERAL NERVE DISORDER: ICD-10-CM

## 2024-10-29 DIAGNOSIS — M25.50 PAIN IN JOINT, MULTIPLE SITES: ICD-10-CM

## 2024-10-29 PROCEDURE — 36415 COLL VENOUS BLD VENIPUNCTURE: CPT | Performed by: INTERNAL MEDICINE

## 2024-10-29 PROCEDURE — 86235 NUCLEAR ANTIGEN ANTIBODY: CPT | Performed by: INTERNAL MEDICINE

## 2024-10-29 PROCEDURE — 86235 NUCLEAR ANTIGEN ANTIBODY: CPT | Mod: 59 | Performed by: INTERNAL MEDICINE

## 2024-10-30 LAB
ANTI-SSA ANTIBODY: 0.06 RATIO (ref 0–0.99)
ANTI-SSA INTERPRETATION: NEGATIVE
ANTI-SSB ANTIBODY: 0.07 RATIO (ref 0–0.99)
ANTI-SSB INTERPRETATION: NEGATIVE

## 2024-12-04 ENCOUNTER — NURSE TRIAGE (OUTPATIENT)
Dept: ADMINISTRATIVE | Facility: CLINIC | Age: 84
End: 2024-12-04
Payer: MEDICARE

## 2024-12-04 ENCOUNTER — HOSPITAL ENCOUNTER (EMERGENCY)
Facility: HOSPITAL | Age: 84
Discharge: HOME | End: 2024-12-04
Attending: STUDENT IN AN ORGANIZED HEALTH CARE EDUCATION/TRAINING PROGRAM
Payer: MEDICARE

## 2024-12-04 VITALS
WEIGHT: 205 LBS | DIASTOLIC BLOOD PRESSURE: 76 MMHG | TEMPERATURE: 99 F | HEART RATE: 65 BPM | BODY MASS INDEX: 35 KG/M2 | SYSTOLIC BLOOD PRESSURE: 163 MMHG | OXYGEN SATURATION: 94 % | RESPIRATION RATE: 25 BRPM | HEIGHT: 64 IN

## 2024-12-04 DIAGNOSIS — N39.0 URINARY TRACT INFECTION WITHOUT HEMATURIA, SITE UNSPECIFIED: Primary | ICD-10-CM

## 2024-12-04 DIAGNOSIS — R06.02 SHORTNESS OF BREATH: ICD-10-CM

## 2024-12-04 LAB
ALBUMIN SERPL BCP-MCNC: 3.1 G/DL (ref 3.5–5.2)
ALP SERPL-CCNC: 102 U/L (ref 55–135)
ALT SERPL W/O P-5'-P-CCNC: 18 U/L (ref 10–44)
ANION GAP SERPL CALC-SCNC: 9 MMOL/L (ref 3–11)
AST SERPL-CCNC: 16 U/L (ref 10–40)
BACTERIA #/AREA URNS HPF: NEGATIVE /HPF
BASOPHILS # BLD AUTO: 0.04 K/UL (ref 0–0.2)
BASOPHILS NFR BLD: 0.6 % (ref 0–1.9)
BILIRUB SERPL-MCNC: 0.5 MG/DL (ref 0.1–1)
BILIRUB UR QL STRIP: NEGATIVE
BUN SERPL-MCNC: 15 MG/DL (ref 8–23)
CALCIUM SERPL-MCNC: 9.2 MG/DL (ref 8.7–10.5)
CHLORIDE SERPL-SCNC: 103 MMOL/L (ref 95–110)
CLARITY UR: CLEAR
CO2 SERPL-SCNC: 28 MMOL/L (ref 23–29)
COLOR UR: YELLOW
CREAT SERPL-MCNC: 1 MG/DL (ref 0.5–1.4)
CTP QC/QA: YES
CTP QC/QA: YES
DIFFERENTIAL METHOD BLD: ABNORMAL
EOSINOPHIL # BLD AUTO: 0.1 K/UL (ref 0–0.5)
EOSINOPHIL NFR BLD: 0.8 % (ref 0–8)
ERYTHROCYTE [DISTWIDTH] IN BLOOD BY AUTOMATED COUNT: 13.1 % (ref 11.5–14.5)
EST. GFR  (NO RACE VARIABLE): 56.2 ML/MIN/1.73 M^2
GLUCOSE SERPL-MCNC: 182 MG/DL (ref 70–110)
GLUCOSE UR QL STRIP: NEGATIVE
HCT VFR BLD AUTO: 39.1 % (ref 37–48.5)
HGB BLD-MCNC: 13 G/DL (ref 12–16)
HGB UR QL STRIP: NEGATIVE
HYALINE CASTS #/AREA URNS LPF: 2.9 /LPF
IMM GRANULOCYTES # BLD AUTO: 0.02 K/UL (ref 0–0.04)
IMM GRANULOCYTES NFR BLD AUTO: 0.3 % (ref 0–0.5)
KETONES UR QL STRIP: NEGATIVE
LEUKOCYTE ESTERASE UR QL STRIP: ABNORMAL
LYMPHOCYTES # BLD AUTO: 1.2 K/UL (ref 1–4.8)
LYMPHOCYTES NFR BLD: 16.2 % (ref 18–48)
MCH RBC QN AUTO: 28.7 PG (ref 27–31)
MCHC RBC AUTO-ENTMCNC: 33.2 G/DL (ref 32–36)
MCV RBC AUTO: 86 FL (ref 82–98)
MICROSCOPIC COMMENT: ABNORMAL
MONOCYTES # BLD AUTO: 0.4 K/UL (ref 0.3–1)
MONOCYTES NFR BLD: 4.9 % (ref 4–15)
NEUTROPHILS # BLD AUTO: 5.5 K/UL (ref 1.8–7.7)
NEUTROPHILS NFR BLD: 77.2 % (ref 38–73)
NITRITE UR QL STRIP: NEGATIVE
NRBC BLD-RTO: 0 /100 WBC
NT-PROBNP SERPL-MCNC: 957 PG/ML (ref 5–1800)
OHS QRS DURATION: 158 MS
OHS QTC CALCULATION: 475 MS
PH UR STRIP: 8 [PH] (ref 5–8)
PLATELET # BLD AUTO: 254 K/UL (ref 150–450)
PMV BLD AUTO: 9.8 FL (ref 9.2–12.9)
POC MOLECULAR INFLUENZA A AGN: NEGATIVE
POC MOLECULAR INFLUENZA B AGN: NEGATIVE
POTASSIUM SERPL-SCNC: 4 MMOL/L (ref 3.5–5.1)
PROT SERPL-MCNC: 7.2 G/DL (ref 6–8.4)
PROT UR QL STRIP: NEGATIVE
RBC # BLD AUTO: 4.53 M/UL (ref 4–5.4)
RBC #/AREA URNS HPF: 3 /HPF (ref 0–4)
SARS-COV-2 RDRP RESP QL NAA+PROBE: NEGATIVE
SODIUM SERPL-SCNC: 140 MMOL/L (ref 136–145)
SP GR UR STRIP: <=1.005 (ref 1–1.03)
SQUAMOUS #/AREA URNS HPF: 0 /HPF
TROPONIN I SERPL DL<=0.01 NG/ML-MCNC: 9.8 PG/ML (ref 0–60)
URN SPEC COLLECT METH UR: ABNORMAL
UROBILINOGEN UR STRIP-ACNC: NEGATIVE EU/DL
WBC # BLD AUTO: 7.14 K/UL (ref 3.9–12.7)
WBC #/AREA URNS HPF: 27 /HPF (ref 0–5)

## 2024-12-04 PROCEDURE — 36415 COLL VENOUS BLD VENIPUNCTURE: CPT | Performed by: STUDENT IN AN ORGANIZED HEALTH CARE EDUCATION/TRAINING PROGRAM

## 2024-12-04 PROCEDURE — 81000 URINALYSIS NONAUTO W/SCOPE: CPT | Performed by: STUDENT IN AN ORGANIZED HEALTH CARE EDUCATION/TRAINING PROGRAM

## 2024-12-04 PROCEDURE — 93005 ELECTROCARDIOGRAM TRACING: CPT

## 2024-12-04 PROCEDURE — 96375 TX/PRO/DX INJ NEW DRUG ADDON: CPT

## 2024-12-04 PROCEDURE — 83880 ASSAY OF NATRIURETIC PEPTIDE: CPT | Performed by: STUDENT IN AN ORGANIZED HEALTH CARE EDUCATION/TRAINING PROGRAM

## 2024-12-04 PROCEDURE — 99285 EMERGENCY DEPT VISIT HI MDM: CPT | Mod: 25

## 2024-12-04 PROCEDURE — 80053 COMPREHEN METABOLIC PANEL: CPT | Performed by: STUDENT IN AN ORGANIZED HEALTH CARE EDUCATION/TRAINING PROGRAM

## 2024-12-04 PROCEDURE — 87086 URINE CULTURE/COLONY COUNT: CPT | Performed by: STUDENT IN AN ORGANIZED HEALTH CARE EDUCATION/TRAINING PROGRAM

## 2024-12-04 PROCEDURE — 87635 SARS-COV-2 COVID-19 AMP PRB: CPT | Performed by: STUDENT IN AN ORGANIZED HEALTH CARE EDUCATION/TRAINING PROGRAM

## 2024-12-04 PROCEDURE — 63600175 PHARM REV CODE 636 W HCPCS: Performed by: STUDENT IN AN ORGANIZED HEALTH CARE EDUCATION/TRAINING PROGRAM

## 2024-12-04 PROCEDURE — 85025 COMPLETE CBC W/AUTO DIFF WBC: CPT | Performed by: STUDENT IN AN ORGANIZED HEALTH CARE EDUCATION/TRAINING PROGRAM

## 2024-12-04 PROCEDURE — 87502 INFLUENZA DNA AMP PROBE: CPT

## 2024-12-04 PROCEDURE — 96374 THER/PROPH/DIAG INJ IV PUSH: CPT

## 2024-12-04 PROCEDURE — 93010 ELECTROCARDIOGRAM REPORT: CPT | Mod: ,,, | Performed by: INTERNAL MEDICINE

## 2024-12-04 PROCEDURE — 84484 ASSAY OF TROPONIN QUANT: CPT | Performed by: STUDENT IN AN ORGANIZED HEALTH CARE EDUCATION/TRAINING PROGRAM

## 2024-12-04 RX ORDER — CEFTRIAXONE 1 G/1
1 INJECTION, POWDER, FOR SOLUTION INTRAMUSCULAR; INTRAVENOUS
Status: COMPLETED | OUTPATIENT
Start: 2024-12-04 | End: 2024-12-04

## 2024-12-04 RX ORDER — CIPROFLOXACIN 500 MG/1
500 TABLET ORAL 2 TIMES DAILY
Qty: 20 TABLET | Refills: 0 | Status: SHIPPED | OUTPATIENT
Start: 2024-12-04 | End: 2024-12-14

## 2024-12-04 RX ORDER — FUROSEMIDE 10 MG/ML
40 INJECTION INTRAMUSCULAR; INTRAVENOUS
Status: COMPLETED | OUTPATIENT
Start: 2024-12-04 | End: 2024-12-04

## 2024-12-04 RX ADMIN — CEFTRIAXONE 1 G: 1 INJECTION, POWDER, FOR SOLUTION INTRAMUSCULAR; INTRAVENOUS at 01:12

## 2024-12-04 RX ADMIN — FUROSEMIDE 40 MG: 10 INJECTION, SOLUTION INTRAMUSCULAR; INTRAVENOUS at 01:12

## 2024-12-04 NOTE — ED PROVIDER NOTES
"Encounter Date: 12/4/2024       History     Chief Complaint   Patient presents with    Altered Mental Status     Daughter stated that pt has had worsening confusion / nausea / "dry heaving" since last night suspected to be recurring UTI. Land in place - changed last week.      84-year-old female history of CHF, CAD, diabetes, chronic indwelling Land, hypertension, hyperlipidemia presents to the ED with daughter who complaints weakness and confusion since yesterday.  Also reports the patient has been feeling fatigued.  Denies any fevers or chills.  Daughter at bedside reports the usual patient is speaking in full conversations but since yesterday has appeared somewhat confused.  Also noticed her urine output in her Land has been cloudy. Also endorses new cough and is not sure if patient has been compliant with her diet.  Patient denies any chest pain or shortness of breath.  Denies any abdominal pain.  Does endorse feeling overall " crappy"         Review of patient's allergies indicates:   Allergen Reactions    Macrobid [nitrofurantoin monohyd/m-cryst] Swelling    Codeine Other (See Comments)     headache    Latex, natural rubber Rash     Past Medical History:   Diagnosis Date    Arthritis     Asthmatic bronchitis     Breast cancer 1996    bilateral - radiation pellets for tx    Breast cancer 01/2022    right    Colon cancer 1963    Congestive heart failure (CHF)     Coronary artery disease     Depression     Diabetes mellitus, type 2     Encounter for blood transfusion     Environmental and seasonal allergies     Land catheter in place     Hard of hearing     Hyperlipidemia     Hypertension     IBS (irritable bowel syndrome)     Kidney disease     ckd stage 3b    Myocardial infarction 2019    Neuropathy     PONV (postoperative nausea and vomiting)     Presence of indwelling Land catheter     Shingles     Sleep apnea     no cpap used    Stroke 1993    tia    Thyroid disease     UTI (urinary tract infection)     " with sepsis    Vitamin D deficiency     Wound, open, buttock     being treated    Yeast infection      Past Surgical History:   Procedure Laterality Date    BLADDER SUSPENSION      BREAST BIOPSY Bilateral 1996    BREAST SURGERY      lumpectomy, isotopes    CHOLECYSTECTOMY      had gal bladder removed    COLECTOMY      had part of colon removed    CYSTOSCOPY W/ RETROGRADES Bilateral 12/19/2019    Procedure: CYSTOSCOPY, WITH RETROGRADE PYELOGRAM;  Surgeon: Prasad Rocha MD;  Location: [facility] OR;  Service: Urology;  Laterality: Bilateral;    DILATION OF URETHRA N/A 12/19/2019    Procedure: DILATION, URETHRA;  Surgeon: Prasad Rocha MD;  Location: [facility] OR;  Service: Urology;  Laterality: N/A;    HYSTERECTOMY      LEFT HEART CATHETERIZATION Left 11/27/2019    Procedure: Left heart cath;  Surgeon: Urban Paiz MD;  Location: [facility] CATH LAB;  Service: Cardiology;  Laterality: Left;    MODIFIED RADICAL MASTECTOMY W/ AXILLARY LYMPH NODE DISSECTION Right 1/26/2022    Procedure: MASTECTOMY, MODIFIED RADICAL;  Surgeon: Keisha Cortez MD;  Location: [facility] OR;  Service: General;  Laterality: Right;    OOPHORECTOMY      SENTINEL LYMPH NODE BIOPSY Right 1/26/2022    Procedure: BIOPSY, LYMPH NODE, SENTINEL;  Surgeon: Keisha Cortez MD;  Location: [facility] OR;  Service: General;  Laterality: Right;  0800    SIMPLE MASTECTOMY Left 1/26/2022    Procedure: MASTECTOMY, SIMPLE;  Surgeon: Keisha Cortez MD;  Location: [facility];  Service: General;  Laterality: Left;  FROZEN SECTION     Family History   Problem Relation Name Age of Onset    Cancer Mother  42        Bone    Bone cancer Mother      Breast cancer Mother      Heart disease Mother      Cancer Father          Bone    Bone cancer Father  86    Breast cancer Sister      Bell's palsy Sister      Breast cancer Sister       Social History     Tobacco Use    Smoking status: Never    Smokeless tobacco: Never   Substance Use Topics    Alcohol use: Not Currently      Comment: rare    Drug use: Never     Review of Systems   Respiratory:  Negative for chest tightness.    Gastrointestinal:  Negative for abdominal pain.       Physical Exam     Initial Vitals [12/04/24 1106]   BP Pulse Resp Temp SpO2   (!) 189/84 74 18 99 °F (37.2 °C) (!) 92 %      MAP       --         Physical Exam    Vitals reviewed.  Constitutional: She appears well-developed and well-nourished.   HENT:   Head: Normocephalic.   Eyes: EOM are normal. Pupils are equal, round, and reactive to light.   Cardiovascular:  Normal rate and regular rhythm.           Pulmonary/Chest: Breath sounds normal.   Abdominal: Abdomen is soft.   Musculoskeletal:         General: Normal range of motion.     Neurological: She is alert and oriented to person, place, and time. GCS score is 15. GCS eye subscore is 4. GCS verbal subscore is 5. GCS motor subscore is 6.   Skin: Skin is warm. Capillary refill takes less than 2 seconds.         ED Course   Procedures  Labs Reviewed   CBC W/ AUTO DIFFERENTIAL - Abnormal       Result Value    WBC 7.14      RBC 4.53      Hemoglobin 13.0      Hematocrit 39.1      MCV 86      MCH 28.7      MCHC 33.2      RDW 13.1      Platelets 254      MPV 9.8      Immature Granulocytes 0.3      Gran # (ANC) 5.5      Immature Grans (Abs) 0.02      Lymph # 1.2      Mono # 0.4      Eos # 0.1      Baso # 0.04      nRBC 0      Gran % 77.2 (*)     Lymph % 16.2 (*)     Mono % 4.9      Eosinophil % 0.8      Basophil % 0.6      Differential Method Automated     COMPREHENSIVE METABOLIC PANEL   TROPONIN I HIGH SENSITIVITY   NT-PRO NATRIURETIC PEPTIDE   URINALYSIS, REFLEX TO URINE CULTURE   SARS-COV-2 RDRP GENE   POCT INFLUENZA A/B MOLECULAR     EKG Readings: (Independently Interpreted)   Normal sinus rhythm   Right bundle branch block   No STEMI   QTC is 475       Imaging Results              X-Ray Chest AP Portable (In process)                      Medications - No data to display  Medical Decision Making  Patient presents  to ED for complaints of weakness and confusion.  In ED patient is GCS 15, moving all extremities.  She was afebrile with satting 92% on room air.  She has a chronic indwelling Land in place, urine appears to be somewhat cloudy.  She also has a history of recurrent UTIs.  We will plan for labs, imaging, analgesics, and reassess.      Labs reveal no evidence of leukocytosis.  Chemistry panel was unremarkable.  Urinalysis does show the patient has a UTI.  BNP was 957.  Chest x-ray shows chronic findings.  Patient was given dose of Rocephin and Lasix here in the emergency department is feeling much better.  Will be discharged home with antibiotics.  Return precautions given for worsening symptoms.  Daughter at bedside understands and agrees with plan.    Amount and/or Complexity of Data Reviewed  Labs: ordered.  Radiology: ordered.    Risk  Prescription drug management.                                      Clinical Impression:  Final diagnoses:  [R06.02] Shortness of breath                 Jeremias Roman MD  12/04/24 2133

## 2024-12-04 NOTE — TELEPHONE ENCOUNTER
Spoke with daughter of pt who states pt was discharged from ER today, and was prescribed cipro. Asking if pt is still continue to take trimethoprim.    Spoke with Caprice ( Nurse in ER) who spoke with Dr. Roman who advised for pt to stop taking the trimethoprim, and to follow up with urologist    Daughter, informed, and verbalized understanding.     Reason for Disposition   Caller has URGENT medicine question about med that PCP or specialist prescribed and triager unable to answer question    Additional Information   Negative: Intentional drug overdose and suicidal thoughts or ideas   Negative: MORE THAN A DOUBLE DOSE of a prescription or over-the-counter (OTC) drug   Negative: DOUBLE DOSE (an extra dose or lesser amount) of prescription drug and any symptoms (e.g., dizziness, nausea, pain, sleepiness)   Negative: DOUBLE DOSE (an extra dose or lesser amount) of over-the-counter (OTC) drug and any symptoms (e.g., dizziness, nausea, pain, sleepiness)   Negative: Took another person's prescription drug   Negative: DOUBLE DOSE (an extra dose or lesser amount) of prescription drug and NO symptoms  (Exception: A double dose of antibiotics.)   Negative: Diabetes drug error or overdose (e.g., took wrong type of insulin or took extra dose)   Negative: Caller has medication question about med NOT prescribed by PCP and triager unable to answer question (e.g., compatibility with other med, storage)   Negative: Prescription not at pharmacy and was prescribed by PCP recently  (Exception: triager has access to EMR and prescription is recorded there. Go to Home Care and confirm for pharmacy.)   Negative: Pharmacy calling with prescription question and triager unable to answer question    Protocols used: Medication Question Call-A-OH

## 2024-12-05 LAB
BACTERIA UR CULT: NORMAL
BACTERIA UR CULT: NORMAL

## 2024-12-07 ENCOUNTER — HOSPITAL ENCOUNTER (EMERGENCY)
Facility: HOSPITAL | Age: 84
Discharge: HOME | End: 2024-12-07
Attending: EMERGENCY MEDICINE
Payer: MEDICARE

## 2024-12-07 VITALS
SYSTOLIC BLOOD PRESSURE: 109 MMHG | HEIGHT: 64 IN | RESPIRATION RATE: 18 BRPM | BODY MASS INDEX: 36.13 KG/M2 | WEIGHT: 211.63 LBS | DIASTOLIC BLOOD PRESSURE: 57 MMHG | OXYGEN SATURATION: 98 % | HEART RATE: 60 BPM | TEMPERATURE: 98 F

## 2024-12-07 DIAGNOSIS — E86.0 DEHYDRATION: Primary | ICD-10-CM

## 2024-12-07 LAB
ALBUMIN SERPL BCP-MCNC: 3 G/DL (ref 3.5–5.2)
ALP SERPL-CCNC: 118 U/L (ref 55–135)
ALT SERPL W/O P-5'-P-CCNC: 24 U/L (ref 10–44)
ANION GAP SERPL CALC-SCNC: 9 MMOL/L (ref 3–11)
AST SERPL-CCNC: 31 U/L (ref 10–40)
BASOPHILS # BLD AUTO: 0.05 K/UL (ref 0–0.2)
BASOPHILS NFR BLD: 0.5 % (ref 0–1.9)
BILIRUB SERPL-MCNC: 0.3 MG/DL (ref 0.1–1)
BILIRUB UR QL STRIP: NEGATIVE
BUN SERPL-MCNC: 42 MG/DL (ref 8–23)
CALCIUM SERPL-MCNC: 8.6 MG/DL (ref 8.7–10.5)
CHLORIDE SERPL-SCNC: 101 MMOL/L (ref 95–110)
CK SERPL-CCNC: 364 U/L (ref 20–180)
CLARITY UR: CLEAR
CO2 SERPL-SCNC: 27 MMOL/L (ref 23–29)
COLOR UR: YELLOW
CREAT SERPL-MCNC: 2 MG/DL (ref 0.5–1.4)
DIFFERENTIAL METHOD BLD: ABNORMAL
EOSINOPHIL # BLD AUTO: 0.2 K/UL (ref 0–0.5)
EOSINOPHIL NFR BLD: 2.4 % (ref 0–8)
ERYTHROCYTE [DISTWIDTH] IN BLOOD BY AUTOMATED COUNT: 13.3 % (ref 11.5–14.5)
EST. GFR  (NO RACE VARIABLE): 24.5 ML/MIN/1.73 M^2
GLUCOSE SERPL-MCNC: 199 MG/DL (ref 70–110)
GLUCOSE UR QL STRIP: NEGATIVE
HCT VFR BLD AUTO: 38.2 % (ref 37–48.5)
HGB BLD-MCNC: 12.4 G/DL (ref 12–16)
HGB UR QL STRIP: NEGATIVE
IMM GRANULOCYTES # BLD AUTO: 0.03 K/UL (ref 0–0.04)
IMM GRANULOCYTES NFR BLD AUTO: 0.3 % (ref 0–0.5)
KETONES UR QL STRIP: NEGATIVE
LEUKOCYTE ESTERASE UR QL STRIP: NEGATIVE
LYMPHOCYTES # BLD AUTO: 1.3 K/UL (ref 1–4.8)
LYMPHOCYTES NFR BLD: 14.2 % (ref 18–48)
MCH RBC QN AUTO: 29 PG (ref 27–31)
MCHC RBC AUTO-ENTMCNC: 32.5 G/DL (ref 32–36)
MCV RBC AUTO: 89 FL (ref 82–98)
MONOCYTES # BLD AUTO: 0.5 K/UL (ref 0.3–1)
MONOCYTES NFR BLD: 5.6 % (ref 4–15)
NEUTROPHILS # BLD AUTO: 7.2 K/UL (ref 1.8–7.7)
NEUTROPHILS NFR BLD: 77 % (ref 38–73)
NITRITE UR QL STRIP: NEGATIVE
NRBC BLD-RTO: 0 /100 WBC
PH UR STRIP: 6 [PH] (ref 5–8)
PLATELET # BLD AUTO: 216 K/UL (ref 150–450)
PMV BLD AUTO: 10.4 FL (ref 9.2–12.9)
POTASSIUM SERPL-SCNC: 4.4 MMOL/L (ref 3.5–5.1)
PROT SERPL-MCNC: 7.1 G/DL (ref 6–8.4)
PROT UR QL STRIP: NEGATIVE
RBC # BLD AUTO: 4.28 M/UL (ref 4–5.4)
SODIUM SERPL-SCNC: 137 MMOL/L (ref 136–145)
SP GR UR STRIP: 1.01 (ref 1–1.03)
URN SPEC COLLECT METH UR: NORMAL
UROBILINOGEN UR STRIP-ACNC: NEGATIVE EU/DL
WBC # BLD AUTO: 9.29 K/UL (ref 3.9–12.7)

## 2024-12-07 PROCEDURE — 82550 ASSAY OF CK (CPK): CPT | Performed by: EMERGENCY MEDICINE

## 2024-12-07 PROCEDURE — 25000003 PHARM REV CODE 250: Performed by: EMERGENCY MEDICINE

## 2024-12-07 PROCEDURE — 51702 INSERT TEMP BLADDER CATH: CPT

## 2024-12-07 PROCEDURE — 99284 EMERGENCY DEPT VISIT MOD MDM: CPT | Mod: 25

## 2024-12-07 PROCEDURE — 80053 COMPREHEN METABOLIC PANEL: CPT | Performed by: EMERGENCY MEDICINE

## 2024-12-07 PROCEDURE — 96361 HYDRATE IV INFUSION ADD-ON: CPT

## 2024-12-07 PROCEDURE — 36415 COLL VENOUS BLD VENIPUNCTURE: CPT | Performed by: EMERGENCY MEDICINE

## 2024-12-07 PROCEDURE — 85025 COMPLETE CBC W/AUTO DIFF WBC: CPT | Performed by: EMERGENCY MEDICINE

## 2024-12-07 PROCEDURE — 81003 URINALYSIS AUTO W/O SCOPE: CPT | Performed by: EMERGENCY MEDICINE

## 2024-12-07 PROCEDURE — 96360 HYDRATION IV INFUSION INIT: CPT

## 2024-12-07 RX ADMIN — SODIUM CHLORIDE 500 ML: 9 INJECTION, SOLUTION INTRAVENOUS at 12:12

## 2024-12-07 RX ADMIN — SODIUM CHLORIDE 500 ML: 9 INJECTION, SOLUTION INTRAVENOUS at 02:12

## 2024-12-07 NOTE — ED PROVIDER NOTES
"Encounter Date: 12/7/2024       History     Chief Complaint   Patient presents with    Fall     patient had ground level fall this morning around 11am. Pt states she was ambulating with walker when her left knee "gave out" causing her to fall. Denies thinners. Denies injury. Daughter states she thinks pt may have hurt her back by the way she was moving and states there is a scratch there. Being treated for UTI. Daughter reports some confusion.       85 yo female here with ground level fall this AM while using walker. Here with daughter who is concerned patient hurt her back and is confused. Patient is without complaint.       Review of patient's allergies indicates:   Allergen Reactions    Macrobid [nitrofurantoin monohyd/m-cryst] Swelling    Ciprofloxacin Other (See Comments)     GI problems    Codeine Other (See Comments)     headache    Latex, natural rubber Rash     Past Medical History:   Diagnosis Date    Arthritis     Asthmatic bronchitis     Breast cancer 1996    bilateral - radiation pellets for tx    Breast cancer 01/2022    right    Colon cancer 1963    Congestive heart failure (CHF)     Coronary artery disease     Depression     Diabetes mellitus, type 2     Encounter for blood transfusion     Environmental and seasonal allergies     Land catheter in place     Hard of hearing     Hyperlipidemia     Hypertension     IBS (irritable bowel syndrome)     Kidney disease     ckd stage 3b    Myocardial infarction 2019    Neuropathy     PONV (postoperative nausea and vomiting)     Presence of indwelling Land catheter     Shingles     Sleep apnea     no cpap used    Stroke 1993    tia    Thyroid disease     UTI (urinary tract infection)     with sepsis    Vitamin D deficiency     Wound, open, buttock     being treated    Yeast infection      Past Surgical History:   Procedure Laterality Date    BLADDER SUSPENSION      BREAST BIOPSY Bilateral 1996    BREAST SURGERY      lumpectomy, isotopes    CHOLECYSTECTOMY   "    had gal bladder removed    COLECTOMY      had part of colon removed    CYSTOSCOPY W/ RETROGRADES Bilateral 12/19/2019    Procedure: CYSTOSCOPY, WITH RETROGRADE PYELOGRAM;  Surgeon: Prasad Rocha MD;  Location: [facility] OR;  Service: Urology;  Laterality: Bilateral;    DILATION OF URETHRA N/A 12/19/2019    Procedure: DILATION, URETHRA;  Surgeon: Prasad Rocha MD;  Location: [facility] OR;  Service: Urology;  Laterality: N/A;    HYSTERECTOMY      LEFT HEART CATHETERIZATION Left 11/27/2019    Procedure: Left heart cath;  Surgeon: Urban Paiz MD;  Location: [facility] CATH LAB;  Service: Cardiology;  Laterality: Left;    MODIFIED RADICAL MASTECTOMY W/ AXILLARY LYMPH NODE DISSECTION Right 1/26/2022    Procedure: MASTECTOMY, MODIFIED RADICAL;  Surgeon: Keisha Cortez MD;  Location: [facility];  Service: General;  Laterality: Right;    OOPHORECTOMY      SENTINEL LYMPH NODE BIOPSY Right 1/26/2022    Procedure: BIOPSY, LYMPH NODE, SENTINEL;  Surgeon: Keisha Cortez MD;  Location: [facility];  Service: General;  Laterality: Right;  0800    SIMPLE MASTECTOMY Left 1/26/2022    Procedure: MASTECTOMY, SIMPLE;  Surgeon: Keisha Cortez MD;  Location: [facility];  Service: General;  Laterality: Left;  FROZEN SECTION     Family History   Problem Relation Name Age of Onset    Cancer Mother  42        Bone    Bone cancer Mother      Breast cancer Mother      Heart disease Mother      Cancer Father          Bone    Bone cancer Father  86    Breast cancer Sister      Bell's palsy Sister      Breast cancer Sister       Social History     Tobacco Use    Smoking status: Never    Smokeless tobacco: Never   Substance Use Topics    Alcohol use: Not Currently     Comment: rare    Drug use: Never     Review of Systems   Constitutional: Negative.    Respiratory: Negative.     Cardiovascular: Negative.    Gastrointestinal: Negative.    All other systems reviewed and are negative.      Physical Exam     Initial Vitals   BP Pulse Resp Temp SpO2    12/07/24 1205 12/07/24 1200 12/07/24 1200 12/07/24 1215 12/07/24 1200   (!) 117/54 61 18 97.6 °F (36.4 °C) 96 %      MAP       --                Physical Exam    Nursing note and vitals reviewed.  Constitutional: She is not diaphoretic. No distress.   HENT:   Head: Normocephalic and atraumatic.   Eyes: EOM are normal. Pupils are equal, round, and reactive to light.   Neck: Neck supple.   Normal range of motion.  Cardiovascular:  Normal rate, regular rhythm and intact distal pulses.           Pulmonary/Chest: Breath sounds normal. No respiratory distress. She has no wheezes. She has no rales.   Abdominal: Abdomen is soft. Bowel sounds are normal. She exhibits no distension. There is no abdominal tenderness. There is no rebound.   Musculoskeletal:         General: No tenderness or edema. Normal range of motion.      Cervical back: Normal range of motion and neck supple.     Neurological: She is alert and oriented to person, place, and time.   Skin: Skin is warm and dry. Capillary refill takes less than 2 seconds.         ED Course   Procedures  Labs Reviewed   CBC W/ AUTO DIFFERENTIAL - Abnormal       Result Value    WBC 9.29      RBC 4.28      Hemoglobin 12.4      Hematocrit 38.2      MCV 89      MCH 29.0      MCHC 32.5      RDW 13.3      Platelets 216      MPV 10.4      Immature Granulocytes 0.3      Gran # (ANC) 7.2      Immature Grans (Abs) 0.03      Lymph # 1.3      Mono # 0.5      Eos # 0.2      Baso # 0.05      nRBC 0      Gran % 77.0 (*)     Lymph % 14.2 (*)     Mono % 5.6      Eosinophil % 2.4      Basophil % 0.5      Differential Method Automated     COMPREHENSIVE METABOLIC PANEL - Abnormal    Sodium 137      Potassium 4.4      Chloride 101      CO2 27      Glucose 199 (*)     BUN 42 (*)     Creatinine 2.0 (*)     Calcium 8.6 (*)     Total Protein 7.1      Albumin 3.0 (*)     Total Bilirubin 0.3      Alkaline Phosphatase 118      AST 31      ALT 24      eGFR 24.5 (*)     Anion Gap 9     CK - Abnormal      (*)    URINALYSIS, REFLEX TO URINE CULTURE    Specimen UA Urine, Clean Catch      Color, UA Yellow      Appearance, UA Clear      pH, UA 6.0      Specific Gravity, UA 1.010      Protein, UA Negative      Glucose, UA Negative      Ketones, UA Negative      Bilirubin (UA) Negative      Occult Blood UA Negative      Nitrite, UA Negative      Urobilinogen, UA Negative      Leukocytes, UA Negative      Narrative:     Preferred Collection Type->Urine, Clean Catch  Specimen Source->Urine          Imaging Results    None          Medications   sodium chloride 0.9% bolus 500 mL 500 mL (0 mLs Intravenous Stopped 12/7/24 1355)   sodium chloride 0.9% bolus 500 mL 500 mL (0 mLs Intravenous Stopped 12/7/24 1521)     Medical Decision Making  Labs with dehydration. Feels better after IVF. Stable for outpatient care.     Problems Addressed:  Dehydration: acute illness or injury    Amount and/or Complexity of Data Reviewed  Labs: ordered. Decision-making details documented in ED Course.                                      Clinical Impression:  Final diagnoses:  [E86.0] Dehydration (Primary)          ED Disposition Condition    Discharge Stable          ED Prescriptions    None       Follow-up Information       Follow up With Specialties Details Why Contact Info    Merry Leary MD Internal Medicine Schedule an appointment as soon as possible for a visit   [address] [address]  [phone]               Jovany Shah MD  12/17/24 3102

## 2024-12-08 ENCOUNTER — NURSE TRIAGE (OUTPATIENT)
Dept: ADMINISTRATIVE | Facility: CLINIC | Age: 84
End: 2024-12-08
Payer: MEDICARE

## 2024-12-08 NOTE — TELEPHONE ENCOUNTER
Patient currently on cipro for a UTI. She has a hx of diarrhea and c/o worsening of symptoms while on antibiotics. She was seen in the ER for dehydration on yesterday. Her daughter is calling to inquire if the antibiotics can be stopped or switched. No one is on call for Dr. Collins with urology. Patient was triaged and advised per protocol to be seen within 24 hours. Advised the patient to call back with any further questions or if symptoms worsen.       Reason for Disposition   MODERATE diarrhea (e.g., 4-6 times / day more than normal)    Additional Information   Negative: Shock suspected (e.g., cold/pale/clammy skin, too weak to stand, low BP, rapid pulse)   Negative: Difficult to awaken or acting confused (e.g., disoriented, slurred speech)   Negative: Sounds like a life-threatening emergency to the triager   Negative: SEVERE abdominal pain (e.g., excruciating)   Negative: [1] Blood in the stool AND [2] moderate or large amount of blood (e.g., any blood clots, passing blood without stool, toilet water turns red)   Negative: Black or tarry bowel movements  (Exception: Chronic-unchanged black-grey BMs AND is taking iron pills or Pepto-Bismol.)   Negative: [1] Drinking very little AND [2] dehydration suspected (e.g., no urine > 12 hours, very dry mouth, very lightheaded)   Negative: Patient sounds very sick or weak to the triager   Negative: SEVERE diarrhea (e.g., 7 or more times / day more than normal)   Negative: [1] Constant abdominal pain AND [2] present > 2 hours   Negative: Abdomen looks much more swollen than usual    Protocols used: Diarrhea on Antibiotics-A-

## 2024-12-11 ENCOUNTER — LAB VISIT (OUTPATIENT)
Dept: LAB | Facility: HOSPITAL | Age: 84
End: 2024-12-11
Attending: INTERNAL MEDICINE
Payer: MEDICARE

## 2024-12-11 DIAGNOSIS — I25.10 CORONARY ATHEROSCLEROSIS OF NATIVE CORONARY ARTERY: ICD-10-CM

## 2024-12-11 DIAGNOSIS — I50.32 CHRONIC DIASTOLIC HEART FAILURE: ICD-10-CM

## 2024-12-11 DIAGNOSIS — D63.1 ANEMIA OF CHRONIC RENAL FAILURE: ICD-10-CM

## 2024-12-11 DIAGNOSIS — I13.0 HYPERTENSIVE HEART AND RENAL DISEASE WITH CONGESTIVE HEART FAILURE: ICD-10-CM

## 2024-12-11 DIAGNOSIS — E11.22 TYPE 2 DIABETES MELLITUS WITH END-STAGE RENAL DISEASE: ICD-10-CM

## 2024-12-11 DIAGNOSIS — N18.6 TYPE 2 DIABETES MELLITUS WITH END-STAGE RENAL DISEASE: ICD-10-CM

## 2024-12-11 DIAGNOSIS — N18.9 ANEMIA OF CHRONIC RENAL FAILURE: ICD-10-CM

## 2024-12-11 DIAGNOSIS — N18.32 CHRONIC KIDNEY DISEASE (CKD) STAGE G3B/A1, MODERATELY DECREASED GLOMERULAR FILTRATION RATE (GFR) BETWEEN 30-44 ML/MIN/1.73 SQUARE METER AND ALBUMINURIA CREATININE RATIO LESS THAN 30 MG/G: ICD-10-CM

## 2024-12-11 LAB
25(OH)D3+25(OH)D2 SERPL-MCNC: 55 NG/ML (ref 30–96)
ALBUMIN SERPL BCP-MCNC: 3.1 G/DL (ref 3.5–5.2)
ALP SERPL-CCNC: 105 U/L (ref 55–135)
ALT SERPL W/O P-5'-P-CCNC: 28 U/L (ref 10–44)
ANION GAP SERPL CALC-SCNC: 6 MMOL/L (ref 3–11)
AST SERPL-CCNC: 26 U/L (ref 10–40)
BASOPHILS # BLD AUTO: 0.06 K/UL (ref 0–0.2)
BASOPHILS NFR BLD: 0.8 % (ref 0–1.9)
BILIRUB SERPL-MCNC: 0.2 MG/DL (ref 0.1–1)
BUN SERPL-MCNC: 16 MG/DL (ref 8–23)
CALCIUM SERPL-MCNC: 8.8 MG/DL (ref 8.7–10.5)
CHLORIDE SERPL-SCNC: 107 MMOL/L (ref 95–110)
CHOLEST SERPL-MCNC: 77 MG/DL (ref 120–199)
CHOLEST/HDLC SERPL: 1.8 {RATIO} (ref 2–5)
CO2 SERPL-SCNC: 24 MMOL/L (ref 23–29)
CREAT SERPL-MCNC: 1 MG/DL (ref 0.5–1.4)
DIFFERENTIAL METHOD BLD: ABNORMAL
EOSINOPHIL # BLD AUTO: 0.3 K/UL (ref 0–0.5)
EOSINOPHIL NFR BLD: 4.6 % (ref 0–8)
ERYTHROCYTE [DISTWIDTH] IN BLOOD BY AUTOMATED COUNT: 13.5 % (ref 11.5–14.5)
EST. GFR  (NO RACE VARIABLE): 56.2 ML/MIN/1.73 M^2
ESTIMATED AVG GLUCOSE: 177 MG/DL (ref 68–131)
GLUCOSE SERPL-MCNC: 106 MG/DL (ref 70–110)
HBA1C MFR BLD: 7.8 % (ref 4–5.6)
HCT VFR BLD AUTO: 36.9 % (ref 37–48.5)
HDLC SERPL-MCNC: 42 MG/DL (ref 40–75)
HDLC SERPL: 54.5 % (ref 20–50)
HGB BLD-MCNC: 12.2 G/DL (ref 12–16)
IMM GRANULOCYTES # BLD AUTO: 0.02 K/UL (ref 0–0.04)
IMM GRANULOCYTES NFR BLD AUTO: 0.3 % (ref 0–0.5)
LDLC SERPL CALC-MCNC: 14.6 MG/DL (ref 63–159)
LYMPHOCYTES # BLD AUTO: 1.7 K/UL (ref 1–4.8)
LYMPHOCYTES NFR BLD: 22.7 % (ref 18–48)
MCH RBC QN AUTO: 28.9 PG (ref 27–31)
MCHC RBC AUTO-ENTMCNC: 33.1 G/DL (ref 32–36)
MCV RBC AUTO: 87 FL (ref 82–98)
MONOCYTES # BLD AUTO: 0.5 K/UL (ref 0.3–1)
MONOCYTES NFR BLD: 7.3 % (ref 4–15)
NEUTROPHILS # BLD AUTO: 4.8 K/UL (ref 1.8–7.7)
NEUTROPHILS NFR BLD: 64.3 % (ref 38–73)
NONHDLC SERPL-MCNC: 35 MG/DL
NRBC BLD-RTO: 0 /100 WBC
PLATELET # BLD AUTO: 290 K/UL (ref 150–450)
PMV BLD AUTO: 10.5 FL (ref 9.2–12.9)
POTASSIUM SERPL-SCNC: 4.6 MMOL/L (ref 3.5–5.1)
PROT SERPL-MCNC: 7.2 G/DL (ref 6–8.4)
RBC # BLD AUTO: 4.22 M/UL (ref 4–5.4)
SODIUM SERPL-SCNC: 137 MMOL/L (ref 136–145)
T4 FREE SERPL-MCNC: 1.17 NG/DL (ref 0.71–1.51)
TRIGL SERPL-MCNC: 102 MG/DL (ref 30–150)
TSH SERPL DL<=0.005 MIU/L-ACNC: 1.67 UIU/ML (ref 0.4–4)
VIT B12 SERPL-MCNC: 739 PG/ML (ref 210–950)
WBC # BLD AUTO: 7.43 K/UL (ref 3.9–12.7)

## 2024-12-11 PROCEDURE — 80053 COMPREHEN METABOLIC PANEL: CPT | Performed by: INTERNAL MEDICINE

## 2024-12-11 PROCEDURE — 84439 ASSAY OF FREE THYROXINE: CPT | Performed by: INTERNAL MEDICINE

## 2024-12-11 PROCEDURE — 36415 COLL VENOUS BLD VENIPUNCTURE: CPT | Performed by: INTERNAL MEDICINE

## 2024-12-11 PROCEDURE — 83036 HEMOGLOBIN GLYCOSYLATED A1C: CPT | Performed by: INTERNAL MEDICINE

## 2024-12-11 PROCEDURE — 84443 ASSAY THYROID STIM HORMONE: CPT | Performed by: INTERNAL MEDICINE

## 2024-12-11 PROCEDURE — 82607 VITAMIN B-12: CPT | Mod: GA | Performed by: INTERNAL MEDICINE

## 2024-12-11 PROCEDURE — 82306 VITAMIN D 25 HYDROXY: CPT | Performed by: INTERNAL MEDICINE

## 2024-12-11 PROCEDURE — 85025 COMPLETE CBC W/AUTO DIFF WBC: CPT | Performed by: INTERNAL MEDICINE

## 2024-12-11 PROCEDURE — 80061 LIPID PANEL: CPT | Performed by: INTERNAL MEDICINE

## 2024-12-16 ENCOUNTER — OFFICE VISIT (OUTPATIENT)
Dept: UROLOGY | Facility: CLINIC | Age: 84
End: 2024-12-16
Payer: MEDICARE

## 2024-12-16 DIAGNOSIS — N39.0 URINARY TRACT INFECTION ASSOCIATED WITH CATHETERIZATION OF URINARY TRACT, UNSPECIFIED INDWELLING URINARY CATHETER TYPE, SUBSEQUENT ENCOUNTER: Primary | ICD-10-CM

## 2024-12-16 DIAGNOSIS — T83.511D URINARY TRACT INFECTION ASSOCIATED WITH CATHETERIZATION OF URINARY TRACT, UNSPECIFIED INDWELLING URINARY CATHETER TYPE, SUBSEQUENT ENCOUNTER: Primary | ICD-10-CM

## 2024-12-16 PROCEDURE — 99214 OFFICE O/P EST MOD 30 MIN: CPT | Mod: PBBFAC | Performed by: SPECIALIST

## 2024-12-16 PROCEDURE — 99999 PR PBB SHADOW E&M-EST. PATIENT-LVL IV: CPT | Mod: PBBFAC,,, | Performed by: SPECIALIST

## 2024-12-16 RX ORDER — TRIMETHOPRIM 100 MG/1
100 TABLET ORAL EVERY 12 HOURS
Qty: 90 TABLET | Refills: 3 | Status: SHIPPED | OUTPATIENT
Start: 2024-12-16

## 2024-12-16 NOTE — PROGRESS NOTES
[facility]   Clinic Note    SUBJECTIVE:     Chief Complaint   Patient presents with    Follow-up     3 month follow up       Referral from: No ref. provider found.    History of Present Illness:  Kelsea Cadet is a 84 y.o. female who presents to clinic for chronic indwelling machado.  She was recently treated for UTI.  She mentions that she is still having mild symptoms and favors continued course of antibiotics.  She is familiar with periodic urinary tract infections by nature of having a chronic indwelling Machado.  Apparently she did not previously like his suprapubic tube.    Past Medical History:   Diagnosis Date    Arthritis     Asthmatic bronchitis     Breast cancer 1996    bilateral - radiation pellets for tx    Breast cancer 01/2022    right    Colon cancer 1963    Congestive heart failure (CHF)     Coronary artery disease     Depression     Diabetes mellitus, type 2     Encounter for blood transfusion     Environmental and seasonal allergies     Machado catheter in place     Hard of hearing     Hyperlipidemia     Hypertension     IBS (irritable bowel syndrome)     Kidney disease     ckd stage 3b    Myocardial infarction 2019    Neuropathy     PONV (postoperative nausea and vomiting)     Presence of indwelling Machado catheter     Shingles     Sleep apnea     no cpap used    Stroke 1993    tia    Thyroid disease     UTI (urinary tract infection)     with sepsis    Vitamin D deficiency     Wound, open, buttock     being treated    Yeast infection        Current Outpatient Medications on File Prior to Visit   Medication Sig Dispense Refill    acarbose (PRECOSE) 100 MG Tab Take 1 tablet (100 mg total) by mouth 3 (three) times daily with meals. 270 tablet 3    albuterol (PROVENTIL/VENTOLIN HFA) 90 mcg/actuation inhaler Inhale 2 puffs into the lungs every 6 hours as needed for wheezing 18 g 11    alendronate (FOSAMAX) 70 MG tablet Take 1 tablet (70 mg total) by mouth every 7 days. 12 tablet 1    aspirin  (ECOTRIN) 81 MG EC tablet Take 81 mg by mouth once daily. Stopped 01/19/2022      atorvastatin (LIPITOR) 80 MG tablet Take 1 tablet by mouth in the evening 90 tablet 0    blood sugar diagnostic (ONETOUCH ULTRA TEST) Strp Use to check fingerstick glucose readings 1 time a day 100 each 11    carvediloL (COREG) 6.25 MG tablet Take 1 tablet (6.25 mg total) by mouth 2 (two) times daily with meals. 180 tablet 3    cholecalciferol, vitamin D3, (VITAMIN D3) 2,000 unit Tab Take 1 tablet (2,000 Units total) by mouth once daily. (Patient taking differently: Take 1 tablet by mouth twice a week. 10,000 twice a week)      clobetasoL (TEMOVATE) 0.05 % cream Apply twice a day for 2 weeks then nightly for 2 months. May decrease to three nights a week after that. 60 g 2    co-enzyme Q-10 30 mg capsule Take 1 capsule (30 mg total) by mouth every evening.      cranberry fruit extract (CRANBERRY CONCENTRATE ORAL) Take 2 capsules by mouth once daily.      dicyclomine (BENTYL) 20 mg tablet       flash glucose scanning reader (FREESTYLE JOSEPHINE 2 READER) Misc Use as instructed 1 each 1    flash glucose sensor (FREESTYLE JOSEPHINE 2 SENSOR) Kit Use as instructed 6 kit 3    FLUoxetine 10 MG capsule Take 1 capsule (10 mg total) by mouth once daily. 90 capsule 3    furosemide (LASIX) 20 MG tablet Take 1 tablet by mouth once daily 90 tablet 0    glucagon (BAQSIMI) 3 mg/actuation Spry 2 pack.  Spray one device (3 mg) in one nostril to treat severe hypoglycemia. Disp 1 box (2 devices) 2 each 3    hydroCHLOROthiazide (HYDRODIURIL) 25 MG tablet Take 1 tablet by mouth once daily 90 tablet 0    insulin aspart U-100 (NOVOLOG FLEXPEN U-100 INSULIN) 100 unit/mL (3 mL) InPn pen Take sliding scale insulin 4 times a day as instructed- max dose of 20 units/day. 15 mL 3    lancets (ONETOUCH DELICA PLUS LANCET) 33 gauge Misc Use to check glucose readings 4 times a day. 100 each 11    letrozole (FEMARA) 2.5 mg Tab Take 1 tablet (2.5 mg total) by mouth once daily. 30  "tablet 11    LIDOcaine (LIDODERM) 5 % SMARTSIG:Topical      loperamide (IMODIUM) 2 mg capsule Take 2 mg by mouth 4 (four) times daily as needed for Diarrhea.      metFORMIN (GLUCOPHAGE-XR) 500 MG ER 24hr tablet Take 1 tablet (500 mg total) by mouth daily with breakfast. 90 tablet 3    mucus clearing device (AEROBIKA OSCILLATING PEP SYSTM MISC) 12 puffs by Misc.(Non-Drug; Combo Route) route daily as needed.      multivitamin capsule Take 1 capsule by mouth once daily.      nebulizer accessories Kit Use as directed 1 kit 5    NYAMYC powder       omega-3 fatty acids/fish oil (FISH OIL-OMEGA-3 FATTY ACIDS) 300-1,000 mg capsule Take by mouth once daily.      pen needle, diabetic (BD ULTRA-FINE MICRO PEN NEEDLE) 32 gauge x 1/4" Ndle use to inject ozempic once a week 100 each 3    potassium chloride (KLOR-CON) 10 MEQ TbSR Take 99 mEq by mouth once.      SEMGLEE,INSULIN GLARG-YFGN, unit/mL (3 mL) InPn INJECT 16 UNITS SUBCUTANEOUSLY ONCE DAILY      sulfamethoxazole-trimethoprim 800-160mg (BACTRIM DS) 800-160 mg Tab Take 1 tablet by mouth 2 (two) times daily. 28 tablet 2    blood-glucose meter Misc 1 Device by Misc.(Non-Drug; Combo Route) route once daily. One Touch Dx. Code:E11.9 1 each 0    gabapentin (NEURONTIN) 600 MG tablet Take 1 tablet (600 mg total) by mouth 3 (three) times daily. 90 tablet 11    lisinopriL (PRINIVIL,ZESTRIL) 20 MG tablet Take 1 tablet (20 mg total) by mouth every evening. 30 tablet 0     No current facility-administered medications on file prior to visit.         OBJECTIVE:     Estimated body mass index is 35.19 kg/m² as calculated from the following:    Height as of 12/18/24: 5' 4" (1.626 m).    Weight as of 12/18/24: 93 kg (205 lb).    Vital Signs (Most Recent)  There were no vitals filed for this visit.    Physical Exam:    Physical Exam     GENERAL: patient sitting comfortably  HEENT: normocephalic  NECK: supple, no JVD  PULM: normal chest rise, no increased WOB  HEART: " "non-diaphoretic  ABDO: soft, nondistended, nontender  BACK: no CVA tenderness bilaterally  SKIN: warm, dry, well perfused  EXT: no bruising or edema  NEURO: grossly normal with no focal deficits  PSYCH: appropriate mood and affect    Genitourinary Exam:  Land to straight drainage urine output clear      LABS:     Lab Results   Component Value Date    BUN 16 12/18/2024    CREATININE 0.81 12/18/2024    WBC 8.90 12/18/2024    HGB 12.2 12/18/2024    HCT 36.6 (L) 12/18/2024     12/18/2024    AST 32 12/18/2024    ALT 27 12/18/2024    ALKPHOS 123 12/18/2024    ALBUMIN 3.6 12/18/2024    HGBA1C 7.8 (H) 12/11/2024    INR 1.2 10/15/2019        Urinalysis:   No results found for: "UAREFLEX"       Imaging:  I have personally reviewed all relevant imaging studies.    No results found. However, due to the size of the patient record, not all encounters were searched. Please check Results Review for a complete set of results.  No results found for this or any previous visit (from the past 2160 hours).  X-Ray Chest AP Portable  Narrative: EXAMINATION:  XR CHEST AP PORTABLE    CLINICAL HISTORY:  Shortness of breath    COMPARISON:  08/16/2024    FINDINGS:  Cardiac silhouette does not appear enlarged.  Prominent opacity at the right hilar region is unchanged from previous exams.  There are similar chronic changes of the right lung.  No definite acute infiltrate or pleural effusion.  No acute osseous finding.  Impression: Chronic lung changes without definite evidence of an acute pulmonary process.    Electronically signed by: Yasmany Sanders MD  Date:    12/04/2024  Time:    12:39         ASSESSMENT     1. Urinary tract infection associated with catheterization of urinary tract, unspecified indwelling urinary catheter type, subsequent encounter        PLAN:   .  Rx Bactrim -- one tab daily x 3 months for prophylaxis, call for breakthru UTI sx.  RTC three months    Jovany Collins MD  Urology  [facility]     Disclaimer: " This note has been generated using voice-recognition software. There may be typographical errors that have been missed during proof-reading.

## 2025-02-07 ENCOUNTER — OFFICE VISIT (OUTPATIENT)
Dept: PRIMARY CARE CLINIC | Facility: CLINIC | Age: 85
End: 2025-02-07
Payer: MEDICARE

## 2025-02-07 VITALS
HEIGHT: 64 IN | SYSTOLIC BLOOD PRESSURE: 162 MMHG | BODY MASS INDEX: 33.8 KG/M2 | DIASTOLIC BLOOD PRESSURE: 75 MMHG | HEART RATE: 75 BPM | WEIGHT: 198 LBS | OXYGEN SATURATION: 95 %

## 2025-02-07 DIAGNOSIS — J30.89 ENVIRONMENTAL AND SEASONAL ALLERGIES: ICD-10-CM

## 2025-02-07 DIAGNOSIS — Z76.89 ENCOUNTER TO ESTABLISH CARE: Primary | ICD-10-CM

## 2025-02-07 DIAGNOSIS — G89.4 CHRONIC PAIN SYNDROME: ICD-10-CM

## 2025-02-07 DIAGNOSIS — Z23 NEED FOR ZOSTER VACCINATION: ICD-10-CM

## 2025-02-07 DIAGNOSIS — Z92.89 HISTORY OF BLOOD TRANSFUSION: ICD-10-CM

## 2025-02-07 DIAGNOSIS — Z97.8 PRESENCE OF INDWELLING FOLEY CATHETER: ICD-10-CM

## 2025-02-07 DIAGNOSIS — G47.33 OSA (OBSTRUCTIVE SLEEP APNEA): ICD-10-CM

## 2025-02-07 DIAGNOSIS — E78.5 HYPERLIPIDEMIA, UNSPECIFIED HYPERLIPIDEMIA TYPE: ICD-10-CM

## 2025-02-07 DIAGNOSIS — I25.2 HISTORY OF MI (MYOCARDIAL INFARCTION): ICD-10-CM

## 2025-02-07 DIAGNOSIS — Z97.8 FOLEY CATHETER IN PLACE: ICD-10-CM

## 2025-02-07 DIAGNOSIS — Z86.73 HISTORY OF TRANSIENT ISCHEMIC ATTACK (TIA): ICD-10-CM

## 2025-02-07 DIAGNOSIS — Z23 NEED FOR PNEUMOCOCCAL 20-VALENT CONJUGATE VACCINATION: ICD-10-CM

## 2025-02-07 DIAGNOSIS — I25.10 CORONARY ARTERY DISEASE, UNSPECIFIED VESSEL OR LESION TYPE, UNSPECIFIED WHETHER ANGINA PRESENT, UNSPECIFIED WHETHER NATIVE OR TRANSPLANTED HEART: ICD-10-CM

## 2025-02-07 DIAGNOSIS — E03.9 HYPOTHYROIDISM, UNSPECIFIED TYPE: ICD-10-CM

## 2025-02-07 DIAGNOSIS — H91.90 HARD OF HEARING: ICD-10-CM

## 2025-02-07 PROCEDURE — G0009 ADMIN PNEUMOCOCCAL VACCINE: HCPCS | Mod: PBBFAC

## 2025-02-07 PROCEDURE — 99215 OFFICE O/P EST HI 40 MIN: CPT | Mod: PBBFAC,25 | Performed by: NURSE PRACTITIONER

## 2025-02-07 PROCEDURE — 99999PBSHW PR PBB SHADOW TECHNICAL ONLY FILED TO HB: Mod: PBBFAC,,,

## 2025-02-07 PROCEDURE — 90677 PCV20 VACCINE IM: CPT | Mod: PBBFAC

## 2025-02-07 PROCEDURE — 90472 IMMUNIZATION ADMIN EACH ADD: CPT | Mod: PBBFAC

## 2025-02-07 PROCEDURE — 99205 OFFICE O/P NEW HI 60 MIN: CPT | Mod: S$PBB,,, | Performed by: NURSE PRACTITIONER

## 2025-02-07 PROCEDURE — 99999 PR PBB SHADOW E&M-EST. PATIENT-LVL V: CPT | Mod: PBBFAC,,, | Performed by: NURSE PRACTITIONER

## 2025-02-07 PROCEDURE — 90750 HZV VACC RECOMBINANT IM: CPT | Mod: PBBFAC

## 2025-02-07 RX ORDER — ATORVASTATIN CALCIUM 40 MG/1
40 TABLET, FILM COATED ORAL DAILY
Qty: 90 TABLET | Refills: 3 | Status: SHIPPED | OUTPATIENT
Start: 2025-02-07 | End: 2026-02-07

## 2025-02-07 RX ORDER — GABAPENTIN 600 MG/1
600 TABLET ORAL 3 TIMES DAILY
Qty: 90 TABLET | Refills: 11 | Status: SHIPPED | OUTPATIENT
Start: 2025-02-07 | End: 2026-02-07

## 2025-02-07 RX ORDER — DEXTROMETHORPHAN HYDROBROMIDE, GUAIFENESIN 5; 100 MG/5ML; MG/5ML
650 LIQUID ORAL 2 TIMES DAILY
COMMUNITY

## 2025-02-07 RX ADMIN — PNEUMOCOCCAL 20-VALENT CONJUGATE VACCINE 0.5 ML
2.2; 2.2; 2.2; 2.2; 2.2; 2.2; 2.2; 2.2; 2.2; 2.2; 2.2; 2.2; 2.2; 2.2; 2.2; 2.2; 4.4; 2.2; 2.2; 2.2 INJECTION, SUSPENSION INTRAMUSCULAR at 04:02

## 2025-02-07 RX ADMIN — Medication 0.5 ML: at 04:02

## 2025-02-07 NOTE — PROGRESS NOTES
[facility] Note    HPI:  Kelsea Cadet is a 84 y.o. female who presents today for [facility] Care (Establish care)        Review of Systems   Constitutional: Negative.    HENT: Negative.     Eyes: Negative.    Respiratory: Negative.     Cardiovascular: Negative.    Gastrointestinal: Negative.    Genitourinary: Negative.    Musculoskeletal:  Positive for back pain and joint pain.   Skin: Negative.    Neurological: Negative.    Endo/Heme/Allergies: Negative.    Psychiatric/Behavioral: Negative.        A review of systems was performed and was negative except as noted above.    I personally reviewed allergies, past medical, surgical, social and family history and updated as appropriate.    Medications:    Current Outpatient Medications:     sulfamethoxazole-trimethoprim 800-160mg (BACTRIM DS) 800-160 mg Tab, Take 1 tablet by mouth 2 (two) times daily., Disp: 28 tablet, Rfl: 2    acarbose (PRECOSE) 100 MG Tab, Take 1 tablet (100 mg total) by mouth 3 (three) times daily with meals., Disp: 270 tablet, Rfl: 3    acetaminophen (TYLENOL) 650 MG TbSR, Take 650 mg by mouth 2 (two) times a day., Disp: , Rfl:     albuterol (PROVENTIL/VENTOLIN HFA) 90 mcg/actuation inhaler, Inhale 2 puffs into the lungs every 6 hours as needed for wheezing, Disp: 18 g, Rfl: 11    alendronate (FOSAMAX) 70 MG tablet, Take 1 tablet (70 mg total) by mouth every 7 days., Disp: 12 tablet, Rfl: 1    aspirin (ECOTRIN) 81 MG EC tablet, Take 81 mg by mouth once daily. Stopped 01/19/2022, Disp: , Rfl:     atorvastatin (LIPITOR) 40 MG tablet, Take 1 tablet (40 mg total) by mouth once daily., Disp: 90 tablet, Rfl: 3    blood sugar diagnostic (ONETOUCH ULTRA TEST) Strp, Use to check fingerstick glucose readings 1 time a day, Disp: 100 each, Rfl: 11    blood-glucose meter Misc, 1 Device by Misc.(Non-Drug; Combo Route) route once daily. One Touch Dx. Code:E11.9, Disp: 1 each, Rfl: 0    carvediloL (COREG) 6.25 MG tablet, Take 1 tablet (6.25 mg  total) by mouth 2 (two) times daily with meals., Disp: 180 tablet, Rfl: 3    cholecalciferol, vitamin D3, (VITAMIN D3) 2,000 unit Tab, Take 1 tablet (2,000 Units total) by mouth once daily. (Patient taking differently: Take 1 tablet by mouth twice a week. 10,000 twice a week), Disp: , Rfl:     clobetasoL (TEMOVATE) 0.05 % cream, Apply twice a day for 2 weeks then nightly for 2 months. May decrease to three nights a week after that., Disp: 60 g, Rfl: 2    co-enzyme Q-10 30 mg capsule, Take 1 capsule (30 mg total) by mouth every evening., Disp: , Rfl:     cranberry fruit extract (CRANBERRY CONCENTRATE ORAL), Take 2 capsules by mouth once daily., Disp: , Rfl:     dicyclomine (BENTYL) 20 mg tablet, , Disp: , Rfl:     flash glucose scanning reader (FREESTYLE JOSEPHINE 2 READER) Misc, Use as instructed, Disp: 1 each, Rfl: 1    flash glucose sensor (FREESTYLE JOSEPHINE 2 SENSOR) Kit, Use as instructed, Disp: 6 kit, Rfl: 3    FLUoxetine 10 MG capsule, Take 1 capsule (10 mg total) by mouth once daily., Disp: 90 capsule, Rfl: 3    furosemide (LASIX) 20 MG tablet, Take 1 tablet by mouth once daily, Disp: 90 tablet, Rfl: 0    gabapentin (NEURONTIN) 600 MG tablet, Take 1 tablet (600 mg total) by mouth 3 (three) times daily., Disp: 90 tablet, Rfl: 11    glucagon (BAQSIMI) 3 mg/actuation Spry, 2 pack.  Spray one device (3 mg) in one nostril to treat severe hypoglycemia. Disp 1 box (2 devices), Disp: 2 each, Rfl: 3    hydroCHLOROthiazide (HYDRODIURIL) 25 MG tablet, Take 1 tablet by mouth once daily, Disp: 90 tablet, Rfl: 0    insulin aspart U-100 (NOVOLOG FLEXPEN U-100 INSULIN) 100 unit/mL (3 mL) InPn pen, Take sliding scale insulin 4 times a day as instructed- max dose of 40 units/day., Disp: 15 mL, Rfl: 3    lancets (ONETOUCH DELICA PLUS LANCET) 33 gauge Misc, Use to check glucose readings 4 times a day., Disp: 100 each, Rfl: 11    letrozole (FEMARA) 2.5 mg Tab, Take 1 tablet (2.5 mg total) by mouth once daily., Disp: 30 tablet, Rfl:  "11    LIDOcaine (LIDODERM) 5 %, SMARTSIG:Topical, Disp: , Rfl:     lisinopriL (PRINIVIL,ZESTRIL) 20 MG tablet, Take 1 tablet (20 mg total) by mouth every evening., Disp: 30 tablet, Rfl: 0    loperamide (IMODIUM) 2 mg capsule, Take 2 mg by mouth 4 (four) times daily as needed for Diarrhea., Disp: , Rfl:     metFORMIN (GLUCOPHAGE-XR) 500 MG ER 24hr tablet, Take 1 tablet (500 mg total) by mouth daily with breakfast., Disp: 90 tablet, Rfl: 3    mucus clearing device (AEROBIKA OSCILLATING PEP SYSTM MISC), 12 puffs by Misc.(Non-Drug; Combo Route) route daily as needed. (Patient not taking: Reported on 1/3/2025), Disp: , Rfl:     multivitamin capsule, Take 1 capsule by mouth once daily., Disp: , Rfl:     NYAMYC powder, , Disp: , Rfl:     omega-3 fatty acids/fish oil (FISH OIL-OMEGA-3 FATTY ACIDS) 300-1,000 mg capsule, Take by mouth once daily., Disp: , Rfl:     pen needle, diabetic (BD ULTRA-FINE MICRO PEN NEEDLE) 32 gauge x 1/4" Ndle, use to inject ozempic once a week, Disp: 100 each, Rfl: 3    pen needle, diabetic (BD ULTRA-FINE MICRO PEN NEEDLE) 32 gauge x 1/4" Ndle, Use to inject insulin 4 to 5 times a day as instructed, Disp: 100 each, Rfl: 6    potassium chloride (KLOR-CON) 10 MEQ TbSR, Take 99 mEq by mouth once., Disp: , Rfl:     SEMGLEE,INSULIN GLARG-YFGN, unit/mL (3 mL) InPn, Inject 16 units once daily, Disp: 15 mL, Rfl: 3    Current Facility-Administered Medications:     pneumoc 20-kathia conj-dip cr(PF) (PREVNAR-20 (PF)) injection Syrg 0.5 mL, 0.5 mL, Intramuscular, 1 time in Clinic/HOD,     varicella zoster (Shingrix) IM vaccine (>/= 51 yo), 0.5 mL, Intramuscular, Once,      Health Maintenance:  Immunization History   Administered Date(s) Administered    COVID-19, MRNA, LN-S, PF (MODERNA FULL 0.5 ML DOSE) 04/08/2021, 09/29/2021    Influenza (FLUAD) - Quadrivalent - Adjuvanted - PF *Preferred* (65+) 10/23/2020    Influenza - Quadrivalent - PF *Preferred* (6 months and older) 01/14/2000, 12/11/2001, " "11/19/2002, 11/03/2003, 12/08/2005, 11/07/2008    Influenza - Trivalent - Fluzone High Dose - PF (65 years and older) 12/03/2019    Pneumococcal Conjugate - 13 Valent 05/13/2019, 10/21/2020    Pneumococcal Polysaccharide - 23 Valent 12/11/2001    Tdap 07/23/2001, 05/13/2019      Health Maintenance   Topic Date Due    Diabetic Eye Exam  Never done    Shingles Vaccine (1 of 2) Never done    RSV Vaccine (Age 60+ and Pregnant patients) (1 - 1-dose 75+ series) Never done    Pneumococcal Vaccines (Age 50+) (3 of 3 - PPSV23, PCV20 or PCV21) 12/16/2020    COVID-19 Vaccine (3 - Moderna risk series) 02/07/2026 (Originally 10/27/2021)    Hemoglobin A1c  06/11/2025    Lipid Panel  12/11/2025    Aspirin/Antiplatelet Therapy  01/03/2026    TETANUS VACCINE  05/13/2029    DEXA Scan  05/14/2034    Influenza Vaccine  Discontinued     Health Maintenance Topics with due status: Not Due       Topic Last Completion Date    TETANUS VACCINE 05/13/2019    DEXA Scan 05/14/2024    Lipid Panel 12/11/2024    Hemoglobin A1c 12/11/2024    Aspirin/Antiplatelet Therapy 01/03/2025     Health Maintenance Due   Topic Date Due    Diabetic Eye Exam  Never done    Shingles Vaccine (1 of 2) Never done    RSV Vaccine (Age 60+ and Pregnant patients) (1 - 1-dose 75+ series) Never done    Pneumococcal Vaccines (Age 50+) (3 of 3 - PPSV23, PCV20 or PCV21) 12/16/2020       PHYSICAL EXAM:  Vitals:    02/07/25 1601   BP: (!) 162/75   Patient Position: Sitting   Pulse: 75   SpO2: 95%   Weight: 89.8 kg (198 lb)   Height: 5' 4" (1.626 m)     Body mass index is 33.99 kg/m².  Physical Exam  Constitutional:       Appearance: Normal appearance. She is normal weight.   HENT:      Head: Normocephalic.      Right Ear: Tympanic membrane, ear canal and external ear normal.      Left Ear: Tympanic membrane, ear canal and external ear normal.      Nose: Nose normal.      Mouth/Throat:      Mouth: Mucous membranes are moist.   Cardiovascular:      Rate and Rhythm: Normal rate " and regular rhythm.      Pulses: Normal pulses.      Heart sounds: Normal heart sounds.   Pulmonary:      Effort: Pulmonary effort is normal.      Breath sounds: Normal breath sounds.   Musculoskeletal:         General: Normal range of motion.      Cervical back: Normal range of motion.   Skin:     General: Skin is warm and dry.   Neurological:      General: No focal deficit present.      Mental Status: She is alert and oriented to person, place, and time.          ASSESSMENT/PLAN:  1. Encounter to establish care    2. History of transient ischemic attack (TIA)    3. Environmental and seasonal allergies    4. Hard of hearing    5. Coronary artery disease, unspecified vessel or lesion type, unspecified whether angina present, unspecified whether native or transplanted heart    6. Hyperlipidemia, unspecified hyperlipidemia type  -     atorvastatin (LIPITOR) 40 MG tablet; Take 1 tablet (40 mg total) by mouth once daily.  Dispense: 90 tablet; Refill: 3    7. History of MI (myocardial infarction)    8. Land catheter in place    9. Hypothyroidism, unspecified type    10. History of blood transfusion    11. KRISTINA (obstructive sleep apnea)    12. Presence of indwelling Land catheter    13. Chronic pain syndrome  -     gabapentin (NEURONTIN) 600 MG tablet; Take 1 tablet (600 mg total) by mouth 3 (three) times daily.  Dispense: 90 tablet; Refill: 11    14. Need for pneumococcal 20-valent conjugate vaccination  -     pneumoc 20-kathia conj-dip cr(PF) (PREVNAR-20 (PF)) injection Syrg 0.5 mL    15. Need for zoster vaccination  -     varicella zoster (Shingrix) IM vaccine (>/= 51 yo)        Other than changes above, continue current medications and maintain follow up with specialists.      No follow-ups on file.   Recent Results (from the past 12 weeks)   EKG 12-lead    Collection Time: 12/04/24 11:28 AM   Result Value Ref Range    QRS Duration 158 ms    [facility] QTC Calculation 475 ms   CBC auto differential    Collection Time: 12/04/24  11:38 AM   Result Value Ref Range    WBC 7.14 3.90 - 12.70 K/uL    RBC 4.53 4.00 - 5.40 M/uL    Hemoglobin 13.0 12.0 - 16.0 g/dL    Hematocrit 39.1 37.0 - 48.5 %    MCV 86 82 - 98 fL    MCH 28.7 27.0 - 31.0 pg    MCHC 33.2 32.0 - 36.0 g/dL    RDW 13.1 11.5 - 14.5 %    Platelets 254 150 - 450 K/uL    MPV 9.8 9.2 - 12.9 fL    Immature Granulocytes 0.3 0.0 - 0.5 %    Gran # (ANC) 5.5 1.8 - 7.7 K/uL    Immature Grans (Abs) 0.02 0.00 - 0.04 K/uL    Lymph # 1.2 1.0 - 4.8 K/uL    Mono # 0.4 0.3 - 1.0 K/uL    Eos # 0.1 0.0 - 0.5 K/uL    Baso # 0.04 0.00 - 0.20 K/uL    nRBC 0 0 /100 WBC    Gran % 77.2 (H) 38.0 - 73.0 %    Lymph % 16.2 (L) 18.0 - 48.0 %    Mono % 4.9 4.0 - 15.0 %    Eosinophil % 0.8 0.0 - 8.0 %    Basophil % 0.6 0.0 - 1.9 %    Differential Method Automated    Comprehensive metabolic panel    Collection Time: 12/04/24 11:38 AM   Result Value Ref Range    Sodium 140 136 - 145 mmol/L    Potassium 4.0 3.5 - 5.1 mmol/L    Chloride 103 95 - 110 mmol/L    CO2 28 23 - 29 mmol/L    Glucose 182 (H) 70 - 110 mg/dL    BUN 15 8 - 23 mg/dL    Creatinine 1.0 0.5 - 1.4 mg/dL    Calcium 9.2 8.7 - 10.5 mg/dL    Total Protein 7.2 6.0 - 8.4 g/dL    Albumin 3.1 (L) 3.5 - 5.2 g/dL    Total Bilirubin 0.5 0.1 - 1.0 mg/dL    Alkaline Phosphatase 102 55 - 135 U/L    AST 16 10 - 40 U/L    ALT 18 10 - 44 U/L    eGFR 56.2 (A) >60 mL/min/1.73 m^2    Anion Gap 9 3 - 11 mmol/L   Troponin I High Sensitivity    Collection Time: 12/04/24 11:38 AM   Result Value Ref Range    Troponin I High Sensitivity 9.8 0.0 - 60.0 pg/mL   NT-Pro Natriuretic Peptide    Collection Time: 12/04/24 11:38 AM   Result Value Ref Range    NT-proBNP 957 5 - 1800 pg/mL   POCT COVID-19 Rapid Screening    Collection Time: 12/04/24 12:01 PM   Result Value Ref Range    POC Rapid COVID Negative Negative     Acceptable Yes    POCT Influenza A/B Molecular    Collection Time: 12/04/24 12:01 PM   Result Value Ref Range    POC Molecular Influenza A Ag Negative  Negative    POC Molecular Influenza B Ag Negative Negative     Acceptable Yes    Urinalysis, Reflex to Urine Culture Urine, Clean Catch    Collection Time: 12/04/24 12:46 PM    Specimen: Urine, Catheterized   Result Value Ref Range    Specimen UA Urine, Clean Catch     Color, UA Yellow Yellow, Straw, Neli    Appearance, UA Clear Clear    pH, UA 8.0 5.0 - 8.0    Specific Gravity, UA <=1.005 (A) 1.005 - 1.030    Protein, UA Negative Negative    Glucose, UA Negative Negative    Ketones, UA Negative Negative    Bilirubin (UA) Negative Negative    Occult Blood UA Negative Negative    Nitrite, UA Negative Negative    Urobilinogen, UA Negative <2.0 EU/dL    Leukocytes, UA 2+ (A) Negative   Urinalysis Microscopic    Collection Time: 12/04/24 12:46 PM   Result Value Ref Range    RBC, UA 3 0 - 4 /hpf    WBC, UA 27 (H) 0 - 5 /hpf    Bacteria Negative None-Occ /hpf    Squam Epithel, UA 0 /hpf    Hyaline Casts, UA 2.9 (A) 0-1/lpf /lpf    Microscopic Comment SEE COMMENT    Urine culture    Collection Time: 12/04/24 12:46 PM    Specimen: Urine   Result Value Ref Range    Urine Culture, Routine       Multiple organisms isolated. None in predominance.  Repeat if    Urine Culture, Routine clinically necessary.    CBC auto differential    Collection Time: 12/07/24  1:01 PM   Result Value Ref Range    WBC 9.29 3.90 - 12.70 K/uL    RBC 4.28 4.00 - 5.40 M/uL    Hemoglobin 12.4 12.0 - 16.0 g/dL    Hematocrit 38.2 37.0 - 48.5 %    MCV 89 82 - 98 fL    MCH 29.0 27.0 - 31.0 pg    MCHC 32.5 32.0 - 36.0 g/dL    RDW 13.3 11.5 - 14.5 %    Platelets 216 150 - 450 K/uL    MPV 10.4 9.2 - 12.9 fL    Immature Granulocytes 0.3 0.0 - 0.5 %    Gran # (ANC) 7.2 1.8 - 7.7 K/uL    Immature Grans (Abs) 0.03 0.00 - 0.04 K/uL    Lymph # 1.3 1.0 - 4.8 K/uL    Mono # 0.5 0.3 - 1.0 K/uL    Eos # 0.2 0.0 - 0.5 K/uL    Baso # 0.05 0.00 - 0.20 K/uL    nRBC 0 0 /100 WBC    Gran % 77.0 (H) 38.0 - 73.0 %    Lymph % 14.2 (L) 18.0 - 48.0 %    Mono % 5.6  4.0 - 15.0 %    Eosinophil % 2.4 0.0 - 8.0 %    Basophil % 0.5 0.0 - 1.9 %    Differential Method Automated    Comprehensive metabolic panel    Collection Time: 12/07/24  1:01 PM   Result Value Ref Range    Sodium 137 136 - 145 mmol/L    Potassium 4.4 3.5 - 5.1 mmol/L    Chloride 101 95 - 110 mmol/L    CO2 27 23 - 29 mmol/L    Glucose 199 (H) 70 - 110 mg/dL    BUN 42 (H) 8 - 23 mg/dL    Creatinine 2.0 (H) 0.5 - 1.4 mg/dL    Calcium 8.6 (L) 8.7 - 10.5 mg/dL    Total Protein 7.1 6.0 - 8.4 g/dL    Albumin 3.0 (L) 3.5 - 5.2 g/dL    Total Bilirubin 0.3 0.1 - 1.0 mg/dL    Alkaline Phosphatase 118 55 - 135 U/L    AST 31 10 - 40 U/L    ALT 24 10 - 44 U/L    eGFR 24.5 (A) >60 mL/min/1.73 m^2    Anion Gap 9 3 - 11 mmol/L   CPK    Collection Time: 12/07/24  1:01 PM   Result Value Ref Range     (H) 20 - 180 U/L   Urinalysis, Reflex to Urine Culture Urine, Clean Catch    Collection Time: 12/07/24  1:27 PM    Specimen: Urine, Catheterized   Result Value Ref Range    Specimen UA Urine, Clean Catch     Color, UA Yellow Yellow, Straw, Neli    Appearance, UA Clear Clear    pH, UA 6.0 5.0 - 8.0    Specific Gravity, UA 1.010 1.005 - 1.030    Protein, UA Negative Negative    Glucose, UA Negative Negative    Ketones, UA Negative Negative    Bilirubin (UA) Negative Negative    Occult Blood UA Negative Negative    Nitrite, UA Negative Negative    Urobilinogen, UA Negative <2.0 EU/dL    Leukocytes, UA Negative Negative   Comprehensive Metabolic Panel    Collection Time: 12/11/24 10:00 AM   Result Value Ref Range    Sodium 137 136 - 145 mmol/L    Potassium 4.6 3.5 - 5.1 mmol/L    Chloride 107 95 - 110 mmol/L    CO2 24 23 - 29 mmol/L    Glucose 106 70 - 110 mg/dL    BUN 16 8 - 23 mg/dL    Creatinine 1.0 0.5 - 1.4 mg/dL    Calcium 8.8 8.7 - 10.5 mg/dL    Total Protein 7.2 6.0 - 8.4 g/dL    Albumin 3.1 (L) 3.5 - 5.2 g/dL    Total Bilirubin 0.2 0.1 - 1.0 mg/dL    Alkaline Phosphatase 105 55 - 135 U/L    AST 26 10 - 40 U/L    ALT 28 10 -  44 U/L    eGFR 56.2 (A) >60 mL/min/1.73 m^2    Anion Gap 6 3 - 11 mmol/L   Lipid Panel    Collection Time: 12/11/24 10:00 AM   Result Value Ref Range    Cholesterol 77 (L) 120 - 199 mg/dL    Triglycerides 102 30 - 150 mg/dL    HDL 42 40 - 75 mg/dL    LDL Cholesterol 14.6 (L) 63.0 - 159.0 mg/dL    HDL/Cholesterol Ratio 54.5 (H) 20.0 - 50.0 %    Total Cholesterol/HDL Ratio 1.8 (L) 2.0 - 5.0    Non-HDL Cholesterol 35 mg/dL   CBC Auto Differential    Collection Time: 12/11/24 10:00 AM   Result Value Ref Range    WBC 7.43 3.90 - 12.70 K/uL    RBC 4.22 4.00 - 5.40 M/uL    Hemoglobin 12.2 12.0 - 16.0 g/dL    Hematocrit 36.9 (L) 37.0 - 48.5 %    MCV 87 82 - 98 fL    MCH 28.9 27.0 - 31.0 pg    MCHC 33.1 32.0 - 36.0 g/dL    RDW 13.5 11.5 - 14.5 %    Platelets 290 150 - 450 K/uL    MPV 10.5 9.2 - 12.9 fL    Immature Granulocytes 0.3 0.0 - 0.5 %    Gran # (ANC) 4.8 1.8 - 7.7 K/uL    Immature Grans (Abs) 0.02 0.00 - 0.04 K/uL    Lymph # 1.7 1.0 - 4.8 K/uL    Mono # 0.5 0.3 - 1.0 K/uL    Eos # 0.3 0.0 - 0.5 K/uL    Baso # 0.06 0.00 - 0.20 K/uL    nRBC 0 0 /100 WBC    Gran % 64.3 38.0 - 73.0 %    Lymph % 22.7 18.0 - 48.0 %    Mono % 7.3 4.0 - 15.0 %    Eosinophil % 4.6 0.0 - 8.0 %    Basophil % 0.8 0.0 - 1.9 %    Differential Method Automated    Hemoglobin A1C    Collection Time: 12/11/24 10:00 AM   Result Value Ref Range    Hemoglobin A1C 7.8 (H) 4.0 - 5.6 %    Estimated Avg Glucose 177 (H) 68 - 131 mg/dL   TSH    Collection Time: 12/11/24 10:00 AM   Result Value Ref Range    TSH 1.670 0.400 - 4.000 uIU/mL   T4, Free    Collection Time: 12/11/24 10:00 AM   Result Value Ref Range    Free T4 1.17 0.71 - 1.51 ng/dL   Vitamin B12    Collection Time: 12/11/24 10:00 AM   Result Value Ref Range    Vitamin B-12 739 210 - 950 pg/mL   Vitamin D    Collection Time: 12/11/24 10:00 AM   Result Value Ref Range    Vit D, 25-Hydroxy 55 30 - 96 ng/mL   CBC Auto Differential    Collection Time: 12/18/24 12:52 PM   Result Value Ref Range    WBC  8.90 3.90 - 12.70 K/uL    RBC 4.22 4.00 - 5.40 M/uL    Hemoglobin 12.2 12.0 - 16.0 g/dL    Hematocrit 36.6 (L) 37.0 - 48.5 %    MCV 87 82 - 98 fL    MCH 29.0 27.0 - 31.0 pg    MCHC 33.4 32.0 - 36.0 g/dL    RDW 14.5 11.5 - 14.5 %    Platelets 272 150 - 450 K/uL    MPV 7.5 7.4 - 10.4 fL    Gran # (ANC) 6.6 1.8 - 7.7 K/uL    Lymph # 1.5 1.0 - 4.8 K/uL    Mono # 0.5 0.3 - 1.0 K/uL    Eos # 0.2 0.0 - 0.5 K/uL    Baso # 0.10 0.00 - 0.20 K/uL    nRBC 0 0 /100 WBC    Gran % 73.8 (H) 38.0 - 73.0 %    Lymph % 17.1 (L) 18.0 - 48.0 %    Mono % 5.5 4.0 - 15.0 %    Eosinophil % 2.8 0.0 - 8.0 %    Basophil % 0.8 0.0 - 1.9 %    Differential Method Automated    Comprehensive Metabolic Panel    Collection Time: 12/18/24 12:52 PM   Result Value Ref Range    Sodium 137 136 - 145 mmol/L    Potassium 4.8 3.5 - 5.1 mmol/L    Chloride 102 95 - 110 mmol/L    CO2 28 23 - 29 mmol/L    Glucose 162 (H) 74 - 106 mg/dL    BUN 16 7 - 17 mg/dL    Creatinine 0.81 0.70 - 1.20 mg/dL    Calcium 9.2 8.4 - 10.2 mg/dL    Total Protein 6.9 6.3 - 8.2 g/dL    Albumin 3.6 3.5 - 5.2 g/dL    Total Bilirubin 0.4 0.2 - 1.3 mg/dL    Alkaline Phosphatase 123 38 - 145 U/L    AST 32 14 - 36 U/L    ALT 27 10 - 44 U/L    Anion Gap 7 (L) 8 - 16 mmol/L    eGFR >60 >60 mL/min/1.73 m^2         Gabby Jackson, YNES  [facility]

## 2025-02-17 DIAGNOSIS — F32.5 MAJOR DEPRESSIVE DISORDER WITH SINGLE EPISODE, IN FULL REMISSION: ICD-10-CM

## 2025-02-17 RX ORDER — FLUOXETINE 10 MG/1
10 CAPSULE ORAL DAILY
Qty: 90 CAPSULE | Refills: 3 | Status: SHIPPED | OUTPATIENT
Start: 2025-02-17

## 2025-02-17 NOTE — TELEPHONE ENCOUNTER
----- Message from Jennifer sent at 2/17/2025 10:04 AM CST -----  Regarding: [facility]

## 2025-03-03 DIAGNOSIS — I10 ESSENTIAL HYPERTENSION: ICD-10-CM

## 2025-03-03 RX ORDER — HYDROCHLOROTHIAZIDE 25 MG/1
25 TABLET ORAL DAILY
Qty: 90 TABLET | Refills: 0 | Status: SHIPPED | OUTPATIENT
Start: 2025-03-03

## 2025-03-05 DIAGNOSIS — I10 ESSENTIAL HYPERTENSION: ICD-10-CM

## 2025-03-05 DIAGNOSIS — I50.30 HEART FAILURE WITH PRESERVED EJECTION FRACTION, NYHA CLASS II: ICD-10-CM

## 2025-03-05 RX ORDER — FAMOTIDINE 20 MG/1
20 TABLET, FILM COATED ORAL 2 TIMES DAILY
COMMUNITY
Start: 2024-10-29 | End: 2025-03-05 | Stop reason: SDUPTHER

## 2025-03-05 RX ORDER — FAMOTIDINE 20 MG/1
20 TABLET, FILM COATED ORAL 2 TIMES DAILY
Qty: 60 TABLET | Refills: 11 | Status: SHIPPED | OUTPATIENT
Start: 2025-03-05

## 2025-03-05 RX ORDER — DICYCLOMINE HYDROCHLORIDE 20 MG/1
20 TABLET ORAL
Qty: 120 TABLET | Refills: 11 | Status: SHIPPED | OUTPATIENT
Start: 2025-03-05 | End: 2026-02-28

## 2025-03-05 RX ORDER — MONTELUKAST SODIUM 10 MG/1
10 TABLET ORAL DAILY
COMMUNITY
Start: 2024-11-17 | End: 2025-03-05 | Stop reason: SDUPTHER

## 2025-03-05 RX ORDER — MONTELUKAST SODIUM 10 MG/1
10 TABLET ORAL DAILY
Qty: 90 TABLET | Refills: 3 | Status: SHIPPED | OUTPATIENT
Start: 2025-03-05

## 2025-03-06 ENCOUNTER — TELEPHONE (OUTPATIENT)
Dept: PRIMARY CARE CLINIC | Facility: CLINIC | Age: 85
End: 2025-03-06
Payer: MEDICARE

## 2025-03-06 DIAGNOSIS — Z29.9 PROPHYLACTIC MEASURE: Primary | ICD-10-CM

## 2025-03-06 RX ORDER — OSELTAMIVIR PHOSPHATE 75 MG/1
75 CAPSULE ORAL DAILY
Qty: 10 CAPSULE | Refills: 0 | Status: SHIPPED | OUTPATIENT
Start: 2025-03-06 | End: 2025-03-16

## 2025-03-06 NOTE — TELEPHONE ENCOUNTER
Pt's daughter called and stated that she went to urgent care this morning and was dx w/ flu a and she is her mother's care giver wants to know if you can give her an rx

## 2025-03-11 ENCOUNTER — HOSPITAL ENCOUNTER (INPATIENT)
Facility: HOSPITAL | Age: 85
LOS: 5 days | Discharge: HOME HEALTH | DRG: 194 | End: 2025-03-17
Attending: EMERGENCY MEDICINE | Admitting: EMERGENCY MEDICINE
Payer: MEDICARE

## 2025-03-11 DIAGNOSIS — J11.1 INFLUENZA: ICD-10-CM

## 2025-03-11 DIAGNOSIS — E55.9 VITAMIN D DEFICIENCY: ICD-10-CM

## 2025-03-11 DIAGNOSIS — R09.02 HYPOXIA: ICD-10-CM

## 2025-03-11 DIAGNOSIS — R06.02 SHORTNESS OF BREATH: Primary | ICD-10-CM

## 2025-03-11 LAB
ALBUMIN SERPL BCP-MCNC: 2.8 G/DL (ref 3.5–5.2)
ALP SERPL-CCNC: 93 U/L (ref 55–135)
ALT SERPL W/O P-5'-P-CCNC: 14 U/L (ref 10–44)
ANION GAP SERPL CALC-SCNC: 12 MMOL/L (ref 8–16)
AST SERPL-CCNC: 25 U/L (ref 10–40)
BASOPHILS # BLD AUTO: 0.03 K/UL (ref 0–0.2)
BASOPHILS NFR BLD: 0.3 % (ref 0–1.9)
BILIRUB SERPL-MCNC: 0.2 MG/DL (ref 0.1–1)
BUN SERPL-MCNC: 15 MG/DL (ref 8–23)
CALCIUM SERPL-MCNC: 8.4 MG/DL (ref 8.7–10.5)
CHLORIDE SERPL-SCNC: 97 MMOL/L (ref 95–110)
CO2 SERPL-SCNC: 23 MMOL/L (ref 23–29)
CREAT SERPL-MCNC: 1 MG/DL (ref 0.5–1.4)
CTP QC/QA: YES
CTP QC/QA: YES
DIFFERENTIAL METHOD BLD: ABNORMAL
EOSINOPHIL # BLD AUTO: 0 K/UL (ref 0–0.5)
EOSINOPHIL NFR BLD: 0.3 % (ref 0–8)
ERYTHROCYTE [DISTWIDTH] IN BLOOD BY AUTOMATED COUNT: 13.4 % (ref 11.5–14.5)
EST. GFR  (NO RACE VARIABLE): 56.2 ML/MIN/1.73 M^2
GLUCOSE SERPL-MCNC: 166 MG/DL (ref 70–110)
HCT VFR BLD AUTO: 33.1 % (ref 37–48.5)
HGB BLD-MCNC: 10.9 G/DL (ref 12–16)
IMM GRANULOCYTES # BLD AUTO: 0.03 K/UL (ref 0–0.04)
IMM GRANULOCYTES NFR BLD AUTO: 0.3 % (ref 0–0.5)
LYMPHOCYTES # BLD AUTO: 1.2 K/UL (ref 1–4.8)
LYMPHOCYTES NFR BLD: 12.5 % (ref 18–48)
MCH RBC QN AUTO: 29 PG (ref 27–31)
MCHC RBC AUTO-ENTMCNC: 32.9 G/DL (ref 32–36)
MCV RBC AUTO: 88 FL (ref 82–98)
MONOCYTES # BLD AUTO: 1 K/UL (ref 0.3–1)
MONOCYTES NFR BLD: 10.8 % (ref 4–15)
NEUTROPHILS # BLD AUTO: 7.2 K/UL (ref 1.8–7.7)
NEUTROPHILS NFR BLD: 75.8 % (ref 38–73)
NRBC BLD-RTO: 0 /100 WBC
NT-PROBNP SERPL-MCNC: 1426 PG/ML (ref 5–1800)
PLATELET # BLD AUTO: 253 K/UL (ref 150–450)
PMV BLD AUTO: 10.3 FL (ref 9.2–12.9)
POC MOLECULAR INFLUENZA A AGN: POSITIVE
POC MOLECULAR INFLUENZA B AGN: NEGATIVE
POTASSIUM SERPL-SCNC: 3.5 MMOL/L (ref 3.5–5.1)
PROT SERPL-MCNC: 6.8 G/DL (ref 6–8.4)
RBC # BLD AUTO: 3.76 M/UL (ref 4–5.4)
SARS-COV-2 RDRP RESP QL NAA+PROBE: NEGATIVE
SODIUM SERPL-SCNC: 132 MMOL/L (ref 136–145)
WBC # BLD AUTO: 9.55 K/UL (ref 3.9–12.7)

## 2025-03-11 PROCEDURE — 87635 SARS-COV-2 COVID-19 AMP PRB: CPT | Performed by: EMERGENCY MEDICINE

## 2025-03-11 PROCEDURE — 96374 THER/PROPH/DIAG INJ IV PUSH: CPT

## 2025-03-11 PROCEDURE — 25000242 PHARM REV CODE 250 ALT 637 W/ HCPCS: Performed by: EMERGENCY MEDICINE

## 2025-03-11 PROCEDURE — 93005 ELECTROCARDIOGRAM TRACING: CPT

## 2025-03-11 PROCEDURE — 99900035 HC TECH TIME PER 15 MIN (STAT)

## 2025-03-11 PROCEDURE — 83880 ASSAY OF NATRIURETIC PEPTIDE: CPT | Performed by: EMERGENCY MEDICINE

## 2025-03-11 PROCEDURE — 63600175 PHARM REV CODE 636 W HCPCS: Performed by: EMERGENCY MEDICINE

## 2025-03-11 PROCEDURE — 87502 INFLUENZA DNA AMP PROBE: CPT

## 2025-03-11 PROCEDURE — 36415 COLL VENOUS BLD VENIPUNCTURE: CPT | Performed by: EMERGENCY MEDICINE

## 2025-03-11 PROCEDURE — 99900031 HC PATIENT EDUCATION (STAT)

## 2025-03-11 PROCEDURE — 94640 AIRWAY INHALATION TREATMENT: CPT

## 2025-03-11 PROCEDURE — 93010 ELECTROCARDIOGRAM REPORT: CPT | Mod: ,,, | Performed by: STUDENT IN AN ORGANIZED HEALTH CARE EDUCATION/TRAINING PROGRAM

## 2025-03-11 PROCEDURE — 51702 INSERT TEMP BLADDER CATH: CPT

## 2025-03-11 PROCEDURE — 99285 EMERGENCY DEPT VISIT HI MDM: CPT | Mod: 25

## 2025-03-11 PROCEDURE — 85025 COMPLETE CBC W/AUTO DIFF WBC: CPT | Performed by: EMERGENCY MEDICINE

## 2025-03-11 PROCEDURE — 80053 COMPREHEN METABOLIC PANEL: CPT | Performed by: EMERGENCY MEDICINE

## 2025-03-11 PROCEDURE — 94761 N-INVAS EAR/PLS OXIMETRY MLT: CPT

## 2025-03-11 RX ORDER — ONDANSETRON HYDROCHLORIDE 2 MG/ML
4 INJECTION, SOLUTION INTRAVENOUS
Status: COMPLETED | OUTPATIENT
Start: 2025-03-11 | End: 2025-03-11

## 2025-03-11 RX ORDER — IPRATROPIUM BROMIDE AND ALBUTEROL SULFATE 2.5; .5 MG/3ML; MG/3ML
3 SOLUTION RESPIRATORY (INHALATION)
Status: COMPLETED | OUTPATIENT
Start: 2025-03-11 | End: 2025-03-11

## 2025-03-11 RX ADMIN — ONDANSETRON 4 MG: 2 INJECTION INTRAMUSCULAR; INTRAVENOUS at 10:03

## 2025-03-11 RX ADMIN — IPRATROPIUM BROMIDE AND ALBUTEROL SULFATE 3 ML: .5; 3 SOLUTION RESPIRATORY (INHALATION) at 10:03

## 2025-03-12 PROBLEM — J11.1 INFLUENZA: Status: ACTIVE | Noted: 2025-03-12

## 2025-03-12 PROBLEM — E87.1 HYPONATREMIA: Status: ACTIVE | Noted: 2025-03-12

## 2025-03-12 PROBLEM — R09.02 HYPOXIA: Status: ACTIVE | Noted: 2025-03-12

## 2025-03-12 PROBLEM — R06.02 SHORTNESS OF BREATH: Status: ACTIVE | Noted: 2025-03-12

## 2025-03-12 LAB
ANION GAP SERPL CALC-SCNC: 10 MMOL/L (ref 8–16)
BACTERIA #/AREA URNS HPF: NEGATIVE /HPF
BASOPHILS # BLD AUTO: 0.02 K/UL (ref 0–0.2)
BASOPHILS NFR BLD: 0.3 % (ref 0–1.9)
BILIRUB UR QL STRIP: NEGATIVE
BUN SERPL-MCNC: 15 MG/DL (ref 8–23)
CALCIUM SERPL-MCNC: 8.1 MG/DL (ref 8.7–10.5)
CHLORIDE SERPL-SCNC: 98 MMOL/L (ref 95–110)
CLARITY UR: CLEAR
CO2 SERPL-SCNC: 25 MMOL/L (ref 23–29)
COLOR UR: YELLOW
CREAT SERPL-MCNC: 0.9 MG/DL (ref 0.5–1.4)
DIFFERENTIAL METHOD BLD: ABNORMAL
EOSINOPHIL # BLD AUTO: 0 K/UL (ref 0–0.5)
EOSINOPHIL NFR BLD: 0 % (ref 0–8)
ERYTHROCYTE [DISTWIDTH] IN BLOOD BY AUTOMATED COUNT: 13.3 % (ref 11.5–14.5)
EST. GFR  (NO RACE VARIABLE): >60 ML/MIN/1.73 M^2
ESTIMATED AVG GLUCOSE: 154 MG/DL (ref 68–131)
GLUCOSE SERPL-MCNC: 207 MG/DL (ref 70–110)
GLUCOSE SERPL-MCNC: 220 MG/DL (ref 70–110)
GLUCOSE SERPL-MCNC: 370 MG/DL (ref 70–110)
GLUCOSE UR QL STRIP: ABNORMAL
HBA1C MFR BLD: 7 % (ref 4–5.6)
HCT VFR BLD AUTO: 31.4 % (ref 37–48.5)
HGB BLD-MCNC: 10.3 G/DL (ref 12–16)
HGB UR QL STRIP: ABNORMAL
HYALINE CASTS #/AREA URNS LPF: 0 /LPF
IMM GRANULOCYTES # BLD AUTO: 0.03 K/UL (ref 0–0.04)
IMM GRANULOCYTES NFR BLD AUTO: 0.4 % (ref 0–0.5)
KETONES UR QL STRIP: NEGATIVE
LEUKOCYTE ESTERASE UR QL STRIP: ABNORMAL
LYMPHOCYTES # BLD AUTO: 1 K/UL (ref 1–4.8)
LYMPHOCYTES NFR BLD: 12.4 % (ref 18–48)
MCH RBC QN AUTO: 29.3 PG (ref 27–31)
MCHC RBC AUTO-ENTMCNC: 32.8 G/DL (ref 32–36)
MCV RBC AUTO: 89 FL (ref 82–98)
MICROSCOPIC COMMENT: ABNORMAL
MONOCYTES # BLD AUTO: 0.3 K/UL (ref 0.3–1)
MONOCYTES NFR BLD: 4.3 % (ref 4–15)
NEUTROPHILS # BLD AUTO: 6.3 K/UL (ref 1.8–7.7)
NEUTROPHILS NFR BLD: 82.6 % (ref 38–73)
NITRITE UR QL STRIP: NEGATIVE
NRBC BLD-RTO: 0 /100 WBC
OHS QRS DURATION: 160 MS
OHS QTC CALCULATION: 491 MS
PH UR STRIP: 7 [PH] (ref 5–8)
PLATELET # BLD AUTO: 250 K/UL (ref 150–450)
PMV BLD AUTO: 9.9 FL (ref 9.2–12.9)
POCT GLUCOSE: 261 MG/DL (ref 70–110)
POTASSIUM SERPL-SCNC: 3.4 MMOL/L (ref 3.5–5.1)
PROT UR QL STRIP: ABNORMAL
RBC # BLD AUTO: 3.52 M/UL (ref 4–5.4)
RBC #/AREA URNS HPF: 4 /HPF (ref 0–4)
SODIUM SERPL-SCNC: 133 MMOL/L (ref 136–145)
SP GR UR STRIP: 1.01 (ref 1–1.03)
SQUAMOUS #/AREA URNS HPF: 5 /HPF
URN SPEC COLLECT METH UR: ABNORMAL
UROBILINOGEN UR STRIP-ACNC: NEGATIVE EU/DL
WBC # BLD AUTO: 7.65 K/UL (ref 3.9–12.7)
WBC #/AREA URNS HPF: 14 /HPF (ref 0–5)
YEAST URNS QL MICRO: ABNORMAL

## 2025-03-12 PROCEDURE — 25000242 PHARM REV CODE 250 ALT 637 W/ HCPCS: Performed by: EMERGENCY MEDICINE

## 2025-03-12 PROCEDURE — 25000003 PHARM REV CODE 250: Performed by: EMERGENCY MEDICINE

## 2025-03-12 PROCEDURE — 87040 BLOOD CULTURE FOR BACTERIA: CPT | Performed by: EMERGENCY MEDICINE

## 2025-03-12 PROCEDURE — 21400001 HC TELEMETRY ROOM

## 2025-03-12 PROCEDURE — 87086 URINE CULTURE/COLONY COUNT: CPT | Performed by: INTERNAL MEDICINE

## 2025-03-12 PROCEDURE — 25000003 PHARM REV CODE 250: Performed by: INTERNAL MEDICINE

## 2025-03-12 PROCEDURE — 27000207 HC ISOLATION

## 2025-03-12 PROCEDURE — 63600175 PHARM REV CODE 636 W HCPCS: Performed by: EMERGENCY MEDICINE

## 2025-03-12 PROCEDURE — 99900035 HC TECH TIME PER 15 MIN (STAT)

## 2025-03-12 PROCEDURE — 85025 COMPLETE CBC W/AUTO DIFF WBC: CPT | Performed by: EMERGENCY MEDICINE

## 2025-03-12 PROCEDURE — 94640 AIRWAY INHALATION TREATMENT: CPT

## 2025-03-12 PROCEDURE — 94799 UNLISTED PULMONARY SVC/PX: CPT

## 2025-03-12 PROCEDURE — 99900031 HC PATIENT EDUCATION (STAT)

## 2025-03-12 PROCEDURE — 81000 URINALYSIS NONAUTO W/SCOPE: CPT | Performed by: INTERNAL MEDICINE

## 2025-03-12 PROCEDURE — 83036 HEMOGLOBIN GLYCOSYLATED A1C: CPT | Performed by: EMERGENCY MEDICINE

## 2025-03-12 PROCEDURE — 27000221 HC OXYGEN, UP TO 24 HOURS

## 2025-03-12 PROCEDURE — 94761 N-INVAS EAR/PLS OXIMETRY MLT: CPT

## 2025-03-12 PROCEDURE — 80048 BASIC METABOLIC PNL TOTAL CA: CPT | Performed by: EMERGENCY MEDICINE

## 2025-03-12 PROCEDURE — 63600175 PHARM REV CODE 636 W HCPCS: Performed by: INTERNAL MEDICINE

## 2025-03-12 PROCEDURE — 36415 COLL VENOUS BLD VENIPUNCTURE: CPT | Performed by: EMERGENCY MEDICINE

## 2025-03-12 RX ORDER — ENOXAPARIN SODIUM 100 MG/ML
40 INJECTION SUBCUTANEOUS EVERY 24 HOURS
Status: DISCONTINUED | OUTPATIENT
Start: 2025-03-12 | End: 2025-03-17 | Stop reason: HOSPADM

## 2025-03-12 RX ORDER — SODIUM CHLORIDE 0.9 % (FLUSH) 0.9 %
10 SYRINGE (ML) INJECTION
Status: DISCONTINUED | OUTPATIENT
Start: 2025-03-12 | End: 2025-03-17 | Stop reason: HOSPADM

## 2025-03-12 RX ORDER — DEXAMETHASONE SODIUM PHOSPHATE 4 MG/ML
8 INJECTION, SOLUTION INTRA-ARTICULAR; INTRALESIONAL; INTRAMUSCULAR; INTRAVENOUS; SOFT TISSUE
Status: COMPLETED | OUTPATIENT
Start: 2025-03-12 | End: 2025-03-12

## 2025-03-12 RX ORDER — LOPERAMIDE HYDROCHLORIDE 2 MG/1
2 CAPSULE ORAL 4 TIMES DAILY PRN
Status: DISCONTINUED | OUTPATIENT
Start: 2025-03-12 | End: 2025-03-17 | Stop reason: HOSPADM

## 2025-03-12 RX ORDER — POTASSIUM CHLORIDE 20 MEQ/1
40 TABLET, EXTENDED RELEASE ORAL ONCE
Status: COMPLETED | OUTPATIENT
Start: 2025-03-12 | End: 2025-03-12

## 2025-03-12 RX ORDER — OSELTAMIVIR PHOSPHATE 75 MG/1
75 CAPSULE ORAL 2 TIMES DAILY
Status: DISCONTINUED | OUTPATIENT
Start: 2025-03-12 | End: 2025-03-12

## 2025-03-12 RX ORDER — MONTELUKAST SODIUM 10 MG/1
10 TABLET ORAL DAILY
Status: DISCONTINUED | OUTPATIENT
Start: 2025-03-13 | End: 2025-03-17 | Stop reason: HOSPADM

## 2025-03-12 RX ORDER — FAMOTIDINE 20 MG/1
20 TABLET, FILM COATED ORAL DAILY
Status: DISCONTINUED | OUTPATIENT
Start: 2025-03-12 | End: 2025-03-16

## 2025-03-12 RX ORDER — IPRATROPIUM BROMIDE AND ALBUTEROL SULFATE 2.5; .5 MG/3ML; MG/3ML
3 SOLUTION RESPIRATORY (INHALATION) EVERY 4 HOURS
Status: DISCONTINUED | OUTPATIENT
Start: 2025-03-12 | End: 2025-03-13

## 2025-03-12 RX ORDER — OSELTAMIVIR PHOSPHATE 75 MG/1
75 CAPSULE ORAL ONCE
Status: COMPLETED | OUTPATIENT
Start: 2025-03-12 | End: 2025-03-12

## 2025-03-12 RX ORDER — IBUPROFEN 200 MG
16 TABLET ORAL
Status: DISCONTINUED | OUTPATIENT
Start: 2025-03-12 | End: 2025-03-17 | Stop reason: HOSPADM

## 2025-03-12 RX ORDER — CEFEPIME HYDROCHLORIDE 1 G/1
1 INJECTION, POWDER, FOR SOLUTION INTRAMUSCULAR; INTRAVENOUS
Status: DISCONTINUED | OUTPATIENT
Start: 2025-03-12 | End: 2025-03-14

## 2025-03-12 RX ORDER — ACETAMINOPHEN 325 MG/1
650 TABLET ORAL EVERY 8 HOURS PRN
Status: DISCONTINUED | OUTPATIENT
Start: 2025-03-12 | End: 2025-03-17 | Stop reason: HOSPADM

## 2025-03-12 RX ORDER — GUAIFENESIN 600 MG/1
600 TABLET, EXTENDED RELEASE ORAL 2 TIMES DAILY
Status: DISCONTINUED | OUTPATIENT
Start: 2025-03-12 | End: 2025-03-13

## 2025-03-12 RX ORDER — TALC
6 POWDER (GRAM) TOPICAL NIGHTLY PRN
Status: DISCONTINUED | OUTPATIENT
Start: 2025-03-12 | End: 2025-03-17 | Stop reason: HOSPADM

## 2025-03-12 RX ORDER — OSELTAMIVIR PHOSPHATE 30 MG/1
30 CAPSULE ORAL 2 TIMES DAILY
Status: COMPLETED | OUTPATIENT
Start: 2025-03-12 | End: 2025-03-17

## 2025-03-12 RX ORDER — IBUPROFEN 200 MG
24 TABLET ORAL
Status: DISCONTINUED | OUTPATIENT
Start: 2025-03-12 | End: 2025-03-17 | Stop reason: HOSPADM

## 2025-03-12 RX ORDER — POLYETHYLENE GLYCOL 3350 17 G/17G
17 POWDER, FOR SOLUTION ORAL DAILY
Status: DISCONTINUED | OUTPATIENT
Start: 2025-03-12 | End: 2025-03-17 | Stop reason: HOSPADM

## 2025-03-12 RX ORDER — ONDANSETRON HYDROCHLORIDE 2 MG/ML
4 INJECTION, SOLUTION INTRAVENOUS EVERY 8 HOURS PRN
Status: DISCONTINUED | OUTPATIENT
Start: 2025-03-12 | End: 2025-03-17 | Stop reason: HOSPADM

## 2025-03-12 RX ORDER — FLUOXETINE 10 MG/1
10 CAPSULE ORAL DAILY
Status: DISCONTINUED | OUTPATIENT
Start: 2025-03-13 | End: 2025-03-17 | Stop reason: HOSPADM

## 2025-03-12 RX ORDER — INSULIN ASPART 100 [IU]/ML
0-10 INJECTION, SOLUTION INTRAVENOUS; SUBCUTANEOUS
Status: DISCONTINUED | OUTPATIENT
Start: 2025-03-12 | End: 2025-03-17 | Stop reason: HOSPADM

## 2025-03-12 RX ORDER — GLUCAGON 1 MG
1 KIT INJECTION
Status: DISCONTINUED | OUTPATIENT
Start: 2025-03-12 | End: 2025-03-17 | Stop reason: HOSPADM

## 2025-03-12 RX ORDER — GUAIFENESIN AND DEXTROMETHORPHAN HYDROBROMIDE 10; 100 MG/5ML; MG/5ML
10 SYRUP ORAL EVERY 4 HOURS PRN
Status: DISCONTINUED | OUTPATIENT
Start: 2025-03-12 | End: 2025-03-17 | Stop reason: HOSPADM

## 2025-03-12 RX ORDER — ATORVASTATIN CALCIUM 40 MG/1
40 TABLET, FILM COATED ORAL DAILY
Status: DISCONTINUED | OUTPATIENT
Start: 2025-03-13 | End: 2025-03-17 | Stop reason: HOSPADM

## 2025-03-12 RX ADMIN — IPRATROPIUM BROMIDE AND ALBUTEROL SULFATE 3 ML: .5; 3 SOLUTION RESPIRATORY (INHALATION) at 03:03

## 2025-03-12 RX ADMIN — CEFEPIME 1 G: 1 INJECTION, POWDER, FOR SOLUTION INTRAMUSCULAR; INTRAVENOUS at 06:03

## 2025-03-12 RX ADMIN — DEXAMETHASONE SODIUM PHOSPHATE 8 MG: 4 INJECTION, SOLUTION INTRA-ARTICULAR; INTRALESIONAL; INTRAMUSCULAR; INTRAVENOUS; SOFT TISSUE at 02:03

## 2025-03-12 RX ADMIN — CEFEPIME 1 G: 1 INJECTION, POWDER, FOR SOLUTION INTRAMUSCULAR; INTRAVENOUS at 09:03

## 2025-03-12 RX ADMIN — POTASSIUM CHLORIDE 40 MEQ: 1500 TABLET, EXTENDED RELEASE ORAL at 09:03

## 2025-03-12 RX ADMIN — ONDANSETRON 4 MG: 2 INJECTION INTRAMUSCULAR; INTRAVENOUS at 07:03

## 2025-03-12 RX ADMIN — ENOXAPARIN SODIUM 40 MG: 40 INJECTION SUBCUTANEOUS at 06:03

## 2025-03-12 RX ADMIN — OSELTAMIVIR PHOSPHATE 30 MG: 30 CAPSULE ORAL at 10:03

## 2025-03-12 RX ADMIN — IPRATROPIUM BROMIDE AND ALBUTEROL SULFATE 3 ML: .5; 3 SOLUTION RESPIRATORY (INHALATION) at 08:03

## 2025-03-12 RX ADMIN — FAMOTIDINE 20 MG: 20 TABLET, FILM COATED ORAL at 09:03

## 2025-03-12 RX ADMIN — GUAIFENESIN AND DEXTROMETHORPHAN 10 ML: 20; 200 SYRUP ORAL at 06:03

## 2025-03-12 RX ADMIN — OSELTAMAVIR PHOSPHATE 75 MG: 75 CAPSULE ORAL at 09:03

## 2025-03-12 RX ADMIN — IPRATROPIUM BROMIDE AND ALBUTEROL SULFATE 3 ML: .5; 3 SOLUTION RESPIRATORY (INHALATION) at 07:03

## 2025-03-12 RX ADMIN — GUAIFENESIN AND DEXTROMETHORPHAN 10 ML: 20; 200 SYRUP ORAL at 10:03

## 2025-03-12 RX ADMIN — CEFEPIME 1 G: 1 INJECTION, POWDER, FOR SOLUTION INTRAMUSCULAR; INTRAVENOUS at 02:03

## 2025-03-12 RX ADMIN — GUAIFENESIN 600 MG: 600 TABLET, EXTENDED RELEASE ORAL at 08:03

## 2025-03-12 RX ADMIN — ACETAMINOPHEN 650 MG: 325 TABLET ORAL at 04:03

## 2025-03-12 RX ADMIN — GUAIFENESIN 600 MG: 600 TABLET, EXTENDED RELEASE ORAL at 09:03

## 2025-03-12 RX ADMIN — LOPERAMIDE HYDROCHLORIDE 2 MG: 2 CAPSULE ORAL at 10:03

## 2025-03-12 RX ADMIN — IPRATROPIUM BROMIDE AND ALBUTEROL SULFATE 3 ML: .5; 3 SOLUTION RESPIRATORY (INHALATION) at 11:03

## 2025-03-12 RX ADMIN — INSULIN ASPART 6 UNITS: 100 INJECTION, SOLUTION INTRAVENOUS; SUBCUTANEOUS at 06:03

## 2025-03-12 RX ADMIN — ONDANSETRON 4 MG: 2 INJECTION INTRAMUSCULAR; INTRAVENOUS at 10:03

## 2025-03-12 RX ADMIN — SODIUM CHLORIDE 1000 MG: 9 INJECTION, SOLUTION INTRAVENOUS at 05:03

## 2025-03-12 RX ADMIN — ACETAMINOPHEN 650 MG: 325 TABLET ORAL at 12:03

## 2025-03-12 RX ADMIN — VANCOMYCIN HYDROCHLORIDE 1250 MG: 1.25 INJECTION, POWDER, LYOPHILIZED, FOR SOLUTION INTRAVENOUS at 03:03

## 2025-03-12 RX ADMIN — INSULIN ASPART 5 UNITS: 100 INJECTION, SOLUTION INTRAVENOUS; SUBCUTANEOUS at 08:03

## 2025-03-12 NOTE — PROGRESS NOTES
Pharmacist Renal Dose Adjustment Note    Kelsea Cadet is a 85 y.o. female being treated with the medication oseltamivir    Patient Data:    Vital Signs (Most Recent):  Temp: 98.3 °F (36.8 °C) (03/12/25 0720)  Pulse: 77 (03/12/25 0836)  Resp: (!) 42 (03/12/25 0836)  BP: (!) 153/72 (03/12/25 0836)  SpO2: (!) 94 % (03/12/25 0836) Vital Signs (72h Range):  Temp:  [98.3 °F (36.8 °C)-98.7 °F (37.1 °C)]   Pulse:  [64-77]   Resp:  [10-42]   BP: (123-159)/()   SpO2:  [90 %-97 %]      Recent Labs   Lab 03/11/25  2214 03/12/25  0632   CREATININE 1.0 0.9     Serum creatinine: 0.9 mg/dL 03/12/25 0632  Estimated creatinine clearance: 51.5 mL/min    Medication:oseltamivir 75 mg BID will be changed to medication:oseltamivir 75 mg x 1, then 30 mg BID   Pharmacist's Name: ERIC ROBB  Pharmacist's Extension: [phone]

## 2025-03-12 NOTE — HPI
84-year-old female presents to the emergency department for evaluation due to cough and progressive shortness of breath for the last 5 days. The patient reports that she was exposed to her daughter who has the flu. The patient's primary care provider as been treatment patient prophylactically with Tamiflu due to her influenza exposure   Pt has had some chills and nausea with decreased pp intake

## 2025-03-12 NOTE — ED PROVIDER NOTES
Encounter Date: 3/11/2025       History     Chief Complaint   Patient presents with    Shortness of Breath     Pt to the ER w/ complaints of  cough and sob x 5 days. Placed on tamiflu byu pcp after family member tested positive for flu.       84-year-old female presents to the emergency department for evaluation due to cough and progressive shortness of breath for the last 5 days.  The patient reports that she was exposed to her daughter who has the flu.  The patient's primary care provider as been treatment patient prophylactically with Tamiflu due to her influenza exposure.        Review of patient's allergies indicates:   Allergen Reactions    Macrobid [nitrofurantoin monohyd/m-cryst] Swelling    Metformin Swelling    Ciprofloxacin Other (See Comments)     GI problems    Codeine Other (See Comments)     headache    Latex, natural rubber Rash     Past Medical History:   Diagnosis Date    Arthritis     Coronary artery disease     Environmental and seasonal allergies     Land catheter in place     Hard of hearing     Hyperlipidemia     Presence of indwelling Land catheter     Vitamin D deficiency      Past Surgical History:   Procedure Laterality Date    BLADDER SUSPENSION      BREAST BIOPSY Bilateral 1996    BREAST SURGERY      lumpectomy, isotopes    CHOLECYSTECTOMY      had gal bladder removed    COLECTOMY      had part of colon removed    CYSTOSCOPY W/ RETROGRADES Bilateral 12/19/2019    Procedure: CYSTOSCOPY, WITH RETROGRADE PYELOGRAM;  Surgeon: Prasad Rocha MD;  Location: [facility] OR;  Service: Urology;  Laterality: Bilateral;    DILATION OF URETHRA N/A 12/19/2019    Procedure: DILATION, URETHRA;  Surgeon: Prasad Rocha MD;  Location: [facility] OR;  Service: Urology;  Laterality: N/A;    HYSTERECTOMY      LEFT HEART CATHETERIZATION Left 11/27/2019    Procedure: Left heart cath;  Surgeon: Urban Paiz MD;  Location: [facility] CATH LAB;  Service: Cardiology;  Laterality: Left;    MODIFIED RADICAL MASTECTOMY  W/ AXILLARY LYMPH NODE DISSECTION Right 1/26/2022    Procedure: MASTECTOMY, MODIFIED RADICAL;  Surgeon: Keisha Cortez MD;  Location: [facility] OR;  Service: General;  Laterality: Right;    OOPHORECTOMY      SENTINEL LYMPH NODE BIOPSY Right 1/26/2022    Procedure: BIOPSY, LYMPH NODE, SENTINEL;  Surgeon: Keisha Cortez MD;  Location: [facility] OR;  Service: General;  Laterality: Right;  0800    SIMPLE MASTECTOMY Left 1/26/2022    Procedure: MASTECTOMY, SIMPLE;  Surgeon: Keisha Cortez MD;  Location: [facility] OR;  Service: General;  Laterality: Left;  FROZEN SECTION     Family History   Problem Relation Name Age of Onset    Cancer Mother  42        Bone    Bone cancer Mother      Breast cancer Mother      Heart disease Mother      Cancer Father          Bone    Bone cancer Father  86    Breast cancer Sister      Bell's palsy Sister      Breast cancer Sister       Social History[1]  Review of Systems   Respiratory:  Positive for cough and shortness of breath.    All other systems reviewed and are negative.      Physical Exam     Initial Vitals [03/11/25 2141]   BP Pulse Resp Temp SpO2   (!) 143/100 74 (!) 22 98.7 °F (37.1 °C) (!) 93 %      MAP       --         Physical Exam    Nursing note and vitals reviewed.  Constitutional: She appears well-developed and well-nourished.   HENT:   Head: Normocephalic and atraumatic.   Eyes: EOM are normal. Pupils are equal, round, and reactive to light.   Neck: Neck supple.   Normal range of motion.  Cardiovascular:  Normal rate, regular rhythm and normal heart sounds.     Exam reveals no gallop and no friction rub.       No murmur heard.  Pulmonary/Chest: Effort normal. She has wheezes.   Abdominal: Abdomen is soft. Bowel sounds are normal. She exhibits no distension. There is no abdominal tenderness.   Musculoskeletal:         General: Normal range of motion.      Cervical back: Normal range of motion and neck supple.     Neurological: She is alert and oriented to person, place, and  time. GCS score is 15. GCS eye subscore is 4. GCS verbal subscore is 5. GCS motor subscore is 6.   Skin: Skin is warm and dry. No rash noted.   Psychiatric: She has a normal mood and affect.         ED Course   Procedures  Labs Reviewed   CBC W/ AUTO DIFFERENTIAL - Abnormal       Result Value    WBC 9.55      RBC 3.76 (*)     Hemoglobin 10.9 (*)     Hematocrit 33.1 (*)     MCV 88      MCH 29.0      MCHC 32.9      RDW 13.4      Platelets 253      MPV 10.3      Immature Granulocytes 0.3      Gran # (ANC) 7.2      Immature Grans (Abs) 0.03      Lymph # 1.2      Mono # 1.0      Eos # 0.0      Baso # 0.03      nRBC 0      Gran % 75.8 (*)     Lymph % 12.5 (*)     Mono % 10.8      Eosinophil % 0.3      Basophil % 0.3      Differential Method Automated     COMPREHENSIVE METABOLIC PANEL - Abnormal    Sodium 132 (*)     Potassium 3.5      Chloride 97      CO2 23      Glucose 166 (*)     BUN 15      Creatinine 1.0      Calcium 8.4 (*)     Total Protein 6.8      Albumin 2.8 (*)     Total Bilirubin 0.2      Alkaline Phosphatase 93      AST 25      ALT 14      eGFR 56.2 (*)     Anion Gap 12     POCT INFLUENZA A/B MOLECULAR - Abnormal    POC Molecular Influenza A Ag Positive (*)     POC Molecular Influenza B Ag Negative       Acceptable Yes     CULTURE, BLOOD   CULTURE, BLOOD   NT-PRO NATRIURETIC PEPTIDE    NT-proBNP 1426     HEMOGLOBIN A1C   SARS-COV-2 RDRP GENE    POC Rapid COVID Negative       Acceptable Yes      Narrative:     ..This test utilizes isothermal nucleic acid amplification technology to detect the SARS-CoV-2 RdRp nucleic acid segment. The analytical sensitivity (limit of detection) is 500 copies/swab.     A POSITIVE result is indicative of the presence of SARS-CoV-2 RNA; clinical correlation with patient history and other diagnostic information is necessary to determine patient infection status.    A NEGATIVE result means that SARS-CoV-2 nucleic acids are not present above the limit of  detection. A NEGATIVE result should be treated as presumptive. It does not rule out the possibility of COVID-19 and should not be the sole basis for treatment decisions. If COVID-19 is strongly suspected based on clinical and exposure history, re-testing using an alternate molecular assay should be considered.     Commercial kits are provided by [employer].       POCT GLUCOSE MONITORING CONTINUOUS     EKG Readings: (Independently Interpreted)   Initial Reading: No STEMI. Rhythm: Normal Sinus Rhythm. Heart Rate: 71. Ectopy: No Ectopy. Conduction: RBBB. ST Segments: Normal ST Segments. T Waves Flipped: V1 and V2. Axis: Right Axis Deviation. Clinical Impression: Normal Sinus Rhythm with RBBB       Imaging Results              X-Ray Chest 1 View (Preliminary result)  Result time 03/11/25 22:53:26      Wet Read by Keny Solis DO (03/11/25 22:53:26, [facility] Emergency Department, Emergency Medicine)    Chronic right perihilar consolidation as previously identified on prior x-rays.  No pneumothorax.  No definite acute infiltrate identified.                                     Medications   glucose chewable tablet 16 g (has no administration in time range)   glucose chewable tablet 24 g (has no administration in time range)   dextrose 50% injection 12.5 g (has no administration in time range)   dextrose 50% injection 25 g (has no administration in time range)   glucagon (human recombinant) injection 1 mg (has no administration in time range)   insulin aspart U-100 pen 0-10 Units (has no administration in time range)   oseltamivir capsule 75 mg (has no administration in time range)   albuterol-ipratropium 2.5 mg-0.5 mg/3 mL nebulizer solution 3 mL (3 mLs Nebulization Given 3/11/25 2208)   ondansetron injection 4 mg (4 mg Intravenous Given 3/11/25 2255)     Medical Decision Making  Amount and/or Complexity of Data Reviewed  Labs: ordered.  Radiology: ordered and independent interpretation performed.    Risk  OTC  drugs.  Prescription drug management.  Decision regarding hospitalization.               ED Course as of 03/12/25 0124   Wed Mar 12, 2025   0025 The patient is influenza positive.  The patient is requiring continuous supplemental oxygen therapy for treatment of hypoxia.  When the patient is off of supplemental oxygen, her oxygen saturation drops to 85% on room air.  Admit. [DH]   0107 Discussed with Dr. Mina who accepts admission. Advised to initiate IV antibiotics. [DH]      ED Course User Index  [DH] Keny Solis DO                           Clinical Impression:  Final diagnoses:  [R06.02] Shortness of breath (Primary)  [R09.02] Hypoxia  [J11.1] Influenza          ED Disposition Condition    Admit Stable                  [1]   Social History  Tobacco Use    Smoking status: Never    Smokeless tobacco: Never   Substance Use Topics    Alcohol use: Not Currently     Comment: rare    Drug use: Never        Keny Solis DO  03/12/25 0124

## 2025-03-12 NOTE — PROGRESS NOTES
"Pharmacokinetic Initial Assessment: IV Vancomycin    Assessment/Plan:    Initiate intravenous vancomycin with loading dose of 2250 mg once followed by a maintenance dose of vancomycin 1500mg IV every 24 hours  Desired empiric serum trough concentration is 10 to 20 mcg/mL  Draw vancomycin trough level 60 min prior to third dose on 3/14 at approximately 0500  Pharmacy will continue to follow and monitor vancomycin.      Please contact pharmacy with any questions regarding this assessment.     Thank you for the consult,   Isabela Beeer       Patient brief summary:  Kelsea Cadet is a 85 y.o. female initiated on antimicrobial therapy with IV Vancomycin for treatment of suspected lower respiratory infection    Drug Allergies:   Review of patient's allergies indicates:   Allergen Reactions    Macrobid [nitrofurantoin monohyd/m-cryst] Swelling    Metformin Swelling    Ciprofloxacin Other (See Comments)     GI problems    Codeine Other (See Comments)     headache    Latex, natural rubber Rash       Actual Body Weight:   90.3kg    Renal Function:   Estimated Creatinine Clearance: 46.4 mL/min (based on SCr of 1 mg/dL).,     Dialysis Method (if applicable):  N/A    CBC (last 72 hours):  Recent Labs   Lab Result Units 03/11/25  2214   WBC K/uL 9.55   Hemoglobin g/dL 10.9*   Hematocrit % 33.1*   Platelets K/uL 253   Gran % % 75.8*   Lymph % % 12.5*   Mono % % 10.8   Eosinophil % % 0.3   Basophil % % 0.3   Differential Method  Automated       Metabolic Panel (last 72 hours):  Recent Labs   Lab Result Units 03/11/25  2214   Sodium mmol/L 132*   Potassium mmol/L 3.5   Chloride mmol/L 97   CO2 mmol/L 23   Glucose mg/dL 166*   BUN mg/dL 15   Creatinine mg/dL 1.0   Albumin g/dL 2.8*   Total Bilirubin mg/dL 0.2   Alkaline Phosphatase U/L 93   AST U/L 25   ALT U/L 14       Drug levels (last 3 results):  No results for input(s): "VANCOMYCINRA", "VANCORANDOM", "VANCOMYCINPE", "VANCOPEAK", "VANCOMYCINTR", "VANCOTROUGH" in the last 72 " hours.    Microbiologic Results:  Microbiology Results (last 7 days)       Procedure Component Value Units Date/Time    Blood Culture #2 **CANNOT BE ORDERED STAT** [[id number]] Collected: 03/12/25 0155    Order Status: Sent Specimen: Blood from Peripheral, Wrist, Right     Blood Culture #1 **CANNOT BE ORDERED STAT** [[id number]] Collected: 03/12/25 0148    Order Status: Sent Specimen: Blood from Peripheral, Forearm, Left

## 2025-03-12 NOTE — PLAN OF CARE
[facility]  Initial Discharge Assessment       Primary Care Provider: Gabby Jackson NP    Admission Diagnosis: Shortness of breath [R06.02]  Hypoxia [R09.02]  Influenza [J11.1]    Admission Date: 3/11/2025  Expected Discharge Date:          Payor: MEDICARE / Plan: MEDICARE PART A & B / Product Type: Government /     Extended Emergency Contact Information  Primary Emergency Contact: Elizabeth Reed   [facility]  Home Phone: [phone]  Mobile Phone: [phone]  Relation: Daughter    Discharge Plan A: Home with family, Home Health  Discharge Plan B: Other, Skilled Nursing Facility (TBD)      [facility] [address] [place] [address] [address] [facility] [address] [facility] [address]  [address]  [facility] [address]  Phone: [phone] Fax: [phone]      Initial Assessment (most recent)       Adult Discharge Assessment - 03/12/25 1556          Discharge Assessment    Assessment Type Discharge Planning Assessment     Confirmed/corrected address, phone number and insurance Yes     Confirmed Demographics Correct on Facesheet     Source of Information patient     When was your last doctors appointment? 02/07/25     Reason For Admission Shortness of Breath     People in Home child(eloy), adult;grandchild(eloy)     Do you expect to return to your current living situation? Yes     Do you have help at home or someone to help you manage your care at home? Yes     Who are your caregiver(s) and their phone number(s)? ,Elizabeth (Daughter)  [phone] (Mobile)     Prior to hospitilization cognitive status: Alert/Oriented     Current cognitive status: Alert/Oriented     Walking or Climbing Stairs Difficulty yes     Walking or Climbing Stairs ambulation difficulty, requires equipment;ambulation difficulty, assistance 1 person;stair climbing difficulty, assistance 1 person     Mobility Management rollator     Dressing/Bathing Difficulty yes     Dressing/Bathing bathing difficulty, requires equipment;bathing difficulty,  assistance 1 person;dressing difficulty, assistance 1 person     Dressing/Bathing Management shower chair     Do you have any problems with: Errands/Grocery;Needs other help     Home Accessibility stairs to enter home     Number of Stairs, Main Entrance one   Daughter reports that there is a threshold to get into their apartment.    Home Layout Able to live on 1st floor     Equipment Currently Used at Home rollator;bedside commode;glucometer;urinary catheter supplies;blood pressure machine;shower chair;hospital bed;lift device;other (see comments);grab bar;raised toilet   bedside commode x2    Readmission within 30 days? No     Patient currently being followed by outpatient case management? Unable to determine (comments)     Do you currently have service(s) that help you manage your care at home? Yes     Name and Contact number of agency [facility]     Is the pt/caregiver preference to resume services with current agency Yes     Do you take prescription medications? Yes     Do you have prescription coverage? Yes     Coverage [employer]     Do you have any problems affording any of your prescribed medications? TBD     Is the patient taking medications as prescribed? yes     Who is going to help you get home at discharge? family     How do you get to doctors appointments? family or friend will provide     Are you on dialysis? No     Do you take coumadin? No     Discharge Plan A Home with family;Home Health     Discharge Plan B Other;Skilled Nursing Facility   TBD    DME Needed Upon Discharge  other (see comments)   TBD    Discharge Plan discussed with: Adult children        Physical Activity    On average, how many days per week do you engage in moderate to strenuous exercise (like a brisk walk)? 0 days     On average, how many minutes do you engage in exercise at this level? 0 min        Financial Resource Strain    How hard is it for you to pay for the very basics like food, housing, medical care, and heating? Not  hard at all        Housing Stability    In the last 12 months, was there a time when you were not able to pay the mortgage or rent on time? No     At any time in the past 12 months, were you homeless or living in a shelter (including now)? No        Transportation Needs    Has the lack of transportation kept you from medical appointments, meetings, work or from getting things needed for daily living? No        Food Insecurity    Within the past 12 months, you worried that your food would run out before you got the money to buy more. Never true     Within the past 12 months, the food you bought just didn't last and you didn't have money to get more. Never true        Stress    Do you feel stress - tense, restless, nervous, or anxious, or unable to sleep at night because your mind is troubled all the time - these days? To some extent        Social Isolation    How often do you feel lonely or isolated from those around you?  Never        Alcohol Use    Q1: How often do you have a drink containing alcohol? Monthly or less     Q2: How many drinks containing alcohol do you have on a typical day when you are drinking? Patient does not drink     Q3: How often do you have six or more drinks on one occasion? Never        Utilities    In the past 12 months has the electric, gas, oil, or water company threatened to shut off services in your home? No        Health Literacy    How often do you need to have someone help you when you read instructions, pamphlets, or other written material from your doctor or pharmacy? Rarely                 Discharge assessment completed with the  patient's daughter, Elizabeth, via phone. The patient is from home. She lives with her adult daughter and two grandsons.The patient does require assistance with her ADL. She also has durable medical equipment for her care. The patient currently has home health with [facility], and she plans to resume care with this agency. I spoke to the patient's daughter  about possible skilled nursing home placement if the patient would need it, and she was open. I instructed the patient's daughter to please contact me if she has any questions or concerns.        SHRAVAN Mullen, LMSW  [facility] Case Management Department  Office: [phone]   Office II: [phone]   Fax: [phone]

## 2025-03-12 NOTE — ED NOTES
Changed patient's soiled diaper, provided perineal care, and placed clean diaper. Placed a statlock on right anterior thigh for patient's urinary catheter (there wasn't one present beforehand). Patient resting comfortably at this time. Denies further needs. Side rails up.

## 2025-03-12 NOTE — PROGRESS NOTES
Pharmacist Renal Dose Adjustment Note    Kelsea Cadet is a 85 y.o. female being treated with the medication pepcid    Patient Data:    Vital Signs (Most Recent):  Temp: 98.7 °F (37.1 °C) (03/11/25 2141)  Pulse: 69 (03/12/25 0152)  Resp: 18 (03/12/25 0152)  BP: (!) 130/59 (03/12/25 0152)  SpO2: 96 % (03/12/25 0152) Vital Signs (72h Range):  Temp:  [98.7 °F (37.1 °C)]   Pulse:  [65-74]   Resp:  [18-27]   BP: (123-159)/()   SpO2:  [93 %-97 %]      Recent Labs   Lab 03/11/25 2214   CREATININE 1.0     Serum creatinine: 1 mg/dL 03/11/25 2214  Estimated creatinine clearance: 46.4 mL/min    Medication:pepcid dose: 20mg frequency bid will be changed to medication:pepcid dose:20mg frequency:daily    Pharmacist's Name: Isabela Alas  Pharmacist's Extension:

## 2025-03-13 LAB
ALBUMIN SERPL BCP-MCNC: 2.5 G/DL (ref 3.5–5.2)
ALP SERPL-CCNC: 80 U/L (ref 55–135)
ALT SERPL W/O P-5'-P-CCNC: 11 U/L (ref 10–44)
ANION GAP SERPL CALC-SCNC: 10 MMOL/L (ref 8–16)
AST SERPL-CCNC: 22 U/L (ref 10–40)
BASOPHILS # BLD AUTO: 0.03 K/UL (ref 0–0.2)
BASOPHILS NFR BLD: 0.3 % (ref 0–1.9)
BILIRUB SERPL-MCNC: 0.2 MG/DL (ref 0.1–1)
BUN SERPL-MCNC: 18 MG/DL (ref 8–23)
CALCIUM SERPL-MCNC: 8.3 MG/DL (ref 8.7–10.5)
CHLORIDE SERPL-SCNC: 101 MMOL/L (ref 95–110)
CO2 SERPL-SCNC: 23 MMOL/L (ref 23–29)
CREAT SERPL-MCNC: 0.9 MG/DL (ref 0.5–1.4)
DIFFERENTIAL METHOD BLD: ABNORMAL
EOSINOPHIL # BLD AUTO: 0 K/UL (ref 0–0.5)
EOSINOPHIL NFR BLD: 0.2 % (ref 0–8)
ERYTHROCYTE [DISTWIDTH] IN BLOOD BY AUTOMATED COUNT: 13.3 % (ref 11.5–14.5)
EST. GFR  (NO RACE VARIABLE): >60 ML/MIN/1.73 M^2
GLUCOSE SERPL-MCNC: 169 MG/DL (ref 70–110)
HCT VFR BLD AUTO: 32.1 % (ref 37–48.5)
HGB BLD-MCNC: 10.6 G/DL (ref 12–16)
IMM GRANULOCYTES # BLD AUTO: 0.04 K/UL (ref 0–0.04)
IMM GRANULOCYTES NFR BLD AUTO: 0.4 % (ref 0–0.5)
LYMPHOCYTES # BLD AUTO: 1.3 K/UL (ref 1–4.8)
LYMPHOCYTES NFR BLD: 13.7 % (ref 18–48)
MAGNESIUM SERPL-MCNC: 1.6 MG/DL (ref 1.6–2.6)
MCH RBC QN AUTO: 28.9 PG (ref 27–31)
MCHC RBC AUTO-ENTMCNC: 33 G/DL (ref 32–36)
MCV RBC AUTO: 88 FL (ref 82–98)
MONOCYTES # BLD AUTO: 0.8 K/UL (ref 0.3–1)
MONOCYTES NFR BLD: 8 % (ref 4–15)
NEUTROPHILS # BLD AUTO: 7.2 K/UL (ref 1.8–7.7)
NEUTROPHILS NFR BLD: 77.4 % (ref 38–73)
NRBC BLD-RTO: 0 /100 WBC
PLATELET # BLD AUTO: 257 K/UL (ref 150–450)
PMV BLD AUTO: 10.7 FL (ref 9.2–12.9)
POCT GLUCOSE: 165 MG/DL (ref 70–110)
POCT GLUCOSE: 212 MG/DL (ref 70–110)
POTASSIUM SERPL-SCNC: 3.8 MMOL/L (ref 3.5–5.1)
PROT SERPL-MCNC: 6.2 G/DL (ref 6–8.4)
RBC # BLD AUTO: 3.67 M/UL (ref 4–5.4)
SODIUM SERPL-SCNC: 134 MMOL/L (ref 136–145)
WBC # BLD AUTO: 9.33 K/UL (ref 3.9–12.7)

## 2025-03-13 PROCEDURE — 80053 COMPREHEN METABOLIC PANEL: CPT | Performed by: INTERNAL MEDICINE

## 2025-03-13 PROCEDURE — 25000242 PHARM REV CODE 250 ALT 637 W/ HCPCS: Performed by: INTERNAL MEDICINE

## 2025-03-13 PROCEDURE — 63600175 PHARM REV CODE 636 W HCPCS: Performed by: INTERNAL MEDICINE

## 2025-03-13 PROCEDURE — 27000221 HC OXYGEN, UP TO 24 HOURS

## 2025-03-13 PROCEDURE — 99900035 HC TECH TIME PER 15 MIN (STAT)

## 2025-03-13 PROCEDURE — 85025 COMPLETE CBC W/AUTO DIFF WBC: CPT | Performed by: INTERNAL MEDICINE

## 2025-03-13 PROCEDURE — 25000242 PHARM REV CODE 250 ALT 637 W/ HCPCS: Performed by: EMERGENCY MEDICINE

## 2025-03-13 PROCEDURE — 25000003 PHARM REV CODE 250: Performed by: INTERNAL MEDICINE

## 2025-03-13 PROCEDURE — 25000003 PHARM REV CODE 250: Performed by: EMERGENCY MEDICINE

## 2025-03-13 PROCEDURE — 83735 ASSAY OF MAGNESIUM: CPT | Performed by: INTERNAL MEDICINE

## 2025-03-13 PROCEDURE — 36415 COLL VENOUS BLD VENIPUNCTURE: CPT | Performed by: INTERNAL MEDICINE

## 2025-03-13 PROCEDURE — 63600175 PHARM REV CODE 636 W HCPCS: Performed by: EMERGENCY MEDICINE

## 2025-03-13 PROCEDURE — 94640 AIRWAY INHALATION TREATMENT: CPT

## 2025-03-13 PROCEDURE — 94761 N-INVAS EAR/PLS OXIMETRY MLT: CPT

## 2025-03-13 PROCEDURE — 27000207 HC ISOLATION

## 2025-03-13 PROCEDURE — 21400001 HC TELEMETRY ROOM

## 2025-03-13 PROCEDURE — 99900031 HC PATIENT EDUCATION (STAT)

## 2025-03-13 RX ORDER — LISINOPRIL 20 MG/1
20 TABLET ORAL NIGHTLY
Status: DISCONTINUED | OUTPATIENT
Start: 2025-03-13 | End: 2025-03-15

## 2025-03-13 RX ORDER — IBUPROFEN 600 MG/1
600 TABLET ORAL EVERY 6 HOURS PRN
Status: DISCONTINUED | OUTPATIENT
Start: 2025-03-14 | End: 2025-03-17 | Stop reason: HOSPADM

## 2025-03-13 RX ORDER — FUROSEMIDE 10 MG/ML
20 INJECTION INTRAMUSCULAR; INTRAVENOUS ONCE
Status: COMPLETED | OUTPATIENT
Start: 2025-03-13 | End: 2025-03-13

## 2025-03-13 RX ORDER — PROCHLORPERAZINE EDISYLATE 5 MG/ML
2.5 INJECTION INTRAMUSCULAR; INTRAVENOUS EVERY 6 HOURS PRN
Status: DISCONTINUED | OUTPATIENT
Start: 2025-03-13 | End: 2025-03-17 | Stop reason: HOSPADM

## 2025-03-13 RX ORDER — IPRATROPIUM BROMIDE AND ALBUTEROL SULFATE 2.5; .5 MG/3ML; MG/3ML
3 SOLUTION RESPIRATORY (INHALATION)
Status: DISCONTINUED | OUTPATIENT
Start: 2025-03-13 | End: 2025-03-15

## 2025-03-13 RX ORDER — FUROSEMIDE 20 MG/1
20 TABLET ORAL DAILY
Status: DISCONTINUED | OUTPATIENT
Start: 2025-03-13 | End: 2025-03-17 | Stop reason: HOSPADM

## 2025-03-13 RX ORDER — MUPIROCIN 20 MG/G
OINTMENT TOPICAL 2 TIMES DAILY
Status: DISCONTINUED | OUTPATIENT
Start: 2025-03-13 | End: 2025-03-17 | Stop reason: HOSPADM

## 2025-03-13 RX ORDER — GUAIFENESIN 600 MG/1
1200 TABLET, EXTENDED RELEASE ORAL 2 TIMES DAILY
Status: DISCONTINUED | OUTPATIENT
Start: 2025-03-13 | End: 2025-03-17 | Stop reason: HOSPADM

## 2025-03-13 RX ADMIN — FAMOTIDINE 20 MG: 20 TABLET, FILM COATED ORAL at 09:03

## 2025-03-13 RX ADMIN — IPRATROPIUM BROMIDE AND ALBUTEROL SULFATE 3 ML: 2.5; .5 SOLUTION RESPIRATORY (INHALATION) at 08:03

## 2025-03-13 RX ADMIN — IBUPROFEN 600 MG: 600 TABLET ORAL at 11:03

## 2025-03-13 RX ADMIN — PROCHLORPERAZINE EDISYLATE 2.5 MG: 5 INJECTION INTRAMUSCULAR; INTRAVENOUS at 10:03

## 2025-03-13 RX ADMIN — VANCOMYCIN HYDROCHLORIDE 1500 MG: 1.5 INJECTION, POWDER, LYOPHILIZED, FOR SOLUTION INTRAVENOUS at 05:03

## 2025-03-13 RX ADMIN — MUPIROCIN: 20 OINTMENT TOPICAL at 09:03

## 2025-03-13 RX ADMIN — IPRATROPIUM BROMIDE AND ALBUTEROL SULFATE 3 ML: .5; 3 SOLUTION RESPIRATORY (INHALATION) at 11:03

## 2025-03-13 RX ADMIN — MONTELUKAST 10 MG: 10 TABLET, FILM COATED ORAL at 09:03

## 2025-03-13 RX ADMIN — GUAIFENESIN 1200 MG: 600 TABLET, EXTENDED RELEASE ORAL at 08:03

## 2025-03-13 RX ADMIN — FUROSEMIDE 20 MG: 10 INJECTION, SOLUTION INTRAMUSCULAR; INTRAVENOUS at 12:03

## 2025-03-13 RX ADMIN — IPRATROPIUM BROMIDE AND ALBUTEROL SULFATE 3 ML: .5; 3 SOLUTION RESPIRATORY (INHALATION) at 12:03

## 2025-03-13 RX ADMIN — OSELTAMIVIR PHOSPHATE 30 MG: 30 CAPSULE ORAL at 08:03

## 2025-03-13 RX ADMIN — GUAIFENESIN 1200 MG: 600 TABLET, EXTENDED RELEASE ORAL at 09:03

## 2025-03-13 RX ADMIN — ENOXAPARIN SODIUM 40 MG: 40 INJECTION SUBCUTANEOUS at 05:03

## 2025-03-13 RX ADMIN — IPRATROPIUM BROMIDE AND ALBUTEROL SULFATE 3 ML: .5; 3 SOLUTION RESPIRATORY (INHALATION) at 03:03

## 2025-03-13 RX ADMIN — POLYETHYLENE GLYCOL (3350) 17 G: 17 POWDER, FOR SOLUTION ORAL at 09:03

## 2025-03-13 RX ADMIN — IPRATROPIUM BROMIDE AND ALBUTEROL SULFATE 3 ML: .5; 3 SOLUTION RESPIRATORY (INHALATION) at 07:03

## 2025-03-13 RX ADMIN — CEFEPIME 1 G: 1 INJECTION, POWDER, FOR SOLUTION INTRAMUSCULAR; INTRAVENOUS at 01:03

## 2025-03-13 RX ADMIN — LISINOPRIL 20 MG: 20 TABLET ORAL at 08:03

## 2025-03-13 RX ADMIN — ONDANSETRON 4 MG: 2 INJECTION INTRAMUSCULAR; INTRAVENOUS at 07:03

## 2025-03-13 RX ADMIN — GUAIFENESIN AND DEXTROMETHORPHAN 10 ML: 20; 200 SYRUP ORAL at 08:03

## 2025-03-13 RX ADMIN — FLUOXETINE HYDROCHLORIDE 10 MG: 10 CAPSULE ORAL at 09:03

## 2025-03-13 RX ADMIN — FUROSEMIDE 20 MG: 20 TABLET ORAL at 09:03

## 2025-03-13 RX ADMIN — CEFEPIME 1 G: 1 INJECTION, POWDER, FOR SOLUTION INTRAMUSCULAR; INTRAVENOUS at 09:03

## 2025-03-13 RX ADMIN — INSULIN ASPART 4 UNITS: 100 INJECTION, SOLUTION INTRAVENOUS; SUBCUTANEOUS at 03:03

## 2025-03-13 RX ADMIN — CEFEPIME 1 G: 1 INJECTION, POWDER, FOR SOLUTION INTRAMUSCULAR; INTRAVENOUS at 06:03

## 2025-03-13 RX ADMIN — ACETAMINOPHEN 650 MG: 325 TABLET ORAL at 11:03

## 2025-03-13 RX ADMIN — ACETAMINOPHEN 650 MG: 325 TABLET ORAL at 10:03

## 2025-03-13 RX ADMIN — OSELTAMIVIR PHOSPHATE 30 MG: 30 CAPSULE ORAL at 09:03

## 2025-03-13 RX ADMIN — ONDANSETRON 4 MG: 2 INJECTION INTRAMUSCULAR; INTRAVENOUS at 03:03

## 2025-03-13 RX ADMIN — GUAIFENESIN AND DEXTROMETHORPHAN 10 ML: 20; 200 SYRUP ORAL at 09:03

## 2025-03-13 RX ADMIN — ATORVASTATIN CALCIUM 40 MG: 40 TABLET, FILM COATED ORAL at 09:03

## 2025-03-13 NOTE — PLAN OF CARE
Plan of care reviewed and ongoing with patient and daughter at bedside. 20 gauge IV to R FA saline locked/flushes well, 3L NC tolerating well, tele electrodes and continuous pulse ox connections intact. Land catheter in place, draining properly into drainage device, free of loops/kinks. Tolerated medications whole, no difficulty noted. Blood glucose monitored per orders via pt dexcom to L UA. PRN short acting insulin administered per MAR. One time dose of Lasix administered during the night due to pt feelings SOB with increase wheezing noted. Call light and personal items within reach of patient.       Problem: Adult Inpatient Plan of Care  Goal: Plan of Care Review  Outcome: Not Progressing  Goal: Patient-Specific Goal (Individualized)  Outcome: Not Progressing  Goal: Absence of Hospital-Acquired Illness or Injury  Outcome: Not Progressing  Goal: Optimal Comfort and Wellbeing  Outcome: Not Progressing  Goal: Readiness for Transition of Care  Outcome: Not Progressing     Problem: Infection  Goal: Absence of Infection Signs and Symptoms  Outcome: Not Progressing     Problem: Diabetes Comorbidity  Goal: Blood Glucose Level Within Targeted Range  Outcome: Not Progressing     Problem: Wound  Goal: Optimal Coping  Outcome: Not Progressing  Goal: Optimal Functional Ability  Outcome: Not Progressing  Goal: Absence of Infection Signs and Symptoms  Outcome: Not Progressing  Goal: Improved Oral Intake  Outcome: Not Progressing  Goal: Optimal Pain Control and Function  Outcome: Not Progressing  Goal: Skin Health and Integrity  Outcome: Not Progressing  Goal: Optimal Wound Healing  Outcome: Not Progressing

## 2025-03-13 NOTE — ASSESSMENT & PLAN NOTE
Hyponatremia is likely due to Dehydration/hypovolemia. The patient's most recent sodium results are listed below.  Recent Labs     03/11/25  2214 03/12/25  0632 03/13/25  0549   * 133* 134*     Encourage po intake - monitor daily    3/13 sodium level improved

## 2025-03-13 NOTE — PROGRESS NOTES
[facility] Medicine  Progress Note    Patient Name: Kelsea Cadet  MRN: [id number]  Patient Class: IP- Inpatient   Admission Date: 3/11/2025  Length of Stay: 1 days  Attending Physician: Harika Mina MD  Primary Care Provider: Gabby Jackson NP        Subjective     Principal Problem:Hypoxia        HPI:  84-year-old female presents to the emergency department for evaluation due to cough and progressive shortness of breath for the last 5 days. The patient reports that she was exposed to her daughter who has the flu. The patient's primary care provider as been treatment patient prophylactically with Tamiflu due to her influenza exposure   Pt has had some chills and nausea with decreased pp intake    Overview/Hospital Course:  3/13 ND pt with cough this am - causign some nuasea - did require dose of lasix last night due to worsening sob and wheezing - diuresed well    Past Medical History:   Diagnosis Date    Arthritis     Coronary artery disease     Environmental and seasonal allergies     Land catheter in place     Hard of hearing     Hyperlipidemia     Presence of indwelling Land catheter     Vitamin D deficiency        Past Surgical History:   Procedure Laterality Date    BLADDER SUSPENSION      BREAST BIOPSY Bilateral 1996    BREAST SURGERY      lumpectomy, isotopes    CHOLECYSTECTOMY      had gal bladder removed    COLECTOMY      had part of colon removed    CYSTOSCOPY W/ RETROGRADES Bilateral 12/19/2019    Procedure: CYSTOSCOPY, WITH RETROGRADE PYELOGRAM;  Surgeon: Prasad Rocha MD;  Location: [facility] OR;  Service: Urology;  Laterality: Bilateral;    DILATION OF URETHRA N/A 12/19/2019    Procedure: DILATION, URETHRA;  Surgeon: Prasad Rocha MD;  Location: [facility] OR;  Service: Urology;  Laterality: N/A;    HYSTERECTOMY      LEFT HEART CATHETERIZATION Left 11/27/2019    Procedure: Left heart cath;  Surgeon: Urban Paiz MD;  Location: [facility] CATH LAB;  Service: Cardiology;   Laterality: Left;    MODIFIED RADICAL MASTECTOMY W/ AXILLARY LYMPH NODE DISSECTION Right 1/26/2022    Procedure: MASTECTOMY, MODIFIED RADICAL;  Surgeon: Keisha Cortez MD;  Location: [facility] OR;  Service: General;  Laterality: Right;    OOPHORECTOMY      SENTINEL LYMPH NODE BIOPSY Right 1/26/2022    Procedure: BIOPSY, LYMPH NODE, SENTINEL;  Surgeon: Keisha Cortez MD;  Location: [facility] OR;  Service: General;  Laterality: Right;  0800    SIMPLE MASTECTOMY Left 1/26/2022    Procedure: MASTECTOMY, SIMPLE;  Surgeon: Keisha Cortez MD;  Location: [facility] OR;  Service: General;  Laterality: Left;  FROZEN SECTION       Review of patient's allergies indicates:   Allergen Reactions    Macrobid [nitrofurantoin monohyd/m-cryst] Swelling    Metformin Swelling    Ciprofloxacin Other (See Comments)     GI problems    Codeine Other (See Comments)     headache    Latex, natural rubber Rash       No current facility-administered medications on file prior to encounter.     Current Outpatient Medications on File Prior to Encounter   Medication Sig    acarbose (PRECOSE) 100 MG Tab Take 1 tablet (100 mg total) by mouth 3 (three) times daily with meals.    acetaminophen (TYLENOL) 650 MG TbSR Take 650 mg by mouth 2 (two) times a day.    albuterol (PROVENTIL/VENTOLIN HFA) 90 mcg/actuation inhaler Inhale 2 puffs into the lungs every 6 hours as needed for wheezing    alendronate (FOSAMAX) 70 MG tablet Take 1 tablet (70 mg total) by mouth every 7 days.    aspirin (ECOTRIN) 81 MG EC tablet Take 81 mg by mouth once daily. Stopped 01/19/2022    atorvastatin (LIPITOR) 40 MG tablet Take 1 tablet (40 mg total) by mouth once daily.    blood sugar diagnostic (ONETOUCH ULTRA TEST) Strp Use to check fingerstick glucose readings 1 time a day    blood-glucose meter Misc 1 Device by Misc.(Non-Drug; Combo Route) route once daily. One Touch Dx. Code:E11.9    carvediloL (COREG) 6.25 MG tablet Take 1 tablet (6.25 mg total) by mouth 2 (two) times daily with  meals.    cholecalciferol, vitamin D3, (VITAMIN D3) 2,000 unit Tab Take 1 tablet (2,000 Units total) by mouth once daily. (Patient taking differently: Take 1 tablet by mouth twice a week. 10,000 twice a week)    clobetasoL (TEMOVATE) 0.05 % cream Apply twice a day for 2 weeks then nightly for 2 months. May decrease to three nights a week after that.    co-enzyme Q-10 30 mg capsule Take 1 capsule (30 mg total) by mouth every evening.    cranberry fruit extract (CRANBERRY CONCENTRATE ORAL) Take 2 capsules by mouth once daily.    dicyclomine (BENTYL) 20 mg tablet Take 1 tablet (20 mg total) by mouth 4 (four) times daily before meals and nightly.    famotidine (PEPCID) 20 MG tablet Take 1 tablet (20 mg total) by mouth 2 (two) times daily.    flash glucose scanning reader (FREESTYLE JOSEPHINE 2 READER) Misc Use as instructed    flash glucose sensor (FREESTYLE JOSEPHINE 2 SENSOR) Kit Use as instructed    FLUoxetine 10 MG capsule Take 1 capsule (10 mg total) by mouth once daily.    furosemide (LASIX) 20 MG tablet Take 1 tablet by mouth once daily    gabapentin (NEURONTIN) 600 MG tablet Take 1 tablet (600 mg total) by mouth 3 (three) times daily.    glucagon (BAQSIMI) 3 mg/actuation Spry 2 pack.  Spray one device (3 mg) in one nostril to treat severe hypoglycemia. Disp 1 box (2 devices)    hydroCHLOROthiazide (HYDRODIURIL) 25 MG tablet Take 1 tablet (25 mg total) by mouth once daily.    insulin aspart U-100 (NOVOLOG FLEXPEN U-100 INSULIN) 100 unit/mL (3 mL) InPn pen Take sliding scale insulin 4 times a day as instructed- max dose of 40 units/day.    lancets (ONETOUCH DELICA PLUS LANCET) 33 gauge Misc Use to check glucose readings 4 times a day.    letrozole (FEMARA) 2.5 mg Tab Take 1 tablet (2.5 mg total) by mouth once daily.    LIDOcaine (LIDODERM) 5 % SMARTSIG:Topical    lisinopriL (PRINIVIL,ZESTRIL) 20 MG tablet Take 1 tablet (20 mg total) by mouth every evening.    loperamide (IMODIUM) 2 mg capsule Take 2 mg by mouth 4 (four)  "times daily as needed for Diarrhea.    metFORMIN (GLUCOPHAGE-XR) 500 MG ER 24hr tablet Take 1 tablet (500 mg total) by mouth daily with breakfast.    montelukast (SINGULAIR) 10 mg tablet Take 1 tablet (10 mg total) by mouth once daily.    mucus clearing device (AEROBIKA OSCILLATING PEP SYSTM MISC) 12 puffs by Misc.(Non-Drug; Combo Route) route daily as needed. (Patient not taking: Reported on 1/3/2025)    multivitamin capsule Take 1 capsule by mouth once daily.    NYAMYC powder     omega-3 fatty acids/fish oil (FISH OIL-OMEGA-3 FATTY ACIDS) 300-1,000 mg capsule Take by mouth once daily.    oseltamivir (TAMIFLU) 75 MG capsule Take 1 capsule (75 mg total) by mouth once daily. for 10 days    pen needle, diabetic (BD ULTRA-FINE MICRO PEN NEEDLE) 32 gauge x 1/4" Ndle Use to inject insulin 4 to 5 times a day as instructed    pen needle, diabetic (BD ULTRA-FINE MICRO PEN NEEDLE) 32 gauge x 1/4" Ndle use to inject ozempic once a week    potassium chloride (KLOR-CON) 10 MEQ TbSR Take 99 mEq by mouth once.    SEMGLEE,INSULIN GLARG-YFGN, unit/mL (3 mL) InPn Inject 16 units once daily    sulfamethoxazole-trimethoprim 800-160mg (BACTRIM DS) 800-160 mg Tab Take 1 tablet by mouth 2 (two) times daily.     Family History       Problem Relation (Age of Onset)    Bell's palsy Sister    Bone cancer Mother, Father (86)    Breast cancer Mother, Sister, Sister    Cancer Mother (42), Father    Heart disease Mother          Tobacco Use    Smoking status: Never    Smokeless tobacco: Never   Substance and Sexual Activity    Alcohol use: Not Currently     Comment: rare    Drug use: Never    Sexual activity: Not Currently     Partners: Male     Comment:      Review of Systems   Constitutional:  Positive for activity change, chills and fatigue. Negative for fever.   HENT:  Negative for postnasal drip, sinus pain and sore throat.    Respiratory:  Positive for cough and shortness of breath.    Cardiovascular:  Negative for chest pain, " palpitations and leg swelling.   Gastrointestinal:  Positive for diarrhea and nausea. Negative for abdominal pain.   Musculoskeletal:  Negative for back pain and myalgias.   Neurological:  Positive for weakness. Negative for syncope.   Psychiatric/Behavioral:  Negative for agitation and decreased concentration.      Objective:     Vital Signs (Most Recent):  Temp: 98.4 °F (36.9 °C) (03/13/25 0319)  Pulse: 74 (03/13/25 0711)  Resp: 20 (03/13/25 0710)  BP: (!) 179/70 (03/13/25 0319)  SpO2: (!) 92 % (03/13/25 0710) Vital Signs (24h Range):  Temp:  [97.8 °F (36.6 °C)-98.4 °F (36.9 °C)] 98.4 °F (36.9 °C)  Pulse:  [63-88] 74  Resp:  [17-28] 20  SpO2:  [92 %-98 %] 92 %  BP: (144-179)/(70-83) 179/70     Weight: 89.7 kg (197 lb 11.2 oz)  Body mass index is 33.94 kg/m².     Physical Exam  Constitutional:       Appearance: She is ill-appearing.   HENT:      Nose: Nose normal.      Mouth/Throat:      Mouth: Mucous membranes are moist.   Eyes:      Extraocular Movements: Extraocular movements intact.      Pupils: Pupils are equal, round, and reactive to light.   Cardiovascular:      Rate and Rhythm: Normal rate and regular rhythm.   Pulmonary:      Breath sounds: Rhonchi present.   Abdominal:      General: Bowel sounds are normal.      Palpations: Abdomen is soft.   Genitourinary:     Comments: Land with yellow urine  Musculoskeletal:         General: Normal range of motion.   Skin:     General: Skin is warm.   Neurological:      General: No focal deficit present.              CRANIAL NERVES     CN III, IV, VI   Pupils are equal, round, and reactive to light.       Significant Labs: All pertinent labs within the past 24 hours have been reviewed.  Recent Lab Results         03/13/25  0549   03/12/25  2357   03/12/25 2032 03/12/25  1132        Albumin 2.5             ALP 80             ALT 11             Anion Gap 10             Appearance, UA       Clear       AST 22             Bacteria, UA       Negative       Bilirubin  (UA)       Negative       BILIRUBIN TOTAL 0.2  Comment: For infants and newborns, interpretation of results should be based  on gestational age, weight and in agreement with clinical  observations.    Premature Infant recommended reference ranges:  Up to 24 hours.............<8.0 mg/dL  Up to 48 hours............<12.0 mg/dL  3-5 days..................<15.0 mg/dL  6-29 days.................<15.0 mg/dL               BUN 18             Calcium 8.3             Chloride 101             CO2 23             Color, UA       Yellow       Creatinine 0.9             eGFR >60.0             Glucose 169             Glucose, UA       4+       Hyaline Casts, UA       0       Ketones, UA       Negative       Leukocyte Esterase, UA       1+       Magnesium  1.6             Microscopic Comment       SEE COMMENT  Comment: Other formed elements not mentioned in the report are not   present in the microscopic examination.          NITRITE UA       Negative       Blood, UA       Trace       pH, UA       7.0       POC Glucose   220   370         Potassium 3.8             PROTEIN TOTAL 6.2             Protein, UA       1+  Comment: Recommend a 24 hour urine protein or a urine   protein/creatinine ratio if globulin induced proteinuria is  clinically suspected.         RBC, UA       4       Sodium 134             Spec Grav UA       1.015       Specimen UA       Urine, Clean Catch       Squam Epithel, UA       5       UROBILINOGEN UA       Negative       WBC, UA       14       Yeast, UA       None               Significant Imaging: I have reviewed all pertinent imaging results/findings within the past 24 hours.      Assessment & Plan  Hypoxia  O2 protocal  Tx probable pneumonia with iv abxs - repeat cxr in am  Tx flu  3/13 ct of chest to evaluate lung - pneumo vs chronic lung condition  Type 2 diabetes mellitus without complication, with long-term current use of insulin  Accuchecks q ach abd qhs  Ada diet    Shortness of breath  Tx flu and  probable pneumonia - o2 protocal    Influenza  Tamiflu; nebsl o2 protocal    Hyponatremia  Hyponatremia is likely due to Dehydration/hypovolemia. The patient's most recent sodium results are listed below.  Recent Labs     03/11/25  2214 03/12/25  0632 03/13/25  0549   * 133* 134*     Encourage po intake - monitor daily    3/13 sodium level improved  VTE Risk Mitigation (From admission, onward)           Ordered     enoxaparin injection 40 mg  Daily         03/12/25 0150     IP VTE HIGH RISK PATIENT  Once         03/12/25 0150     Place sequential compression device  Until discontinued         03/12/25 0150                    Discharge Planning   CATRACHITO:      Code Status: Full Code   Medical Readiness for Discharge Date:   Discharge Plan A: Home with family, Home Health                        Harika Mina MD  Department of Hospital Medicine   [facility]

## 2025-03-13 NOTE — HOSPITAL COURSE
3/13 ND pt with cough this am - causign some nuasea - did require dose of lasix last night due to worsening sob and wheezing - diuresed well  3/14 ND pt has been feeling blah - coughing a lot and wheezing some, more winded with moving around.  Pt has been urinating well - machado in place to help monitor output until pt can move more  3/15 AA, weekend crosscover, patient admitted with influenza and pneumonitis, CT chest reviewed, steroids on board, patient on antibiotic therapy and Tamiflu, no leukocytosis, renal function stable, blood cultures no growth to date, blood pressure meds adjusted  3/16 AA, weekend crosscover, no acute events overnight reported, renal function stable H&H stable, blood pressure meds adjusted, discharge planning  3/17 ND pt feeling better- breathing has imporved - dc home with HH and close fu with pcp

## 2025-03-13 NOTE — SUBJECTIVE & OBJECTIVE
Past Medical History:   Diagnosis Date    Arthritis     Coronary artery disease     Environmental and seasonal allergies     Land catheter in place     Hard of hearing     Hyperlipidemia     Presence of indwelling Land catheter     Vitamin D deficiency        Past Surgical History:   Procedure Laterality Date    BLADDER SUSPENSION      BREAST BIOPSY Bilateral 1996    BREAST SURGERY      lumpectomy, isotopes    CHOLECYSTECTOMY      had gal bladder removed    COLECTOMY      had part of colon removed    CYSTOSCOPY W/ RETROGRADES Bilateral 12/19/2019    Procedure: CYSTOSCOPY, WITH RETROGRADE PYELOGRAM;  Surgeon: Prasad Rocha MD;  Location: [facility] OR;  Service: Urology;  Laterality: Bilateral;    DILATION OF URETHRA N/A 12/19/2019    Procedure: DILATION, URETHRA;  Surgeon: Prasad Rohca MD;  Location: [facility] OR;  Service: Urology;  Laterality: N/A;    HYSTERECTOMY      LEFT HEART CATHETERIZATION Left 11/27/2019    Procedure: Left heart cath;  Surgeon: Urban Paiz MD;  Location: [facility] CATH LAB;  Service: Cardiology;  Laterality: Left;    MODIFIED RADICAL MASTECTOMY W/ AXILLARY LYMPH NODE DISSECTION Right 1/26/2022    Procedure: MASTECTOMY, MODIFIED RADICAL;  Surgeon: Keisha Cortez MD;  Location: [facility];  Service: General;  Laterality: Right;    OOPHORECTOMY      SENTINEL LYMPH NODE BIOPSY Right 1/26/2022    Procedure: BIOPSY, LYMPH NODE, SENTINEL;  Surgeon: Keisha Cortez MD;  Location: [facility];  Service: General;  Laterality: Right;  0800    SIMPLE MASTECTOMY Left 1/26/2022    Procedure: MASTECTOMY, SIMPLE;  Surgeon: Keisha Cortez MD;  Location: [facility];  Service: General;  Laterality: Left;  FROZEN SECTION       Review of patient's allergies indicates:   Allergen Reactions    Macrobid [nitrofurantoin monohyd/m-cryst] Swelling    Metformin Swelling    Ciprofloxacin Other (See Comments)     GI problems    Codeine Other (See Comments)     headache    Latex, natural rubber Rash       No current  facility-administered medications on file prior to encounter.     Current Outpatient Medications on File Prior to Encounter   Medication Sig    acarbose (PRECOSE) 100 MG Tab Take 1 tablet (100 mg total) by mouth 3 (three) times daily with meals.    acetaminophen (TYLENOL) 650 MG TbSR Take 650 mg by mouth 2 (two) times a day.    albuterol (PROVENTIL/VENTOLIN HFA) 90 mcg/actuation inhaler Inhale 2 puffs into the lungs every 6 hours as needed for wheezing    alendronate (FOSAMAX) 70 MG tablet Take 1 tablet (70 mg total) by mouth every 7 days.    aspirin (ECOTRIN) 81 MG EC tablet Take 81 mg by mouth once daily. Stopped 01/19/2022    atorvastatin (LIPITOR) 40 MG tablet Take 1 tablet (40 mg total) by mouth once daily.    blood sugar diagnostic (ONETOUCH ULTRA TEST) Strp Use to check fingerstick glucose readings 1 time a day    blood-glucose meter Misc 1 Device by Misc.(Non-Drug; Combo Route) route once daily. One Touch Dx. Code:E11.9    carvediloL (COREG) 6.25 MG tablet Take 1 tablet (6.25 mg total) by mouth 2 (two) times daily with meals.    cholecalciferol, vitamin D3, (VITAMIN D3) 2,000 unit Tab Take 1 tablet (2,000 Units total) by mouth once daily. (Patient taking differently: Take 1 tablet by mouth twice a week. 10,000 twice a week)    clobetasoL (TEMOVATE) 0.05 % cream Apply twice a day for 2 weeks then nightly for 2 months. May decrease to three nights a week after that.    co-enzyme Q-10 30 mg capsule Take 1 capsule (30 mg total) by mouth every evening.    cranberry fruit extract (CRANBERRY CONCENTRATE ORAL) Take 2 capsules by mouth once daily.    dicyclomine (BENTYL) 20 mg tablet Take 1 tablet (20 mg total) by mouth 4 (four) times daily before meals and nightly.    famotidine (PEPCID) 20 MG tablet Take 1 tablet (20 mg total) by mouth 2 (two) times daily.    flash glucose scanning reader (FREESTYLE JOSEPHINE 2 READER) Misc Use as instructed    flash glucose sensor (FREESTYLE JOSEPHINE 2 SENSOR) Kit Use as instructed     "FLUoxetine 10 MG capsule Take 1 capsule (10 mg total) by mouth once daily.    furosemide (LASIX) 20 MG tablet Take 1 tablet by mouth once daily    gabapentin (NEURONTIN) 600 MG tablet Take 1 tablet (600 mg total) by mouth 3 (three) times daily.    glucagon (BAQSIMI) 3 mg/actuation Spry 2 pack.  Spray one device (3 mg) in one nostril to treat severe hypoglycemia. Disp 1 box (2 devices)    hydroCHLOROthiazide (HYDRODIURIL) 25 MG tablet Take 1 tablet (25 mg total) by mouth once daily.    insulin aspart U-100 (NOVOLOG FLEXPEN U-100 INSULIN) 100 unit/mL (3 mL) InPn pen Take sliding scale insulin 4 times a day as instructed- max dose of 40 units/day.    lancets (ONETOUCH DELICA PLUS LANCET) 33 gauge Misc Use to check glucose readings 4 times a day.    letrozole (FEMARA) 2.5 mg Tab Take 1 tablet (2.5 mg total) by mouth once daily.    LIDOcaine (LIDODERM) 5 % SMARTSIG:Topical    lisinopriL (PRINIVIL,ZESTRIL) 20 MG tablet Take 1 tablet (20 mg total) by mouth every evening.    loperamide (IMODIUM) 2 mg capsule Take 2 mg by mouth 4 (four) times daily as needed for Diarrhea.    metFORMIN (GLUCOPHAGE-XR) 500 MG ER 24hr tablet Take 1 tablet (500 mg total) by mouth daily with breakfast.    montelukast (SINGULAIR) 10 mg tablet Take 1 tablet (10 mg total) by mouth once daily.    mucus clearing device (AEROBIKA OSCILLATING PEP SYSTM MISC) 12 puffs by Misc.(Non-Drug; Combo Route) route daily as needed. (Patient not taking: Reported on 1/3/2025)    multivitamin capsule Take 1 capsule by mouth once daily.    NYAMYC powder     omega-3 fatty acids/fish oil (FISH OIL-OMEGA-3 FATTY ACIDS) 300-1,000 mg capsule Take by mouth once daily.    oseltamivir (TAMIFLU) 75 MG capsule Take 1 capsule (75 mg total) by mouth once daily. for 10 days    pen needle, diabetic (BD ULTRA-FINE MICRO PEN NEEDLE) 32 gauge x 1/4" Ndle Use to inject insulin 4 to 5 times a day as instructed    pen needle, diabetic (BD ULTRA-FINE MICRO PEN NEEDLE) 32 gauge x 1/4" Ndle " use to inject ozempic once a week    potassium chloride (KLOR-CON) 10 MEQ TbSR Take 99 mEq by mouth once.    SEMGLEE,INSULIN GLARG-YFGN, unit/mL (3 mL) InPn Inject 16 units once daily    sulfamethoxazole-trimethoprim 800-160mg (BACTRIM DS) 800-160 mg Tab Take 1 tablet by mouth 2 (two) times daily.     Family History       Problem Relation (Age of Onset)    Bell's palsy Sister    Bone cancer Mother, Father (86)    Breast cancer Mother, Sister, Sister    Cancer Mother (42), Father    Heart disease Mother          Tobacco Use    Smoking status: Never    Smokeless tobacco: Never   Substance and Sexual Activity    Alcohol use: Not Currently     Comment: rare    Drug use: Never    Sexual activity: Not Currently     Partners: Male     Comment:      Review of Systems   Constitutional:  Positive for activity change, chills and fatigue. Negative for fever.   HENT:  Negative for postnasal drip, sinus pain and sore throat.    Respiratory:  Positive for cough and shortness of breath.    Cardiovascular:  Negative for chest pain, palpitations and leg swelling.   Gastrointestinal:  Positive for diarrhea and nausea. Negative for abdominal pain.   Musculoskeletal:  Negative for back pain and myalgias.   Neurological:  Positive for weakness. Negative for syncope.   Psychiatric/Behavioral:  Negative for agitation and decreased concentration.      Objective:     Vital Signs (Most Recent):  Temp: 98.1 °F (36.7 °C) (03/1940)  Pulse: 82 (03/1940)  Resp: 20 (03/1940)  BP: (!) 144/83 (03/1940)  SpO2: (!) 94 % (03/1940) Vital Signs (24h Range):  Temp:  [97.9 °F (36.6 °C)-98.7 °F (37.1 °C)] 98.1 °F (36.7 °C)  Pulse:  [64-86] 82  Resp:  [10-42] 20  SpO2:  [90 %-97 %] 94 %  BP: (123-171)/() 144/83     Weight: 90.3 kg (199 lb)  Body mass index is 34.16 kg/m².     Physical Exam  Constitutional:       Appearance: She is ill-appearing.   HENT:      Nose: Nose normal.      Mouth/Throat:      Mouth:  Mucous membranes are moist.   Eyes:      Extraocular Movements: Extraocular movements intact.      Pupils: Pupils are equal, round, and reactive to light.   Cardiovascular:      Rate and Rhythm: Normal rate and regular rhythm.   Pulmonary:      Breath sounds: Rhonchi present.   Abdominal:      General: Bowel sounds are normal.      Palpations: Abdomen is soft.   Genitourinary:     Comments: Land with yellow urine  Musculoskeletal:         General: Normal range of motion.   Skin:     General: Skin is warm.   Neurological:      General: No focal deficit present.              CRANIAL NERVES     CN III, IV, VI   Pupils are equal, round, and reactive to light.       Significant Labs: All pertinent labs within the past 24 hours have been reviewed.  Recent Lab Results  (Last 5 results in the past 24 hours)        03/12/25  1132   03/12/25  0632   03/12/25  0615   03/11/25  2227   03/11/25  2216        POC Molecular Influenza A Ag       Positive         POC Molecular Influenza B Ag       Negative         Anion Gap   10             Appearance, UA Clear               Bacteria, UA Negative               Baso #   0.02             Basophil %   0.3             Bilirubin (UA) Negative               BUN   15             Calcium   8.1             Chloride   98             CO2   25             Color, UA Yellow               Creatinine   0.9             Differential Method   Automated             eGFR   >60.0             Eos #   0.0             Eos %   0.0             Estimated Avg Glucose   154             Glucose   207             Glucose, UA 4+               Gran # (ANC)   6.3             Gran %   82.6             Hematocrit   31.4             Hemoglobin   10.3             Hemoglobin A1C External   7.0  Comment: ADA Screening Guidelines:  5.7-6.4%  Consistent with prediabetes  >or=6.5%  Consistent with diabetes    High levels of fetal hemoglobin interfere with the HbA1C  assay. Heterozygous hemoglobin variants (HbS, HgC,  etc)do  not significantly interfere with this assay.   However, presence of multiple variants may affect accuracy.               Hyaline Casts, UA 0               Immature Grans (Abs)   0.03  Comment: Mild elevation in immature granulocytes is non specific and   can be seen in a variety of conditions including stress response,   acute inflammation, trauma and pregnancy. Correlation with other   laboratory and clinical findings is essential.               Immature Granulocytes   0.4             Ketones, UA Negative               Leukocyte Esterase, UA 1+               Lymph #   1.0             Lymph %   12.4             MCH   29.3             MCHC   32.8             MCV   89             Microscopic Comment SEE COMMENT  Comment: Other formed elements not mentioned in the report are not   present in the microscopic examination.                  Mono #   0.3             Mono %   4.3             MPV   9.9             NITRITE UA Negative               nRBC   0             Blood, UA Trace               QRS Duration         160       OHS QTC Calculation         491       pH, UA 7.0               Platelet Count   250             POCT Glucose     261           Potassium   3.4             Protein, UA 1+  Comment: Recommend a 24 hour urine protein or a urine   protein/creatinine ratio if globulin induced proteinuria is  clinically suspected.                  Acceptable       Yes                Yes         RBC   3.52             RBC, UA 4               RDW   13.3             SARS-CoV-2 RNA, Amplification, Qual       Negative         Sodium   133             Spec Grav UA 1.015               Specimen UA Urine, Clean Catch               Squam Epithel, UA 5               UROBILINOGEN UA Negative               WBC, UA 14               WBC   7.65             Yeast, UA None                                      Significant Imaging: I have reviewed all pertinent imaging results/findings within the past 24 hours.

## 2025-03-13 NOTE — ASSESSMENT & PLAN NOTE
O2 protocal  Tx probable pneumonia with iv abxs - repeat cxr in am  Tx flu  3/13 ct of chest to evaluate lung - pneumo vs chronic lung condition

## 2025-03-13 NOTE — H&P
[facility]  History & Physical    Patient Name: Kelsea Cadet  MRN: [id number]  Patient Class: IP- Inpatient  Admission Date: 3/11/2025  Attending Physician: Harika Mina MD   Primary Care Provider: Gabby Jackson NP         Patient information was obtained from patient and ER records.     Subjective:     Principal Problem:Hypoxia    Chief Complaint:   Chief Complaint   Patient presents with    Shortness of Breath     Pt to the ER w/ complaints of  cough and sob x 5 days. Placed on tamiflu byu pcp after family member tested positive for flu.          HPI: 84-year-old female presents to the emergency department for evaluation due to cough and progressive shortness of breath for the last 5 days. The patient reports that she was exposed to her daughter who has the flu. The patient's primary care provider as been treatment patient prophylactically with Tamiflu due to her influenza exposure   Pt has had some chills and nausea with decreased pp intake    Past Medical History:   Diagnosis Date    Arthritis     Coronary artery disease     Environmental and seasonal allergies     Land catheter in place     Hard of hearing     Hyperlipidemia     Presence of indwelling Land catheter     Vitamin D deficiency        Past Surgical History:   Procedure Laterality Date    BLADDER SUSPENSION      BREAST BIOPSY Bilateral 1996    BREAST SURGERY      lumpectomy, isotopes    CHOLECYSTECTOMY      had gal bladder removed    COLECTOMY      had part of colon removed    CYSTOSCOPY W/ RETROGRADES Bilateral 12/19/2019    Procedure: CYSTOSCOPY, WITH RETROGRADE PYELOGRAM;  Surgeon: Prasad Rocha MD;  Location: On license of [facility] OR;  Service: Urology;  Laterality: Bilateral;    DILATION OF URETHRA N/A 12/19/2019    Procedure: DILATION, URETHRA;  Surgeon: Prasad Rocha MD;  Location: On license of [facility] OR;  Service: Urology;  Laterality: N/A;    HYSTERECTOMY      LEFT HEART CATHETERIZATION Left 11/27/2019    Procedure: Left  heart cath;  Surgeon: Urban Paiz MD;  Location: [facility] CATH LAB;  Service: Cardiology;  Laterality: Left;    MODIFIED RADICAL MASTECTOMY W/ AXILLARY LYMPH NODE DISSECTION Right 1/26/2022    Procedure: MASTECTOMY, MODIFIED RADICAL;  Surgeon: Keisha Cortez MD;  Location: [facility] OR;  Service: General;  Laterality: Right;    OOPHORECTOMY      SENTINEL LYMPH NODE BIOPSY Right 1/26/2022    Procedure: BIOPSY, LYMPH NODE, SENTINEL;  Surgeon: Keisha Cortez MD;  Location: [facility] OR;  Service: General;  Laterality: Right;  0800    SIMPLE MASTECTOMY Left 1/26/2022    Procedure: MASTECTOMY, SIMPLE;  Surgeon: Keisha Cortez MD;  Location: [facility] OR;  Service: General;  Laterality: Left;  FROZEN SECTION       Review of patient's allergies indicates:   Allergen Reactions    Macrobid [nitrofurantoin monohyd/m-cryst] Swelling    Metformin Swelling    Ciprofloxacin Other (See Comments)     GI problems    Codeine Other (See Comments)     headache    Latex, natural rubber Rash       No current facility-administered medications on file prior to encounter.     Current Outpatient Medications on File Prior to Encounter   Medication Sig    acarbose (PRECOSE) 100 MG Tab Take 1 tablet (100 mg total) by mouth 3 (three) times daily with meals.    acetaminophen (TYLENOL) 650 MG TbSR Take 650 mg by mouth 2 (two) times a day.    albuterol (PROVENTIL/VENTOLIN HFA) 90 mcg/actuation inhaler Inhale 2 puffs into the lungs every 6 hours as needed for wheezing    alendronate (FOSAMAX) 70 MG tablet Take 1 tablet (70 mg total) by mouth every 7 days.    aspirin (ECOTRIN) 81 MG EC tablet Take 81 mg by mouth once daily. Stopped 01/19/2022    atorvastatin (LIPITOR) 40 MG tablet Take 1 tablet (40 mg total) by mouth once daily.    blood sugar diagnostic (ONETOUCH ULTRA TEST) Strp Use to check fingerstick glucose readings 1 time a day    blood-glucose meter Misc 1 Device by Misc.(Non-Drug; Combo Route) route once daily. One Touch Dx. Code:E11.9     carvediloL (COREG) 6.25 MG tablet Take 1 tablet (6.25 mg total) by mouth 2 (two) times daily with meals.    cholecalciferol, vitamin D3, (VITAMIN D3) 2,000 unit Tab Take 1 tablet (2,000 Units total) by mouth once daily. (Patient taking differently: Take 1 tablet by mouth twice a week. 10,000 twice a week)    clobetasoL (TEMOVATE) 0.05 % cream Apply twice a day for 2 weeks then nightly for 2 months. May decrease to three nights a week after that.    co-enzyme Q-10 30 mg capsule Take 1 capsule (30 mg total) by mouth every evening.    cranberry fruit extract (CRANBERRY CONCENTRATE ORAL) Take 2 capsules by mouth once daily.    dicyclomine (BENTYL) 20 mg tablet Take 1 tablet (20 mg total) by mouth 4 (four) times daily before meals and nightly.    famotidine (PEPCID) 20 MG tablet Take 1 tablet (20 mg total) by mouth 2 (two) times daily.    flash glucose scanning reader (FREESTYLE OJSEPHINE 2 READER) Misc Use as instructed    flash glucose sensor (FREESTYLE JOSEPHINE 2 SENSOR) Kit Use as instructed    FLUoxetine 10 MG capsule Take 1 capsule (10 mg total) by mouth once daily.    furosemide (LASIX) 20 MG tablet Take 1 tablet by mouth once daily    gabapentin (NEURONTIN) 600 MG tablet Take 1 tablet (600 mg total) by mouth 3 (three) times daily.    glucagon (BAQSIMI) 3 mg/actuation Spry 2 pack.  Spray one device (3 mg) in one nostril to treat severe hypoglycemia. Disp 1 box (2 devices)    hydroCHLOROthiazide (HYDRODIURIL) 25 MG tablet Take 1 tablet (25 mg total) by mouth once daily.    insulin aspart U-100 (NOVOLOG FLEXPEN U-100 INSULIN) 100 unit/mL (3 mL) InPn pen Take sliding scale insulin 4 times a day as instructed- max dose of 40 units/day.    lancets (ONETOUCH DELICA PLUS LANCET) 33 gauge Misc Use to check glucose readings 4 times a day.    letrozole (FEMARA) 2.5 mg Tab Take 1 tablet (2.5 mg total) by mouth once daily.    LIDOcaine (LIDODERM) 5 % SMARTSIG:Topical    lisinopriL (PRINIVIL,ZESTRIL) 20 MG tablet Take 1 tablet (20 mg  "total) by mouth every evening.    loperamide (IMODIUM) 2 mg capsule Take 2 mg by mouth 4 (four) times daily as needed for Diarrhea.    metFORMIN (GLUCOPHAGE-XR) 500 MG ER 24hr tablet Take 1 tablet (500 mg total) by mouth daily with breakfast.    montelukast (SINGULAIR) 10 mg tablet Take 1 tablet (10 mg total) by mouth once daily.    mucus clearing device (AEROBIKA OSCILLATING PEP SYSTM MISC) 12 puffs by Misc.(Non-Drug; Combo Route) route daily as needed. (Patient not taking: Reported on 1/3/2025)    multivitamin capsule Take 1 capsule by mouth once daily.    NYAMYC powder     omega-3 fatty acids/fish oil (FISH OIL-OMEGA-3 FATTY ACIDS) 300-1,000 mg capsule Take by mouth once daily.    oseltamivir (TAMIFLU) 75 MG capsule Take 1 capsule (75 mg total) by mouth once daily. for 10 days    pen needle, diabetic (BD ULTRA-FINE MICRO PEN NEEDLE) 32 gauge x 1/4" Ndle Use to inject insulin 4 to 5 times a day as instructed    pen needle, diabetic (BD ULTRA-FINE MICRO PEN NEEDLE) 32 gauge x 1/4" Ndle use to inject ozempic once a week    potassium chloride (KLOR-CON) 10 MEQ TbSR Take 99 mEq by mouth once.    SEMGLEE,INSULIN GLARG-YFGN, unit/mL (3 mL) InPn Inject 16 units once daily    sulfamethoxazole-trimethoprim 800-160mg (BACTRIM DS) 800-160 mg Tab Take 1 tablet by mouth 2 (two) times daily.     Family History       Problem Relation (Age of Onset)    Bell's palsy Sister    Bone cancer Mother, Father (86)    Breast cancer Mother, Sister, Sister    Cancer Mother (42), Father    Heart disease Mother          Tobacco Use    Smoking status: Never    Smokeless tobacco: Never   Substance and Sexual Activity    Alcohol use: Not Currently     Comment: rare    Drug use: Never    Sexual activity: Not Currently     Partners: Male     Comment:      Review of Systems   Constitutional:  Positive for activity change, chills and fatigue. Negative for fever.   HENT:  Negative for postnasal drip, sinus pain and sore throat.  "   Respiratory:  Positive for cough and shortness of breath.    Cardiovascular:  Negative for chest pain, palpitations and leg swelling.   Gastrointestinal:  Positive for diarrhea and nausea. Negative for abdominal pain.   Musculoskeletal:  Negative for back pain and myalgias.   Neurological:  Positive for weakness. Negative for syncope.   Psychiatric/Behavioral:  Negative for agitation and decreased concentration.      Objective:     Vital Signs (Most Recent):  Temp: 98.1 °F (36.7 °C) (03/1940)  Pulse: 82 (03/1940)  Resp: 20 ([birth year]/1942)  BP: (!) 144/83 (03/1942)  SpO2: (!) 94 % ([birth year]/1942) Vital Signs (24h Range):  Temp:  [97.9 °F (36.6 °C)-98.7 °F (37.1 °C)] 98.1 °F (36.7 °C)  Pulse:  [64-86] 82  Resp:  [10-42] 20  SpO2:  [90 %-97 %] 94 %  BP: (123-171)/() 144/83     Weight: 90.3 kg (199 lb)  Body mass index is 34.16 kg/m².     Physical Exam  Constitutional:       Appearance: She is ill-appearing.   HENT:      Nose: Nose normal.      Mouth/Throat:      Mouth: Mucous membranes are moist.   Eyes:      Extraocular Movements: Extraocular movements intact.      Pupils: Pupils are equal, round, and reactive to light.   Cardiovascular:      Rate and Rhythm: Normal rate and regular rhythm.   Pulmonary:      Breath sounds: Rhonchi present.   Abdominal:      General: Bowel sounds are normal.      Palpations: Abdomen is soft.   Genitourinary:     Comments: Land with yellow urine  Musculoskeletal:         General: Normal range of motion.   Skin:     General: Skin is warm.   Neurological:      General: No focal deficit present.              CRANIAL NERVES     CN III, IV, VI   Pupils are equal, round, and reactive to light.       Significant Labs: All pertinent labs within the past 24 hours have been reviewed.  Recent Lab Results  (Last 5 results in the past 24 hours)        03/12/25  1132   03/12/25  0632   03/12/25  0615   03/11/25  2227   03/11/25  2216        POC Molecular Influenza A Ag        Positive         POC Molecular Influenza B Ag       Negative         Anion Gap   10             Appearance, UA Clear               Bacteria, UA Negative               Baso #   0.02             Basophil %   0.3             Bilirubin (UA) Negative               BUN   15             Calcium   8.1             Chloride   98             CO2   25             Color, UA Yellow               Creatinine   0.9             Differential Method   Automated             eGFR   >60.0             Eos #   0.0             Eos %   0.0             Estimated Avg Glucose   154             Glucose   207             Glucose, UA 4+               Gran # (ANC)   6.3             Gran %   82.6             Hematocrit   31.4             Hemoglobin   10.3             Hemoglobin A1C External   7.0  Comment: ADA Screening Guidelines:  5.7-6.4%  Consistent with prediabetes  >or=6.5%  Consistent with diabetes    High levels of fetal hemoglobin interfere with the HbA1C  assay. Heterozygous hemoglobin variants (HbS, HgC, etc)do  not significantly interfere with this assay.   However, presence of multiple variants may affect accuracy.               Hyaline Casts, UA 0               Immature Grans (Abs)   0.03  Comment: Mild elevation in immature granulocytes is non specific and   can be seen in a variety of conditions including stress response,   acute inflammation, trauma and pregnancy. Correlation with other   laboratory and clinical findings is essential.               Immature Granulocytes   0.4             Ketones, UA Negative               Leukocyte Esterase, UA 1+               Lymph #   1.0             Lymph %   12.4             MCH   29.3             MCHC   32.8             MCV   89             Microscopic Comment SEE COMMENT  Comment: Other formed elements not mentioned in the report are not   present in the microscopic examination.                  Mono #   0.3             Mono %   4.3             MPV   9.9             NITRITE UA Negative                nRBC   0             Blood, UA Trace               QRS Duration         160       OHS QTC Calculation         491       pH, UA 7.0               Platelet Count   250             POCT Glucose     261           Potassium   3.4             Protein, UA 1+  Comment: Recommend a 24 hour urine protein or a urine   protein/creatinine ratio if globulin induced proteinuria is  clinically suspected.                  Acceptable       Yes                Yes         RBC   3.52             RBC, UA 4               RDW   13.3             SARS-CoV-2 RNA, Amplification, Qual       Negative         Sodium   133             Spec Grav UA 1.015               Specimen UA Urine, Clean Catch               Squam Epithel, UA 5               UROBILINOGEN UA Negative               WBC, UA 14               WBC   7.65             Yeast, UA None                                      Significant Imaging: I have reviewed all pertinent imaging results/findings within the past 24 hours.  Assessment/Plan:     * Hypoxia  O2 protocal  Tx probable pneumonia with iv abxs - repeat cxr in am  Tx flu      Hyponatremia  Hyponatremia is likely due to Dehydration/hypovolemia. The patient's most recent sodium results are listed below.  Recent Labs     03/11/25  2214 03/12/25  0632   * 133*     Encourage po intake - monitor daily    Influenza  Tamiflu; nebsl o2 protocal      Shortness of breath  Tx flu and probable pneumonia - o2 protocal      Type 2 diabetes mellitus without complication, with long-term current use of insulin  Accuchecks q ach abd qhs  Ada diet        VTE Risk Mitigation (From admission, onward)           Ordered     enoxaparin injection 40 mg  Daily         03/12/25 0150     IP VTE HIGH RISK PATIENT  Once         03/12/25 0150     Place sequential compression device  Until discontinued         03/12/25 0150                               Pharmacist Renal Dose Adjustment Note    Kelsea Cadet is a 85 y.o. female being  treated with the medication oseltamivir    Patient Data:    Vital Signs (Most Recent):  Temp: 98.3 °F (36.8 °C) (03/12/25 0720)  Pulse: 77 (03/12/25 0836)  Resp: (!) 42 (03/12/25 0836)  BP: (!) 153/72 (03/12/25 0836)  SpO2: (!) 94 % (03/12/25 0836) Vital Signs (72h Range):  Temp:  [98.3 °F (36.8 °C)-98.7 °F (37.1 °C)]   Pulse:  [64-77]   Resp:  [10-42]   BP: (123-159)/()   SpO2:  [90 %-97 %]      Recent Labs   Lab 03/11/25 2214 03/12/25  0632   CREATININE 1.0 0.9     Serum creatinine: 0.9 mg/dL 03/12/25 0632  Estimated creatinine clearance: 51.5 mL/min    Medication:oseltamivir 75 mg BID will be changed to medication:oseltamivir 75 mg x 1, then 30 mg BID   Pharmacist's Name: ERIC ROBB  Pharmacist's Extension: [phone]         Harika Mina MD  Department of Hospital Medicine  [facility]

## 2025-03-13 NOTE — SUBJECTIVE & OBJECTIVE
Past Medical History:   Diagnosis Date    Arthritis     Coronary artery disease     Environmental and seasonal allergies     Land catheter in place     Hard of hearing     Hyperlipidemia     Presence of indwelling Land catheter     Vitamin D deficiency        Past Surgical History:   Procedure Laterality Date    BLADDER SUSPENSION      BREAST BIOPSY Bilateral 1996    BREAST SURGERY      lumpectomy, isotopes    CHOLECYSTECTOMY      had gal bladder removed    COLECTOMY      had part of colon removed    CYSTOSCOPY W/ RETROGRADES Bilateral 12/19/2019    Procedure: CYSTOSCOPY, WITH RETROGRADE PYELOGRAM;  Surgeon: Prasad Rocha MD;  Location: [facility] OR;  Service: Urology;  Laterality: Bilateral;    DILATION OF URETHRA N/A 12/19/2019    Procedure: DILATION, URETHRA;  Surgeon: Prasad Rocha MD;  Location: [facility] OR;  Service: Urology;  Laterality: N/A;    HYSTERECTOMY      LEFT HEART CATHETERIZATION Left 11/27/2019    Procedure: Left heart cath;  Surgeon: Urban Paiz MD;  Location: [facility] CATH LAB;  Service: Cardiology;  Laterality: Left;    MODIFIED RADICAL MASTECTOMY W/ AXILLARY LYMPH NODE DISSECTION Right 1/26/2022    Procedure: MASTECTOMY, MODIFIED RADICAL;  Surgeon: Keisha Cortez MD;  Location: [facility];  Service: General;  Laterality: Right;    OOPHORECTOMY      SENTINEL LYMPH NODE BIOPSY Right 1/26/2022    Procedure: BIOPSY, LYMPH NODE, SENTINEL;  Surgeon: Keisha Cortez MD;  Location: [facility];  Service: General;  Laterality: Right;  0800    SIMPLE MASTECTOMY Left 1/26/2022    Procedure: MASTECTOMY, SIMPLE;  Surgeon: Keisha Cortez MD;  Location: [facility];  Service: General;  Laterality: Left;  FROZEN SECTION       Review of patient's allergies indicates:   Allergen Reactions    Macrobid [nitrofurantoin monohyd/m-cryst] Swelling    Metformin Swelling    Ciprofloxacin Other (See Comments)     GI problems    Codeine Other (See Comments)     headache    Latex, natural rubber Rash       No current  facility-administered medications on file prior to encounter.     Current Outpatient Medications on File Prior to Encounter   Medication Sig    acarbose (PRECOSE) 100 MG Tab Take 1 tablet (100 mg total) by mouth 3 (three) times daily with meals.    acetaminophen (TYLENOL) 650 MG TbSR Take 650 mg by mouth 2 (two) times a day.    albuterol (PROVENTIL/VENTOLIN HFA) 90 mcg/actuation inhaler Inhale 2 puffs into the lungs every 6 hours as needed for wheezing    alendronate (FOSAMAX) 70 MG tablet Take 1 tablet (70 mg total) by mouth every 7 days.    aspirin (ECOTRIN) 81 MG EC tablet Take 81 mg by mouth once daily. Stopped 01/19/2022    atorvastatin (LIPITOR) 40 MG tablet Take 1 tablet (40 mg total) by mouth once daily.    blood sugar diagnostic (ONETOUCH ULTRA TEST) Strp Use to check fingerstick glucose readings 1 time a day    blood-glucose meter Misc 1 Device by Misc.(Non-Drug; Combo Route) route once daily. One Touch Dx. Code:E11.9    carvediloL (COREG) 6.25 MG tablet Take 1 tablet (6.25 mg total) by mouth 2 (two) times daily with meals.    cholecalciferol, vitamin D3, (VITAMIN D3) 2,000 unit Tab Take 1 tablet (2,000 Units total) by mouth once daily. (Patient taking differently: Take 1 tablet by mouth twice a week. 10,000 twice a week)    clobetasoL (TEMOVATE) 0.05 % cream Apply twice a day for 2 weeks then nightly for 2 months. May decrease to three nights a week after that.    co-enzyme Q-10 30 mg capsule Take 1 capsule (30 mg total) by mouth every evening.    cranberry fruit extract (CRANBERRY CONCENTRATE ORAL) Take 2 capsules by mouth once daily.    dicyclomine (BENTYL) 20 mg tablet Take 1 tablet (20 mg total) by mouth 4 (four) times daily before meals and nightly.    famotidine (PEPCID) 20 MG tablet Take 1 tablet (20 mg total) by mouth 2 (two) times daily.    flash glucose scanning reader (FREESTYLE JOSEPHINE 2 READER) Misc Use as instructed    flash glucose sensor (FREESTYLE JOSEPHINE 2 SENSOR) Kit Use as instructed     "FLUoxetine 10 MG capsule Take 1 capsule (10 mg total) by mouth once daily.    furosemide (LASIX) 20 MG tablet Take 1 tablet by mouth once daily    gabapentin (NEURONTIN) 600 MG tablet Take 1 tablet (600 mg total) by mouth 3 (three) times daily.    glucagon (BAQSIMI) 3 mg/actuation Spry 2 pack.  Spray one device (3 mg) in one nostril to treat severe hypoglycemia. Disp 1 box (2 devices)    hydroCHLOROthiazide (HYDRODIURIL) 25 MG tablet Take 1 tablet (25 mg total) by mouth once daily.    insulin aspart U-100 (NOVOLOG FLEXPEN U-100 INSULIN) 100 unit/mL (3 mL) InPn pen Take sliding scale insulin 4 times a day as instructed- max dose of 40 units/day.    lancets (ONETOUCH DELICA PLUS LANCET) 33 gauge Misc Use to check glucose readings 4 times a day.    letrozole (FEMARA) 2.5 mg Tab Take 1 tablet (2.5 mg total) by mouth once daily.    LIDOcaine (LIDODERM) 5 % SMARTSIG:Topical    lisinopriL (PRINIVIL,ZESTRIL) 20 MG tablet Take 1 tablet (20 mg total) by mouth every evening.    loperamide (IMODIUM) 2 mg capsule Take 2 mg by mouth 4 (four) times daily as needed for Diarrhea.    metFORMIN (GLUCOPHAGE-XR) 500 MG ER 24hr tablet Take 1 tablet (500 mg total) by mouth daily with breakfast.    montelukast (SINGULAIR) 10 mg tablet Take 1 tablet (10 mg total) by mouth once daily.    mucus clearing device (AEROBIKA OSCILLATING PEP SYSTM MISC) 12 puffs by Misc.(Non-Drug; Combo Route) route daily as needed. (Patient not taking: Reported on 1/3/2025)    multivitamin capsule Take 1 capsule by mouth once daily.    NYAMYC powder     omega-3 fatty acids/fish oil (FISH OIL-OMEGA-3 FATTY ACIDS) 300-1,000 mg capsule Take by mouth once daily.    oseltamivir (TAMIFLU) 75 MG capsule Take 1 capsule (75 mg total) by mouth once daily. for 10 days    pen needle, diabetic (BD ULTRA-FINE MICRO PEN NEEDLE) 32 gauge x 1/4" Ndle Use to inject insulin 4 to 5 times a day as instructed    pen needle, diabetic (BD ULTRA-FINE MICRO PEN NEEDLE) 32 gauge x 1/4" Ndle " use to inject ozempic once a week    potassium chloride (KLOR-CON) 10 MEQ TbSR Take 99 mEq by mouth once.    SEMGLEE,INSULIN GLARG-YFGN, unit/mL (3 mL) InPn Inject 16 units once daily    sulfamethoxazole-trimethoprim 800-160mg (BACTRIM DS) 800-160 mg Tab Take 1 tablet by mouth 2 (two) times daily.     Family History       Problem Relation (Age of Onset)    Bell's palsy Sister    Bone cancer Mother, Father (86)    Breast cancer Mother, Sister, Sister    Cancer Mother (42), Father    Heart disease Mother          Tobacco Use    Smoking status: Never    Smokeless tobacco: Never   Substance and Sexual Activity    Alcohol use: Not Currently     Comment: rare    Drug use: Never    Sexual activity: Not Currently     Partners: Male     Comment:      Review of Systems   Constitutional:  Positive for activity change, chills and fatigue. Negative for fever.   HENT:  Negative for postnasal drip, sinus pain and sore throat.    Respiratory:  Positive for cough and shortness of breath.    Cardiovascular:  Negative for chest pain, palpitations and leg swelling.   Gastrointestinal:  Positive for diarrhea and nausea. Negative for abdominal pain.   Musculoskeletal:  Negative for back pain and myalgias.   Neurological:  Positive for weakness. Negative for syncope.   Psychiatric/Behavioral:  Negative for agitation and decreased concentration.      Objective:     Vital Signs (Most Recent):  Temp: 98.4 °F (36.9 °C) (03/13/25 0319)  Pulse: 74 (03/13/25 0711)  Resp: 20 (03/13/25 0710)  BP: (!) 179/70 (03/13/25 0319)  SpO2: (!) 92 % (03/13/25 0710) Vital Signs (24h Range):  Temp:  [97.8 °F (36.6 °C)-98.4 °F (36.9 °C)] 98.4 °F (36.9 °C)  Pulse:  [63-88] 74  Resp:  [17-28] 20  SpO2:  [92 %-98 %] 92 %  BP: (144-179)/(70-83) 179/70     Weight: 89.7 kg (197 lb 11.2 oz)  Body mass index is 33.94 kg/m².     Physical Exam  Constitutional:       Appearance: She is ill-appearing.   HENT:      Nose: Nose normal.      Mouth/Throat:       Mouth: Mucous membranes are moist.   Eyes:      Extraocular Movements: Extraocular movements intact.      Pupils: Pupils are equal, round, and reactive to light.   Cardiovascular:      Rate and Rhythm: Normal rate and regular rhythm.   Pulmonary:      Breath sounds: Rhonchi present.   Abdominal:      General: Bowel sounds are normal.      Palpations: Abdomen is soft.   Genitourinary:     Comments: Land with yellow urine  Musculoskeletal:         General: Normal range of motion.   Skin:     General: Skin is warm.   Neurological:      General: No focal deficit present.              CRANIAL NERVES     CN III, IV, VI   Pupils are equal, round, and reactive to light.       Significant Labs: All pertinent labs within the past 24 hours have been reviewed.  Recent Lab Results         03/13/25  0549   03/12/25  2357   03/12/25  2032 03/12/25  1132        Albumin 2.5             ALP 80             ALT 11             Anion Gap 10             Appearance, UA       Clear       AST 22             Bacteria, UA       Negative       Bilirubin (UA)       Negative       BILIRUBIN TOTAL 0.2  Comment: For infants and newborns, interpretation of results should be based  on gestational age, weight and in agreement with clinical  observations.    Premature Infant recommended reference ranges:  Up to 24 hours.............<8.0 mg/dL  Up to 48 hours............<12.0 mg/dL  3-5 days..................<15.0 mg/dL  6-29 days.................<15.0 mg/dL               BUN 18             Calcium 8.3             Chloride 101             CO2 23             Color, UA       Yellow       Creatinine 0.9             eGFR >60.0             Glucose 169             Glucose, UA       4+       Hyaline Casts, UA       0       Ketones, UA       Negative       Leukocyte Esterase, UA       1+       Magnesium  1.6             Microscopic Comment       SEE COMMENT  Comment: Other formed elements not mentioned in the report are not   present in the microscopic  examination.          NITRITE UA       Negative       Blood, UA       Trace       pH, UA       7.0       POC Glucose   220   370         Potassium 3.8             PROTEIN TOTAL 6.2             Protein, UA       1+  Comment: Recommend a 24 hour urine protein or a urine   protein/creatinine ratio if globulin induced proteinuria is  clinically suspected.         RBC, UA       4       Sodium 134             Spec Grav UA       1.015       Specimen UA       Urine, Clean Catch       Squam Epithel, UA       5       UROBILINOGEN UA       Negative       WBC, UA       14       Yeast, UA       None               Significant Imaging: I have reviewed all pertinent imaging results/findings within the past 24 hours.

## 2025-03-14 LAB
ALBUMIN SERPL BCP-MCNC: 2.5 G/DL (ref 3.5–5.2)
ALP SERPL-CCNC: 81 U/L (ref 55–135)
ALT SERPL W/O P-5'-P-CCNC: 13 U/L (ref 10–44)
ANION GAP SERPL CALC-SCNC: 10 MMOL/L (ref 8–16)
AST SERPL-CCNC: 23 U/L (ref 10–40)
BACTERIA UR CULT: NO GROWTH
BASOPHILS # BLD AUTO: 0.03 K/UL (ref 0–0.2)
BASOPHILS NFR BLD: 0.4 % (ref 0–1.9)
BILIRUB SERPL-MCNC: 0.3 MG/DL (ref 0.1–1)
BUN SERPL-MCNC: 14 MG/DL (ref 8–23)
CALCIUM SERPL-MCNC: 8.5 MG/DL (ref 8.7–10.5)
CHLORIDE SERPL-SCNC: 101 MMOL/L (ref 95–110)
CO2 SERPL-SCNC: 24 MMOL/L (ref 23–29)
CREAT SERPL-MCNC: 0.9 MG/DL (ref 0.5–1.4)
DIFFERENTIAL METHOD BLD: ABNORMAL
EOSINOPHIL # BLD AUTO: 0 K/UL (ref 0–0.5)
EOSINOPHIL NFR BLD: 0.4 % (ref 0–8)
ERYTHROCYTE [DISTWIDTH] IN BLOOD BY AUTOMATED COUNT: 13.2 % (ref 11.5–14.5)
EST. GFR  (NO RACE VARIABLE): >60 ML/MIN/1.73 M^2
GLUCOSE SERPL-MCNC: 160 MG/DL (ref 70–110)
HCT VFR BLD AUTO: 32.6 % (ref 37–48.5)
HGB BLD-MCNC: 10.8 G/DL (ref 12–16)
IMM GRANULOCYTES # BLD AUTO: 0.08 K/UL (ref 0–0.04)
IMM GRANULOCYTES NFR BLD AUTO: 1 % (ref 0–0.5)
LYMPHOCYTES # BLD AUTO: 1.5 K/UL (ref 1–4.8)
LYMPHOCYTES NFR BLD: 17.8 % (ref 18–48)
MAGNESIUM SERPL-MCNC: 1.6 MG/DL (ref 1.6–2.6)
MCH RBC QN AUTO: 29.3 PG (ref 27–31)
MCHC RBC AUTO-ENTMCNC: 33.1 G/DL (ref 32–36)
MCV RBC AUTO: 89 FL (ref 82–98)
MONOCYTES # BLD AUTO: 0.7 K/UL (ref 0.3–1)
MONOCYTES NFR BLD: 8.3 % (ref 4–15)
NEUTROPHILS # BLD AUTO: 6.1 K/UL (ref 1.8–7.7)
NEUTROPHILS NFR BLD: 72.1 % (ref 38–73)
NRBC BLD-RTO: 0 /100 WBC
PLATELET # BLD AUTO: 310 K/UL (ref 150–450)
PMV BLD AUTO: 9.5 FL (ref 9.2–12.9)
POCT GLUCOSE: 164 MG/DL (ref 70–110)
POCT GLUCOSE: 239 MG/DL (ref 70–110)
POTASSIUM SERPL-SCNC: 3.7 MMOL/L (ref 3.5–5.1)
PROT SERPL-MCNC: 6.4 G/DL (ref 6–8.4)
RBC # BLD AUTO: 3.68 M/UL (ref 4–5.4)
SODIUM SERPL-SCNC: 135 MMOL/L (ref 136–145)
VANCOMYCIN TROUGH SERPL-MCNC: 32.5 UG/ML (ref 10–22)
WBC # BLD AUTO: 8.41 K/UL (ref 3.9–12.7)

## 2025-03-14 PROCEDURE — 25000003 PHARM REV CODE 250

## 2025-03-14 PROCEDURE — 94640 AIRWAY INHALATION TREATMENT: CPT

## 2025-03-14 PROCEDURE — 25000242 PHARM REV CODE 250 ALT 637 W/ HCPCS: Performed by: INTERNAL MEDICINE

## 2025-03-14 PROCEDURE — 21400001 HC TELEMETRY ROOM

## 2025-03-14 PROCEDURE — 63600175 PHARM REV CODE 636 W HCPCS: Performed by: INTERNAL MEDICINE

## 2025-03-14 PROCEDURE — 80202 ASSAY OF VANCOMYCIN: CPT | Performed by: INTERNAL MEDICINE

## 2025-03-14 PROCEDURE — 25000003 PHARM REV CODE 250: Performed by: INTERNAL MEDICINE

## 2025-03-14 PROCEDURE — 27000221 HC OXYGEN, UP TO 24 HOURS

## 2025-03-14 PROCEDURE — 27000207 HC ISOLATION

## 2025-03-14 PROCEDURE — 83735 ASSAY OF MAGNESIUM: CPT | Performed by: INTERNAL MEDICINE

## 2025-03-14 PROCEDURE — 94664 DEMO&/EVAL PT USE INHALER: CPT

## 2025-03-14 PROCEDURE — 94799 UNLISTED PULMONARY SVC/PX: CPT

## 2025-03-14 PROCEDURE — 80053 COMPREHEN METABOLIC PANEL: CPT | Performed by: INTERNAL MEDICINE

## 2025-03-14 PROCEDURE — 27000646 HC AEROBIKA DEVICE

## 2025-03-14 PROCEDURE — 36415 COLL VENOUS BLD VENIPUNCTURE: CPT | Performed by: INTERNAL MEDICINE

## 2025-03-14 PROCEDURE — 99900035 HC TECH TIME PER 15 MIN (STAT)

## 2025-03-14 PROCEDURE — 63600175 PHARM REV CODE 636 W HCPCS: Performed by: EMERGENCY MEDICINE

## 2025-03-14 PROCEDURE — 99900031 HC PATIENT EDUCATION (STAT)

## 2025-03-14 PROCEDURE — 85025 COMPLETE CBC W/AUTO DIFF WBC: CPT | Performed by: INTERNAL MEDICINE

## 2025-03-14 PROCEDURE — 25000003 PHARM REV CODE 250: Performed by: EMERGENCY MEDICINE

## 2025-03-14 PROCEDURE — 94761 N-INVAS EAR/PLS OXIMETRY MLT: CPT

## 2025-03-14 RX ORDER — GABAPENTIN 300 MG/1
600 CAPSULE ORAL 3 TIMES DAILY
Status: DISCONTINUED | OUTPATIENT
Start: 2025-03-14 | End: 2025-03-17 | Stop reason: HOSPADM

## 2025-03-14 RX ORDER — DEXAMETHASONE SODIUM PHOSPHATE 4 MG/ML
4 INJECTION, SOLUTION INTRA-ARTICULAR; INTRALESIONAL; INTRAMUSCULAR; INTRAVENOUS; SOFT TISSUE DAILY
Status: DISCONTINUED | OUTPATIENT
Start: 2025-03-14 | End: 2025-03-17 | Stop reason: HOSPADM

## 2025-03-14 RX ADMIN — MONTELUKAST 10 MG: 10 TABLET, FILM COATED ORAL at 10:03

## 2025-03-14 RX ADMIN — IPRATROPIUM BROMIDE AND ALBUTEROL SULFATE 3 ML: 2.5; .5 SOLUTION RESPIRATORY (INHALATION) at 03:03

## 2025-03-14 RX ADMIN — ATORVASTATIN CALCIUM 40 MG: 40 TABLET, FILM COATED ORAL at 10:03

## 2025-03-14 RX ADMIN — IPRATROPIUM BROMIDE AND ALBUTEROL SULFATE 3 ML: 2.5; .5 SOLUTION RESPIRATORY (INHALATION) at 11:03

## 2025-03-14 RX ADMIN — ACETAMINOPHEN 650 MG: 325 TABLET ORAL at 04:03

## 2025-03-14 RX ADMIN — PROCHLORPERAZINE EDISYLATE 2.5 MG: 5 INJECTION INTRAMUSCULAR; INTRAVENOUS at 09:03

## 2025-03-14 RX ADMIN — VANCOMYCIN HYDROCHLORIDE 1500 MG: 1.5 INJECTION, POWDER, LYOPHILIZED, FOR SOLUTION INTRAVENOUS at 06:03

## 2025-03-14 RX ADMIN — Medication 6 MG: at 09:03

## 2025-03-14 RX ADMIN — OSELTAMIVIR PHOSPHATE 30 MG: 30 CAPSULE ORAL at 09:03

## 2025-03-14 RX ADMIN — LISINOPRIL 20 MG: 20 TABLET ORAL at 09:03

## 2025-03-14 RX ADMIN — MUPIROCIN: 20 OINTMENT TOPICAL at 09:03

## 2025-03-14 RX ADMIN — GUAIFENESIN 1200 MG: 600 TABLET, EXTENDED RELEASE ORAL at 10:03

## 2025-03-14 RX ADMIN — IPRATROPIUM BROMIDE AND ALBUTEROL SULFATE 3 ML: 2.5; .5 SOLUTION RESPIRATORY (INHALATION) at 08:03

## 2025-03-14 RX ADMIN — CEFEPIME 1 G: 1 INJECTION, POWDER, FOR SOLUTION INTRAMUSCULAR; INTRAVENOUS at 01:03

## 2025-03-14 RX ADMIN — FUROSEMIDE 20 MG: 20 TABLET ORAL at 09:03

## 2025-03-14 RX ADMIN — PIPERACILLIN SODIUM AND TAZOBACTAM SODIUM 4.5 G: 4; .5 INJECTION, POWDER, LYOPHILIZED, FOR SOLUTION INTRAVENOUS at 10:03

## 2025-03-14 RX ADMIN — DEXAMETHASONE SODIUM PHOSPHATE 4 MG: 4 INJECTION INTRA-ARTICULAR; INTRALESIONAL; INTRAMUSCULAR; INTRAVENOUS; SOFT TISSUE at 06:03

## 2025-03-14 RX ADMIN — GABAPENTIN 600 MG: 300 CAPSULE ORAL at 09:03

## 2025-03-14 RX ADMIN — PIPERACILLIN SODIUM AND TAZOBACTAM SODIUM 4.5 G: 4; .5 INJECTION, POWDER, LYOPHILIZED, FOR SOLUTION INTRAVENOUS at 06:03

## 2025-03-14 RX ADMIN — ONDANSETRON 4 MG: 2 INJECTION INTRAMUSCULAR; INTRAVENOUS at 05:03

## 2025-03-14 RX ADMIN — IBUPROFEN 600 MG: 600 TABLET ORAL at 10:03

## 2025-03-14 RX ADMIN — ACETAMINOPHEN 650 MG: 325 TABLET ORAL at 09:03

## 2025-03-14 RX ADMIN — FAMOTIDINE 20 MG: 20 TABLET, FILM COATED ORAL at 09:03

## 2025-03-14 RX ADMIN — FLUOXETINE HYDROCHLORIDE 10 MG: 10 CAPSULE ORAL at 10:03

## 2025-03-14 RX ADMIN — ENOXAPARIN SODIUM 40 MG: 40 INJECTION SUBCUTANEOUS at 05:03

## 2025-03-14 RX ADMIN — GABAPENTIN 600 MG: 300 CAPSULE ORAL at 02:03

## 2025-03-14 RX ADMIN — GUAIFENESIN 1200 MG: 600 TABLET, EXTENDED RELEASE ORAL at 09:03

## 2025-03-14 NOTE — PLAN OF CARE
Plan of care reviewed and ongoing with patient and daughter at bedside. 20 gauge IV to R FA saline locked/flushes well, 3L NC tolerating well, tele electrodes and continuous pulse ox connections intact. Land catheter in place, draining properly into drainage device, free of loops/kinks. Tolerated medications whole, no difficulty noted. Call light and personal items within reach of patient.       Problem: Adult Inpatient Plan of Care  Goal: Plan of Care Review  Outcome: Not Progressing  Goal: Patient-Specific Goal (Individualized)  Outcome: Not Progressing  Goal: Absence of Hospital-Acquired Illness or Injury  Outcome: Not Progressing  Goal: Optimal Comfort and Wellbeing  Outcome: Not Progressing  Goal: Readiness for Transition of Care  Outcome: Not Progressing     Problem: Infection  Goal: Absence of Infection Signs and Symptoms  Outcome: Not Progressing     Problem: Diabetes Comorbidity  Goal: Blood Glucose Level Within Targeted Range  Outcome: Not Progressing     Problem: Wound  Goal: Optimal Coping  Outcome: Not Progressing  Goal: Optimal Functional Ability  Outcome: Not Progressing  Goal: Absence of Infection Signs and Symptoms  Outcome: Not Progressing  Goal: Improved Oral Intake  Outcome: Not Progressing  Goal: Optimal Pain Control and Function  Outcome: Not Progressing  Goal: Skin Health and Integrity  Outcome: Not Progressing  Goal: Optimal Wound Healing  Outcome: Not Progressing     Problem: Skin Injury Risk Increased  Goal: Skin Health and Integrity  Outcome: Not Progressing

## 2025-03-14 NOTE — PLAN OF CARE
Problem: Adult Inpatient Plan of Care  Goal: Plan of Care Review  Outcome: Progressing  Goal: Patient-Specific Goal (Individualized)  Outcome: Progressing  Goal: Absence of Hospital-Acquired Illness or Injury  Outcome: Progressing  Goal: Optimal Comfort and Wellbeing  Outcome: Progressing  Goal: Readiness for Transition of Care  Outcome: Progressing     Problem: Infection  Goal: Absence of Infection Signs and Symptoms  Outcome: Progressing     Problem: Diabetes Comorbidity  Goal: Blood Glucose Level Within Targeted Range  Outcome: Progressing     Problem: Wound  Goal: Optimal Coping  Outcome: Progressing  Goal: Optimal Functional Ability  Outcome: Progressing  Goal: Absence of Infection Signs and Symptoms  Outcome: Progressing  Goal: Improved Oral Intake  Outcome: Progressing  Goal: Optimal Pain Control and Function  Outcome: Progressing  Goal: Skin Health and Integrity  Outcome: Progressing  Goal: Optimal Wound Healing  Outcome: Progressing     Problem: Skin Injury Risk Increased  Goal: Skin Health and Integrity  Outcome: Progressing

## 2025-03-14 NOTE — NURSING
"Family at the bedside requested for door to be open due to room feeling warm. PCT and nurse educated family member and pt on policy with pt being on droplet precautions with the door needing to stay closed. Thermostat working adequately set on cool air. Family member voiced concerns stating "we are not prisoners" getting noticeably upset; education provided. Charge nurse and house sup made aware. House sup provided emotional support and education on policy with droplet precautions and importance of keeping door closed to prevent spread of infection. Portable fan provided to pt. Call light and personal items within reach of patient.         "

## 2025-03-14 NOTE — ASSESSMENT & PLAN NOTE
O2 protocal  Tx probable pneumonia with iv abxs - repeat cxr in am  Tx flu  3/13 ct of chest to evaluate lung - pneumo vs chronic lung condition  3/14 has pneumonitits -cont iv abxs; add dexamthasone 4mg qday

## 2025-03-14 NOTE — PROGRESS NOTES
Pharmacokinetic Assessment Follow Up: IV Vancomycin    Vancomycin serum concentration assessment(s):    The trough level was drawn incorrectly and cannot be used to guide therapy at this time.    Vancomycin Regimen Plan:    Continue regimen to Vancomycin 1500 mg IV every 24 hours with next serum trough concentration measured at 0500 prior to the next dose on 03/15/25    Drug levels (last 3 results):  Recent Labs   Lab Result Units 03/14/25  0706   Vancomycin-Trough ug/mL 32.5*       Pharmacy will continue to follow and monitor vancomycin.    Please contact pharmacy at extension [phone] for questions regarding this assessment.    Thank you for the consult,   Ekaterina Whittaker       Patient brief summary:  Kelsea Cadet is a 85 y.o. female initiated on antimicrobial therapy with IV Vancomycin for treatment of pneumonia    The patient's current regimen plan is to continue Vancomycin 1500 mg daily with a repeat vanc trough in the morning since the lab was drawn after the dose was given this morning.     Drug Allergies:   Review of patient's allergies indicates:   Allergen Reactions    Macrobid [nitrofurantoin monohyd/m-cryst] Swelling    Metformin Swelling    Ciprofloxacin Other (See Comments)     GI problems    Codeine Other (See Comments)     headache    Latex, natural rubber Rash       Actual Body Weight:   89.7 kg    Renal Function:   Estimated Creatinine Clearance: 51.4 mL/min (based on SCr of 0.9 mg/dL).,     Dialysis Method (if applicable):  N/A    CBC (last 72 hours):  Recent Labs   Lab Result Units 03/11/25  2214 03/12/25  0632 03/13/25  0548 03/14/25  0706   WBC K/uL 9.55 7.65 9.33 8.41   Hemoglobin g/dL 10.9* 10.3* 10.6* 10.8*   Hemoglobin A1C %  --  7.0*  --   --    Hematocrit % 33.1* 31.4* 32.1* 32.6*   Platelets K/uL 253 250 257 310   Gran % % 75.8* 82.6* 77.4* 72.1   Lymph % % 12.5* 12.4* 13.7* 17.8*   Mono % % 10.8 4.3 8.0 8.3   Eosinophil % % 0.3 0.0 0.2 0.4   Basophil % % 0.3 0.3 0.3 0.4   Differential  Method  Automated Automated Automated Automated       Metabolic Panel (last 72 hours):  Recent Labs   Lab Result Units 03/11/25  2214 03/12/25  0632 03/12/25  1132 03/13/25  0549 03/14/25  0706   Sodium mmol/L 132* 133*  --  134* 135*   Potassium mmol/L 3.5 3.4*  --  3.8 3.7   Chloride mmol/L 97 98  --  101 101   CO2 mmol/L 23 25  --  23 24   Glucose mg/dL 166* 207*  --  169* 160*   Glucose, UA   --   --  4+*  --   --    BUN mg/dL 15 15  --  18 14   Creatinine mg/dL 1.0 0.9  --  0.9 0.9   Albumin g/dL 2.8*  --   --  2.5* 2.5*   Total Bilirubin mg/dL 0.2  --   --  0.2 0.3   Alkaline Phosphatase U/L 93  --   --  80 81   AST U/L 25  --   --  22 23   ALT U/L 14  --   --  11 13   Magnesium mg/dL  --   --   --  1.6 1.6       Vancomycin Administrations:  vancomycin given in the last 96 hours                     vancomycin 1,500 mg in 0.9% NaCl 250 mL IVPB (admixture device) (mg) 1,500 mg New Bag 03/14/25 0634     1,500 mg New Bag 03/13/25 0555    vancomycin (VANCOCIN) 1,000 mg in 0.9% NaCl 250 mL IVPB (admixture device) (mg) 1,000 mg New Bag 03/12/25 0522    vancomycin 1,250 mg in 0.9% NaCl 250 mL IVPB (admixture device) (mg) 1,250 mg New Bag 03/12/25 0349                    Microbiologic Results:  Microbiology Results (last 7 days)       Procedure Component Value Units Date/Time    Urine culture [[id number]] Collected: 03/12/25 1132    Order Status: Completed Specimen: Urine Updated: 03/14/25 0035     Urine Culture, Routine No growth    Narrative:      Preferred Collection Type->Urine, Clean Catch  Specimen Source->Urine    Blood Culture #1 **CANNOT BE ORDERED STAT** [[id number]] Collected: 03/12/25 0148    Order Status: Completed Specimen: Blood from Peripheral, Forearm, Left Updated: 03/13/25 2013     Blood Culture, Routine No Growth to date      No Growth to date    Blood Culture #2 **CANNOT BE ORDERED STAT** [[id number]] Collected: 03/12/25 0155    Order Status: Completed Specimen: Blood from Peripheral, Wrist, Right  Updated: 03/13/25 2013     Blood Culture, Routine No Growth to date      No Growth to date

## 2025-03-14 NOTE — SUBJECTIVE & OBJECTIVE
Past Medical History:   Diagnosis Date    Arthritis     Coronary artery disease     Environmental and seasonal allergies     Land catheter in place     Hard of hearing     Hyperlipidemia     Presence of indwelling Land catheter     Vitamin D deficiency        Past Surgical History:   Procedure Laterality Date    BLADDER SUSPENSION      BREAST BIOPSY Bilateral 1996    BREAST SURGERY      lumpectomy, isotopes    CHOLECYSTECTOMY      had gal bladder removed    COLECTOMY      had part of colon removed    CYSTOSCOPY W/ RETROGRADES Bilateral 12/19/2019    Procedure: CYSTOSCOPY, WITH RETROGRADE PYELOGRAM;  Surgeon: Prasad Rocha MD;  [facility];  Service: Urology;  Laterality: Bilateral;    DILATION OF URETHRA N/A 12/19/2019    Procedure: DILATION, URETHRA;  Surgeon: Prasad Rocha MD;  [facility];  Service: Urology;  Laterality: N/A;    HYSTERECTOMY      LEFT HEART CATHETERIZATION Left 11/27/2019    Procedure: Left heart cath;  Surgeon: Urban Paiz MD;  Location: [facility] CATH LAB;  Service: Cardiology;  Laterality: Left;    MODIFIED RADICAL MASTECTOMY W/ AXILLARY LYMPH NODE DISSECTION Right 1/26/2022    Procedure: MASTECTOMY, MODIFIED RADICAL;  Surgeon: Keisha Cortez MD;  Location: [facility];  Service: General;  Laterality: Right;    OOPHORECTOMY      SENTINEL LYMPH NODE BIOPSY Right 1/26/2022    Procedure: BIOPSY, LYMPH NODE, SENTINEL;  Surgeon: Keisha Cortez MD;  Location: [facility];  Service: General;  Laterality: Right;  0800    SIMPLE MASTECTOMY Left 1/26/2022    Procedure: MASTECTOMY, SIMPLE;  Surgeon: Keisha Cortez MD;  Location: [facility];  Service: General;  Laterality: Left;  FROZEN SECTION       Review of patient's allergies indicates:   Allergen Reactions    Macrobid [nitrofurantoin monohyd/m-cryst] Swelling    Metformin Swelling    Ciprofloxacin Other (See Comments)     GI problems    Codeine Other (See Comments)     headache    Latex, natural rubber Rash       No current  facility-administered medications on file prior to encounter.     Current Outpatient Medications on File Prior to Encounter   Medication Sig    acarbose (PRECOSE) 100 MG Tab Take 1 tablet (100 mg total) by mouth 3 (three) times daily with meals.    acetaminophen (TYLENOL) 650 MG TbSR Take 650 mg by mouth 2 (two) times a day.    albuterol (PROVENTIL/VENTOLIN HFA) 90 mcg/actuation inhaler Inhale 2 puffs into the lungs every 6 hours as needed for wheezing    alendronate (FOSAMAX) 70 MG tablet Take 1 tablet (70 mg total) by mouth every 7 days.    aspirin (ECOTRIN) 81 MG EC tablet Take 81 mg by mouth once daily. Stopped 01/19/2022    atorvastatin (LIPITOR) 40 MG tablet Take 1 tablet (40 mg total) by mouth once daily.    blood sugar diagnostic (ONETOUCH ULTRA TEST) Strp Use to check fingerstick glucose readings 1 time a day    blood-glucose meter Misc 1 Device by Misc.(Non-Drug; Combo Route) route once daily. One Touch Dx. Code:E11.9    carvediloL (COREG) 6.25 MG tablet Take 1 tablet (6.25 mg total) by mouth 2 (two) times daily with meals.    cholecalciferol, vitamin D3, (VITAMIN D3) 2,000 unit Tab Take 1 tablet (2,000 Units total) by mouth once daily. (Patient taking differently: Take 1 tablet by mouth twice a week. 10,000 twice a week)    clobetasoL (TEMOVATE) 0.05 % cream Apply twice a day for 2 weeks then nightly for 2 months. May decrease to three nights a week after that.    co-enzyme Q-10 30 mg capsule Take 1 capsule (30 mg total) by mouth every evening.    cranberry fruit extract (CRANBERRY CONCENTRATE ORAL) Take 2 capsules by mouth once daily.    dicyclomine (BENTYL) 20 mg tablet Take 1 tablet (20 mg total) by mouth 4 (four) times daily before meals and nightly.    famotidine (PEPCID) 20 MG tablet Take 1 tablet (20 mg total) by mouth 2 (two) times daily.    flash glucose scanning reader (FREESTYLE JOSEPHINE 2 READER) Misc Use as instructed    flash glucose sensor (FREESTYLE JOSEPHINE 2 SENSOR) Kit Use as instructed     "FLUoxetine 10 MG capsule Take 1 capsule (10 mg total) by mouth once daily.    furosemide (LASIX) 20 MG tablet Take 1 tablet by mouth once daily    gabapentin (NEURONTIN) 600 MG tablet Take 1 tablet (600 mg total) by mouth 3 (three) times daily.    glucagon (BAQSIMI) 3 mg/actuation Spry 2 pack.  Spray one device (3 mg) in one nostril to treat severe hypoglycemia. Disp 1 box (2 devices)    hydroCHLOROthiazide (HYDRODIURIL) 25 MG tablet Take 1 tablet (25 mg total) by mouth once daily.    insulin aspart U-100 (NOVOLOG FLEXPEN U-100 INSULIN) 100 unit/mL (3 mL) InPn pen Take sliding scale insulin 4 times a day as instructed- max dose of 40 units/day.    lancets (ONETOUCH DELICA PLUS LANCET) 33 gauge Misc Use to check glucose readings 4 times a day.    letrozole (FEMARA) 2.5 mg Tab Take 1 tablet (2.5 mg total) by mouth once daily.    LIDOcaine (LIDODERM) 5 % SMARTSIG:Topical    lisinopriL (PRINIVIL,ZESTRIL) 20 MG tablet Take 1 tablet (20 mg total) by mouth every evening.    loperamide (IMODIUM) 2 mg capsule Take 2 mg by mouth 4 (four) times daily as needed for Diarrhea.    metFORMIN (GLUCOPHAGE-XR) 500 MG ER 24hr tablet Take 1 tablet (500 mg total) by mouth daily with breakfast.    montelukast (SINGULAIR) 10 mg tablet Take 1 tablet (10 mg total) by mouth once daily.    mucus clearing device (AEROBIKA OSCILLATING PEP SYSTM MISC) 12 puffs by Misc.(Non-Drug; Combo Route) route daily as needed. (Patient not taking: Reported on 1/3/2025)    multivitamin capsule Take 1 capsule by mouth once daily.    NYAMYC powder     omega-3 fatty acids/fish oil (FISH OIL-OMEGA-3 FATTY ACIDS) 300-1,000 mg capsule Take by mouth once daily.    oseltamivir (TAMIFLU) 75 MG capsule Take 1 capsule (75 mg total) by mouth once daily. for 10 days    pen needle, diabetic (BD ULTRA-FINE MICRO PEN NEEDLE) 32 gauge x 1/4" Ndle Use to inject insulin 4 to 5 times a day as instructed    pen needle, diabetic (BD ULTRA-FINE MICRO PEN NEEDLE) 32 gauge x 1/4" Ndle " use to inject ozempic once a week    potassium chloride (KLOR-CON) 10 MEQ TbSR Take 99 mEq by mouth once.    SEMGLEE,INSULIN GLARG-YFGN, unit/mL (3 mL) InPn Inject 16 units once daily    sulfamethoxazole-trimethoprim 800-160mg (BACTRIM DS) 800-160 mg Tab Take 1 tablet by mouth 2 (two) times daily.     Family History       Problem Relation (Age of Onset)    Bell's palsy Sister    Bone cancer Mother, Father (86)    Breast cancer Mother, Sister, Sister    Cancer Mother (42), Father    Heart disease Mother          Tobacco Use    Smoking status: Never    Smokeless tobacco: Never   Substance and Sexual Activity    Alcohol use: Not Currently     Comment: rare    Drug use: Never    Sexual activity: Not Currently     Partners: Male     Comment:      Review of Systems   Constitutional:  Positive for activity change, chills and fatigue. Negative for fever.   HENT:  Negative for postnasal drip, sinus pain and sore throat.    Respiratory:  Positive for cough and shortness of breath.    Cardiovascular:  Negative for chest pain, palpitations and leg swelling.   Gastrointestinal:  Positive for diarrhea and nausea. Negative for abdominal pain.   Musculoskeletal:  Negative for back pain and myalgias.   Neurological:  Positive for weakness. Negative for syncope.   Psychiatric/Behavioral:  Negative for agitation and decreased concentration.      Objective:     Vital Signs (Most Recent):  Temp: 97.2 °F (36.2 °C) (03/14/25 1654)  Pulse: 79 (03/14/25 1511)  Resp: (!) 21 (03/14/25 1511)  BP: (!) 167/84 (03/14/25 0841)  SpO2: 97 % (03/14/25 1511) Vital Signs (24h Range):  Temp:  [97.2 °F (36.2 °C)-98.6 °F (37 °C)] 97.2 °F (36.2 °C)  Pulse:  [68-86] 79  Resp:  [18-21] 21  SpO2:  [93 %-99 %] 97 %  BP: (167-184)/(79-90) 167/84     Weight: 89.7 kg (197 lb 11.2 oz)  Body mass index is 33.94 kg/m².     Physical Exam  Constitutional:       Appearance: She is ill-appearing.   HENT:      Nose: Nose normal.      Mouth/Throat:      Mouth:  Mucous membranes are moist.   Eyes:      Extraocular Movements: Extraocular movements intact.      Pupils: Pupils are equal, round, and reactive to light.   Cardiovascular:      Rate and Rhythm: Normal rate and regular rhythm.   Pulmonary:      Breath sounds: Rhonchi present.   Abdominal:      General: Bowel sounds are normal.      Palpations: Abdomen is soft.   Genitourinary:     Comments: Land with yellow urine  Musculoskeletal:         General: Normal range of motion.   Skin:     General: Skin is warm.   Neurological:      General: No focal deficit present.              CRANIAL NERVES     CN III, IV, VI   Pupils are equal, round, and reactive to light.       Significant Labs: All pertinent labs within the past 24 hours have been reviewed.  Recent Lab Results         03/14/25  1301   03/14/25  0706   03/13/25 2015        Albumin   2.5         ALP   81         ALT   13         Anion Gap   10         AST   23         Baso #   0.03         Basophil %   0.4         BILIRUBIN TOTAL   0.3  Comment: For infants and newborns, interpretation of results should be based  on gestational age, weight and in agreement with clinical  observations.    Premature Infant recommended reference ranges:  Up to 24 hours.............<8.0 mg/dL  Up to 48 hours............<12.0 mg/dL  3-5 days..................<15.0 mg/dL  6-29 days.................<15.0 mg/dL           BUN   14         Calcium   8.5         Chloride   101         CO2   24         Creatinine   0.9         Differential Method   Automated         eGFR   >60.0         Eos #   0.0         Eos %   0.4         Glucose   160         Gran # (ANC)   6.1         Gran %   72.1         Hematocrit   32.6         Hemoglobin   10.8         Immature Grans (Abs)   0.08  Comment: Mild elevation in immature granulocytes is non specific and   can be seen in a variety of conditions including stress response,   acute inflammation, trauma and pregnancy. Correlation with other   laboratory and  clinical findings is essential.           Immature Granulocytes   1.0         Lymph #   1.5         Lymph %   17.8         Magnesium    1.6         MCH   29.3         MCHC   33.1         MCV   89         Mono #   0.7         Mono %   8.3         MPV   9.5         nRBC   0         Platelet Count   310         POCT Glucose 239     164       Potassium   3.7         PROTEIN TOTAL   6.4         RBC   3.68         RDW   13.2         Sodium   135         Vancomycin-Trough   32.5  Comment: Vancomycin critical result(s) called and verbal readback obtained   from Peggy Salinas RN. by ABT 03/14/2025 08:01           WBC   8.41                 Significant Imaging: I have reviewed all pertinent imaging results/findings within the past 24 hours.

## 2025-03-14 NOTE — PROGRESS NOTES
[facility] Medicine  Progress Note    Patient Name: Kelsea Cadet  MRN: [id number]  Patient Class: IP- Inpatient   Admission Date: 3/11/2025  Length of Stay: 2 days  Attending Physician: Harika Mina MD  Primary Care Provider: Gabby Jackson NP        Subjective     Principal Problem:Hypoxia        HPI:  84-year-old female presents to the emergency department for evaluation due to cough and progressive shortness of breath for the last 5 days. The patient reports that she was exposed to her daughter who has the flu. The patient's primary care provider as been treatment patient prophylactically with Tamiflu due to her influenza exposure   Pt has had some chills and nausea with decreased pp intake    Overview/Hospital Course:  3/13 ND pt with cough this am - causign some nuasea - did require dose of lasix last night due to worsening sob and wheezing - diuresed well  3/14 ND pt has been feeling blah - coughing a lot and wheezing some, more winded with moving around.  Pt has been urinating well - machado in place to help monitor output until pt can move more    Past Medical History:   Diagnosis Date    Arthritis     Coronary artery disease     Environmental and seasonal allergies     Machado catheter in place     Hard of hearing     Hyperlipidemia     Presence of indwelling Machado catheter     Vitamin D deficiency        Past Surgical History:   Procedure Laterality Date    BLADDER SUSPENSION      BREAST BIOPSY Bilateral 1996    BREAST SURGERY      lumpectomy, isotopes    CHOLECYSTECTOMY      had gal bladder removed    COLECTOMY      had part of colon removed    CYSTOSCOPY W/ RETROGRADES Bilateral 12/19/2019    Procedure: CYSTOSCOPY, WITH RETROGRADE PYELOGRAM;  Surgeon: Prasad Rocha MD;  Location: [facility] OR;  Service: Urology;  Laterality: Bilateral;    DILATION OF URETHRA N/A 12/19/2019    Procedure: DILATION, URETHRA;  Surgeon: Prasad Rocha MD;  Location: [facility] OR;  Service: Urology;   Laterality: N/A;    HYSTERECTOMY      LEFT HEART CATHETERIZATION Left 11/27/2019    Procedure: Left heart cath;  Surgeon: Urban Paiz MD;  Location: [facility] CATH LAB;  Service: Cardiology;  Laterality: Left;    MODIFIED RADICAL MASTECTOMY W/ AXILLARY LYMPH NODE DISSECTION Right 1/26/2022    Procedure: MASTECTOMY, MODIFIED RADICAL;  Surgeon: Keisha Cortez MD;  Location: [facility] OR;  Service: General;  Laterality: Right;    OOPHORECTOMY      SENTINEL LYMPH NODE BIOPSY Right 1/26/2022    Procedure: BIOPSY, LYMPH NODE, SENTINEL;  Surgeon: Keisha Cortez MD;  Location: [facility] OR;  Service: General;  Laterality: Right;  0800    SIMPLE MASTECTOMY Left 1/26/2022    Procedure: MASTECTOMY, SIMPLE;  Surgeon: Keisha Cortez MD;  Location: [facility];  Service: General;  Laterality: Left;  FROZEN SECTION       Review of patient's allergies indicates:   Allergen Reactions    Macrobid [nitrofurantoin monohyd/m-cryst] Swelling    Metformin Swelling    Ciprofloxacin Other (See Comments)     GI problems    Codeine Other (See Comments)     headache    Latex, natural rubber Rash       No current facility-administered medications on file prior to encounter.     Current Outpatient Medications on File Prior to Encounter   Medication Sig    acarbose (PRECOSE) 100 MG Tab Take 1 tablet (100 mg total) by mouth 3 (three) times daily with meals.    acetaminophen (TYLENOL) 650 MG TbSR Take 650 mg by mouth 2 (two) times a day.    albuterol (PROVENTIL/VENTOLIN HFA) 90 mcg/actuation inhaler Inhale 2 puffs into the lungs every 6 hours as needed for wheezing    alendronate (FOSAMAX) 70 MG tablet Take 1 tablet (70 mg total) by mouth every 7 days.    aspirin (ECOTRIN) 81 MG EC tablet Take 81 mg by mouth once daily. Stopped 01/19/2022    atorvastatin (LIPITOR) 40 MG tablet Take 1 tablet (40 mg total) by mouth once daily.    blood sugar diagnostic (ONETOUCH ULTRA TEST) Strp Use to check fingerstick glucose readings 1 time a day    blood-glucose  meter Misc 1 Device by Misc.(Non-Drug; Combo Route) route once daily. One Touch Dx. Code:E11.9    carvediloL (COREG) 6.25 MG tablet Take 1 tablet (6.25 mg total) by mouth 2 (two) times daily with meals.    cholecalciferol, vitamin D3, (VITAMIN D3) 2,000 unit Tab Take 1 tablet (2,000 Units total) by mouth once daily. (Patient taking differently: Take 1 tablet by mouth twice a week. 10,000 twice a week)    clobetasoL (TEMOVATE) 0.05 % cream Apply twice a day for 2 weeks then nightly for 2 months. May decrease to three nights a week after that.    co-enzyme Q-10 30 mg capsule Take 1 capsule (30 mg total) by mouth every evening.    cranberry fruit extract (CRANBERRY CONCENTRATE ORAL) Take 2 capsules by mouth once daily.    dicyclomine (BENTYL) 20 mg tablet Take 1 tablet (20 mg total) by mouth 4 (four) times daily before meals and nightly.    famotidine (PEPCID) 20 MG tablet Take 1 tablet (20 mg total) by mouth 2 (two) times daily.    flash glucose scanning reader (FREESTYLE JOSEPHINE 2 READER) Misc Use as instructed    flash glucose sensor (FREESTYLE JOSEPHINE 2 SENSOR) Kit Use as instructed    FLUoxetine 10 MG capsule Take 1 capsule (10 mg total) by mouth once daily.    furosemide (LASIX) 20 MG tablet Take 1 tablet by mouth once daily    gabapentin (NEURONTIN) 600 MG tablet Take 1 tablet (600 mg total) by mouth 3 (three) times daily.    glucagon (BAQSIMI) 3 mg/actuation Spry 2 pack.  Spray one device (3 mg) in one nostril to treat severe hypoglycemia. Disp 1 box (2 devices)    hydroCHLOROthiazide (HYDRODIURIL) 25 MG tablet Take 1 tablet (25 mg total) by mouth once daily.    insulin aspart U-100 (NOVOLOG FLEXPEN U-100 INSULIN) 100 unit/mL (3 mL) InPn pen Take sliding scale insulin 4 times a day as instructed- max dose of 40 units/day.    lancets (ONETOUCH DELICA PLUS LANCET) 33 gauge Misc Use to check glucose readings 4 times a day.    letrozole (FEMARA) 2.5 mg Tab Take 1 tablet (2.5 mg total) by mouth once daily.    LIDOcaine  "(LIDODERM) 5 % SMARTSIG:Topical    lisinopriL (PRINIVIL,ZESTRIL) 20 MG tablet Take 1 tablet (20 mg total) by mouth every evening.    loperamide (IMODIUM) 2 mg capsule Take 2 mg by mouth 4 (four) times daily as needed for Diarrhea.    metFORMIN (GLUCOPHAGE-XR) 500 MG ER 24hr tablet Take 1 tablet (500 mg total) by mouth daily with breakfast.    montelukast (SINGULAIR) 10 mg tablet Take 1 tablet (10 mg total) by mouth once daily.    mucus clearing device (AEROBIKA OSCILLATING PEP SYSTM MISC) 12 puffs by Misc.(Non-Drug; Combo Route) route daily as needed. (Patient not taking: Reported on 1/3/2025)    multivitamin capsule Take 1 capsule by mouth once daily.    NYAMYC powder     omega-3 fatty acids/fish oil (FISH OIL-OMEGA-3 FATTY ACIDS) 300-1,000 mg capsule Take by mouth once daily.    oseltamivir (TAMIFLU) 75 MG capsule Take 1 capsule (75 mg total) by mouth once daily. for 10 days    pen needle, diabetic (BD ULTRA-FINE MICRO PEN NEEDLE) 32 gauge x 1/4" Ndle Use to inject insulin 4 to 5 times a day as instructed    pen needle, diabetic (BD ULTRA-FINE MICRO PEN NEEDLE) 32 gauge x 1/4" Ndle use to inject ozempic once a week    potassium chloride (KLOR-CON) 10 MEQ TbSR Take 99 mEq by mouth once.    SEMGLEE,INSULIN GLARG-YFGN, unit/mL (3 mL) InPn Inject 16 units once daily    sulfamethoxazole-trimethoprim 800-160mg (BACTRIM DS) 800-160 mg Tab Take 1 tablet by mouth 2 (two) times daily.     Family History       Problem Relation (Age of Onset)    Bell's palsy Sister    Bone cancer Mother, Father (86)    Breast cancer Mother, Sister, Sister    Cancer Mother (42), Father    Heart disease Mother          Tobacco Use    Smoking status: Never    Smokeless tobacco: Never   Substance and Sexual Activity    Alcohol use: Not Currently     Comment: rare    Drug use: Never    Sexual activity: Not Currently     Partners: Male     Comment:      Review of Systems   Constitutional:  Positive for activity change, chills and " fatigue. Negative for fever.   HENT:  Negative for postnasal drip, sinus pain and sore throat.    Respiratory:  Positive for cough and shortness of breath.    Cardiovascular:  Negative for chest pain, palpitations and leg swelling.   Gastrointestinal:  Positive for diarrhea and nausea. Negative for abdominal pain.   Musculoskeletal:  Negative for back pain and myalgias.   Neurological:  Positive for weakness. Negative for syncope.   Psychiatric/Behavioral:  Negative for agitation and decreased concentration.      Objective:     Vital Signs (Most Recent):  Temp: 97.2 °F (36.2 °C) (03/14/25 1654)  Pulse: 79 (03/14/25 1511)  Resp: (!) 21 (03/14/25 1511)  BP: (!) 167/84 (03/14/25 0841)  SpO2: 97 % (03/14/25 1511) Vital Signs (24h Range):  Temp:  [97.2 °F (36.2 °C)-98.6 °F (37 °C)] 97.2 °F (36.2 °C)  Pulse:  [68-86] 79  Resp:  [18-21] 21  SpO2:  [93 %-99 %] 97 %  BP: (167-184)/(79-90) 167/84     Weight: 89.7 kg (197 lb 11.2 oz)  Body mass index is 33.94 kg/m².     Physical Exam  Constitutional:       Appearance: She is ill-appearing.   HENT:      Nose: Nose normal.      Mouth/Throat:      Mouth: Mucous membranes are moist.   Eyes:      Extraocular Movements: Extraocular movements intact.      Pupils: Pupils are equal, round, and reactive to light.   Cardiovascular:      Rate and Rhythm: Normal rate and regular rhythm.   Pulmonary:      Breath sounds: Rhonchi present.   Abdominal:      General: Bowel sounds are normal.      Palpations: Abdomen is soft.   Genitourinary:     Comments: Land with yellow urine  Musculoskeletal:         General: Normal range of motion.   Skin:     General: Skin is warm.   Neurological:      General: No focal deficit present.              CRANIAL NERVES     CN III, IV, VI   Pupils are equal, round, and reactive to light.       Significant Labs: All pertinent labs within the past 24 hours have been reviewed.  Recent Lab Results         03/14/25  1301   03/14/25  0706   03/13/25 2015         Albumin   2.5         ALP   81         ALT   13         Anion Gap   10         AST   23         Baso #   0.03         Basophil %   0.4         BILIRUBIN TOTAL   0.3  Comment: For infants and newborns, interpretation of results should be based  on gestational age, weight and in agreement with clinical  observations.    Premature Infant recommended reference ranges:  Up to 24 hours.............<8.0 mg/dL  Up to 48 hours............<12.0 mg/dL  3-5 days..................<15.0 mg/dL  6-29 days.................<15.0 mg/dL           BUN   14         Calcium   8.5         Chloride   101         CO2   24         Creatinine   0.9         Differential Method   Automated         eGFR   >60.0         Eos #   0.0         Eos %   0.4         Glucose   160         Gran # (ANC)   6.1         Gran %   72.1         Hematocrit   32.6         Hemoglobin   10.8         Immature Grans (Abs)   0.08  Comment: Mild elevation in immature granulocytes is non specific and   can be seen in a variety of conditions including stress response,   acute inflammation, trauma and pregnancy. Correlation with other   laboratory and clinical findings is essential.           Immature Granulocytes   1.0         Lymph #   1.5         Lymph %   17.8         Magnesium    1.6         MCH   29.3         MCHC   33.1         MCV   89         Mono #   0.7         Mono %   8.3         MPV   9.5         nRBC   0         Platelet Count   310         POCT Glucose 239     164       Potassium   3.7         PROTEIN TOTAL   6.4         RBC   3.68         RDW   13.2         Sodium   135         Vancomycin-Trough   32.5  Comment: Vancomycin critical result(s) called and verbal readback obtained   from Peggy Salinas RN. by ABT 03/14/2025 08:01           WBC   8.41                 Significant Imaging: I have reviewed all pertinent imaging results/findings within the past 24 hours.      Assessment & Plan  Hypoxia  O2 protocal  Tx probable pneumonia with iv abxs - repeat  cxr in am  Tx flu  3/13 ct of chest to evaluate lung - pneumo vs chronic lung condition  3/14 has pneumonitits -cont iv abxs; add dexamthasone 4mg qday  Type 2 diabetes mellitus without complication, with long-term current use of insulin  Accuchecks q ach abd qhs  Ada diet    Shortness of breath  Tx flu and probable pneumonia - o2 protocal    Influenza  Tamiflu; nebsl o2 protocal    Hyponatremia  Hyponatremia is likely due to Dehydration/hypovolemia. The patient's most recent sodium results are listed below.  Recent Labs     03/12/25  0632 03/13/25  0549 03/14/25  0706   * 134* 135*     Encourage po intake - monitor daily    3/13 sodium level improved  3/14 sodium back to normal  VTE Risk Mitigation (From admission, onward)           Ordered     enoxaparin injection 40 mg  Daily         03/12/25 0150     IP VTE HIGH RISK PATIENT  Once         03/12/25 0150     Place sequential compression device  Until discontinued         03/12/25 0150                    Discharge Planning   CATRACHITO:      Code Status: Full Code   Medical Readiness for Discharge Date:   Discharge Plan A: Home with family, Home Health                        Harika Mina MD  Department of Hospital Medicine   [facility]

## 2025-03-14 NOTE — ASSESSMENT & PLAN NOTE
Hyponatremia is likely due to Dehydration/hypovolemia. The patient's most recent sodium results are listed below.  Recent Labs     03/12/25  0632 03/13/25  0549 03/14/25  0706   * 134* 135*     Encourage po intake - monitor daily    3/13 sodium level improved  3/14 sodium back to normal

## 2025-03-15 LAB
ALBUMIN SERPL BCP-MCNC: 2.6 G/DL (ref 3.5–5.2)
ALP SERPL-CCNC: 81 U/L (ref 55–135)
ALT SERPL W/O P-5'-P-CCNC: 13 U/L (ref 10–44)
ANION GAP SERPL CALC-SCNC: 11 MMOL/L (ref 8–16)
AST SERPL-CCNC: 20 U/L (ref 10–40)
BASOPHILS # BLD AUTO: 0.02 K/UL (ref 0–0.2)
BASOPHILS NFR BLD: 0.4 % (ref 0–1.9)
BILIRUB SERPL-MCNC: 0.3 MG/DL (ref 0.1–1)
BUN SERPL-MCNC: 12 MG/DL (ref 8–23)
CALCIUM SERPL-MCNC: 8.5 MG/DL (ref 8.7–10.5)
CHLORIDE SERPL-SCNC: 98 MMOL/L (ref 95–110)
CO2 SERPL-SCNC: 25 MMOL/L (ref 23–29)
CREAT SERPL-MCNC: 0.8 MG/DL (ref 0.5–1.4)
DIFFERENTIAL METHOD BLD: ABNORMAL
EOSINOPHIL # BLD AUTO: 0 K/UL (ref 0–0.5)
EOSINOPHIL NFR BLD: 0 % (ref 0–8)
ERYTHROCYTE [DISTWIDTH] IN BLOOD BY AUTOMATED COUNT: 13.2 % (ref 11.5–14.5)
EST. GFR  (NO RACE VARIABLE): >60 ML/MIN/1.73 M^2
GLUCOSE SERPL-MCNC: 275 MG/DL (ref 70–110)
HCT VFR BLD AUTO: 33.5 % (ref 37–48.5)
HGB BLD-MCNC: 11 G/DL (ref 12–16)
IMM GRANULOCYTES # BLD AUTO: 0.14 K/UL (ref 0–0.04)
IMM GRANULOCYTES NFR BLD AUTO: 2.9 % (ref 0–0.5)
LYMPHOCYTES # BLD AUTO: 0.9 K/UL (ref 1–4.8)
LYMPHOCYTES NFR BLD: 18.2 % (ref 18–48)
MAGNESIUM SERPL-MCNC: 1.8 MG/DL (ref 1.6–2.6)
MCH RBC QN AUTO: 28.8 PG (ref 27–31)
MCHC RBC AUTO-ENTMCNC: 32.8 G/DL (ref 32–36)
MCV RBC AUTO: 88 FL (ref 82–98)
MONOCYTES # BLD AUTO: 0.3 K/UL (ref 0.3–1)
MONOCYTES NFR BLD: 6.7 % (ref 4–15)
NEUTROPHILS # BLD AUTO: 3.5 K/UL (ref 1.8–7.7)
NEUTROPHILS NFR BLD: 71.8 % (ref 38–73)
NRBC BLD-RTO: 0 /100 WBC
PLATELET # BLD AUTO: 348 K/UL (ref 150–450)
PMV BLD AUTO: 9.7 FL (ref 9.2–12.9)
POCT GLUCOSE: 296 MG/DL (ref 70–110)
POTASSIUM SERPL-SCNC: 3.7 MMOL/L (ref 3.5–5.1)
PROT SERPL-MCNC: 6.6 G/DL (ref 6–8.4)
RBC # BLD AUTO: 3.82 M/UL (ref 4–5.4)
SODIUM SERPL-SCNC: 134 MMOL/L (ref 136–145)
VANCOMYCIN TROUGH SERPL-MCNC: 19.5 UG/ML (ref 10–22)
WBC # BLD AUTO: 4.89 K/UL (ref 3.9–12.7)

## 2025-03-15 PROCEDURE — 80202 ASSAY OF VANCOMYCIN: CPT | Performed by: INTERNAL MEDICINE

## 2025-03-15 PROCEDURE — 80053 COMPREHEN METABOLIC PANEL: CPT | Performed by: INTERNAL MEDICINE

## 2025-03-15 PROCEDURE — 21400001 HC TELEMETRY ROOM

## 2025-03-15 PROCEDURE — 27000207 HC ISOLATION

## 2025-03-15 PROCEDURE — 94799 UNLISTED PULMONARY SVC/PX: CPT

## 2025-03-15 PROCEDURE — 99900035 HC TECH TIME PER 15 MIN (STAT)

## 2025-03-15 PROCEDURE — 27000221 HC OXYGEN, UP TO 24 HOURS

## 2025-03-15 PROCEDURE — 63600175 PHARM REV CODE 636 W HCPCS: Performed by: INTERNAL MEDICINE

## 2025-03-15 PROCEDURE — 94640 AIRWAY INHALATION TREATMENT: CPT

## 2025-03-15 PROCEDURE — 94761 N-INVAS EAR/PLS OXIMETRY MLT: CPT

## 2025-03-15 PROCEDURE — 25000003 PHARM REV CODE 250

## 2025-03-15 PROCEDURE — 63600175 PHARM REV CODE 636 W HCPCS: Performed by: EMERGENCY MEDICINE

## 2025-03-15 PROCEDURE — 83735 ASSAY OF MAGNESIUM: CPT | Performed by: INTERNAL MEDICINE

## 2025-03-15 PROCEDURE — 85025 COMPLETE CBC W/AUTO DIFF WBC: CPT | Performed by: INTERNAL MEDICINE

## 2025-03-15 PROCEDURE — 25000003 PHARM REV CODE 250: Performed by: INTERNAL MEDICINE

## 2025-03-15 PROCEDURE — 25000003 PHARM REV CODE 250: Performed by: EMERGENCY MEDICINE

## 2025-03-15 PROCEDURE — 25000242 PHARM REV CODE 250 ALT 637 W/ HCPCS: Performed by: INTERNAL MEDICINE

## 2025-03-15 PROCEDURE — 99900031 HC PATIENT EDUCATION (STAT)

## 2025-03-15 PROCEDURE — 94664 DEMO&/EVAL PT USE INHALER: CPT

## 2025-03-15 RX ORDER — HYDRALAZINE HYDROCHLORIDE 25 MG/1
25 TABLET, FILM COATED ORAL EVERY 8 HOURS PRN
Status: DISCONTINUED | OUTPATIENT
Start: 2025-03-15 | End: 2025-03-17 | Stop reason: HOSPADM

## 2025-03-15 RX ORDER — LISINOPRIL 20 MG/1
40 TABLET ORAL NIGHTLY
Status: DISCONTINUED | OUTPATIENT
Start: 2025-03-15 | End: 2025-03-17 | Stop reason: HOSPADM

## 2025-03-15 RX ORDER — IPRATROPIUM BROMIDE AND ALBUTEROL SULFATE 2.5; .5 MG/3ML; MG/3ML
3 SOLUTION RESPIRATORY (INHALATION)
Status: DISCONTINUED | OUTPATIENT
Start: 2025-03-15 | End: 2025-03-17 | Stop reason: HOSPADM

## 2025-03-15 RX ADMIN — MONTELUKAST 10 MG: 10 TABLET, FILM COATED ORAL at 09:03

## 2025-03-15 RX ADMIN — GABAPENTIN 600 MG: 300 CAPSULE ORAL at 09:03

## 2025-03-15 RX ADMIN — INSULIN ASPART 3 UNITS: 100 INJECTION, SOLUTION INTRAVENOUS; SUBCUTANEOUS at 09:03

## 2025-03-15 RX ADMIN — MUPIROCIN: 20 OINTMENT TOPICAL at 09:03

## 2025-03-15 RX ADMIN — PIPERACILLIN SODIUM AND TAZOBACTAM SODIUM 4.5 G: 4; .5 INJECTION, POWDER, LYOPHILIZED, FOR SOLUTION INTRAVENOUS at 02:03

## 2025-03-15 RX ADMIN — ACETAMINOPHEN 650 MG: 325 TABLET ORAL at 01:03

## 2025-03-15 RX ADMIN — OSELTAMIVIR PHOSPHATE 30 MG: 30 CAPSULE ORAL at 09:03

## 2025-03-15 RX ADMIN — FUROSEMIDE 20 MG: 20 TABLET ORAL at 09:03

## 2025-03-15 RX ADMIN — ENOXAPARIN SODIUM 40 MG: 40 INJECTION SUBCUTANEOUS at 06:03

## 2025-03-15 RX ADMIN — POLYETHYLENE GLYCOL (3350) 17 G: 17 POWDER, FOR SOLUTION ORAL at 09:03

## 2025-03-15 RX ADMIN — GUAIFENESIN 1200 MG: 600 TABLET, EXTENDED RELEASE ORAL at 09:03

## 2025-03-15 RX ADMIN — GABAPENTIN 600 MG: 300 CAPSULE ORAL at 02:03

## 2025-03-15 RX ADMIN — GUAIFENESIN AND DEXTROMETHORPHAN 10 ML: 20; 200 SYRUP ORAL at 04:03

## 2025-03-15 RX ADMIN — LISINOPRIL 40 MG: 20 TABLET ORAL at 09:03

## 2025-03-15 RX ADMIN — IBUPROFEN 600 MG: 600 TABLET ORAL at 09:03

## 2025-03-15 RX ADMIN — ACETAMINOPHEN 650 MG: 325 TABLET ORAL at 09:03

## 2025-03-15 RX ADMIN — FLUOXETINE HYDROCHLORIDE 10 MG: 10 CAPSULE ORAL at 09:03

## 2025-03-15 RX ADMIN — ATORVASTATIN CALCIUM 40 MG: 40 TABLET, FILM COATED ORAL at 09:03

## 2025-03-15 RX ADMIN — ACETAMINOPHEN 650 MG: 325 TABLET ORAL at 04:03

## 2025-03-15 RX ADMIN — IPRATROPIUM BROMIDE AND ALBUTEROL SULFATE 3 ML: 2.5; .5 SOLUTION RESPIRATORY (INHALATION) at 02:03

## 2025-03-15 RX ADMIN — INSULIN ASPART 6 UNITS: 100 INJECTION, SOLUTION INTRAVENOUS; SUBCUTANEOUS at 11:03

## 2025-03-15 RX ADMIN — GUAIFENESIN AND DEXTROMETHORPHAN 10 ML: 20; 200 SYRUP ORAL at 01:03

## 2025-03-15 RX ADMIN — IPRATROPIUM BROMIDE AND ALBUTEROL SULFATE 3 ML: 2.5; .5 SOLUTION RESPIRATORY (INHALATION) at 08:03

## 2025-03-15 RX ADMIN — PIPERACILLIN SODIUM AND TAZOBACTAM SODIUM 4.5 G: 4; .5 INJECTION, POWDER, LYOPHILIZED, FOR SOLUTION INTRAVENOUS at 12:03

## 2025-03-15 RX ADMIN — IPRATROPIUM BROMIDE AND ALBUTEROL SULFATE 3 ML: 2.5; .5 SOLUTION RESPIRATORY (INHALATION) at 10:03

## 2025-03-15 RX ADMIN — FAMOTIDINE 20 MG: 20 TABLET, FILM COATED ORAL at 09:03

## 2025-03-15 RX ADMIN — VANCOMYCIN HYDROCHLORIDE 1500 MG: 1.5 INJECTION, POWDER, LYOPHILIZED, FOR SOLUTION INTRAVENOUS at 09:03

## 2025-03-15 NOTE — PROGRESS NOTES
[facility] Medicine  Progress Note    Patient Name: Kelsea Cadet  MRN: [id number]  Patient Class: IP- Inpatient   Admission Date: 3/11/2025  Length of Stay: 3 days  Attending Physician: Harika Mina MD  Primary Care Provider: Gabby Jackson NP        Subjective     Principal Problem:Hypoxia        HPI:  84-year-old female presents to the emergency department for evaluation due to cough and progressive shortness of breath for the last 5 days. The patient reports that she was exposed to her daughter who has the flu. The patient's primary care provider as been treatment patient prophylactically with Tamiflu due to her influenza exposure   Pt has had some chills and nausea with decreased pp intake    Overview/Hospital Course:  3/13 ND pt with cough this am - causign some nuasea - did require dose of lasix last night due to worsening sob and wheezing - diuresed well  3/14 ND pt has been feeling blah - coughing a lot and wheezing some, more winded with moving around.  Pt has been urinating well - machado in place to help monitor output until pt can move more  3/15 AA, weekend crosscover, patient admitted with influenza and pneumonitis, CT chest reviewed, steroids on board, patient on antibiotic therapy and Tamiflu, no leukocytosis, renal function stable, blood cultures no growth to date, blood pressure meds adjusted    Interval History: Patient seen and examined.    Review of Systems   Constitutional:  Positive for activity change, chills and fatigue. Negative for fever.   HENT:  Negative for postnasal drip, sinus pain and sore throat.    Respiratory:  Positive for cough and shortness of breath.    Cardiovascular:  Negative for chest pain, palpitations and leg swelling.   Gastrointestinal:  Positive for diarrhea and nausea. Negative for abdominal pain.   Musculoskeletal:  Negative for back pain and myalgias.   Neurological:  Positive for weakness. Negative for syncope.   Psychiatric/Behavioral:   Negative for agitation and decreased concentration.      Objective:     Vital Signs (Most Recent):  Temp: 97.6 °F (36.4 °C) (03/15/25 0408)  Pulse: 70 (03/15/25 0742)  Resp: 20 (03/15/25 0742)  BP: (!) 196/91 (03/15/25 0742)  SpO2: 97 % (03/15/25 0742) Vital Signs (24h Range):  Temp:  [97.2 °F (36.2 °C)-98 °F (36.7 °C)] 97.6 °F (36.4 °C)  Pulse:  [63-86] 70  Resp:  [16-21] 20  SpO2:  [89 %-99 %] 97 %  BP: (154-196)/(68-91) 196/91     Weight: 89.7 kg (197 lb 11.2 oz)  Body mass index is 33.94 kg/m².    Intake/Output Summary (Last 24 hours) at 3/15/2025 0906  Last data filed at 3/14/2025 1715  Gross per 24 hour   Intake 720 ml   Output 1000 ml   Net -280 ml         Physical Exam  Constitutional:       General: She is not in acute distress.     Appearance: She is obese. She is ill-appearing. She is not diaphoretic.   HENT:      Nose: Congestion and rhinorrhea present.      Mouth/Throat:      Mouth: Mucous membranes are moist.      Pharynx: Oropharynx is clear.   Eyes:      Extraocular Movements: Extraocular movements intact.   Cardiovascular:      Rate and Rhythm: Normal rate.   Pulmonary:      Effort: No respiratory distress.      Breath sounds: Rhonchi present. No wheezing or rales.   Abdominal:      General: Bowel sounds are normal. There is no distension.      Palpations: Abdomen is soft.      Tenderness: There is no abdominal tenderness. There is no guarding or rebound.   Genitourinary:     Comments: Land with yellow urine  Musculoskeletal:      Cervical back: Normal range of motion. No rigidity.      Right lower leg: No edema.      Left lower leg: No edema.   Skin:     General: Skin is warm.      Capillary Refill: Capillary refill takes less than 2 seconds.   Neurological:      Mental Status: She is alert. Mental status is at baseline.   Psychiatric:         Behavior: Behavior normal.         Thought Content: Thought content normal.               Significant Labs: All pertinent labs within the past 24 hours  have been reviewed.  Recent Lab Results         03/15/25  0611   03/14/25  1301        Albumin 2.6         ALP 81         ALT 13         Anion Gap 11         AST 20         Baso # 0.02         Basophil % 0.4         BILIRUBIN TOTAL 0.3  Comment: For infants and newborns, interpretation of results should be based  on gestational age, weight and in agreement with clinical  observations.    Premature Infant recommended reference ranges:  Up to 24 hours.............<8.0 mg/dL  Up to 48 hours............<12.0 mg/dL  3-5 days..................<15.0 mg/dL  6-29 days.................<15.0 mg/dL           BUN 12         Calcium 8.5         Chloride 98         CO2 25         Creatinine 0.8         Differential Method Automated         eGFR >60.0         Eos # 0.0         Eos % 0.0         Glucose 275         Gran # (ANC) 3.5         Gran % 71.8         Hematocrit 33.5         Hemoglobin 11.0         Immature Grans (Abs) 0.14  Comment: Mild elevation in immature granulocytes is non specific and   can be seen in a variety of conditions including stress response,   acute inflammation, trauma and pregnancy. Correlation with other   laboratory and clinical findings is essential.           Immature Granulocytes 2.9         Lymph # 0.9         Lymph % 18.2         Magnesium  1.8         MCH 28.8         MCHC 32.8         MCV 88         Mono # 0.3         Mono % 6.7         MPV 9.7         nRBC 0         Platelet Count 348         POCT Glucose   239       Potassium 3.7         PROTEIN TOTAL 6.6         RBC 3.82         RDW 13.2         Sodium 134         Vancomycin-Trough 19.5         WBC 4.89                 Significant Imaging: I have reviewed all pertinent imaging results/findings within the past 24 hours.      Assessment & Plan  Hypoxia  O2 protocal  Tx probable pneumonia with iv abxs - repeat cxr in am  Tx flu  3/13 ct of chest to evaluate lung - pneumo vs chronic lung condition  3/14 has pneumonitits -cont iv abxs; add  dexamthasone 4mg qday  3/15 antibiotics on board, antiviral for influenza, dexamethasone on board  Type 2 diabetes mellitus without complication, with long-term current use of insulin  Accuchecks q ach abd qhs  Ada diet    Shortness of breath  Tx flu and probable pneumonia - o2 protocal    Influenza  Tamiflu; nebsl o2 protocal    Hyponatremia  Hyponatremia is likely due to Dehydration/hypovolemia. The patient's most recent sodium results are listed below.  Recent Labs     03/13/25  0549 03/14/25  0706 03/15/25  0611   * 135* 134*     Encourage po intake - monitor daily    3/13 sodium level improved  3/14 sodium back to normal  VTE Risk Mitigation (From admission, onward)           Ordered     enoxaparin injection 40 mg  Daily         03/12/25 0150     IP VTE HIGH RISK PATIENT  Once         03/12/25 0150     Place sequential compression device  Until discontinued         03/12/25 0150                    Discharge Planning   CATRACHITO:      Code Status: Full Code   Medical Readiness for Discharge Date:   Discharge Plan A: Home with family, Home Health                        Kieran Kitchen MD  Department of Hospital Medicine   [facility]

## 2025-03-15 NOTE — ASSESSMENT & PLAN NOTE
O2 protocal  Tx probable pneumonia with iv abxs - repeat cxr in am  Tx flu  3/13 ct of chest to evaluate lung - pneumo vs chronic lung condition  3/14 has pneumonitits -cont iv abxs; add dexamthasone 4mg qday  3/15 antibiotics on board, antiviral for influenza, dexamethasone on board

## 2025-03-15 NOTE — NURSING
Purposeful rounding ongoing, Pt c/o severe (R) knee pain unrelieved with PRN tylenol, productive cough, thick mucous-clear/green, noted pt crying t/o first half of shift, no tears while nurse in room there since waking up. Pt denies needs at this time, care plan ongoing.

## 2025-03-15 NOTE — RESPIRATORY THERAPY
03/15/25 1020   PRE-TX-O2   Device (Oxygen Therapy) nasal cannula   $ Is the patient on Low Flow Oxygen? Yes   Flow (L/min) (Oxygen Therapy) (S)  2  (weaned pt to 2LNC at this time -- SHANNON Banks notified)   Oxygen Concentration (%) 28   SpO2 96 %   Pulse 85   Resp 18

## 2025-03-15 NOTE — PLAN OF CARE
POC reviewed with pt and daughter      Problem: Adult Inpatient Plan of Care  Goal: Plan of Care Review  Outcome: Progressing  Goal: Patient-Specific Goal (Individualized)  Outcome: Progressing  Goal: Absence of Hospital-Acquired Illness or Injury  Outcome: Progressing  Goal: Optimal Comfort and Wellbeing  Outcome: Progressing  Goal: Readiness for Transition of Care  Outcome: Progressing     Problem: Infection  Goal: Absence of Infection Signs and Symptoms  Outcome: Progressing     Problem: Diabetes Comorbidity  Goal: Blood Glucose Level Within Targeted Range  Outcome: Progressing     Problem: Wound  Goal: Optimal Coping  Outcome: Progressing  Goal: Optimal Functional Ability  Outcome: Progressing  Goal: Absence of Infection Signs and Symptoms  Outcome: Progressing  Goal: Improved Oral Intake  Outcome: Progressing  Goal: Optimal Pain Control and Function  Outcome: Progressing  Goal: Skin Health and Integrity  Outcome: Progressing  Goal: Optimal Wound Healing  Outcome: Progressing     Problem: Skin Injury Risk Increased  Goal: Skin Health and Integrity  Outcome: Progressing

## 2025-03-15 NOTE — SUBJECTIVE & OBJECTIVE
Interval History: Patient seen and examined.    Review of Systems   Constitutional:  Positive for activity change, chills and fatigue. Negative for fever.   HENT:  Negative for postnasal drip, sinus pain and sore throat.    Respiratory:  Positive for cough and shortness of breath.    Cardiovascular:  Negative for chest pain, palpitations and leg swelling.   Gastrointestinal:  Positive for diarrhea and nausea. Negative for abdominal pain.   Musculoskeletal:  Negative for back pain and myalgias.   Neurological:  Positive for weakness. Negative for syncope.   Psychiatric/Behavioral:  Negative for agitation and decreased concentration.      Objective:     Vital Signs (Most Recent):  Temp: 97.6 °F (36.4 °C) (03/15/25 0408)  Pulse: 70 (03/15/25 0742)  Resp: 20 (03/15/25 0742)  BP: (!) 196/91 (03/15/25 0742)  SpO2: 97 % (03/15/25 0742) Vital Signs (24h Range):  Temp:  [97.2 °F (36.2 °C)-98 °F (36.7 °C)] 97.6 °F (36.4 °C)  Pulse:  [63-86] 70  Resp:  [16-21] 20  SpO2:  [89 %-99 %] 97 %  BP: (154-196)/(68-91) 196/91     Weight: 89.7 kg (197 lb 11.2 oz)  Body mass index is 33.94 kg/m².    Intake/Output Summary (Last 24 hours) at 3/15/2025 0906  Last data filed at 3/14/2025 1715  Gross per 24 hour   Intake 720 ml   Output 1000 ml   Net -280 ml         Physical Exam  Constitutional:       General: She is not in acute distress.     Appearance: She is obese. She is ill-appearing. She is not diaphoretic.   HENT:      Nose: Congestion and rhinorrhea present.      Mouth/Throat:      Mouth: Mucous membranes are moist.      Pharynx: Oropharynx is clear.   Eyes:      Extraocular Movements: Extraocular movements intact.   Cardiovascular:      Rate and Rhythm: Normal rate.   Pulmonary:      Effort: No respiratory distress.      Breath sounds: Rhonchi present. No wheezing or rales.   Abdominal:      General: Bowel sounds are normal. There is no distension.      Palpations: Abdomen is soft.      Tenderness: There is no abdominal tenderness.  There is no guarding or rebound.   Genitourinary:     Comments: Land with yellow urine  Musculoskeletal:      Cervical back: Normal range of motion. No rigidity.      Right lower leg: No edema.      Left lower leg: No edema.   Skin:     General: Skin is warm.      Capillary Refill: Capillary refill takes less than 2 seconds.   Neurological:      Mental Status: She is alert. Mental status is at baseline.   Psychiatric:         Behavior: Behavior normal.         Thought Content: Thought content normal.               Significant Labs: All pertinent labs within the past 24 hours have been reviewed.  Recent Lab Results         03/15/25  0611   03/14/25  1301        Albumin 2.6         ALP 81         ALT 13         Anion Gap 11         AST 20         Baso # 0.02         Basophil % 0.4         BILIRUBIN TOTAL 0.3  Comment: For infants and newborns, interpretation of results should be based  on gestational age, weight and in agreement with clinical  observations.    Premature Infant recommended reference ranges:  Up to 24 hours.............<8.0 mg/dL  Up to 48 hours............<12.0 mg/dL  3-5 days..................<15.0 mg/dL  6-29 days.................<15.0 mg/dL           BUN 12         Calcium 8.5         Chloride 98         CO2 25         Creatinine 0.8         Differential Method Automated         eGFR >60.0         Eos # 0.0         Eos % 0.0         Glucose 275         Gran # (ANC) 3.5         Gran % 71.8         Hematocrit 33.5         Hemoglobin 11.0         Immature Grans (Abs) 0.14  Comment: Mild elevation in immature granulocytes is non specific and   can be seen in a variety of conditions including stress response,   acute inflammation, trauma and pregnancy. Correlation with other   laboratory and clinical findings is essential.           Immature Granulocytes 2.9         Lymph # 0.9         Lymph % 18.2         Magnesium  1.8         MCH 28.8         MCHC 32.8         MCV 88         Mono # 0.3         Mono %  6.7         MPV 9.7         nRBC 0         Platelet Count 348         POCT Glucose   239       Potassium 3.7         PROTEIN TOTAL 6.6         RBC 3.82         RDW 13.2         Sodium 134         Vancomycin-Trough 19.5         WBC 4.89                 Significant Imaging: I have reviewed all pertinent imaging results/findings within the past 24 hours.

## 2025-03-15 NOTE — ASSESSMENT & PLAN NOTE
Hyponatremia is likely due to Dehydration/hypovolemia. The patient's most recent sodium results are listed below.  Recent Labs     03/13/25  0549 03/14/25  0706 03/15/25  0611   * 135* 134*     Encourage po intake - monitor daily    3/13 sodium level improved  3/14 sodium back to normal

## 2025-03-15 NOTE — PROGRESS NOTES
Pharmacokinetic Assessment Follow Up: IV Vancomycin    Vancomycin serum concentration assessment(s):    The trough level was drawn correctly and can be used to guide therapy at this time. The measurement is within the desired definitive target range of 10 to 20 mcg/mL.    Vancomycin Regimen Plan:    Continue regimen to Vancomycin 1500 mg IV every 24 hours with next serum trough concentration measured at 0500 prior to 3rd dose on 3/17/25    Drug levels (last 3 results):  Recent Labs   Lab Result Units 03/14/25  0706 03/15/25  0611   Vancomycin-Trough ug/mL 32.5* 19.5       Pharmacy will continue to follow and monitor vancomycin.    Please contact pharmacy at extension [phone] for questions regarding this assessment.    Thank you for the consult,   Doc Jackson       Patient brief summary:  Kelsea Cadet is a 85 y.o. female initiated on antimicrobial therapy with IV Vancomycin for treatment of  pneumonia    The patient's current regimen is vanc 1500mg q24h    Drug Allergies:   Review of patient's allergies indicates:   Allergen Reactions    Macrobid [nitrofurantoin monohyd/m-cryst] Swelling    Metformin Swelling    Ciprofloxacin Other (See Comments)     GI problems    Codeine Other (See Comments)     headache    Latex, natural rubber Rash       Actual Body Weight:   89.7kg    Renal Function:   Estimated Creatinine Clearance: 57.8 mL/min (based on SCr of 0.8 mg/dL).,     Dialysis Method (if applicable):  N/A    CBC (last 72 hours):  Recent Labs   Lab Result Units 03/13/25  0548 03/14/25  0706 03/15/25  0611   WBC K/uL 9.33 8.41 4.89   Hemoglobin g/dL 10.6* 10.8* 11.0*   Hematocrit % 32.1* 32.6* 33.5*   Platelets K/uL 257 310 348   Gran % % 77.4* 72.1 71.8   Lymph % % 13.7* 17.8* 18.2   Mono % % 8.0 8.3 6.7   Eosinophil % % 0.2 0.4 0.0   Basophil % % 0.3 0.4 0.4   Differential Method  Automated Automated Automated       Metabolic Panel (last 72 hours):  Recent Labs   Lab Result Units 03/12/25  1132 03/13/25  0549  03/14/25  0706 03/15/25  0611   Sodium mmol/L  --  134* 135* 134*   Potassium mmol/L  --  3.8 3.7 3.7   Chloride mmol/L  --  101 101 98   CO2 mmol/L  --  23 24 25   Glucose mg/dL  --  169* 160* 275*   Glucose, UA  4+*  --   --   --    BUN mg/dL  --  18 14 12   Creatinine mg/dL  --  0.9 0.9 0.8   Albumin g/dL  --  2.5* 2.5* 2.6*   Total Bilirubin mg/dL  --  0.2 0.3 0.3   Alkaline Phosphatase U/L  --  80 81 81   AST U/L  --  22 23 20   ALT U/L  --  11 13 13   Magnesium mg/dL  --  1.6 1.6 1.8       Vancomycin Administrations:  vancomycin given in the last 96 hours                     vancomycin 1,500 mg in 0.9% NaCl 250 mL IVPB (admixture device) (mg) 1,500 mg New Bag 03/14/25 0634     1,500 mg New Bag 03/13/25 0555    vancomycin (VANCOCIN) 1,000 mg in 0.9% NaCl 250 mL IVPB (admixture device) (mg) 1,000 mg New Bag 03/12/25 0522    vancomycin 1,250 mg in 0.9% NaCl 250 mL IVPB (admixture device) (mg) 1,250 mg New Bag 03/12/25 0349                    Microbiologic Results:  Microbiology Results (last 7 days)       Procedure Component Value Units Date/Time    Blood Culture #1 **CANNOT BE ORDERED STAT** [[id number]] Collected: 03/12/25 0148    Order Status: Completed Specimen: Blood from Peripheral, Forearm, Left Updated: 03/14/25 2012     Blood Culture, Routine No Growth to date      No Growth to date      No Growth to date    Blood Culture #2 **CANNOT BE ORDERED STAT** [[id number]] Collected: 03/12/25 0155    Order Status: Completed Specimen: Blood from Peripheral, Wrist, Right Updated: 03/14/25 2012     Blood Culture, Routine No Growth to date      No Growth to date      No Growth to date    Urine culture [[id number]] Collected: 03/12/25 1132    Order Status: Completed Specimen: Urine Updated: 03/14/25 0035     Urine Culture, Routine No growth    Narrative:      Preferred Collection Type->Urine, Clean Catch  Specimen Source->Urine

## 2025-03-16 LAB
ALBUMIN SERPL BCP-MCNC: 2.6 G/DL (ref 3.5–5.2)
ALP SERPL-CCNC: 84 U/L (ref 55–135)
ALT SERPL W/O P-5'-P-CCNC: 14 U/L (ref 10–44)
ANION GAP SERPL CALC-SCNC: 11 MMOL/L (ref 8–16)
AST SERPL-CCNC: 24 U/L (ref 10–40)
BASOPHILS # BLD AUTO: 0.06 K/UL (ref 0–0.2)
BASOPHILS NFR BLD: 0.7 % (ref 0–1.9)
BILIRUB SERPL-MCNC: 0.3 MG/DL (ref 0.1–1)
BUN SERPL-MCNC: 13 MG/DL (ref 8–23)
CALCIUM SERPL-MCNC: 8.4 MG/DL (ref 8.7–10.5)
CHLORIDE SERPL-SCNC: 100 MMOL/L (ref 95–110)
CO2 SERPL-SCNC: 26 MMOL/L (ref 23–29)
CREAT SERPL-MCNC: 0.9 MG/DL (ref 0.5–1.4)
DIFFERENTIAL METHOD BLD: ABNORMAL
EOSINOPHIL # BLD AUTO: 0.2 K/UL (ref 0–0.5)
EOSINOPHIL NFR BLD: 2.3 % (ref 0–8)
ERYTHROCYTE [DISTWIDTH] IN BLOOD BY AUTOMATED COUNT: 13.2 % (ref 11.5–14.5)
EST. GFR  (NO RACE VARIABLE): >60 ML/MIN/1.73 M^2
GLUCOSE SERPL-MCNC: 178 MG/DL (ref 70–110)
HCT VFR BLD AUTO: 35.1 % (ref 37–48.5)
HGB BLD-MCNC: 11.7 G/DL (ref 12–16)
IMM GRANULOCYTES # BLD AUTO: 0.28 K/UL (ref 0–0.04)
IMM GRANULOCYTES NFR BLD AUTO: 3.3 % (ref 0–0.5)
LYMPHOCYTES # BLD AUTO: 1.8 K/UL (ref 1–4.8)
LYMPHOCYTES NFR BLD: 21.8 % (ref 18–48)
MAGNESIUM SERPL-MCNC: 1.7 MG/DL (ref 1.6–2.6)
MCH RBC QN AUTO: 28.9 PG (ref 27–31)
MCHC RBC AUTO-ENTMCNC: 33.3 G/DL (ref 32–36)
MCV RBC AUTO: 87 FL (ref 82–98)
MONOCYTES # BLD AUTO: 0.7 K/UL (ref 0.3–1)
MONOCYTES NFR BLD: 8.6 % (ref 4–15)
NEUTROPHILS # BLD AUTO: 5.3 K/UL (ref 1.8–7.7)
NEUTROPHILS NFR BLD: 63.3 % (ref 38–73)
NRBC BLD-RTO: 0 /100 WBC
PLATELET # BLD AUTO: 358 K/UL (ref 150–450)
PMV BLD AUTO: 10.4 FL (ref 9.2–12.9)
POCT GLUCOSE: 288 MG/DL (ref 70–110)
POCT GLUCOSE: 396 MG/DL (ref 70–110)
POTASSIUM SERPL-SCNC: 3.4 MMOL/L (ref 3.5–5.1)
PROT SERPL-MCNC: 6.6 G/DL (ref 6–8.4)
RBC # BLD AUTO: 4.05 M/UL (ref 4–5.4)
SODIUM SERPL-SCNC: 137 MMOL/L (ref 136–145)
WBC # BLD AUTO: 8.44 K/UL (ref 3.9–12.7)

## 2025-03-16 PROCEDURE — 25000242 PHARM REV CODE 250 ALT 637 W/ HCPCS: Performed by: INTERNAL MEDICINE

## 2025-03-16 PROCEDURE — 85025 COMPLETE CBC W/AUTO DIFF WBC: CPT | Performed by: INTERNAL MEDICINE

## 2025-03-16 PROCEDURE — 25000003 PHARM REV CODE 250: Performed by: INTERNAL MEDICINE

## 2025-03-16 PROCEDURE — 94799 UNLISTED PULMONARY SVC/PX: CPT

## 2025-03-16 PROCEDURE — 99900031 HC PATIENT EDUCATION (STAT)

## 2025-03-16 PROCEDURE — 25000003 PHARM REV CODE 250: Performed by: EMERGENCY MEDICINE

## 2025-03-16 PROCEDURE — 94761 N-INVAS EAR/PLS OXIMETRY MLT: CPT

## 2025-03-16 PROCEDURE — 80053 COMPREHEN METABOLIC PANEL: CPT | Performed by: INTERNAL MEDICINE

## 2025-03-16 PROCEDURE — 94640 AIRWAY INHALATION TREATMENT: CPT

## 2025-03-16 PROCEDURE — 36415 COLL VENOUS BLD VENIPUNCTURE: CPT | Performed by: INTERNAL MEDICINE

## 2025-03-16 PROCEDURE — 83735 ASSAY OF MAGNESIUM: CPT | Performed by: INTERNAL MEDICINE

## 2025-03-16 PROCEDURE — 94664 DEMO&/EVAL PT USE INHALER: CPT

## 2025-03-16 PROCEDURE — 27000207 HC ISOLATION

## 2025-03-16 PROCEDURE — 21400001 HC TELEMETRY ROOM

## 2025-03-16 PROCEDURE — 27000221 HC OXYGEN, UP TO 24 HOURS

## 2025-03-16 PROCEDURE — 63600175 PHARM REV CODE 636 W HCPCS: Performed by: EMERGENCY MEDICINE

## 2025-03-16 PROCEDURE — 63600175 PHARM REV CODE 636 W HCPCS: Performed by: INTERNAL MEDICINE

## 2025-03-16 PROCEDURE — 25000003 PHARM REV CODE 250

## 2025-03-16 PROCEDURE — 99900035 HC TECH TIME PER 15 MIN (STAT)

## 2025-03-16 RX ORDER — POTASSIUM CHLORIDE 20 MEQ/1
40 TABLET, EXTENDED RELEASE ORAL ONCE
Status: COMPLETED | OUTPATIENT
Start: 2025-03-16 | End: 2025-03-16

## 2025-03-16 RX ORDER — BENZONATATE 100 MG/1
100 CAPSULE ORAL 3 TIMES DAILY PRN
Status: DISCONTINUED | OUTPATIENT
Start: 2025-03-16 | End: 2025-03-17 | Stop reason: HOSPADM

## 2025-03-16 RX ORDER — CARVEDILOL 3.12 MG/1
6.25 TABLET ORAL 2 TIMES DAILY
Status: DISCONTINUED | OUTPATIENT
Start: 2025-03-16 | End: 2025-03-17 | Stop reason: HOSPADM

## 2025-03-16 RX ORDER — NYSTATIN 100000 [USP'U]/ML
500000 SUSPENSION ORAL 4 TIMES DAILY
Status: DISCONTINUED | OUTPATIENT
Start: 2025-03-16 | End: 2025-03-17 | Stop reason: HOSPADM

## 2025-03-16 RX ADMIN — ENOXAPARIN SODIUM 40 MG: 40 INJECTION SUBCUTANEOUS at 06:03

## 2025-03-16 RX ADMIN — GABAPENTIN 600 MG: 300 CAPSULE ORAL at 09:03

## 2025-03-16 RX ADMIN — POTASSIUM CHLORIDE 40 MEQ: 1500 TABLET, EXTENDED RELEASE ORAL at 01:03

## 2025-03-16 RX ADMIN — ACETAMINOPHEN 650 MG: 325 TABLET ORAL at 09:03

## 2025-03-16 RX ADMIN — GUAIFENESIN 1200 MG: 600 TABLET, EXTENDED RELEASE ORAL at 09:03

## 2025-03-16 RX ADMIN — OSELTAMIVIR PHOSPHATE 30 MG: 30 CAPSULE ORAL at 09:03

## 2025-03-16 RX ADMIN — FLUOXETINE HYDROCHLORIDE 10 MG: 10 CAPSULE ORAL at 09:03

## 2025-03-16 RX ADMIN — CARVEDILOL 6.25 MG: 3.12 TABLET, FILM COATED ORAL at 03:03

## 2025-03-16 RX ADMIN — PIPERACILLIN SODIUM AND TAZOBACTAM SODIUM 4.5 G: 4; .5 INJECTION, POWDER, LYOPHILIZED, FOR SOLUTION INTRAVENOUS at 11:03

## 2025-03-16 RX ADMIN — NYSTATIN 500000 UNITS: 500000 SUSPENSION ORAL at 11:03

## 2025-03-16 RX ADMIN — GABAPENTIN 600 MG: 300 CAPSULE ORAL at 03:03

## 2025-03-16 RX ADMIN — FUROSEMIDE 20 MG: 20 TABLET ORAL at 09:03

## 2025-03-16 RX ADMIN — IPRATROPIUM BROMIDE AND ALBUTEROL SULFATE 3 ML: 2.5; .5 SOLUTION RESPIRATORY (INHALATION) at 07:03

## 2025-03-16 RX ADMIN — LISINOPRIL 40 MG: 20 TABLET ORAL at 09:03

## 2025-03-16 RX ADMIN — GUAIFENESIN AND DEXTROMETHORPHAN 10 ML: 20; 200 SYRUP ORAL at 07:03

## 2025-03-16 RX ADMIN — INSULIN ASPART 5 UNITS: 100 INJECTION, SOLUTION INTRAVENOUS; SUBCUTANEOUS at 10:03

## 2025-03-16 RX ADMIN — MUPIROCIN: 20 OINTMENT TOPICAL at 09:03

## 2025-03-16 RX ADMIN — PIPERACILLIN SODIUM AND TAZOBACTAM SODIUM 4.5 G: 4; .5 INJECTION, POWDER, LYOPHILIZED, FOR SOLUTION INTRAVENOUS at 06:03

## 2025-03-16 RX ADMIN — VANCOMYCIN HYDROCHLORIDE 1500 MG: 1.5 INJECTION, POWDER, LYOPHILIZED, FOR SOLUTION INTRAVENOUS at 07:03

## 2025-03-16 RX ADMIN — IPRATROPIUM BROMIDE AND ALBUTEROL SULFATE 3 ML: 2.5; .5 SOLUTION RESPIRATORY (INHALATION) at 01:03

## 2025-03-16 RX ADMIN — MONTELUKAST 10 MG: 10 TABLET, FILM COATED ORAL at 09:03

## 2025-03-16 RX ADMIN — NYSTATIN 500000 UNITS: 500000 SUSPENSION ORAL at 09:03

## 2025-03-16 RX ADMIN — PIPERACILLIN SODIUM AND TAZOBACTAM SODIUM 4.5 G: 4; .5 INJECTION, POWDER, LYOPHILIZED, FOR SOLUTION INTRAVENOUS at 03:03

## 2025-03-16 RX ADMIN — DEXAMETHASONE SODIUM PHOSPHATE 4 MG: 4 INJECTION INTRA-ARTICULAR; INTRALESIONAL; INTRAMUSCULAR; INTRAVENOUS; SOFT TISSUE at 06:03

## 2025-03-16 RX ADMIN — ATORVASTATIN CALCIUM 40 MG: 40 TABLET, FILM COATED ORAL at 09:03

## 2025-03-16 RX ADMIN — BENZONATATE 100 MG: 100 CAPSULE ORAL at 06:03

## 2025-03-16 RX ADMIN — IBUPROFEN 600 MG: 600 TABLET ORAL at 11:03

## 2025-03-16 RX ADMIN — IPRATROPIUM BROMIDE AND ALBUTEROL SULFATE 3 ML: 2.5; .5 SOLUTION RESPIRATORY (INHALATION) at 08:03

## 2025-03-16 RX ADMIN — FAMOTIDINE 20 MG: 20 TABLET, FILM COATED ORAL at 09:03

## 2025-03-16 RX ADMIN — LOPERAMIDE HYDROCHLORIDE 2 MG: 2 CAPSULE ORAL at 11:03

## 2025-03-16 RX ADMIN — NYSTATIN 500000 UNITS: 500000 SUSPENSION ORAL at 06:03

## 2025-03-16 RX ADMIN — BENZONATATE 100 MG: 100 CAPSULE ORAL at 11:03

## 2025-03-16 NOTE — PLAN OF CARE
POC reviewed w/ pt and purposeful rounding ongoing. Meds administered per MAR. PRN cough meds given for c/o cough w/ some relief obtained. PRN tylenol and ibuprofen given for c/o pain w/ relief obtained. PRN loperamide given for diarrhea w/ relief obtained. VSS on 1L NC. Pt AAOx4 and able to turn self. Breakdown noted to intergluteal cleft. Wound care consult ordered and zinc barrier cream applied as needed. Call bell w/ in reach and daughter at bedside. Pt denies needs at this time.     Problem: Adult Inpatient Plan of Care  Goal: Plan of Care Review  Outcome: Progressing  Goal: Patient-Specific Goal (Individualized)  Outcome: Progressing  Goal: Absence of Hospital-Acquired Illness or Injury  Outcome: Progressing  Goal: Optimal Comfort and Wellbeing  Outcome: Progressing  Goal: Readiness for Transition of Care  Outcome: Progressing     Problem: Infection  Goal: Absence of Infection Signs and Symptoms  Outcome: Progressing     Problem: Diabetes Comorbidity  Goal: Blood Glucose Level Within Targeted Range  Outcome: Progressing     Problem: Wound  Goal: Optimal Coping  Outcome: Progressing  Goal: Optimal Functional Ability  Outcome: Progressing  Goal: Absence of Infection Signs and Symptoms  Outcome: Progressing  Goal: Improved Oral Intake  Outcome: Progressing  Goal: Optimal Pain Control and Function  Outcome: Progressing  Goal: Skin Health and Integrity  Outcome: Progressing  Goal: Optimal Wound Healing  Outcome: Progressing     Problem: Skin Injury Risk Increased  Goal: Skin Health and Integrity  Outcome: Progressing

## 2025-03-16 NOTE — ASSESSMENT & PLAN NOTE
O2 protocal  Tx probable pneumonia with iv abxs - repeat cxr in am  Tx flu  3/13 ct of chest to evaluate lung - pneumo vs chronic lung condition  3/14 has pneumonitits -cont iv abxs; add dexamthasone 4mg qday  3/15 antibiotics on board, antiviral for influenza, dexamethasone on board  3/16 continued to improve, antibiotics on board steroids on board, wean as tolerated, discharge planning

## 2025-03-16 NOTE — PROGRESS NOTES
[facility] Medicine  Progress Note    Patient Name: Kelsea Cadet  MRN: [id number]  Patient Class: IP- Inpatient   Admission Date: 3/11/2025  Length of Stay: 4 days  Attending Physician: Harika Mina MD  Primary Care Provider: Gabby Jackson NP        Subjective     Principal Problem:Hypoxia        HPI:  84-year-old female presents to the emergency department for evaluation due to cough and progressive shortness of breath for the last 5 days. The patient reports that she was exposed to her daughter who has the flu. The patient's primary care provider as been treatment patient prophylactically with Tamiflu due to her influenza exposure   Pt has had some chills and nausea with decreased pp intake    Overview/Hospital Course:  3/13 ND pt with cough this am - causign some nuasea - did require dose of lasix last night due to worsening sob and wheezing - diuresed well  3/14 ND pt has been feeling blah - coughing a lot and wheezing some, more winded with moving around.  Pt has been urinating well - machado in place to help monitor output until pt can move more  3/15 AA, weekend crosscover, patient admitted with influenza and pneumonitis, CT chest reviewed, steroids on board, patient on antibiotic therapy and Tamiflu, no leukocytosis, renal function stable, blood cultures no growth to date, blood pressure meds adjusted  3/16 AA, weekend crosscover, no acute events overnight reported, renal function stable H&H stable, blood pressure meds adjusted, discharge planning    Interval History: Patient seen and examined.    Review of Systems   Constitutional:  Positive for activity change, chills and fatigue. Negative for fever.   HENT:  Negative for postnasal drip, sinus pain and sore throat.    Respiratory:  Positive for cough and shortness of breath.    Cardiovascular:  Negative for chest pain, palpitations and leg swelling.   Gastrointestinal:  Positive for diarrhea and nausea. Negative for abdominal  pain.   Musculoskeletal:  Negative for back pain and myalgias.   Neurological:  Positive for weakness. Negative for syncope.   Psychiatric/Behavioral:  Negative for agitation and decreased concentration.      Objective:     Vital Signs (Most Recent):  Temp: 97.9 °F (36.6 °C) (03/16/25 0731)  Pulse: (!) 111 (03/16/25 1051)  Resp: 18 (03/16/25 1007)  BP: (!) 171/74 (03/16/25 1007)  SpO2: 96 % (03/16/25 1007) Vital Signs (24h Range):  Temp:  [97.9 °F (36.6 °C)-98 °F (36.7 °C)] 97.9 °F (36.6 °C)  Pulse:  [] 111  Resp:  [18-20] 18  SpO2:  [95 %-100 %] 96 %  BP: (105-174)/(67-79) 171/74     Weight: 89.7 kg (197 lb 11.2 oz)  Body mass index is 33.94 kg/m².    Intake/Output Summary (Last 24 hours) at 3/16/2025 1153  Last data filed at 3/16/2025 0627  Gross per 24 hour   Intake 120 ml   Output 1900 ml   Net -1780 ml         Physical Exam  Constitutional:       General: She is not in acute distress.     Appearance: She is obese. She is ill-appearing. She is not diaphoretic.   HENT:      Nose: Congestion and rhinorrhea present.      Mouth/Throat:      Mouth: Mucous membranes are moist.      Pharynx: Oropharynx is clear.   Eyes:      Extraocular Movements: Extraocular movements intact.   Cardiovascular:      Rate and Rhythm: Normal rate.   Pulmonary:      Effort: No respiratory distress.      Breath sounds: Rhonchi present. No wheezing or rales.   Abdominal:      General: Bowel sounds are normal. There is no distension.      Palpations: Abdomen is soft.      Tenderness: There is no abdominal tenderness. There is no guarding or rebound.   Genitourinary:     Comments: Land with yellow urine  Musculoskeletal:      Cervical back: Normal range of motion. No rigidity.      Right lower leg: No edema.      Left lower leg: No edema.   Skin:     General: Skin is warm.      Capillary Refill: Capillary refill takes less than 2 seconds.   Neurological:      Mental Status: She is alert. Mental status is at baseline.   Psychiatric:          Behavior: Behavior normal.         Thought Content: Thought content normal.               Significant Labs: All pertinent labs within the past 24 hours have been reviewed.  Recent Lab Results         03/16/25  0605        Albumin 2.6       ALP 84       ALT 14       Anion Gap 11       AST 24       Baso # 0.06       Basophil % 0.7       BILIRUBIN TOTAL 0.3  Comment: For infants and newborns, interpretation of results should be based  on gestational age, weight and in agreement with clinical  observations.    Premature Infant recommended reference ranges:  Up to 24 hours.............<8.0 mg/dL  Up to 48 hours............<12.0 mg/dL  3-5 days..................<15.0 mg/dL  6-29 days.................<15.0 mg/dL         BUN 13       Calcium 8.4       Chloride 100       CO2 26       Creatinine 0.9       Differential Method Automated       eGFR >60.0       Eos # 0.2       Eos % 2.3       Glucose 178       Gran # (ANC) 5.3       Gran % 63.3       Hematocrit 35.1       Hemoglobin 11.7       Immature Grans (Abs) 0.28  Comment: Mild elevation in immature granulocytes is non specific and   can be seen in a variety of conditions including stress response,   acute inflammation, trauma and pregnancy. Correlation with other   laboratory and clinical findings is essential.         Immature Granulocytes 3.3       Lymph # 1.8       Lymph % 21.8       Magnesium  1.7       MCH 28.9       MCHC 33.3       MCV 87       Mono # 0.7       Mono % 8.6       MPV 10.4       nRBC 0       Platelet Count 358       Potassium 3.4       PROTEIN TOTAL 6.6       RBC 4.05       RDW 13.2       Sodium 137       WBC 8.44               Significant Imaging: I have reviewed all pertinent imaging results/findings within the past 24 hours.      Assessment & Plan  Hypoxia  O2 protocal  Tx probable pneumonia with iv abxs - repeat cxr in am  Tx flu  3/13 ct of chest to evaluate lung - pneumo vs chronic lung condition  3/14 has pneumonitits -cont iv abxs; add  dexamthasone 4mg qday  3/15 antibiotics on board, antiviral for influenza, dexamethasone on board  3/16 continued to improve, antibiotics on board steroids on board, wean as tolerated, discharge planning  Type 2 diabetes mellitus without complication, with long-term current use of insulin  Accuchecks q ach abd qhs  Ada diet    Shortness of breath  Tx flu and probable pneumonia - o2 protocal    Influenza  Tamiflu; nebsl o2 protocal    Hyponatremia  Hyponatremia is likely due to Dehydration/hypovolemia. The patient's most recent sodium results are listed below.  Recent Labs     03/14/25  0706 03/15/25  0611 03/16/25  0605   * 134* 137     Encourage po intake - monitor daily    3/13 sodium level improved  3/14 sodium back to normal  VTE Risk Mitigation (From admission, onward)           Ordered     enoxaparin injection 40 mg  Daily         03/12/25 0150     IP VTE HIGH RISK PATIENT  Once         03/12/25 0150     Place sequential compression device  Until discontinued         03/12/25 0150                    Discharge Planning   CATRACHITO:      Code Status: Full Code   Medical Readiness for Discharge Date:   Discharge Plan A: Home with family, Home Health                        Kieran Kitchen MD  Department of Hospital Medicine   [facility]

## 2025-03-16 NOTE — SUBJECTIVE & OBJECTIVE
Interval History: Patient seen and examined.    Review of Systems   Constitutional:  Positive for activity change, chills and fatigue. Negative for fever.   HENT:  Negative for postnasal drip, sinus pain and sore throat.    Respiratory:  Positive for cough and shortness of breath.    Cardiovascular:  Negative for chest pain, palpitations and leg swelling.   Gastrointestinal:  Positive for diarrhea and nausea. Negative for abdominal pain.   Musculoskeletal:  Negative for back pain and myalgias.   Neurological:  Positive for weakness. Negative for syncope.   Psychiatric/Behavioral:  Negative for agitation and decreased concentration.      Objective:     Vital Signs (Most Recent):  Temp: 97.9 °F (36.6 °C) (03/16/25 0731)  Pulse: (!) 111 (03/16/25 1051)  Resp: 18 (03/16/25 1007)  BP: (!) 171/74 (03/16/25 1007)  SpO2: 96 % (03/16/25 1007) Vital Signs (24h Range):  Temp:  [97.9 °F (36.6 °C)-98 °F (36.7 °C)] 97.9 °F (36.6 °C)  Pulse:  [] 111  Resp:  [18-20] 18  SpO2:  [95 %-100 %] 96 %  BP: (105-174)/(67-79) 171/74     Weight: 89.7 kg (197 lb 11.2 oz)  Body mass index is 33.94 kg/m².    Intake/Output Summary (Last 24 hours) at 3/16/2025 1153  Last data filed at 3/16/2025 0627  Gross per 24 hour   Intake 120 ml   Output 1900 ml   Net -1780 ml         Physical Exam  Constitutional:       General: She is not in acute distress.     Appearance: She is obese. She is ill-appearing. She is not diaphoretic.   HENT:      Nose: Congestion and rhinorrhea present.      Mouth/Throat:      Mouth: Mucous membranes are moist.      Pharynx: Oropharynx is clear.   Eyes:      Extraocular Movements: Extraocular movements intact.   Cardiovascular:      Rate and Rhythm: Normal rate.   Pulmonary:      Effort: No respiratory distress.      Breath sounds: Rhonchi present. No wheezing or rales.   Abdominal:      General: Bowel sounds are normal. There is no distension.      Palpations: Abdomen is soft.      Tenderness: There is no abdominal  tenderness. There is no guarding or rebound.   Genitourinary:     Comments: Land with yellow urine  Musculoskeletal:      Cervical back: Normal range of motion. No rigidity.      Right lower leg: No edema.      Left lower leg: No edema.   Skin:     General: Skin is warm.      Capillary Refill: Capillary refill takes less than 2 seconds.   Neurological:      Mental Status: She is alert. Mental status is at baseline.   Psychiatric:         Behavior: Behavior normal.         Thought Content: Thought content normal.               Significant Labs: All pertinent labs within the past 24 hours have been reviewed.  Recent Lab Results         03/16/25  0605        Albumin 2.6       ALP 84       ALT 14       Anion Gap 11       AST 24       Baso # 0.06       Basophil % 0.7       BILIRUBIN TOTAL 0.3  Comment: For infants and newborns, interpretation of results should be based  on gestational age, weight and in agreement with clinical  observations.    Premature Infant recommended reference ranges:  Up to 24 hours.............<8.0 mg/dL  Up to 48 hours............<12.0 mg/dL  3-5 days..................<15.0 mg/dL  6-29 days.................<15.0 mg/dL         BUN 13       Calcium 8.4       Chloride 100       CO2 26       Creatinine 0.9       Differential Method Automated       eGFR >60.0       Eos # 0.2       Eos % 2.3       Glucose 178       Gran # (ANC) 5.3       Gran % 63.3       Hematocrit 35.1       Hemoglobin 11.7       Immature Grans (Abs) 0.28  Comment: Mild elevation in immature granulocytes is non specific and   can be seen in a variety of conditions including stress response,   acute inflammation, trauma and pregnancy. Correlation with other   laboratory and clinical findings is essential.         Immature Granulocytes 3.3       Lymph # 1.8       Lymph % 21.8       Magnesium  1.7       MCH 28.9       MCHC 33.3       MCV 87       Mono # 0.7       Mono % 8.6       MPV 10.4       nRBC 0       Platelet Count 358        Potassium 3.4       PROTEIN TOTAL 6.6       RBC 4.05       RDW 13.2       Sodium 137       WBC 8.44               Significant Imaging: I have reviewed all pertinent imaging results/findings within the past 24 hours.

## 2025-03-16 NOTE — ASSESSMENT & PLAN NOTE
Hyponatremia is likely due to Dehydration/hypovolemia. The patient's most recent sodium results are listed below.  Recent Labs     03/14/25  0706 03/15/25  0611 03/16/25  0605   * 134* 137     Encourage po intake - monitor daily    3/13 sodium level improved  3/14 sodium back to normal

## 2025-03-16 NOTE — PLAN OF CARE
03/16/25 1016   Medicare Message   Important Message from Medicare regarding Discharge Appeal Rights Given to patient/caregiver;Explained to patient/caregiver;Signed/date by patient/caregiver   Date IMM was signed 03/16/25   Time IMM was signed 0942

## 2025-03-16 NOTE — PLAN OF CARE
Plan of care reviewed with patient and daughter at the bedside. Peripheral IV in place and saline locked. Pt tolerated meds well. PRN tylenol given for c/o neck and bilateral knee pain with relief obtained. VSS. NADN. Land in place, adequately draining into collection bag, and free from dependent loops or kinks. Tele connections intact. NSR on the monitor. Pt free from falls and injury. Questions and concerns addressed. Pt belongings and call bell within reach.   Problem: Adult Inpatient Plan of Care  Goal: Plan of Care Review  Outcome: Progressing  Goal: Patient-Specific Goal (Individualized)  Outcome: Progressing  Goal: Absence of Hospital-Acquired Illness or Injury  Outcome: Progressing  Goal: Optimal Comfort and Wellbeing  Outcome: Progressing  Goal: Readiness for Transition of Care  Outcome: Progressing     Problem: Infection  Goal: Absence of Infection Signs and Symptoms  Outcome: Progressing     Problem: Diabetes Comorbidity  Goal: Blood Glucose Level Within Targeted Range  Outcome: Progressing     Problem: Wound  Goal: Optimal Coping  Outcome: Progressing  Goal: Optimal Functional Ability  Outcome: Progressing  Goal: Absence of Infection Signs and Symptoms  Outcome: Progressing  Goal: Improved Oral Intake  Outcome: Progressing  Goal: Optimal Pain Control and Function  Outcome: Progressing  Goal: Skin Health and Integrity  Outcome: Progressing  Goal: Optimal Wound Healing  Outcome: Progressing     Problem: Skin Injury Risk Increased  Goal: Skin Health and Integrity  Outcome: Progressing

## 2025-03-17 ENCOUNTER — NURSE TRIAGE (OUTPATIENT)
Dept: ADMINISTRATIVE | Facility: CLINIC | Age: 85
End: 2025-03-17
Payer: MEDICARE

## 2025-03-17 ENCOUNTER — OCHSNER VIRTUAL EMERGENCY DEPARTMENT (OUTPATIENT)
Facility: CLINIC | Age: 85
End: 2025-03-17
Payer: MEDICARE

## 2025-03-17 VITALS
RESPIRATION RATE: 20 BRPM | TEMPERATURE: 98 F | WEIGHT: 197.69 LBS | HEART RATE: 82 BPM | BODY MASS INDEX: 33.75 KG/M2 | OXYGEN SATURATION: 93 % | DIASTOLIC BLOOD PRESSURE: 75 MMHG | HEIGHT: 64 IN | SYSTOLIC BLOOD PRESSURE: 169 MMHG

## 2025-03-17 DIAGNOSIS — J45.20 MILD INTERMITTENT REACTIVE AIRWAY DISEASE WITHOUT COMPLICATION: ICD-10-CM

## 2025-03-17 LAB
ALBUMIN SERPL BCP-MCNC: 2.6 G/DL (ref 3.5–5.2)
ALP SERPL-CCNC: 96 U/L (ref 55–135)
ALT SERPL W/O P-5'-P-CCNC: 13 U/L (ref 10–44)
ANION GAP SERPL CALC-SCNC: 9 MMOL/L (ref 8–16)
AST SERPL-CCNC: 11 U/L (ref 10–40)
BACTERIA BLD CULT: NORMAL
BACTERIA BLD CULT: NORMAL
BASOPHILS # BLD AUTO: 0.04 K/UL (ref 0–0.2)
BASOPHILS NFR BLD: 0.6 % (ref 0–1.9)
BILIRUB SERPL-MCNC: 0.3 MG/DL (ref 0.1–1)
BUN SERPL-MCNC: 15 MG/DL (ref 8–23)
CALCIUM SERPL-MCNC: 8.4 MG/DL (ref 8.7–10.5)
CHLORIDE SERPL-SCNC: 99 MMOL/L (ref 95–110)
CO2 SERPL-SCNC: 24 MMOL/L (ref 23–29)
CREAT SERPL-MCNC: 0.8 MG/DL (ref 0.5–1.4)
DIFFERENTIAL METHOD BLD: ABNORMAL
EOSINOPHIL # BLD AUTO: 0 K/UL (ref 0–0.5)
EOSINOPHIL NFR BLD: 0.1 % (ref 0–8)
ERYTHROCYTE [DISTWIDTH] IN BLOOD BY AUTOMATED COUNT: 12.9 % (ref 11.5–14.5)
EST. GFR  (NO RACE VARIABLE): >60 ML/MIN/1.73 M^2
GLUCOSE SERPL-MCNC: 315 MG/DL (ref 70–110)
HCT VFR BLD AUTO: 34 % (ref 37–48.5)
HGB BLD-MCNC: 11.1 G/DL (ref 12–16)
IMM GRANULOCYTES # BLD AUTO: 0.13 K/UL (ref 0–0.04)
IMM GRANULOCYTES NFR BLD AUTO: 1.9 % (ref 0–0.5)
LYMPHOCYTES # BLD AUTO: 1 K/UL (ref 1–4.8)
LYMPHOCYTES NFR BLD: 14.7 % (ref 18–48)
MAGNESIUM SERPL-MCNC: 1.7 MG/DL (ref 1.6–2.6)
MCH RBC QN AUTO: 28.8 PG (ref 27–31)
MCHC RBC AUTO-ENTMCNC: 32.6 G/DL (ref 32–36)
MCV RBC AUTO: 88 FL (ref 82–98)
MONOCYTES # BLD AUTO: 0.3 K/UL (ref 0.3–1)
MONOCYTES NFR BLD: 4.6 % (ref 4–15)
NEUTROPHILS # BLD AUTO: 5.3 K/UL (ref 1.8–7.7)
NEUTROPHILS NFR BLD: 78.1 % (ref 38–73)
NRBC BLD-RTO: 0 /100 WBC
PLATELET # BLD AUTO: 387 K/UL (ref 150–450)
PMV BLD AUTO: 9.9 FL (ref 9.2–12.9)
POTASSIUM SERPL-SCNC: 3.6 MMOL/L (ref 3.5–5.1)
PROT SERPL-MCNC: 6.4 G/DL (ref 6–8.4)
RBC # BLD AUTO: 3.86 M/UL (ref 4–5.4)
SODIUM SERPL-SCNC: 132 MMOL/L (ref 136–145)
VANCOMYCIN TROUGH SERPL-MCNC: 19.7 UG/ML (ref 10–22)
WBC # BLD AUTO: 6.81 K/UL (ref 3.9–12.7)

## 2025-03-17 PROCEDURE — 25000242 PHARM REV CODE 250 ALT 637 W/ HCPCS: Performed by: INTERNAL MEDICINE

## 2025-03-17 PROCEDURE — 80202 ASSAY OF VANCOMYCIN: CPT | Performed by: INTERNAL MEDICINE

## 2025-03-17 PROCEDURE — 94761 N-INVAS EAR/PLS OXIMETRY MLT: CPT

## 2025-03-17 PROCEDURE — 99900035 HC TECH TIME PER 15 MIN (STAT)

## 2025-03-17 PROCEDURE — 83735 ASSAY OF MAGNESIUM: CPT | Performed by: INTERNAL MEDICINE

## 2025-03-17 PROCEDURE — 27000646 HC AEROBIKA DEVICE

## 2025-03-17 PROCEDURE — 25000003 PHARM REV CODE 250: Performed by: INTERNAL MEDICINE

## 2025-03-17 PROCEDURE — 80053 COMPREHEN METABOLIC PANEL: CPT | Performed by: INTERNAL MEDICINE

## 2025-03-17 PROCEDURE — 63600175 PHARM REV CODE 636 W HCPCS: Performed by: INTERNAL MEDICINE

## 2025-03-17 PROCEDURE — 25000003 PHARM REV CODE 250

## 2025-03-17 PROCEDURE — 99900031 HC PATIENT EDUCATION (STAT)

## 2025-03-17 PROCEDURE — 94640 AIRWAY INHALATION TREATMENT: CPT

## 2025-03-17 PROCEDURE — 85025 COMPLETE CBC W/AUTO DIFF WBC: CPT | Performed by: INTERNAL MEDICINE

## 2025-03-17 PROCEDURE — 94799 UNLISTED PULMONARY SVC/PX: CPT

## 2025-03-17 PROCEDURE — 36415 COLL VENOUS BLD VENIPUNCTURE: CPT | Performed by: INTERNAL MEDICINE

## 2025-03-17 PROCEDURE — 94664 DEMO&/EVAL PT USE INHALER: CPT

## 2025-03-17 PROCEDURE — 27000221 HC OXYGEN, UP TO 24 HOURS

## 2025-03-17 RX ORDER — AMOXICILLIN AND CLAVULANATE POTASSIUM 875; 125 MG/1; MG/1
1 TABLET, FILM COATED ORAL 2 TIMES DAILY
Qty: 10 TABLET | Refills: 0 | Status: SHIPPED | OUTPATIENT
Start: 2025-03-17

## 2025-03-17 RX ORDER — GUAIFENESIN 600 MG/1
1200 TABLET, EXTENDED RELEASE ORAL 2 TIMES DAILY
Qty: 40 TABLET | Refills: 0 | Status: SHIPPED | OUTPATIENT
Start: 2025-03-17

## 2025-03-17 RX ORDER — NYSTATIN 100000 [USP'U]/ML
500000 SUSPENSION ORAL 4 TIMES DAILY
Qty: 200 ML | Refills: 0 | Status: SHIPPED | OUTPATIENT
Start: 2025-03-17 | End: 2025-03-27

## 2025-03-17 RX ORDER — GUAIFENESIN AND DEXTROMETHORPHAN HYDROBROMIDE 10; 100 MG/5ML; MG/5ML
10 SYRUP ORAL EVERY 4 HOURS PRN
COMMUNITY
Start: 2025-03-17 | End: 2025-03-27

## 2025-03-17 RX ORDER — LISINOPRIL 40 MG/1
40 TABLET ORAL NIGHTLY
Qty: 90 TABLET | Refills: 3 | Status: SHIPPED | OUTPATIENT
Start: 2025-03-17 | End: 2026-03-17

## 2025-03-17 RX ORDER — ALBUTEROL SULFATE 90 UG/1
2 INHALANT RESPIRATORY (INHALATION) EVERY 6 HOURS PRN
Qty: 18 G | Refills: 11 | Status: SHIPPED | OUTPATIENT
Start: 2025-03-17

## 2025-03-17 RX ORDER — DEXAMETHASONE 2 MG/1
2 TABLET ORAL DAILY
Qty: 3 TABLET | Refills: 0 | Status: SHIPPED | OUTPATIENT
Start: 2025-03-17 | End: 2025-03-20

## 2025-03-17 RX ORDER — CHOLECALCIFEROL (VITAMIN D3) 50 MCG
1 TABLET ORAL
COMMUNITY
Start: 2025-03-17

## 2025-03-17 RX ADMIN — GUAIFENESIN 1200 MG: 600 TABLET, EXTENDED RELEASE ORAL at 08:03

## 2025-03-17 RX ADMIN — OSELTAMIVIR PHOSPHATE 30 MG: 30 CAPSULE ORAL at 08:03

## 2025-03-17 RX ADMIN — PIPERACILLIN SODIUM AND TAZOBACTAM SODIUM 4.5 G: 4; .5 INJECTION, POWDER, LYOPHILIZED, FOR SOLUTION INTRAVENOUS at 02:03

## 2025-03-17 RX ADMIN — IBUPROFEN 600 MG: 600 TABLET ORAL at 08:03

## 2025-03-17 RX ADMIN — BENZONATATE 100 MG: 100 CAPSULE ORAL at 08:03

## 2025-03-17 RX ADMIN — MONTELUKAST 10 MG: 10 TABLET, FILM COATED ORAL at 08:03

## 2025-03-17 RX ADMIN — FLUOXETINE HYDROCHLORIDE 10 MG: 10 CAPSULE ORAL at 09:03

## 2025-03-17 RX ADMIN — NYSTATIN 500000 UNITS: 500000 SUSPENSION ORAL at 09:03

## 2025-03-17 RX ADMIN — GABAPENTIN 600 MG: 300 CAPSULE ORAL at 08:03

## 2025-03-17 RX ADMIN — VANCOMYCIN HYDROCHLORIDE 1500 MG: 1.5 INJECTION, POWDER, LYOPHILIZED, FOR SOLUTION INTRAVENOUS at 07:03

## 2025-03-17 RX ADMIN — FUROSEMIDE 20 MG: 20 TABLET ORAL at 08:03

## 2025-03-17 RX ADMIN — ATORVASTATIN CALCIUM 40 MG: 40 TABLET, FILM COATED ORAL at 08:03

## 2025-03-17 RX ADMIN — MUPIROCIN: 20 OINTMENT TOPICAL at 09:03

## 2025-03-17 RX ADMIN — CARVEDILOL 6.25 MG: 3.12 TABLET, FILM COATED ORAL at 08:03

## 2025-03-17 RX ADMIN — IPRATROPIUM BROMIDE AND ALBUTEROL SULFATE 3 ML: 2.5; .5 SOLUTION RESPIRATORY (INHALATION) at 07:03

## 2025-03-17 NOTE — CONSULTS
Wound care consult received.  Stage 2 pressure injuries POA to sacrum and bilateral buttocks.  Correct wound care orders guidelines in EPIC.  Dietary has nutritional supplement orders in for nutritional support.  Continue turning/offloading every 2 hours.

## 2025-03-17 NOTE — DISCHARGE INSTRUCTIONS
Stop hydrochlorothiazide     Pt has chronic machado - keep machado in place    Our goal at [facility] is to always give you outstanding care and exceptional service. You may receive a survey from [employer] by mail, text or e-mail in the next 24-48 hours asking about the care you received with us. The survey should only take 5-10 minutes to complete and is very important to us.     Your feedback provides us with a way to recognize our staff who work tirelessly to provide the best care! Also, your responses help us learn how to improve when your experience was below our aspiration of excellence. We are always looking for ways to improve your stay. We WILL use your feedback to continue making improvements to help us provide the highest quality care. We keep your personal information and feedback confidential. We appreciate your time completing this survey and can't wait to hear from you!!!    We look forward to your continued care with us! Thanks so much for choosing [facility] for your healthcare needs!

## 2025-03-17 NOTE — PLAN OF CARE-OVED
"[facility] Emergency Department Plan of Care Note  Referral Source: Nurse On-Call                               Chief Complaint   Patient presents with    Medication Problem     Per nurse on call,     Daughter calling  Discharged this morning  Reports that she has the flu currently and pneumonia    Has a question about last med she received and the times     6 units of novalog 30 min ago   BS was 333 before giving insulin    BS recheck while on phone, reports orange screen is reading "high"     Fingerstick 464    Uses Sonia 2 device    Denies vomiting  Unable to measure urine/blood ketones    Not sure if she was given mucinex  She is unsure what time to give meds and what meds to give   Reports that her biggest question is about metformin and if she received it this morning. AVS reads "resume as directed"    Wants to know what meds she received in the hospital this morning at at what times.       02 95%  B/P 156/77  P 82       I reviewed her records.  Unable to determine when last dosage.  Recommend using her sliding scale of insulin.  Recommend operating as if she received her morning medications.    Recommendation: Treat in place                            No diagnosis found.          "

## 2025-03-17 NOTE — PLAN OF CARE
Problem: Adult Inpatient Plan of Care  Goal: Plan of Care Review  Outcome: Adequate for Care Transition  Goal: Patient-Specific Goal (Individualized)  Outcome: Adequate for Care Transition  Goal: Absence of Hospital-Acquired Illness or Injury  Outcome: Adequate for Care Transition  Goal: Optimal Comfort and Wellbeing  Outcome: Adequate for Care Transition  Goal: Readiness for Transition of Care  Outcome: Adequate for Care Transition     Problem: Infection  Goal: Absence of Infection Signs and Symptoms  Outcome: Adequate for Care Transition     Problem: Diabetes Comorbidity  Goal: Blood Glucose Level Within Targeted Range  Outcome: Adequate for Care Transition     Problem: Wound  Goal: Optimal Coping  Outcome: Adequate for Care Transition  Goal: Optimal Functional Ability  Outcome: Adequate for Care Transition  Goal: Absence of Infection Signs and Symptoms  Outcome: Adequate for Care Transition  Goal: Improved Oral Intake  Outcome: Adequate for Care Transition  Goal: Optimal Pain Control and Function  Outcome: Adequate for Care Transition  Goal: Skin Health and Integrity  Outcome: Adequate for Care Transition  Goal: Optimal Wound Healing  Outcome: Adequate for Care Transition     Problem: Skin Injury Risk Increased  Goal: Skin Health and Integrity  Outcome: Adequate for Care Transition

## 2025-03-17 NOTE — PROGRESS NOTES
Pharmacokinetic Assessment Follow Up: IV Vancomycin    Vancomycin serum concentration assessment(s):    The trough level was drawn correctly and can be used to guide therapy at this time. The measurement is within the desired definitive target range of 15 to 20 mcg/mL.    Vancomycin Regimen Plan:    Continue regimen to Vancomycin 1500 mg IV every 24 hours with next serum trough concentration measured at 0500 prior to 8th dose on 03/19/25    Drug levels (last 3 results):  Recent Labs   Lab Result Units 03/15/25  0611 03/17/25  0526   Vancomycin-Trough ug/mL 19.5 19.7       Pharmacy will continue to follow and monitor vancomycin.    Please contact pharmacy at extension [phone] for questions regarding this assessment.    Thank you for the consult,   Ekaterina Whittaker       Patient brief summary:  Kelsea Cadet is a 85 y.o. female initiated on antimicrobial therapy with IV Vancomycin for treatment of lower respiratory infection    The patient's current regimen is Vancomycin 1500 mg daily.     Drug Allergies:   Review of patient's allergies indicates:   Allergen Reactions    Macrobid [nitrofurantoin monohyd/m-cryst] Swelling    Metformin Swelling    Ciprofloxacin Other (See Comments)     GI problems    Codeine Other (See Comments)     headache    Latex, natural rubber Rash       Actual Body Weight:   89.7 kg    Renal Function:   Estimated Creatinine Clearance: 57.8 mL/min (based on SCr of 0.8 mg/dL).,     Dialysis Method (if applicable):  N/A    CBC (last 72 hours):  Recent Labs   Lab Result Units 03/15/25  0611 03/16/25  0605 03/17/25  0526   WBC K/uL 4.89 8.44 6.81   Hemoglobin g/dL 11.0* 11.7* 11.1*   Hematocrit % 33.5* 35.1* 34.0*   Platelets K/uL 348 358 387   Gran % % 71.8 63.3 78.1*   Lymph % % 18.2 21.8 14.7*   Mono % % 6.7 8.6 4.6   Eosinophil % % 0.0 2.3 0.1   Basophil % % 0.4 0.7 0.6   Differential Method  Automated Automated Automated       Metabolic Panel (last 72 hours):  Recent Labs   Lab Result Units  03/15/25  0611 03/16/25  0605 03/17/25  0526   Sodium mmol/L 134* 137 132*   Potassium mmol/L 3.7 3.4* 3.6   Chloride mmol/L 98 100 99   CO2 mmol/L 25 26 24   Glucose mg/dL 275* 178* 315*   BUN mg/dL 12 13 15   Creatinine mg/dL 0.8 0.9 0.8   Albumin g/dL 2.6* 2.6* 2.6*   Total Bilirubin mg/dL 0.3 0.3 0.3   Alkaline Phosphatase U/L 81 84 96   AST U/L 20 24 11   ALT U/L 13 14 13   Magnesium mg/dL 1.8 1.7 1.7       Vancomycin Administrations:  vancomycin given in the last 96 hours                     vancomycin 1,500 mg in 0.9% NaCl 250 mL IVPB (admixture device) (mg) 1,500 mg New Bag 03/17/25 0701     1,500 mg New Bag 03/16/25 0735     1,500 mg New Bag 03/15/25 0951     1,500 mg New Bag 03/14/25 0634                    Microbiologic Results:  Microbiology Results (last 7 days)       Procedure Component Value Units Date/Time    Blood Culture #1 **CANNOT BE ORDERED STAT** [[id number]] Collected: 03/12/25 0148    Order Status: Completed Specimen: Blood from Peripheral, Forearm, Left Updated: 03/16/25 2012     Blood Culture, Routine No Growth to date      No Growth to date      No Growth to date      No Growth to date      No Growth to date    Blood Culture #2 **CANNOT BE ORDERED STAT** [[id number]] Collected: 03/12/25 0155    Order Status: Completed Specimen: Blood from Peripheral, Wrist, Right Updated: 03/16/25 2012     Blood Culture, Routine No Growth to date      No Growth to date      No Growth to date      No Growth to date      No Growth to date    Urine culture [[id number]] Collected: 03/12/25 1132    Order Status: Completed Specimen: Urine Updated: 03/14/25 0035     Urine Culture, Routine No growth    Narrative:      Preferred Collection Type->Urine, Clean Catch  Specimen Source->Urine

## 2025-03-17 NOTE — ASSESSMENT & PLAN NOTE
O2 protocal  Tx probable pneumonia with iv abxs - repeat cxr in am  Tx flu  3/13 ct of chest to evaluate lung - pneumo vs chronic lung condition  3/14 has pneumonitits -cont iv abxs; add dexamthasone 4mg qday  3/15 antibiotics on board, antiviral for influenza, dexamethasone on board  3/16 continued to improve, antibiotics on board steroids on board, wean as tolerated, discharge planning  3/17 dc home - complete po abxs for pneumonitis - wean dexamthasone to 2mg po qd x 3 days

## 2025-03-17 NOTE — SUBJECTIVE & OBJECTIVE
Interval History: Patient seen and examined.    Review of Systems   Constitutional:  Positive for activity change, chills and fatigue. Negative for fever.   HENT:  Negative for postnasal drip, sinus pain and sore throat.    Respiratory:  Positive for cough and shortness of breath.    Cardiovascular:  Negative for chest pain, palpitations and leg swelling.   Gastrointestinal:  Positive for diarrhea and nausea. Negative for abdominal pain.   Musculoskeletal:  Negative for back pain and myalgias.   Neurological:  Positive for weakness. Negative for syncope.   Psychiatric/Behavioral:  Negative for agitation and decreased concentration.      Objective:     Vital Signs (Most Recent):  Temp: 98 °F (36.7 °C) (03/17/25 0529)  Pulse: 82 (03/17/25 0722)  Resp: 20 (03/17/25 0722)  BP: (!) 169/75 (03/17/25 0529)  SpO2: 99 % (03/17/25 0722) Vital Signs (24h Range):  Temp:  [97.8 °F (36.6 °C)-98.1 °F (36.7 °C)] 98 °F (36.7 °C)  Pulse:  [] 82  Resp:  [18-20] 20  SpO2:  [95 %-99 %] 99 %  BP: (160-178)/(72-88) 169/75     Weight: 89.7 kg (197 lb 11.2 oz)  Body mass index is 33.94 kg/m².    Intake/Output Summary (Last 24 hours) at 3/17/2025 0846  Last data filed at 3/17/2025 0610  Gross per 24 hour   Intake 120 ml   Output 3250 ml   Net -3130 ml         Physical Exam  Constitutional:       General: She is not in acute distress.     Appearance: She is obese. She is not diaphoretic.   HENT:      Nose: Congestion and rhinorrhea present.      Mouth/Throat:      Mouth: Mucous membranes are moist.      Pharynx: Oropharynx is clear.   Eyes:      Extraocular Movements: Extraocular movements intact.   Cardiovascular:      Rate and Rhythm: Normal rate.   Pulmonary:      Effort: Pulmonary effort is normal. No respiratory distress.      Breath sounds: Rhonchi present. No wheezing or rales.   Abdominal:      General: Bowel sounds are normal. There is no distension.      Palpations: Abdomen is soft.      Tenderness: There is no abdominal  tenderness. There is no guarding or rebound.   Genitourinary:     Comments: Land with yellow urine  Musculoskeletal:      Cervical back: Normal range of motion. No rigidity.      Right lower leg: No edema.      Left lower leg: No edema.   Skin:     General: Skin is warm.      Capillary Refill: Capillary refill takes less than 2 seconds.   Neurological:      Mental Status: She is alert. Mental status is at baseline.   Psychiatric:         Behavior: Behavior normal.         Thought Content: Thought content normal.               Significant Labs: All pertinent labs within the past 24 hours have been reviewed.  Recent Lab Results         03/17/25  0526   03/16/25  2202        Albumin 2.6         ALP 96         ALT 13         Anion Gap 9         AST 11         Baso # 0.04         Basophil % 0.6         BILIRUBIN TOTAL 0.3  Comment: For infants and newborns, interpretation of results should be based  on gestational age, weight and in agreement with clinical  observations.    Premature Infant recommended reference ranges:  Up to 24 hours.............<8.0 mg/dL  Up to 48 hours............<12.0 mg/dL  3-5 days..................<15.0 mg/dL  6-29 days.................<15.0 mg/dL           BUN 15         Calcium 8.4         Chloride 99         CO2 24         Creatinine 0.8         Differential Method Automated         eGFR >60.0         Eos # 0.0         Eos % 0.1         Glucose 315         Gran # (ANC) 5.3         Gran % 78.1         Hematocrit 34.0         Hemoglobin 11.1         Immature Grans (Abs) 0.13  Comment: Mild elevation in immature granulocytes is non specific and   can be seen in a variety of conditions including stress response,   acute inflammation, trauma and pregnancy. Correlation with other   laboratory and clinical findings is essential.           Immature Granulocytes 1.9         Lymph # 1.0         Lymph % 14.7         Magnesium  1.7         MCH 28.8         MCHC 32.6         MCV 88         Mono # 0.3          Mono % 4.6         MPV 9.9         nRBC 0         Platelet Count 387         POCT Glucose   396       Potassium 3.6         PROTEIN TOTAL 6.4         RBC 3.86         RDW 12.9         Sodium 132         Vancomycin-Trough 19.7         WBC 6.81                 Significant Imaging: I have reviewed all pertinent imaging results/findings within the past 24 hours.

## 2025-03-17 NOTE — ASSESSMENT & PLAN NOTE
Hyponatremia is likely due to Dehydration/hypovolemia. The patient's most recent sodium results are listed below.  Recent Labs     03/15/25  0611 03/16/25  0605 03/17/25  0526   * 137 132*     Encourage po intake - monitor daily    3/13 sodium level improved  3/14 sodium back to normal

## 2025-03-17 NOTE — TELEPHONE ENCOUNTER
"Daughter calling  Discharged this morning  Reports that she has the flu currently and pneumonia    Has a question about last meds she received and the times. Reports that this was reviewed with her but she did not write it down.     Reports that she gave patient 6 units of novalog 30 min ago   BS was 333 before giving insulin    BS recheck while on phone, reports orange screen is reading "high"     Fingerstick 464    Uses Sonia 2 device    Denies vomiting  Unable to measure urine/blood ketones    Not sure if she was given mucinex  She is unsure what time to give meds and what meds to give   Reports that her biggest question is about metformin and if she received it this morning. AVS reads "resume as directed"    Wants to know what meds she received in the hospital this morning at at what times.     Dispo is discuss with PCP and Callback by Nurse Within 1 hour    02 95%  B/P 156/77  P 82    Sent to [facility] provider on-call who advised, "I am not able to tell when she received her last dose of metformin Would recommend she take next dose tomorrow morning. I am not able to tell if she receoved her morning medications. Would recommend dosing her meds as if she did receive them this morning."    MD also advised to follow sliding scale for insulin regarding high blood sugar.     Advised daughter per MD recommendation. Daughter VU. Advised daughter to call back with any new or worsening s/s or questions. Daughter VU.       Reason for Disposition   Caller has URGENT medicine question about med that PCP or specialist prescribed and triager unable to answer question   Blood glucose > 400 mg/dL (22.2 mmol/L)    Additional Information   Negative: Intentional drug overdose and suicidal thoughts or ideas   Negative: MORE THAN A DOUBLE DOSE of a prescription or over-the-counter (OTC) drug   Negative: DOUBLE DOSE (an extra dose or lesser amount) of prescription drug and any symptoms (e.g., dizziness, nausea, pain, sleepiness)   " Negative: DOUBLE DOSE (an extra dose or lesser amount) of over-the-counter (OTC) drug and any symptoms (e.g., dizziness, nausea, pain, sleepiness)   Negative: Took another person's prescription drug   Negative: DOUBLE DOSE (an extra dose or lesser amount) of prescription drug and NO symptoms  (Exception: A double dose of antibiotics.)   Negative: Diabetes drug error or overdose (e.g., took wrong type of insulin or took extra dose)   Negative: Caller has medication question about med NOT prescribed by PCP and triager unable to answer question (e.g., compatibility with other med, storage)   Negative: Prescription not at pharmacy and was prescribed by PCP recently  (Exception: triager has access to EMR and prescription is recorded there. Go to Home Care and confirm for pharmacy.)   Negative: Pharmacy calling with prescription question and triager unable to answer question   Negative: Unconscious or difficult to awaken   Negative: Acting confused (e.g., disoriented, slurred speech)   Negative: Very weak (can't stand)   Negative: Sounds like a life-threatening emergency to the triager   Negative: Vomiting and signs of dehydration (e.g., very dry mouth, lightheaded, dark urine)   Negative: Blood glucose > 240 mg/dL (13.3 mmol/L) and rapid breathing   Negative: Blood glucose > 500 mg/dL (27.8 mmol/L)   Negative: Blood glucose > 240 mg/dL (13.3 mmol/L) AND urine ketones moderate-large (or more than 1+)   Negative: Blood glucose > 240 mg/dL (13.3 mmol/L) and blood ketones > 1.4 mmol/L   Negative: Blood glucose > 240 mg/dL (13.3 mmol/L) AND vomiting AND unable to check for ketones (in blood or urine)   Negative: Vomiting lasting > 4 hours   Negative: Patient sounds very sick or weak to the triager   Negative: Fever > 100.4 F (38.0 C)   Negative: Caller has URGENT medication or insulin device (e.g., pump, continuous monitoring) question and triager unable to answer question   Negative: Sounds like a life-threatening emergency  to the triager   Negative: Systolic BP >= 160 OR Diastolic >= 100, and any cardiac (e.g., breathing difficulty, chest pain) or neurologic symptoms (e.g., new-onset blurred or double vision)   Negative: Pregnant 20 or more weeks (or postpartum < 6 weeks) with new hand or face swelling   Negative: Pregnant 20 or more weeks (or postpartum < 6 weeks) and Systolic BP >= 160 OR Diastolic >= 110   Negative: Patient sounds very sick or weak to the triager   Negative: Systolic BP >= 200 OR Diastolic >= 120 and having NO cardiac or neurologic symptoms   Negative: Pregnant 20 or more weeks (or postpartum < 6 weeks) with Systolic BP >= 140 OR Diastolic >= 90   Negative: Systolic BP >= 180 OR Diastolic >= 110, and missed most recent dose of blood pressure medication   Negative: Systolic BP >= 180 OR Diastolic >= 110   Negative: Patient wants to be seen   Negative: Ran out of BP medications   Negative: Taking BP medications and feels is having side effects (e.g., impotence, cough, dizziness)    Protocols used: Medication Question Call-A-OH, Diabetes - High Blood Sugar-A-OH, Blood Pressure - High-A-OH

## 2025-03-17 NOTE — DISCHARGE SUMMARY
[facility] Medicine  Discharge Summary      Patient Name: Kelsea Cadet  MRN: [id number]  ALICIA: [id number]  Patient Class: IP- Inpatient  Admission Date: 3/11/2025  Hospital Length of Stay: 5 days  Discharge Date and Time:  03/17/2025 8:59 AM  Attending Physician: Harika Mina MD   Discharging Provider: Harika Mina MD  Primary Care Provider: Gabby Jackson NP    Primary Care Team: Networked reference to record PCT     HPI:   84-year-old female presents to the emergency department for evaluation due to cough and progressive shortness of breath for the last 5 days. The patient reports that she was exposed to her daughter who has the flu. The patient's primary care provider as been treatment patient prophylactically with Tamiflu due to her influenza exposure   Pt has had some chills and nausea with decreased pp intake    * No surgery found *      Hospital Course:   3/13 ND pt with cough this am - causign some nuasea - did require dose of lasix last night due to worsening sob and wheezing - diuresed well  3/14 ND pt has been feeling blah - coughing a lot and wheezing some, more winded with moving around.  Pt has been urinating well - machado in place to help monitor output until pt can move more  3/15 AA, weekend crosscover, patient admitted with influenza and pneumonitis, CT chest reviewed, steroids on board, patient on antibiotic therapy and Tamiflu, no leukocytosis, renal function stable, blood cultures no growth to date, blood pressure meds adjusted  3/16 AA, weekend crosscover, no acute events overnight reported, renal function stable H&H stable, blood pressure meds adjusted, discharge planning  3/17 ND pt feeling better- breathing has imporved - dc home with HH and close fu with pcp     Goals of Care Treatment Preferences:  Code Status: Full Code    Living Will: Yes              SDOH Screening:  The patient was screened for utility difficulties, food insecurity, transport  "difficulties, housing insecurity, and interpersonal safety and there were no concerns identified this admission.     Consults:   Consults (From admission, onward)          Status Ordering Provider     Pharmacy to dose Vancomycin consult  Once        Provider:  (Not yet assigned)   Placed in "And" Linked Group    Acknowledged GORDO MARROQUIN            Assessment & Plan  Hypoxia  O2 protocal  Tx probable pneumonia with iv abxs - repeat cxr in am  Tx flu  3/13 ct of chest to evaluate lung - pneumo vs chronic lung condition  3/14 has pneumonitits -cont iv abxs; add dexamthasone 4mg qday  3/15 antibiotics on board, antiviral for influenza, dexamethasone on board  3/16 continued to improve, antibiotics on board steroids on board, wean as tolerated, discharge planning  3/17 dc home - complete po abxs for pneumonitis - wean dexamthasone to 2mg po qd x 3 days  Type 2 diabetes mellitus without complication, with long-term current use of insulin  Accuchecks q ach abd qhs  Ada diet  3/17 resume home meds upon dc    Shortness of breath  Tx flu and probable pneumonia - o2 protocal  3/17 imporved  Influenza  Tamiflu; nebsl o2 protocal  3/17 completed tamiflu  Hyponatremia  Hyponatremia is likely due to Dehydration/hypovolemia. The patient's most recent sodium results are listed below.  Recent Labs     03/15/25  0611 03/16/25  0605 03/17/25  0526   * 137 132*     Encourage po intake - monitor daily    3/13 sodium level improved  3/14 sodium back to normal  Final Active Diagnoses:    Diagnosis Date Noted POA    PRINCIPAL PROBLEM:  Hypoxia [R09.02] 03/12/2025 Yes    Shortness of breath [R06.02] 03/12/2025 Yes    Influenza [J11.1] 03/12/2025 Yes    Hyponatremia [E87.1] 03/12/2025 Yes    Type 2 diabetes mellitus without complication, with long-term current use of insulin [E11.9, Z79.4] 06/10/2019 Not Applicable      Problems Resolved During this Admission:       Discharged Condition: stable    Disposition: Home-Health Care " [facility]    Follow Up:   Contact information for follow-up providers       Gabby Jackson NP Follow up on 3/24/2025.    Specialty: Family Medicine  Why: 10:15am.  Contact information:  [address]  [address]  [facility] [address]  [phone]               [facility] .    Specialty: Home Health Services  Contact information:  [address]  [address]  [facility] [address]  [phone]                       Contact information for after-discharge care       Home Medical Care       [facility] .    Services: Home Nursing, Home Rehabilitation  Contact information:  [address]  [facility] [address]  [phone]                                 Patient Instructions:      Ambulatory referral/consult to Home Health   Standing Status: Future   Referral Priority: Routine Referral Type: Home Health   Referral Reason: Specialty Services Required   Referred to Provider: [facility] Requested Specialty: Home Health Services   Number of Visits Requested: 1     Diet diabetic     Diet Cardiac     Activity as tolerated       Significant Diagnostic Studies: Labs: CMP   Recent Labs   Lab 03/16/25  0605 03/17/25  0526    132*   K 3.4* 3.6    99   CO2 26 24   * 315*   BUN 13 15   CREATININE 0.9 0.8   CALCIUM 8.4* 8.4*   PROT 6.6 6.4   ALBUMIN 2.6* 2.6*   BILITOT 0.3 0.3   ALKPHOS 84 96   AST 24 11   ALT 14 13   ANIONGAP 11 9   , CBC   Recent Labs   Lab 03/16/25  0605 03/17/25  0526   WBC 8.44 6.81   HGB 11.7* 11.1*   HCT 35.1* 34.0*    387   , and All labs within the past 24 hours have been reviewed    Pending Diagnostic Studies:       None           Medications:  Reconciled Home Medications:      Medication List        START taking these medications      amoxicillin-clavulanate 875-125mg 875-125 mg per tablet  Commonly known as: AUGMENTIN  Take 1 tablet by mouth 2 (two) times daily.     dexAMETHasone 2 MG tablet  Commonly known as:  DECADRON  Take 1 tablet (2 mg total) by mouth once daily. for 3 days     dextromethorphan-guaiFENesin  mg/5 ml  mg/5 mL liquid  Commonly known as: ROBITUSSIN-DM  Take 10 mLs by mouth every 4 (four) hours as needed (cough/congestion).     guaiFENesin 600 mg 12 hr tablet  Commonly known as: MUCINEX  Take 2 tablets (1,200 mg total) by mouth 2 (two) times daily.            CHANGE how you take these medications      lisinopriL 40 MG tablet  Commonly known as: PRINIVIL,ZESTRIL  Take 1 tablet (40 mg total) by mouth every evening.  What changed:   medication strength  how much to take     * NYAMYC powder  Generic drug: nystatin  What changed: Another medication with the same name was added. Make sure you understand how and when to take each.     * nystatin 100,000 unit/mL suspension  Commonly known as: MYCOSTATIN  Take 5 mLs (500,000 Units total) by mouth 4 (four) times daily. for 10 days  What changed: You were already taking a medication with the same name, and this prescription was added. Make sure you understand how and when to take each.           * This list has 2 medication(s) that are the same as other medications prescribed for you. Read the directions carefully, and ask your doctor or other care provider to review them with you.                CONTINUE taking these medications      acarbose 100 MG Tab  Commonly known as: PRECOSE  Take 1 tablet (100 mg total) by mouth 3 (three) times daily with meals.     acetaminophen 650 MG Tbsr  Commonly known as: TYLENOL  Take 650 mg by mouth 2 (two) times a day.     AEROBIKA OSCILLATING PEP SYSTM MISC  12 puffs by Misc.(Non-Drug; Combo Route) route daily as needed.     albuterol 90 mcg/actuation inhaler  Commonly known as: PROVENTIL/VENTOLIN HFA  Inhale 2 puffs into the lungs every 6 hours as needed for wheezing     alendronate 70 MG tablet  Commonly known as: FOSAMAX  Take 1 tablet (70 mg total) by mouth every 7 days.     aspirin 81 MG EC tablet  Commonly known  as: ECOTRIN  Take 81 mg by mouth once daily. Stopped 01/19/2022     atorvastatin 40 MG tablet  Commonly known as: LIPITOR  Take 1 tablet (40 mg total) by mouth once daily.     BAQSIMI 3 mg/actuation Spry  Generic drug: glucagon  2 pack.  Spray one device (3 mg) in one nostril to treat severe hypoglycemia. Disp 1 box (2 devices)     blood sugar diagnostic Strp  Commonly known as: ONETOUCH ULTRA TEST  Use to check fingerstick glucose readings 1 time a day     blood-glucose meter Misc  1 Device by Misc.(Non-Drug; Combo Route) route once daily. One Touch Dx. Code:E11.9     carvediloL 6.25 MG tablet  Commonly known as: COREG  Take 1 tablet (6.25 mg total) by mouth 2 (two) times daily with meals.     cholecalciferol (vitamin D3) 50 mcg (2,000 unit) Tab  Commonly known as: VITAMIN D3  Take 1 tablet (2,000 Units total) by mouth twice a week. 10,000 twice a week     clobetasoL 0.05 % cream  Commonly known as: TEMOVATE  Apply twice a day for 2 weeks then nightly for 2 months. May decrease to three nights a week after that.     co-enzyme Q-10 30 mg capsule  Take 1 capsule (30 mg total) by mouth every evening.     CRANBERRY CONCENTRATE ORAL  Take 2 capsules by mouth once daily.     dicyclomine 20 mg tablet  Commonly known as: BENTYL  Take 1 tablet (20 mg total) by mouth 4 (four) times daily before meals and nightly.     famotidine 20 MG tablet  Commonly known as: PEPCID  Take 1 tablet (20 mg total) by mouth 2 (two) times daily.     fish oil-omega-3 fatty acids 300-1,000 mg capsule  Take by mouth once daily.     FLUoxetine 10 MG capsule  Take 1 capsule (10 mg total) by mouth once daily.     FREESTYLE JOSEPHINE 2 READER [facility]  Generic drug: flash glucose scanning reader  Use as instructed     FREESTYLE JOSEPHINE 2 SENSOR Kit  Generic drug: flash glucose sensor  Use as instructed     furosemide 20 MG tablet  Commonly known as: LASIX  Take 1 tablet by mouth once daily     gabapentin 600 MG tablet  Commonly known as: NEURONTIN  Take 1 tablet  "(600 mg total) by mouth 3 (three) times daily.     insulin aspart U-100 100 unit/mL (3 mL) Inpn pen  Commonly known as: NovoLOG Flexpen U-100 Insulin  Take sliding scale insulin 4 times a day as instructed- max dose of 40 units/day.     lancets 33 gauge Misc  Commonly known as: ONETOUCH DELICA PLUS LANCET  Use to check glucose readings 4 times a day.     letrozole 2.5 mg Tab  Commonly known as: FEMARA  Take 1 tablet (2.5 mg total) by mouth once daily.     LIDOcaine 5 %  Commonly known as: LIDODERM  SMARTSIG:Topical     loperamide 2 mg capsule  Commonly known as: IMODIUM  Take 2 mg by mouth 4 (four) times daily as needed for Diarrhea.     metFORMIN 500 MG ER 24hr tablet  Commonly known as: GLUCOPHAGE-XR  Take 1 tablet (500 mg total) by mouth daily with breakfast.     montelukast 10 mg tablet  Commonly known as: SINGULAIR  Take 1 tablet (10 mg total) by mouth once daily.     multivitamin capsule  Take 1 capsule by mouth once daily.     * pen needle, diabetic 32 gauge x 1/4" Ndle  Commonly known as: BD ULTRA-FINE MICRO PEN NEEDLE  Use to inject insulin 4 to 5 times a day as instructed     * pen needle, diabetic 32 gauge x 1/4" Ndle  Commonly known as: BD ULTRA-FINE MICRO PEN NEEDLE  use to inject ozempic once a week     potassium chloride 10 MEQ Tbsr  Commonly known as: KLOR-CON  Take 99 mEq by mouth once.     SEMGLEE(INSULIN GLARG-YFGN) unit/mL (3 mL) Inpn  Generic drug: insulin glargine-yfgn  Inject 16 units once daily           * This list has 2 medication(s) that are the same as other medications prescribed for you. Read the directions carefully, and ask your doctor or other care provider to review them with you.                STOP taking these medications      hydroCHLOROthiazide 25 MG tablet  Commonly known as: HYDRODIURIL     oseltamivir 75 MG capsule  Commonly known as: TAMIFLU     sulfamethoxazole-trimethoprim 800-160mg 800-160 mg Tab  Commonly known as: BACTRIM DS              Indwelling Lines/Drains " at time of discharge:   Lines/Drains/Airways       Drain  Duration                  Urethral Catheter 03/12/25 1150 Double-lumen 20 Fr. 4 days                    Time spent on the discharge of patient: 35 minutes         Harika Mina MD  Department of Hospital Medicine  [facility]

## 2025-03-25 ENCOUNTER — OFFICE VISIT (OUTPATIENT)
Dept: PRIMARY CARE CLINIC | Facility: CLINIC | Age: 85
End: 2025-03-25
Payer: MEDICARE

## 2025-03-25 ENCOUNTER — RESULTS FOLLOW-UP (OUTPATIENT)
Dept: PRIMARY CARE CLINIC | Facility: CLINIC | Age: 85
End: 2025-03-25

## 2025-03-25 ENCOUNTER — HOSPITAL ENCOUNTER (OUTPATIENT)
Dept: RADIOLOGY | Facility: HOSPITAL | Age: 85
Discharge: HOME | End: 2025-03-25
Attending: NURSE PRACTITIONER
Payer: MEDICARE

## 2025-03-25 VITALS
OXYGEN SATURATION: 96 % | BODY MASS INDEX: 33.41 KG/M2 | HEART RATE: 64 BPM | DIASTOLIC BLOOD PRESSURE: 63 MMHG | SYSTOLIC BLOOD PRESSURE: 131 MMHG | WEIGHT: 195.69 LBS | RESPIRATION RATE: 16 BRPM | TEMPERATURE: 98 F | HEIGHT: 64 IN

## 2025-03-25 DIAGNOSIS — F32.5 MAJOR DEPRESSIVE DISORDER WITH SINGLE EPISODE, IN FULL REMISSION: ICD-10-CM

## 2025-03-25 DIAGNOSIS — H66.92 LEFT OTITIS MEDIA, UNSPECIFIED OTITIS MEDIA TYPE: ICD-10-CM

## 2025-03-25 DIAGNOSIS — R09.89 CHEST CRACKLES: ICD-10-CM

## 2025-03-25 DIAGNOSIS — R09.89 CHEST CRACKLES: Primary | ICD-10-CM

## 2025-03-25 PROCEDURE — 99213 OFFICE O/P EST LOW 20 MIN: CPT | Mod: PBBFAC,25 | Performed by: NURSE PRACTITIONER

## 2025-03-25 PROCEDURE — 71046 X-RAY EXAM CHEST 2 VIEWS: CPT | Mod: TC

## 2025-03-25 PROCEDURE — 99215 OFFICE O/P EST HI 40 MIN: CPT | Mod: S$PBB,,, | Performed by: NURSE PRACTITIONER

## 2025-03-25 PROCEDURE — 99999 PR PBB SHADOW E&M-EST. PATIENT-LVL III: CPT | Mod: PBBFAC,,, | Performed by: NURSE PRACTITIONER

## 2025-03-25 RX ORDER — FERROUS SULFATE 325(65) MG
325 TABLET, DELAYED RELEASE (ENTERIC COATED) ORAL
COMMUNITY

## 2025-03-25 RX ORDER — FLUOXETINE 10 MG/1
10 CAPSULE ORAL DAILY
Qty: 90 CAPSULE | Refills: 3 | Status: SHIPPED | OUTPATIENT
Start: 2025-03-25

## 2025-03-25 RX ORDER — IBUPROFEN 200 MG
1 CAPSULE ORAL DAILY
COMMUNITY

## 2025-03-25 RX ORDER — AZITHROMYCIN 250 MG/1
TABLET, FILM COATED ORAL
Qty: 6 TABLET | Refills: 0 | Status: SHIPPED | OUTPATIENT
Start: 2025-03-25 | End: 2025-03-30

## 2025-03-25 RX ORDER — TRIMETHOPRIM 100 MG/1
100 TABLET ORAL EVERY 12 HOURS
COMMUNITY

## 2025-03-25 NOTE — PROGRESS NOTES
[facility] Note    HPI:  Kelsea Cadet is a 85 y.o. female who presents today for Follow-up (Pt here for hosp f/u flu and pneumonia)     Still c/o SOB, lungs sound junky today    Also needing refill of depression med    Review of Systems   Constitutional: Negative.    HENT: Negative.     Eyes: Negative.    Respiratory:  Positive for shortness of breath.    Cardiovascular: Negative.    Gastrointestinal: Negative.    Genitourinary: Negative.    Musculoskeletal: Negative.    Skin: Negative.    Neurological:  Positive for weakness.   Endo/Heme/Allergies: Negative.    Psychiatric/Behavioral: Negative.        A review of systems was performed and was negative except as noted above.    I personally reviewed allergies, past medical, surgical, social and family history and updated as appropriate.    Medications:  Current Medications[1]     Health Maintenance:  Immunization History   Administered Date(s) Administered    COVID-19, MRNA, LN-S, PF (MODERNA FULL 0.5 ML DOSE) 04/08/2021, 09/29/2021    Influenza (FLUAD) - Quadrivalent - Adjuvanted - PF *Preferred* (65+) 10/23/2020    Influenza - Quadrivalent - High Dose - PF (65 years and older) 11/09/2022    Influenza - Quadrivalent - PF *Preferred* (6 months and older) 01/14/2000, 12/11/2001, 11/19/2002, 11/03/2003, 12/08/2005, 11/07/2008    Influenza - Trivalent - Afluria, Fluzone MDV 01/14/2000, 12/11/2001, 11/19/2002, 11/03/2003, 12/08/2005, 11/07/2008    Influenza - Trivalent - Fluzone High Dose - PF (65 years and older) 12/03/2019    Pneumococcal Conjugate - 13 Valent 05/13/2019, 10/21/2020    Pneumococcal Conjugate - 20 Valent 02/07/2025    Pneumococcal Polysaccharide - 23 Valent 12/11/2001    Td (ADULT) 07/23/2001    Tdap 07/23/2001, 05/13/2019    Zoster Recombinant 02/07/2025      Health Maintenance   Topic Date Due    Diabetic Eye Exam  Never done    RSV Vaccine (Age 60+ and Pregnant patients) (1 - 1-dose 75+ series) Never done    COVID-19 Vaccine (3  "- Moderna risk series) 02/07/2026 (Originally 10/27/2021)    Shingles Vaccine (2 of 2) 04/04/2025    Hemoglobin A1c  09/12/2025    Lipid Panel  12/11/2025    TETANUS VACCINE  05/13/2029    DEXA Scan  05/14/2034    Pneumococcal Vaccines (Age 50+)  Completed    Influenza Vaccine  Discontinued     Health Maintenance Topics with due status: Not Due       Topic Last Completion Date    TETANUS VACCINE 05/13/2019    DEXA Scan 05/14/2024    Lipid Panel 12/11/2024    Shingles Vaccine 02/07/2025    Hemoglobin A1c 03/12/2025     Health Maintenance Due   Topic Date Due    Diabetic Eye Exam  Never done    RSV Vaccine (Age 60+ and Pregnant patients) (1 - 1-dose 75+ series) Never done       PHYSICAL EXAM:  Vitals:    03/25/25 1127 03/25/25 1140   BP: (!) 164/68 131/63   Pulse: 64    Resp: 16    Temp: 98.1 °F (36.7 °C)    TempSrc: Oral    SpO2: 96%    Weight: 88.8 kg (195 lb 11.2 oz)    Height: 5' 4" (1.626 m)      Body mass index is 33.59 kg/m².  Physical Exam  Constitutional:       Appearance: Normal appearance. She is normal weight.   HENT:      Head: Normocephalic.      Right Ear: Tympanic membrane, ear canal and external ear normal.      Left Ear: Tympanic membrane, ear canal and external ear normal.      Nose: Nose normal.      Mouth/Throat:      Mouth: Mucous membranes are moist.   Cardiovascular:      Rate and Rhythm: Normal rate and regular rhythm.      Heart sounds: Normal heart sounds.   Pulmonary:      Effort: Pulmonary effort is normal.      Breath sounds: Rhonchi present.   Musculoskeletal:         General: Normal range of motion.      Cervical back: Normal range of motion.   Skin:     General: Skin is warm and dry.   Neurological:      General: No focal deficit present.      Mental Status: She is alert and oriented to person, place, and time.          ASSESSMENT/PLAN:  1. Chest crackles  -     X-Ray Chest PA And Lateral; Future; Expected date: 03/25/2025    2. Major depressive disorder with single episode, in full " remission  -     FLUoxetine 10 MG capsule; Take 1 capsule (10 mg total) by mouth once daily.  Dispense: 90 capsule; Refill: 3        Other than changes above, continue current medications and maintain follow up with specialists.      No follow-ups on file.   Recent Results (from the past 12 weeks)   CBC Auto Differential    Collection Time: 03/11/25 10:14 PM   Result Value Ref Range    WBC 9.55 3.90 - 12.70 K/uL    RBC 3.76 (L) 4.00 - 5.40 M/uL    Hemoglobin 10.9 (L) 12.0 - 16.0 g/dL    Hematocrit 33.1 (L) 37.0 - 48.5 %    MCV 88 82 - 98 fL    MCH 29.0 27.0 - 31.0 pg    MCHC 32.9 32.0 - 36.0 g/dL    RDW 13.4 11.5 - 14.5 %    Platelets 253 150 - 450 K/uL    MPV 10.3 9.2 - 12.9 fL    Immature Granulocytes 0.3 0.0 - 0.5 %    Gran # (ANC) 7.2 1.8 - 7.7 K/uL    Immature Grans (Abs) 0.03 0.00 - 0.04 K/uL    Lymph # 1.2 1.0 - 4.8 K/uL    Mono # 1.0 0.3 - 1.0 K/uL    Eos # 0.0 0.0 - 0.5 K/uL    Baso # 0.03 0.00 - 0.20 K/uL    nRBC 0 0 /100 WBC    Gran % 75.8 (H) 38.0 - 73.0 %    Lymph % 12.5 (L) 18.0 - 48.0 %    Mono % 10.8 4.0 - 15.0 %    Eosinophil % 0.3 0.0 - 8.0 %    Basophil % 0.3 0.0 - 1.9 %    Differential Method Automated    Comprehensive Metabolic Panel    Collection Time: 03/11/25 10:14 PM   Result Value Ref Range    Sodium 132 (L) 136 - 145 mmol/L    Potassium 3.5 3.5 - 5.1 mmol/L    Chloride 97 95 - 110 mmol/L    CO2 23 23 - 29 mmol/L    Glucose 166 (H) 70 - 110 mg/dL    BUN 15 8 - 23 mg/dL    Creatinine 1.0 0.5 - 1.4 mg/dL    Calcium 8.4 (L) 8.7 - 10.5 mg/dL    Total Protein 6.8 6.0 - 8.4 g/dL    Albumin 2.8 (L) 3.5 - 5.2 g/dL    Total Bilirubin 0.2 0.1 - 1.0 mg/dL    Alkaline Phosphatase 93 55 - 135 U/L    AST 25 10 - 40 U/L    ALT 14 10 - 44 U/L    eGFR 56.2 (A) >60 mL/min/1.73 m^2    Anion Gap 12 8 - 16 mmol/L   NT-Pro Natriuretic Peptide    Collection Time: 03/11/25 10:14 PM   Result Value Ref Range    NT-proBNP 1426 5 - 1800 pg/mL   EKG 12-lead    Collection Time: 03/11/25 10:16 PM   Result Value Ref Range     QRS Duration 160 ms    [facility] QTC Calculation 491 ms   POCT COVID-19 Rapid Screening    Collection Time: 03/11/25 10:27 PM   Result Value Ref Range    POC Rapid COVID Negative Negative     Acceptable Yes    POCT Influenza A/B Molecular    Collection Time: 03/11/25 10:27 PM   Result Value Ref Range    POC Molecular Influenza A Ag Positive (A) Negative    POC Molecular Influenza B Ag Negative Negative     Acceptable Yes    Blood Culture #1 **CANNOT BE ORDERED STAT**    Collection Time: 03/12/25  1:48 AM    Specimen: Peripheral, Forearm, Left; Blood   Result Value Ref Range    Blood Culture, Routine No growth after 5 days.    Blood Culture #2 **CANNOT BE ORDERED STAT**    Collection Time: 03/12/25  1:55 AM    Specimen: Peripheral, Wrist, Right; Blood   Result Value Ref Range    Blood Culture, Routine No growth after 5 days.    POCT glucose    Collection Time: 03/12/25  6:15 AM   Result Value Ref Range    POCT Glucose 261 (H) 70 - 110 mg/dL   Hemoglobin A1c if not done in past 3 months    Collection Time: 03/12/25  6:32 AM   Result Value Ref Range    Hemoglobin A1C 7.0 (H) 4.0 - 5.6 %    Estimated Avg Glucose 154 (H) 68 - 131 mg/dL   CBC auto differential    Collection Time: 03/12/25  6:32 AM   Result Value Ref Range    WBC 7.65 3.90 - 12.70 K/uL    RBC 3.52 (L) 4.00 - 5.40 M/uL    Hemoglobin 10.3 (L) 12.0 - 16.0 g/dL    Hematocrit 31.4 (L) 37.0 - 48.5 %    MCV 89 82 - 98 fL    MCH 29.3 27.0 - 31.0 pg    MCHC 32.8 32.0 - 36.0 g/dL    RDW 13.3 11.5 - 14.5 %    Platelets 250 150 - 450 K/uL    MPV 9.9 9.2 - 12.9 fL    Immature Granulocytes 0.4 0.0 - 0.5 %    Gran # (ANC) 6.3 1.8 - 7.7 K/uL    Immature Grans (Abs) 0.03 0.00 - 0.04 K/uL    Lymph # 1.0 1.0 - 4.8 K/uL    Mono # 0.3 0.3 - 1.0 K/uL    Eos # 0.0 0.0 - 0.5 K/uL    Baso # 0.02 0.00 - 0.20 K/uL    nRBC 0 0 /100 WBC    Gran % 82.6 (H) 38.0 - 73.0 %    Lymph % 12.4 (L) 18.0 - 48.0 %    Mono % 4.3 4.0 - 15.0 %    Eosinophil % 0.0 0.0 - 8.0  %    Basophil % 0.3 0.0 - 1.9 %    Differential Method Automated    Basic metabolic panel    Collection Time: 03/12/25  6:32 AM   Result Value Ref Range    Sodium 133 (L) 136 - 145 mmol/L    Potassium 3.4 (L) 3.5 - 5.1 mmol/L    Chloride 98 95 - 110 mmol/L    CO2 25 23 - 29 mmol/L    Glucose 207 (H) 70 - 110 mg/dL    BUN 15 8 - 23 mg/dL    Creatinine 0.9 0.5 - 1.4 mg/dL    Calcium 8.1 (L) 8.7 - 10.5 mg/dL    Anion Gap 10 8 - 16 mmol/L    eGFR >60.0 >60 mL/min/1.73 m^2   Urinalysis, Reflex to Urine Culture Urine, Clean Catch    Collection Time: 03/12/25 11:32 AM    Specimen: Urine   Result Value Ref Range    Specimen UA Urine, Clean Catch     Color, UA Yellow Yellow, Straw, Neli    Appearance, UA Clear Clear    pH, UA 7.0 5.0 - 8.0    Specific Gravity, UA 1.015 1.005 - 1.030    Protein, UA 1+ (A) Negative    Glucose, UA 4+ (A) Negative    Ketones, UA Negative Negative    Bilirubin (UA) Negative Negative    Occult Blood UA Trace (A) Negative    Nitrite, UA Negative Negative    Urobilinogen, UA Negative <2.0 EU/dL    Leukocytes, UA 1+ (A) Negative   Urinalysis Microscopic    Collection Time: 03/12/25 11:32 AM   Result Value Ref Range    RBC, UA 4 0 - 4 /hpf    WBC, UA 14 (H) 0 - 5 /hpf    Bacteria Negative None-Occ /hpf    Yeast, UA None None    Squam Epithel, UA 5 /hpf    Hyaline Casts, UA 0 0-1/lpf /lpf    Microscopic Comment SEE COMMENT    Urine culture    Collection Time: 03/12/25 11:32 AM    Specimen: Urine   Result Value Ref Range    Urine Culture, Routine No growth    POCT Glucose, Hand-Held Device    Collection Time: 03/12/25  8:32 PM   Result Value Ref Range    POC Glucose 370 (A) 70 - 110 MG/DL   POCT Glucose, Hand-Held Device    Collection Time: 03/12/25 11:57 PM   Result Value Ref Range    POC Glucose 220 (A) 70 - 110 MG/DL   CBC Auto Differential    Collection Time: 03/13/25  5:48 AM   Result Value Ref Range    WBC 9.33 3.90 - 12.70 K/uL    RBC 3.67 (L) 4.00 - 5.40 M/uL    Hemoglobin 10.6 (L) 12.0 - 16.0  g/dL    Hematocrit 32.1 (L) 37.0 - 48.5 %    MCV 88 82 - 98 fL    MCH 28.9 27.0 - 31.0 pg    MCHC 33.0 32.0 - 36.0 g/dL    RDW 13.3 11.5 - 14.5 %    Platelets 257 150 - 450 K/uL    MPV 10.7 9.2 - 12.9 fL    Immature Granulocytes 0.4 0.0 - 0.5 %    Gran # (ANC) 7.2 1.8 - 7.7 K/uL    Immature Grans (Abs) 0.04 0.00 - 0.04 K/uL    Lymph # 1.3 1.0 - 4.8 K/uL    Mono # 0.8 0.3 - 1.0 K/uL    Eos # 0.0 0.0 - 0.5 K/uL    Baso # 0.03 0.00 - 0.20 K/uL    nRBC 0 0 /100 WBC    Gran % 77.4 (H) 38.0 - 73.0 %    Lymph % 13.7 (L) 18.0 - 48.0 %    Mono % 8.0 4.0 - 15.0 %    Eosinophil % 0.2 0.0 - 8.0 %    Basophil % 0.3 0.0 - 1.9 %    Differential Method Automated    Comprehensive Metabolic Panel    Collection Time: 03/13/25  5:49 AM   Result Value Ref Range    Sodium 134 (L) 136 - 145 mmol/L    Potassium 3.8 3.5 - 5.1 mmol/L    Chloride 101 95 - 110 mmol/L    CO2 23 23 - 29 mmol/L    Glucose 169 (H) 70 - 110 mg/dL    BUN 18 8 - 23 mg/dL    Creatinine 0.9 0.5 - 1.4 mg/dL    Calcium 8.3 (L) 8.7 - 10.5 mg/dL    Total Protein 6.2 6.0 - 8.4 g/dL    Albumin 2.5 (L) 3.5 - 5.2 g/dL    Total Bilirubin 0.2 0.1 - 1.0 mg/dL    Alkaline Phosphatase 80 55 - 135 U/L    AST 22 10 - 40 U/L    ALT 11 10 - 44 U/L    eGFR >60.0 >60 mL/min/1.73 m^2    Anion Gap 10 8 - 16 mmol/L   Magnesium    Collection Time: 03/13/25  5:49 AM   Result Value Ref Range    Magnesium 1.6 1.6 - 2.6 mg/dL   POCT glucose    Collection Time: 03/13/25 12:17 PM   Result Value Ref Range    POCT Glucose 165 (H) 70 - 110 mg/dL   POCT glucose    Collection Time: 03/13/25  3:57 PM   Result Value Ref Range    POCT Glucose 212 (H) 70 - 110 mg/dL   POCT glucose    Collection Time: 03/13/25  8:15 PM   Result Value Ref Range    POCT Glucose 164 (H) 70 - 110 mg/dL   VANCOMYCIN, TROUGH    Collection Time: 03/14/25  7:06 AM   Result Value Ref Range    Vancomycin-Trough 32.5 (HH) 10.0 - 22.0 ug/mL   CBC Auto Differential    Collection Time: 03/14/25  7:06 AM   Result Value Ref Range    WBC  8.41 3.90 - 12.70 K/uL    RBC 3.68 (L) 4.00 - 5.40 M/uL    Hemoglobin 10.8 (L) 12.0 - 16.0 g/dL    Hematocrit 32.6 (L) 37.0 - 48.5 %    MCV 89 82 - 98 fL    MCH 29.3 27.0 - 31.0 pg    MCHC 33.1 32.0 - 36.0 g/dL    RDW 13.2 11.5 - 14.5 %    Platelets 310 150 - 450 K/uL    MPV 9.5 9.2 - 12.9 fL    Immature Granulocytes 1.0 (H) 0.0 - 0.5 %    Gran # (ANC) 6.1 1.8 - 7.7 K/uL    Immature Grans (Abs) 0.08 (H) 0.00 - 0.04 K/uL    Lymph # 1.5 1.0 - 4.8 K/uL    Mono # 0.7 0.3 - 1.0 K/uL    Eos # 0.0 0.0 - 0.5 K/uL    Baso # 0.03 0.00 - 0.20 K/uL    nRBC 0 0 /100 WBC    Gran % 72.1 38.0 - 73.0 %    Lymph % 17.8 (L) 18.0 - 48.0 %    Mono % 8.3 4.0 - 15.0 %    Eosinophil % 0.4 0.0 - 8.0 %    Basophil % 0.4 0.0 - 1.9 %    Differential Method Automated    Comprehensive Metabolic Panel    Collection Time: 03/14/25  7:06 AM   Result Value Ref Range    Sodium 135 (L) 136 - 145 mmol/L    Potassium 3.7 3.5 - 5.1 mmol/L    Chloride 101 95 - 110 mmol/L    CO2 24 23 - 29 mmol/L    Glucose 160 (H) 70 - 110 mg/dL    BUN 14 8 - 23 mg/dL    Creatinine 0.9 0.5 - 1.4 mg/dL    Calcium 8.5 (L) 8.7 - 10.5 mg/dL    Total Protein 6.4 6.0 - 8.4 g/dL    Albumin 2.5 (L) 3.5 - 5.2 g/dL    Total Bilirubin 0.3 0.1 - 1.0 mg/dL    Alkaline Phosphatase 81 55 - 135 U/L    AST 23 10 - 40 U/L    ALT 13 10 - 44 U/L    eGFR >60.0 >60 mL/min/1.73 m^2    Anion Gap 10 8 - 16 mmol/L   Magnesium    Collection Time: 03/14/25  7:06 AM   Result Value Ref Range    Magnesium 1.6 1.6 - 2.6 mg/dL   POCT glucose    Collection Time: 03/14/25  1:01 PM   Result Value Ref Range    POCT Glucose 239 (H) 70 - 110 mg/dL   CBC Auto Differential    Collection Time: 03/15/25  6:11 AM   Result Value Ref Range    WBC 4.89 3.90 - 12.70 K/uL    RBC 3.82 (L) 4.00 - 5.40 M/uL    Hemoglobin 11.0 (L) 12.0 - 16.0 g/dL    Hematocrit 33.5 (L) 37.0 - 48.5 %    MCV 88 82 - 98 fL    MCH 28.8 27.0 - 31.0 pg    MCHC 32.8 32.0 - 36.0 g/dL    RDW 13.2 11.5 - 14.5 %    Platelets 348 150 - 450 K/uL    MPV  9.7 9.2 - 12.9 fL    Immature Granulocytes 2.9 (H) 0.0 - 0.5 %    Gran # (ANC) 3.5 1.8 - 7.7 K/uL    Immature Grans (Abs) 0.14 (H) 0.00 - 0.04 K/uL    Lymph # 0.9 (L) 1.0 - 4.8 K/uL    Mono # 0.3 0.3 - 1.0 K/uL    Eos # 0.0 0.0 - 0.5 K/uL    Baso # 0.02 0.00 - 0.20 K/uL    nRBC 0 0 /100 WBC    Gran % 71.8 38.0 - 73.0 %    Lymph % 18.2 18.0 - 48.0 %    Mono % 6.7 4.0 - 15.0 %    Eosinophil % 0.0 0.0 - 8.0 %    Basophil % 0.4 0.0 - 1.9 %    Differential Method Automated    Comprehensive Metabolic Panel    Collection Time: 03/15/25  6:11 AM   Result Value Ref Range    Sodium 134 (L) 136 - 145 mmol/L    Potassium 3.7 3.5 - 5.1 mmol/L    Chloride 98 95 - 110 mmol/L    CO2 25 23 - 29 mmol/L    Glucose 275 (H) 70 - 110 mg/dL    BUN 12 8 - 23 mg/dL    Creatinine 0.8 0.5 - 1.4 mg/dL    Calcium 8.5 (L) 8.7 - 10.5 mg/dL    Total Protein 6.6 6.0 - 8.4 g/dL    Albumin 2.6 (L) 3.5 - 5.2 g/dL    Total Bilirubin 0.3 0.1 - 1.0 mg/dL    Alkaline Phosphatase 81 55 - 135 U/L    AST 20 10 - 40 U/L    ALT 13 10 - 44 U/L    eGFR >60.0 >60 mL/min/1.73 m^2    Anion Gap 11 8 - 16 mmol/L   Magnesium    Collection Time: 03/15/25  6:11 AM   Result Value Ref Range    Magnesium 1.8 1.6 - 2.6 mg/dL   VANCOMYCIN, TROUGH    Collection Time: 03/15/25  6:11 AM   Result Value Ref Range    Vancomycin-Trough 19.5 10.0 - 22.0 ug/mL   POCT glucose    Collection Time: 03/15/25 11:39 AM   Result Value Ref Range    POCT Glucose 296 (H) 70 - 110 mg/dL   POCT glucose    Collection Time: 03/15/25  9:30 PM   Result Value Ref Range    POCT Glucose 288 (H) 70 - 110 mg/dL   CBC Auto Differential    Collection Time: 03/16/25  6:05 AM   Result Value Ref Range    WBC 8.44 3.90 - 12.70 K/uL    RBC 4.05 4.00 - 5.40 M/uL    Hemoglobin 11.7 (L) 12.0 - 16.0 g/dL    Hematocrit 35.1 (L) 37.0 - 48.5 %    MCV 87 82 - 98 fL    MCH 28.9 27.0 - 31.0 pg    MCHC 33.3 32.0 - 36.0 g/dL    RDW 13.2 11.5 - 14.5 %    Platelets 358 150 - 450 K/uL    MPV 10.4 9.2 - 12.9 fL    Immature  Granulocytes 3.3 (H) 0.0 - 0.5 %    Gran # (ANC) 5.3 1.8 - 7.7 K/uL    Immature Grans (Abs) 0.28 (H) 0.00 - 0.04 K/uL    Lymph # 1.8 1.0 - 4.8 K/uL    Mono # 0.7 0.3 - 1.0 K/uL    Eos # 0.2 0.0 - 0.5 K/uL    Baso # 0.06 0.00 - 0.20 K/uL    nRBC 0 0 /100 WBC    Gran % 63.3 38.0 - 73.0 %    Lymph % 21.8 18.0 - 48.0 %    Mono % 8.6 4.0 - 15.0 %    Eosinophil % 2.3 0.0 - 8.0 %    Basophil % 0.7 0.0 - 1.9 %    Differential Method Automated    Comprehensive Metabolic Panel    Collection Time: 03/16/25  6:05 AM   Result Value Ref Range    Sodium 137 136 - 145 mmol/L    Potassium 3.4 (L) 3.5 - 5.1 mmol/L    Chloride 100 95 - 110 mmol/L    CO2 26 23 - 29 mmol/L    Glucose 178 (H) 70 - 110 mg/dL    BUN 13 8 - 23 mg/dL    Creatinine 0.9 0.5 - 1.4 mg/dL    Calcium 8.4 (L) 8.7 - 10.5 mg/dL    Total Protein 6.6 6.0 - 8.4 g/dL    Albumin 2.6 (L) 3.5 - 5.2 g/dL    Total Bilirubin 0.3 0.1 - 1.0 mg/dL    Alkaline Phosphatase 84 55 - 135 U/L    AST 24 10 - 40 U/L    ALT 14 10 - 44 U/L    eGFR >60.0 >60 mL/min/1.73 m^2    Anion Gap 11 8 - 16 mmol/L   Magnesium    Collection Time: 03/16/25  6:05 AM   Result Value Ref Range    Magnesium 1.7 1.6 - 2.6 mg/dL   POCT glucose    Collection Time: 03/16/25 10:02 PM   Result Value Ref Range    POCT Glucose 396 (H) 70 - 110 mg/dL   CBC Auto Differential    Collection Time: 03/17/25  5:26 AM   Result Value Ref Range    WBC 6.81 3.90 - 12.70 K/uL    RBC 3.86 (L) 4.00 - 5.40 M/uL    Hemoglobin 11.1 (L) 12.0 - 16.0 g/dL    Hematocrit 34.0 (L) 37.0 - 48.5 %    MCV 88 82 - 98 fL    MCH 28.8 27.0 - 31.0 pg    MCHC 32.6 32.0 - 36.0 g/dL    RDW 12.9 11.5 - 14.5 %    Platelets 387 150 - 450 K/uL    MPV 9.9 9.2 - 12.9 fL    Immature Granulocytes 1.9 (H) 0.0 - 0.5 %    Gran # (ANC) 5.3 1.8 - 7.7 K/uL    Immature Grans (Abs) 0.13 (H) 0.00 - 0.04 K/uL    Lymph # 1.0 1.0 - 4.8 K/uL    Mono # 0.3 0.3 - 1.0 K/uL    Eos # 0.0 0.0 - 0.5 K/uL    Baso # 0.04 0.00 - 0.20 K/uL    nRBC 0 0 /100 WBC    Gran % 78.1 (H)  38.0 - 73.0 %    Lymph % 14.7 (L) 18.0 - 48.0 %    Mono % 4.6 4.0 - 15.0 %    Eosinophil % 0.1 0.0 - 8.0 %    Basophil % 0.6 0.0 - 1.9 %    Differential Method Automated    Comprehensive Metabolic Panel    Collection Time: 03/17/25  5:26 AM   Result Value Ref Range    Sodium 132 (L) 136 - 145 mmol/L    Potassium 3.6 3.5 - 5.1 mmol/L    Chloride 99 95 - 110 mmol/L    CO2 24 23 - 29 mmol/L    Glucose 315 (H) 70 - 110 mg/dL    BUN 15 8 - 23 mg/dL    Creatinine 0.8 0.5 - 1.4 mg/dL    Calcium 8.4 (L) 8.7 - 10.5 mg/dL    Total Protein 6.4 6.0 - 8.4 g/dL    Albumin 2.6 (L) 3.5 - 5.2 g/dL    Total Bilirubin 0.3 0.1 - 1.0 mg/dL    Alkaline Phosphatase 96 55 - 135 U/L    AST 11 10 - 40 U/L    ALT 13 10 - 44 U/L    eGFR >60.0 >60 mL/min/1.73 m^2    Anion Gap 9 8 - 16 mmol/L   Magnesium    Collection Time: 03/17/25  5:26 AM   Result Value Ref Range    Magnesium 1.7 1.6 - 2.6 mg/dL   VANCOMYCIN, TROUGH    Collection Time: 03/17/25  5:26 AM   Result Value Ref Range    Vancomycin-Trough 19.7 10.0 - 22.0 ug/mL         Gabby Jackson FNP [facility]                       [1]   Current Outpatient Medications:     atorvastatin (LIPITOR) 40 MG tablet, Take 1 tablet (40 mg total) by mouth once daily. (Patient taking differently: Take 20 mg by mouth once daily.), Disp: 90 tablet, Rfl: 3    calcium citrate (CALCITRATE) 200 mg (950 mg) tablet, Take 1 tablet by mouth once daily. (Patient taking differently: Take 1 tablet by mouth every Mon, Wed, Fri.), Disp: , Rfl:     cholecalciferol, vitamin D3, (VITAMIN D3) 50 mcg (2,000 unit) Tab, Take 1 tablet (2,000 Units total) by mouth twice a week. 10,000 twice a week (Patient taking differently: Take 1 tablet by mouth twice a week. 10,000 3 times weekly), Disp: , Rfl:     cranberry fruit extract (CRANBERRY CONCENTRATE ORAL), Take 2 capsules by mouth once daily. (Patient taking differently: Take 1 capsule by mouth once daily.), Disp: , Rfl:     ferrous sulfate 325 (65 FE) MG EC tablet,  Take 325 mg by mouth 3 (three) times daily with meals., Disp: , Rfl:     multivitamin capsule, Take 1 capsule by mouth once daily. (Patient taking differently: Take 1 capsule by mouth once daily. Taking 3 times weekly), Disp: , Rfl:     potassium chloride (KLOR-CON) 10 MEQ TbSR, Take 99 mEq by mouth once. (Patient taking differently: Take 99 mEq by mouth once. Taking 3 x weekly), Disp: , Rfl:     trimethoprim (TRIMPEX) 100 mg Tab, Take 100 mg by mouth every 12 (twelve) hours., Disp: , Rfl:     acarbose (PRECOSE) 100 MG Tab, Take 1 tablet (100 mg total) by mouth 3 (three) times daily with meals., Disp: 270 tablet, Rfl: 3    acetaminophen (TYLENOL) 650 MG TbSR, Take 650 mg by mouth 2 (two) times a day., Disp: , Rfl:     albuterol (PROVENTIL/VENTOLIN HFA) 90 mcg/actuation inhaler, Inhale 2 puffs into the lungs every 6 (six) hours as needed for Wheezing. Rescue, Disp: 18 g, Rfl: 11    alendronate (FOSAMAX) 70 MG tablet, Take 1 tablet (70 mg total) by mouth every 7 days., Disp: 12 tablet, Rfl: 1    amoxicillin-clavulanate 875-125mg (AUGMENTIN) 875-125 mg per tablet, Take 1 tablet by mouth 2 (two) times daily., Disp: 10 tablet, Rfl: 0    aspirin (ECOTRIN) 81 MG EC tablet, Take 81 mg by mouth once daily. Stopped 01/19/2022, Disp: , Rfl:     blood sugar diagnostic (ONETOUCH ULTRA TEST) Strp, Inject 1 each into the skin 3 (three) times daily before meals., Disp: 300 each, Rfl: 3    blood-glucose meter Misc, 1 Device by Misc.(Non-Drug; Combo Route) route once daily. One Touch Dx. Code:E11.9, Disp: 1 each, Rfl: 0    carvediloL (COREG) 6.25 MG tablet, Take 1 tablet (6.25 mg total) by mouth 2 (two) times daily with meals., Disp: 180 tablet, Rfl: 3    clobetasoL (TEMOVATE) 0.05 % cream, Apply twice a day for 2 weeks then nightly for 2 months. May decrease to three nights a week after that., Disp: 60 g, Rfl: 2    co-enzyme Q-10 30 mg capsule, Take 1 capsule (30 mg total) by mouth every evening., Disp: , Rfl:      dextromethorphan-guaiFENesin  mg/5 ml (ROBITUSSIN-DM)  mg/5 mL liquid, Take 10 mLs by mouth every 4 (four) hours as needed (cough/congestion)., Disp: , Rfl:     dicyclomine (BENTYL) 20 mg tablet, Take 1 tablet (20 mg total) by mouth 4 (four) times daily before meals and nightly., Disp: 120 tablet, Rfl: 11    flash glucose scanning reader (FREESTYLE JOSEPHINE 2 READER) Misc, Use as instructed, Disp: 1 each, Rfl: 1    flash glucose sensor (FREESTYLE JOSEPHINE 2 SENSOR) Kit, Use as instructed, Disp: 6 kit, Rfl: 3    FLUoxetine 10 MG capsule, Take 1 capsule (10 mg total) by mouth once daily., Disp: 90 capsule, Rfl: 3    furosemide (LASIX) 20 MG tablet, Take 1 tablet by mouth once daily, Disp: 90 tablet, Rfl: 0    gabapentin (NEURONTIN) 600 MG tablet, Take 1 tablet (600 mg total) by mouth 3 (three) times daily., Disp: 90 tablet, Rfl: 11    glucagon (BAQSIMI) 3 mg/actuation Spry, 2 pack.  Spray one device (3 mg) in one nostril to treat severe hypoglycemia. Disp 1 box (2 devices), Disp: 2 each, Rfl: 3    guaiFENesin (MUCINEX) 600 mg 12 hr tablet, Take 2 tablets (1,200 mg total) by mouth 2 (two) times daily., Disp: 40 tablet, Rfl: 0    insulin aspart U-100 (NOVOLOG FLEXPEN U-100 INSULIN) 100 unit/mL (3 mL) InPn pen, Take sliding scale insulin 4 times a day as instructed- max dose of 40 units/day., Disp: 15 mL, Rfl: 3    insulin glargine U-100, Lantus, (LANTUS SOLOSTAR U-100 INSULIN) 100 unit/mL (3 mL) InPn pen, Inject 16 Units into the skin once daily., Disp: 15 mL, Rfl: 3    letrozole (FEMARA) 2.5 mg Tab, Take 1 tablet (2.5 mg total) by mouth once daily., Disp: 30 tablet, Rfl: 11    LIDOcaine (LIDODERM) 5 %, SMARTSIG:Topical, Disp: , Rfl:     lisinopriL (PRINIVIL,ZESTRIL) 40 MG tablet, Take 1 tablet (40 mg total) by mouth every evening., Disp: 90 tablet, Rfl: 3    loperamide (IMODIUM) 2 mg capsule, Take 2 mg by mouth 4 (four) times daily as needed for Diarrhea., Disp: , Rfl:     metFORMIN (GLUCOPHAGE-XR) 500 MG ER 24hr  "tablet, Take 1 tablet (500 mg total) by mouth daily with breakfast., Disp: 90 tablet, Rfl: 3    montelukast (SINGULAIR) 10 mg tablet, Take 1 tablet (10 mg total) by mouth once daily., Disp: 90 tablet, Rfl: 3    mucus clearing device (AEROBIKA OSCILLATING PEP SYSTM MISC), 12 puffs by Misc.(Non-Drug; Combo Route) route daily as needed. (Patient not taking: Reported on 1/3/2025), Disp: , Rfl:     NYAMYC powder, , Disp: , Rfl:     nystatin (MYCOSTATIN) 100,000 unit/mL suspension, Take 5 mLs (500,000 Units total) by mouth 4 (four) times daily. for 10 days, Disp: 200 mL, Rfl: 0    omega-3 fatty acids/fish oil (FISH OIL-OMEGA-3 FATTY ACIDS) 300-1,000 mg capsule, Take by mouth once daily., Disp: , Rfl:     ONETOUCH DELICA PLUS LANCET 33 gauge Misc, USE   TO CHECK GLUCOSE 4 TIMES DAILY BEFORE  MEALS, Disp: 300 each, Rfl: 3    pen needle, diabetic (BD ULTRA-FINE MICRO PEN NEEDLE) 32 gauge x 1/4" Ndle, Use to inject insulin 4 to 5 times a day as instructed, Disp: 100 each, Rfl: 6    pen needle, diabetic (BD ULTRA-FINE MICRO PEN NEEDLE) 32 gauge x 1/4" Ndle, use to inject ozempic once a week, Disp: 100 each, Rfl: 3    "

## 2025-03-26 ENCOUNTER — PATIENT OUTREACH (OUTPATIENT)
Dept: ADMINISTRATIVE | Facility: HOSPITAL | Age: 85
End: 2025-03-26
Payer: MEDICARE

## 2025-03-26 NOTE — PROGRESS NOTES
Care every where orders, care teams, immunizations, and health maintenance updated  José Miguel sent to [facility] dora for eye exam

## 2025-03-26 NOTE — LETTER
AUTHORIZATION FOR RELEASE OF   CONFIDENTIAL INFORMATION        We are seeing Kelsea Cadet, date of birth 1940, in the clinic at [facility]. Gabby Jackson NP is the patient's PCP. Kelsea Cadet has an outstanding lab/procedure at the time we reviewed her chart. In order to help keep her health information updated, she has authorized us to request the following medical record(s):        (  )  MAMMOGRAM                                      (  )  COLONOSCOPY      (  )  PAP SMEAR                                          (  )  OUTSIDE LAB RESULTS     (  )  DEXA SCAN                                          ( x )  EYE EXAM            (  )  FOOT EXAM                                          (  )  ENTIRE RECORD     (  )  OUTSIDE IMMUNIZATIONS                 (  )  _______________         Please fax records to Gabby Jackson NP, [phone]      If you have any questions, please contact Azra at [phone].           Patient Name: Kelsea Cadet  : 1940  Patient Phone #: [phone]

## 2025-03-28 DIAGNOSIS — I50.30 HEART FAILURE WITH PRESERVED EJECTION FRACTION, NYHA CLASS II: Primary | ICD-10-CM

## 2025-03-28 DIAGNOSIS — E78.5 HYPERLIPIDEMIA, UNSPECIFIED HYPERLIPIDEMIA TYPE: ICD-10-CM

## 2025-03-28 DIAGNOSIS — I25.10 CORONARY ARTERY DISEASE, UNSPECIFIED VESSEL OR LESION TYPE, UNSPECIFIED WHETHER ANGINA PRESENT, UNSPECIFIED WHETHER NATIVE OR TRANSPLANTED HEART: ICD-10-CM

## 2025-04-03 ENCOUNTER — TELEPHONE (OUTPATIENT)
Dept: PRIMARY CARE CLINIC | Facility: CLINIC | Age: 85
End: 2025-04-03

## 2025-04-03 ENCOUNTER — OFFICE VISIT (OUTPATIENT)
Dept: PRIMARY CARE CLINIC | Facility: CLINIC | Age: 85
End: 2025-04-03
Payer: MEDICARE

## 2025-04-03 VITALS
HEIGHT: 64 IN | OXYGEN SATURATION: 95 % | BODY MASS INDEX: 33.32 KG/M2 | SYSTOLIC BLOOD PRESSURE: 150 MMHG | DIASTOLIC BLOOD PRESSURE: 63 MMHG | HEART RATE: 75 BPM | WEIGHT: 195.19 LBS

## 2025-04-03 DIAGNOSIS — H92.01 ACUTE OTALGIA, RIGHT: Primary | ICD-10-CM

## 2025-04-03 PROCEDURE — 99214 OFFICE O/P EST MOD 30 MIN: CPT | Mod: S$PBB,,, | Performed by: NURSE PRACTITIONER

## 2025-04-03 PROCEDURE — 99999 PR PBB SHADOW E&M-EST. PATIENT-LVL III: CPT | Mod: PBBFAC,,, | Performed by: NURSE PRACTITIONER

## 2025-04-03 PROCEDURE — 99999PBSHW PR PBB SHADOW TECHNICAL ONLY FILED TO HB: Mod: PBBFAC,,,

## 2025-04-03 PROCEDURE — 96372 THER/PROPH/DIAG INJ SC/IM: CPT | Mod: PBBFAC

## 2025-04-03 PROCEDURE — 99213 OFFICE O/P EST LOW 20 MIN: CPT | Mod: PBBFAC,25 | Performed by: NURSE PRACTITIONER

## 2025-04-03 RX ORDER — AZITHROMYCIN 250 MG/1
TABLET, FILM COATED ORAL
Qty: 6 TABLET | Refills: 0 | Status: SHIPPED | OUTPATIENT
Start: 2025-04-03 | End: 2025-04-08

## 2025-04-03 RX ORDER — BETAMETHASONE SODIUM PHOSPHATE AND BETAMETHASONE ACETATE 3; 3 MG/ML; MG/ML
6 INJECTION, SUSPENSION INTRA-ARTICULAR; INTRALESIONAL; INTRAMUSCULAR; SOFT TISSUE
Status: COMPLETED | OUTPATIENT
Start: 2025-04-03 | End: 2025-04-03

## 2025-04-03 RX ADMIN — BETAMETHASONE SODIUM PHOSPHATE AND BETAMETHASONE ACETATE 6 MG: 3; 3 INJECTION, SUSPENSION INTRA-ARTICULAR; INTRALESIONAL; INTRAMUSCULAR at 03:04

## 2025-04-03 NOTE — PROGRESS NOTES
[facility] Note    HPI:  Kelsea Cadet is a 85 y.o. female who presents today for No chief complaint on file.        Review of Systems   Constitutional: Negative.    HENT:  Positive for ear pain (right).    Eyes: Negative.    Respiratory: Negative.     Cardiovascular: Negative.    Gastrointestinal: Negative.    Genitourinary: Negative.    Musculoskeletal: Negative.    Skin: Negative.    Neurological:  Positive for dizziness and headaches.   Endo/Heme/Allergies: Negative.    Psychiatric/Behavioral: Negative.        A review of systems was performed and was negative except as noted above.    I personally reviewed allergies, past medical, surgical, social and family history and updated as appropriate.    Medications:  Current Medications[1]     Health Maintenance:  Immunization History   Administered Date(s) Administered    COVID-19, MRNA, LN-S, PF (MODERNA FULL 0.5 ML DOSE) 04/08/2021, 09/29/2021    Influenza (FLUAD) - Quadrivalent - Adjuvanted - PF *Preferred* (65+) 10/23/2020    Influenza - Quadrivalent - High Dose - PF (65 years and older) 11/09/2022    Influenza - Quadrivalent - PF *Preferred* (6 months and older) 01/14/2000, 12/11/2001, 11/19/2002, 11/03/2003, 12/08/2005, 11/07/2008    Influenza - Trivalent - Afluria, Fluzone MDV 01/14/2000, 12/11/2001, 11/19/2002, 11/03/2003, 12/08/2005, 11/07/2008    Influenza - Trivalent - Fluzone High Dose - PF (65 years and older) 12/03/2019    Pneumococcal Conjugate - 13 Valent 05/13/2019, 10/21/2020    Pneumococcal Conjugate - 20 Valent 02/07/2025    Pneumococcal Polysaccharide - 23 Valent 12/11/2001    Td (ADULT) 07/23/2001    Tdap 07/23/2001, 05/13/2019    Zoster Recombinant 02/07/2025      Health Maintenance   Topic Date Due    RSV Vaccine (Age 60+ and Pregnant patients) (1 - 1-dose 75+ series) Never done    COVID-19 Vaccine (3 - Moderna risk series) 02/07/2026 (Originally 10/27/2021)    Shingles Vaccine (2 of 2) 04/04/2025    Diabetic Eye Exam   "09/03/2025    Hemoglobin A1c  09/12/2025    Lipid Panel  12/11/2025    TETANUS VACCINE  05/13/2029    DEXA Scan  05/14/2034    Pneumococcal Vaccines (Age 50+)  Completed    Influenza Vaccine  Discontinued     Health Maintenance Topics with due status: Not Due       Topic Last Completion Date    TETANUS VACCINE 05/13/2019    DEXA Scan 05/14/2024    Diabetic Eye Exam 09/03/2024    Lipid Panel 12/11/2024    Shingles Vaccine 02/07/2025    Hemoglobin A1c 03/12/2025     Health Maintenance Due   Topic Date Due    RSV Vaccine (Age 60+ and Pregnant patients) (1 - 1-dose 75+ series) Never done       PHYSICAL EXAM:  Vitals:    04/03/25 1453 04/03/25 1454   BP: (!) 150/63 (!) (P) 150/66   Pulse: 75    SpO2: 95%    Weight: 88.5 kg (195 lb 3.2 oz)    Height: 5' 4" (1.626 m)      Body mass index is 33.51 kg/m².  Physical Exam     ASSESSMENT/PLAN:  1. Acute otalgia, right  -     betamethasone acetate-betamethasone sodium phosphate injection 6 mg  -     azithromycin (Z-JAIRO) 250 MG tablet; Take 2 tablets by mouth on day 1; Take 1 tablet by mouth on days 2-5  Dispense: 6 tablet; Refill: 0        Other than changes above, continue current medications and maintain follow up with specialists.      No follow-ups on file.   Recent Results (from the past 12 weeks)   CBC Auto Differential    Collection Time: 03/11/25 10:14 PM   Result Value Ref Range    WBC 9.55 3.90 - 12.70 K/uL    RBC 3.76 (L) 4.00 - 5.40 M/uL    Hemoglobin 10.9 (L) 12.0 - 16.0 g/dL    Hematocrit 33.1 (L) 37.0 - 48.5 %    MCV 88 82 - 98 fL    MCH 29.0 27.0 - 31.0 pg    MCHC 32.9 32.0 - 36.0 g/dL    RDW 13.4 11.5 - 14.5 %    Platelets 253 150 - 450 K/uL    MPV 10.3 9.2 - 12.9 fL    Immature Granulocytes 0.3 0.0 - 0.5 %    Gran # (ANC) 7.2 1.8 - 7.7 K/uL    Immature Grans (Abs) 0.03 0.00 - 0.04 K/uL    Lymph # 1.2 1.0 - 4.8 K/uL    Mono # 1.0 0.3 - 1.0 K/uL    Eos # 0.0 0.0 - 0.5 K/uL    Baso # 0.03 0.00 - 0.20 K/uL    nRBC 0 0 /100 WBC    Gran % 75.8 (H) 38.0 - 73.0 %    " Lymph % 12.5 (L) 18.0 - 48.0 %    Mono % 10.8 4.0 - 15.0 %    Eosinophil % 0.3 0.0 - 8.0 %    Basophil % 0.3 0.0 - 1.9 %    Differential Method Automated    Comprehensive Metabolic Panel    Collection Time: 03/11/25 10:14 PM   Result Value Ref Range    Sodium 132 (L) 136 - 145 mmol/L    Potassium 3.5 3.5 - 5.1 mmol/L    Chloride 97 95 - 110 mmol/L    CO2 23 23 - 29 mmol/L    Glucose 166 (H) 70 - 110 mg/dL    BUN 15 8 - 23 mg/dL    Creatinine 1.0 0.5 - 1.4 mg/dL    Calcium 8.4 (L) 8.7 - 10.5 mg/dL    Total Protein 6.8 6.0 - 8.4 g/dL    Albumin 2.8 (L) 3.5 - 5.2 g/dL    Total Bilirubin 0.2 0.1 - 1.0 mg/dL    Alkaline Phosphatase 93 55 - 135 U/L    AST 25 10 - 40 U/L    ALT 14 10 - 44 U/L    eGFR 56.2 (A) >60 mL/min/1.73 m^2    Anion Gap 12 8 - 16 mmol/L   NT-Pro Natriuretic Peptide    Collection Time: 03/11/25 10:14 PM   Result Value Ref Range    NT-proBNP 1426 5 - 1800 pg/mL   EKG 12-lead    Collection Time: 03/11/25 10:16 PM   Result Value Ref Range    QRS Duration 160 ms    [facility] QTC Calculation 491 ms   POCT COVID-19 Rapid Screening    Collection Time: 03/11/25 10:27 PM   Result Value Ref Range    POC Rapid COVID Negative Negative     Acceptable Yes    POCT Influenza A/B Molecular    Collection Time: 03/11/25 10:27 PM   Result Value Ref Range    POC Molecular Influenza A Ag Positive (A) Negative    POC Molecular Influenza B Ag Negative Negative     Acceptable Yes    Blood Culture #1 **CANNOT BE ORDERED STAT**    Collection Time: 03/12/25  1:48 AM    Specimen: Peripheral, Forearm, Left; Blood   Result Value Ref Range    Blood Culture, Routine No growth after 5 days.    Blood Culture #2 **CANNOT BE ORDERED STAT**    Collection Time: 03/12/25  1:55 AM    Specimen: Peripheral, Wrist, Right; Blood   Result Value Ref Range    Blood Culture, Routine No growth after 5 days.    POCT glucose    Collection Time: 03/12/25  6:15 AM   Result Value Ref Range    POCT Glucose 261 (H) 70 - 110 mg/dL    Hemoglobin A1c if not done in past 3 months    Collection Time: 03/12/25  6:32 AM   Result Value Ref Range    Hemoglobin A1C 7.0 (H) 4.0 - 5.6 %    Estimated Avg Glucose 154 (H) 68 - 131 mg/dL   CBC auto differential    Collection Time: 03/12/25  6:32 AM   Result Value Ref Range    WBC 7.65 3.90 - 12.70 K/uL    RBC 3.52 (L) 4.00 - 5.40 M/uL    Hemoglobin 10.3 (L) 12.0 - 16.0 g/dL    Hematocrit 31.4 (L) 37.0 - 48.5 %    MCV 89 82 - 98 fL    MCH 29.3 27.0 - 31.0 pg    MCHC 32.8 32.0 - 36.0 g/dL    RDW 13.3 11.5 - 14.5 %    Platelets 250 150 - 450 K/uL    MPV 9.9 9.2 - 12.9 fL    Immature Granulocytes 0.4 0.0 - 0.5 %    Gran # (ANC) 6.3 1.8 - 7.7 K/uL    Immature Grans (Abs) 0.03 0.00 - 0.04 K/uL    Lymph # 1.0 1.0 - 4.8 K/uL    Mono # 0.3 0.3 - 1.0 K/uL    Eos # 0.0 0.0 - 0.5 K/uL    Baso # 0.02 0.00 - 0.20 K/uL    nRBC 0 0 /100 WBC    Gran % 82.6 (H) 38.0 - 73.0 %    Lymph % 12.4 (L) 18.0 - 48.0 %    Mono % 4.3 4.0 - 15.0 %    Eosinophil % 0.0 0.0 - 8.0 %    Basophil % 0.3 0.0 - 1.9 %    Differential Method Automated    Basic metabolic panel    Collection Time: 03/12/25  6:32 AM   Result Value Ref Range    Sodium 133 (L) 136 - 145 mmol/L    Potassium 3.4 (L) 3.5 - 5.1 mmol/L    Chloride 98 95 - 110 mmol/L    CO2 25 23 - 29 mmol/L    Glucose 207 (H) 70 - 110 mg/dL    BUN 15 8 - 23 mg/dL    Creatinine 0.9 0.5 - 1.4 mg/dL    Calcium 8.1 (L) 8.7 - 10.5 mg/dL    Anion Gap 10 8 - 16 mmol/L    eGFR >60.0 >60 mL/min/1.73 m^2   Urinalysis, Reflex to Urine Culture Urine, Clean Catch    Collection Time: 03/12/25 11:32 AM    Specimen: Urine   Result Value Ref Range    Specimen UA Urine, Clean Catch     Color, UA Yellow Yellow, Straw, Neli    Appearance, UA Clear Clear    pH, UA 7.0 5.0 - 8.0    Specific Gravity, UA 1.015 1.005 - 1.030    Protein, UA 1+ (A) Negative    Glucose, UA 4+ (A) Negative    Ketones, UA Negative Negative    Bilirubin (UA) Negative Negative    Occult Blood UA Trace (A) Negative    Nitrite, UA Negative  Negative    Urobilinogen, UA Negative <2.0 EU/dL    Leukocytes, UA 1+ (A) Negative   Urinalysis Microscopic    Collection Time: 03/12/25 11:32 AM   Result Value Ref Range    RBC, UA 4 0 - 4 /hpf    WBC, UA 14 (H) 0 - 5 /hpf    Bacteria Negative None-Occ /hpf    Yeast, UA None None    Squam Epithel, UA 5 /hpf    Hyaline Casts, UA 0 0-1/lpf /lpf    Microscopic Comment SEE COMMENT    Urine culture    Collection Time: 03/12/25 11:32 AM    Specimen: Urine   Result Value Ref Range    Urine Culture, Routine No growth    POCT Glucose, Hand-Held Device    Collection Time: 03/12/25  8:32 PM   Result Value Ref Range    POC Glucose 370 (A) 70 - 110 MG/DL   POCT Glucose, Hand-Held Device    Collection Time: 03/12/25 11:57 PM   Result Value Ref Range    POC Glucose 220 (A) 70 - 110 MG/DL   CBC Auto Differential    Collection Time: 03/13/25  5:48 AM   Result Value Ref Range    WBC 9.33 3.90 - 12.70 K/uL    RBC 3.67 (L) 4.00 - 5.40 M/uL    Hemoglobin 10.6 (L) 12.0 - 16.0 g/dL    Hematocrit 32.1 (L) 37.0 - 48.5 %    MCV 88 82 - 98 fL    MCH 28.9 27.0 - 31.0 pg    MCHC 33.0 32.0 - 36.0 g/dL    RDW 13.3 11.5 - 14.5 %    Platelets 257 150 - 450 K/uL    MPV 10.7 9.2 - 12.9 fL    Immature Granulocytes 0.4 0.0 - 0.5 %    Gran # (ANC) 7.2 1.8 - 7.7 K/uL    Immature Grans (Abs) 0.04 0.00 - 0.04 K/uL    Lymph # 1.3 1.0 - 4.8 K/uL    Mono # 0.8 0.3 - 1.0 K/uL    Eos # 0.0 0.0 - 0.5 K/uL    Baso # 0.03 0.00 - 0.20 K/uL    nRBC 0 0 /100 WBC    Gran % 77.4 (H) 38.0 - 73.0 %    Lymph % 13.7 (L) 18.0 - 48.0 %    Mono % 8.0 4.0 - 15.0 %    Eosinophil % 0.2 0.0 - 8.0 %    Basophil % 0.3 0.0 - 1.9 %    Differential Method Automated    Comprehensive Metabolic Panel    Collection Time: 03/13/25  5:49 AM   Result Value Ref Range    Sodium 134 (L) 136 - 145 mmol/L    Potassium 3.8 3.5 - 5.1 mmol/L    Chloride 101 95 - 110 mmol/L    CO2 23 23 - 29 mmol/L    Glucose 169 (H) 70 - 110 mg/dL    BUN 18 8 - 23 mg/dL    Creatinine 0.9 0.5 - 1.4 mg/dL    Calcium  8.3 (L) 8.7 - 10.5 mg/dL    Total Protein 6.2 6.0 - 8.4 g/dL    Albumin 2.5 (L) 3.5 - 5.2 g/dL    Total Bilirubin 0.2 0.1 - 1.0 mg/dL    Alkaline Phosphatase 80 55 - 135 U/L    AST 22 10 - 40 U/L    ALT 11 10 - 44 U/L    eGFR >60.0 >60 mL/min/1.73 m^2    Anion Gap 10 8 - 16 mmol/L   Magnesium    Collection Time: 03/13/25  5:49 AM   Result Value Ref Range    Magnesium 1.6 1.6 - 2.6 mg/dL   POCT glucose    Collection Time: 03/13/25 12:17 PM   Result Value Ref Range    POCT Glucose 165 (H) 70 - 110 mg/dL   POCT glucose    Collection Time: 03/13/25  3:57 PM   Result Value Ref Range    POCT Glucose 212 (H) 70 - 110 mg/dL   POCT glucose    Collection Time: 03/13/25  8:15 PM   Result Value Ref Range    POCT Glucose 164 (H) 70 - 110 mg/dL   VANCOMYCIN, TROUGH    Collection Time: 03/14/25  7:06 AM   Result Value Ref Range    Vancomycin-Trough 32.5 (HH) 10.0 - 22.0 ug/mL   CBC Auto Differential    Collection Time: 03/14/25  7:06 AM   Result Value Ref Range    WBC 8.41 3.90 - 12.70 K/uL    RBC 3.68 (L) 4.00 - 5.40 M/uL    Hemoglobin 10.8 (L) 12.0 - 16.0 g/dL    Hematocrit 32.6 (L) 37.0 - 48.5 %    MCV 89 82 - 98 fL    MCH 29.3 27.0 - 31.0 pg    MCHC 33.1 32.0 - 36.0 g/dL    RDW 13.2 11.5 - 14.5 %    Platelets 310 150 - 450 K/uL    MPV 9.5 9.2 - 12.9 fL    Immature Granulocytes 1.0 (H) 0.0 - 0.5 %    Gran # (ANC) 6.1 1.8 - 7.7 K/uL    Immature Grans (Abs) 0.08 (H) 0.00 - 0.04 K/uL    Lymph # 1.5 1.0 - 4.8 K/uL    Mono # 0.7 0.3 - 1.0 K/uL    Eos # 0.0 0.0 - 0.5 K/uL    Baso # 0.03 0.00 - 0.20 K/uL    nRBC 0 0 /100 WBC    Gran % 72.1 38.0 - 73.0 %    Lymph % 17.8 (L) 18.0 - 48.0 %    Mono % 8.3 4.0 - 15.0 %    Eosinophil % 0.4 0.0 - 8.0 %    Basophil % 0.4 0.0 - 1.9 %    Differential Method Automated    Comprehensive Metabolic Panel    Collection Time: 03/14/25  7:06 AM   Result Value Ref Range    Sodium 135 (L) 136 - 145 mmol/L    Potassium 3.7 3.5 - 5.1 mmol/L    Chloride 101 95 - 110 mmol/L    CO2 24 23 - 29 mmol/L    Glucose  160 (H) 70 - 110 mg/dL    BUN 14 8 - 23 mg/dL    Creatinine 0.9 0.5 - 1.4 mg/dL    Calcium 8.5 (L) 8.7 - 10.5 mg/dL    Total Protein 6.4 6.0 - 8.4 g/dL    Albumin 2.5 (L) 3.5 - 5.2 g/dL    Total Bilirubin 0.3 0.1 - 1.0 mg/dL    Alkaline Phosphatase 81 55 - 135 U/L    AST 23 10 - 40 U/L    ALT 13 10 - 44 U/L    eGFR >60.0 >60 mL/min/1.73 m^2    Anion Gap 10 8 - 16 mmol/L   Magnesium    Collection Time: 03/14/25  7:06 AM   Result Value Ref Range    Magnesium 1.6 1.6 - 2.6 mg/dL   POCT glucose    Collection Time: 03/14/25  1:01 PM   Result Value Ref Range    POCT Glucose 239 (H) 70 - 110 mg/dL   CBC Auto Differential    Collection Time: 03/15/25  6:11 AM   Result Value Ref Range    WBC 4.89 3.90 - 12.70 K/uL    RBC 3.82 (L) 4.00 - 5.40 M/uL    Hemoglobin 11.0 (L) 12.0 - 16.0 g/dL    Hematocrit 33.5 (L) 37.0 - 48.5 %    MCV 88 82 - 98 fL    MCH 28.8 27.0 - 31.0 pg    MCHC 32.8 32.0 - 36.0 g/dL    RDW 13.2 11.5 - 14.5 %    Platelets 348 150 - 450 K/uL    MPV 9.7 9.2 - 12.9 fL    Immature Granulocytes 2.9 (H) 0.0 - 0.5 %    Gran # (ANC) 3.5 1.8 - 7.7 K/uL    Immature Grans (Abs) 0.14 (H) 0.00 - 0.04 K/uL    Lymph # 0.9 (L) 1.0 - 4.8 K/uL    Mono # 0.3 0.3 - 1.0 K/uL    Eos # 0.0 0.0 - 0.5 K/uL    Baso # 0.02 0.00 - 0.20 K/uL    nRBC 0 0 /100 WBC    Gran % 71.8 38.0 - 73.0 %    Lymph % 18.2 18.0 - 48.0 %    Mono % 6.7 4.0 - 15.0 %    Eosinophil % 0.0 0.0 - 8.0 %    Basophil % 0.4 0.0 - 1.9 %    Differential Method Automated    Comprehensive Metabolic Panel    Collection Time: 03/15/25  6:11 AM   Result Value Ref Range    Sodium 134 (L) 136 - 145 mmol/L    Potassium 3.7 3.5 - 5.1 mmol/L    Chloride 98 95 - 110 mmol/L    CO2 25 23 - 29 mmol/L    Glucose 275 (H) 70 - 110 mg/dL    BUN 12 8 - 23 mg/dL    Creatinine 0.8 0.5 - 1.4 mg/dL    Calcium 8.5 (L) 8.7 - 10.5 mg/dL    Total Protein 6.6 6.0 - 8.4 g/dL    Albumin 2.6 (L) 3.5 - 5.2 g/dL    Total Bilirubin 0.3 0.1 - 1.0 mg/dL    Alkaline Phosphatase 81 55 - 135 U/L    AST 20  10 - 40 U/L    ALT 13 10 - 44 U/L    eGFR >60.0 >60 mL/min/1.73 m^2    Anion Gap 11 8 - 16 mmol/L   Magnesium    Collection Time: 03/15/25  6:11 AM   Result Value Ref Range    Magnesium 1.8 1.6 - 2.6 mg/dL   VANCOMYCIN, TROUGH    Collection Time: 03/15/25  6:11 AM   Result Value Ref Range    Vancomycin-Trough 19.5 10.0 - 22.0 ug/mL   POCT glucose    Collection Time: 03/15/25 11:39 AM   Result Value Ref Range    POCT Glucose 296 (H) 70 - 110 mg/dL   POCT glucose    Collection Time: 03/15/25  9:30 PM   Result Value Ref Range    POCT Glucose 288 (H) 70 - 110 mg/dL   CBC Auto Differential    Collection Time: 03/16/25  6:05 AM   Result Value Ref Range    WBC 8.44 3.90 - 12.70 K/uL    RBC 4.05 4.00 - 5.40 M/uL    Hemoglobin 11.7 (L) 12.0 - 16.0 g/dL    Hematocrit 35.1 (L) 37.0 - 48.5 %    MCV 87 82 - 98 fL    MCH 28.9 27.0 - 31.0 pg    MCHC 33.3 32.0 - 36.0 g/dL    RDW 13.2 11.5 - 14.5 %    Platelets 358 150 - 450 K/uL    MPV 10.4 9.2 - 12.9 fL    Immature Granulocytes 3.3 (H) 0.0 - 0.5 %    Gran # (ANC) 5.3 1.8 - 7.7 K/uL    Immature Grans (Abs) 0.28 (H) 0.00 - 0.04 K/uL    Lymph # 1.8 1.0 - 4.8 K/uL    Mono # 0.7 0.3 - 1.0 K/uL    Eos # 0.2 0.0 - 0.5 K/uL    Baso # 0.06 0.00 - 0.20 K/uL    nRBC 0 0 /100 WBC    Gran % 63.3 38.0 - 73.0 %    Lymph % 21.8 18.0 - 48.0 %    Mono % 8.6 4.0 - 15.0 %    Eosinophil % 2.3 0.0 - 8.0 %    Basophil % 0.7 0.0 - 1.9 %    Differential Method Automated    Comprehensive Metabolic Panel    Collection Time: 03/16/25  6:05 AM   Result Value Ref Range    Sodium 137 136 - 145 mmol/L    Potassium 3.4 (L) 3.5 - 5.1 mmol/L    Chloride 100 95 - 110 mmol/L    CO2 26 23 - 29 mmol/L    Glucose 178 (H) 70 - 110 mg/dL    BUN 13 8 - 23 mg/dL    Creatinine 0.9 0.5 - 1.4 mg/dL    Calcium 8.4 (L) 8.7 - 10.5 mg/dL    Total Protein 6.6 6.0 - 8.4 g/dL    Albumin 2.6 (L) 3.5 - 5.2 g/dL    Total Bilirubin 0.3 0.1 - 1.0 mg/dL    Alkaline Phosphatase 84 55 - 135 U/L    AST 24 10 - 40 U/L    ALT 14 10 - 44 U/L     eGFR >60.0 >60 mL/min/1.73 m^2    Anion Gap 11 8 - 16 mmol/L   Magnesium    Collection Time: 03/16/25  6:05 AM   Result Value Ref Range    Magnesium 1.7 1.6 - 2.6 mg/dL   POCT glucose    Collection Time: 03/16/25 10:02 PM   Result Value Ref Range    POCT Glucose 396 (H) 70 - 110 mg/dL   CBC Auto Differential    Collection Time: 03/17/25  5:26 AM   Result Value Ref Range    WBC 6.81 3.90 - 12.70 K/uL    RBC 3.86 (L) 4.00 - 5.40 M/uL    Hemoglobin 11.1 (L) 12.0 - 16.0 g/dL    Hematocrit 34.0 (L) 37.0 - 48.5 %    MCV 88 82 - 98 fL    MCH 28.8 27.0 - 31.0 pg    MCHC 32.6 32.0 - 36.0 g/dL    RDW 12.9 11.5 - 14.5 %    Platelets 387 150 - 450 K/uL    MPV 9.9 9.2 - 12.9 fL    Immature Granulocytes 1.9 (H) 0.0 - 0.5 %    Gran # (ANC) 5.3 1.8 - 7.7 K/uL    Immature Grans (Abs) 0.13 (H) 0.00 - 0.04 K/uL    Lymph # 1.0 1.0 - 4.8 K/uL    Mono # 0.3 0.3 - 1.0 K/uL    Eos # 0.0 0.0 - 0.5 K/uL    Baso # 0.04 0.00 - 0.20 K/uL    nRBC 0 0 /100 WBC    Gran % 78.1 (H) 38.0 - 73.0 %    Lymph % 14.7 (L) 18.0 - 48.0 %    Mono % 4.6 4.0 - 15.0 %    Eosinophil % 0.1 0.0 - 8.0 %    Basophil % 0.6 0.0 - 1.9 %    Differential Method Automated    Comprehensive Metabolic Panel    Collection Time: 03/17/25  5:26 AM   Result Value Ref Range    Sodium 132 (L) 136 - 145 mmol/L    Potassium 3.6 3.5 - 5.1 mmol/L    Chloride 99 95 - 110 mmol/L    CO2 24 23 - 29 mmol/L    Glucose 315 (H) 70 - 110 mg/dL    BUN 15 8 - 23 mg/dL    Creatinine 0.8 0.5 - 1.4 mg/dL    Calcium 8.4 (L) 8.7 - 10.5 mg/dL    Total Protein 6.4 6.0 - 8.4 g/dL    Albumin 2.6 (L) 3.5 - 5.2 g/dL    Total Bilirubin 0.3 0.1 - 1.0 mg/dL    Alkaline Phosphatase 96 55 - 135 U/L    AST 11 10 - 40 U/L    ALT 13 10 - 44 U/L    eGFR >60.0 >60 mL/min/1.73 m^2    Anion Gap 9 8 - 16 mmol/L   Magnesium    Collection Time: 03/17/25  5:26 AM   Result Value Ref Range    Magnesium 1.7 1.6 - 2.6 mg/dL   VANCOMYCIN, TROUGH    Collection Time: 03/17/25  5:26 AM   Result Value Ref Range    Vancomycin-Trough  19.7 10.0 - 22.0 ug/mL         Gabby Jackson, YNES  [facility]                       [1]   Current Outpatient Medications:     acarbose (PRECOSE) 100 MG Tab, Take 1 tablet (100 mg total) by mouth 3 (three) times daily with meals., Disp: 270 tablet, Rfl: 3    acetaminophen (TYLENOL) 650 MG TbSR, Take 650 mg by mouth 2 (two) times a day., Disp: , Rfl:     albuterol (PROVENTIL/VENTOLIN HFA) 90 mcg/actuation inhaler, Inhale 2 puffs into the lungs every 6 (six) hours as needed for Wheezing. Rescue, Disp: 18 g, Rfl: 11    alendronate (FOSAMAX) 70 MG tablet, Take 1 tablet (70 mg total) by mouth every 7 days., Disp: 12 tablet, Rfl: 1    amoxicillin-clavulanate 875-125mg (AUGMENTIN) 875-125 mg per tablet, Take 1 tablet by mouth 2 (two) times daily., Disp: 10 tablet, Rfl: 0    aspirin (ECOTRIN) 81 MG EC tablet, Take 81 mg by mouth once daily. Stopped 01/19/2022, Disp: , Rfl:     atorvastatin (LIPITOR) 40 MG tablet, Take 1 tablet (40 mg total) by mouth once daily. (Patient taking differently: Take 20 mg by mouth once daily.), Disp: 90 tablet, Rfl: 3    azithromycin (Z-JAIRO) 250 MG tablet, Take 2 tablets by mouth on day 1; Take 1 tablet by mouth on days 2-5, Disp: 6 tablet, Rfl: 0    blood sugar diagnostic (ONETOUCH ULTRA TEST) Strp, Inject 1 each into the skin 3 (three) times daily before meals., Disp: 300 each, Rfl: 3    blood-glucose meter Misc, 1 Device by Misc.(Non-Drug; Combo Route) route once daily. One Touch Dx. Code:E11.9, Disp: 1 each, Rfl: 0    calcium citrate (CALCITRATE) 200 mg (950 mg) tablet, Take 1 tablet by mouth once daily. (Patient taking differently: Take 1 tablet by mouth every Mon, Wed, Fri.), Disp: , Rfl:     carvediloL (COREG) 6.25 MG tablet, Take 1 tablet (6.25 mg total) by mouth 2 (two) times daily with meals., Disp: 180 tablet, Rfl: 3    cholecalciferol, vitamin D3, (VITAMIN D3) 50 mcg (2,000 unit) Tab, Take 1 tablet (2,000 Units total) by mouth twice a week. 10,000 twice a week (Patient  taking differently: Take 1 tablet by mouth twice a week. 10,000 3 times weekly), Disp: , Rfl:     clobetasoL (TEMOVATE) 0.05 % cream, Apply twice a day for 2 weeks then nightly for 2 months. May decrease to three nights a week after that., Disp: 60 g, Rfl: 2    co-enzyme Q-10 30 mg capsule, Take 1 capsule (30 mg total) by mouth every evening., Disp: , Rfl:     cranberry fruit extract (CRANBERRY CONCENTRATE ORAL), Take 2 capsules by mouth once daily. (Patient taking differently: Take 1 capsule by mouth once daily.), Disp: , Rfl:     dicyclomine (BENTYL) 20 mg tablet, Take 1 tablet (20 mg total) by mouth 4 (four) times daily before meals and nightly., Disp: 120 tablet, Rfl: 11    ferrous sulfate 325 (65 FE) MG EC tablet, Take 325 mg by mouth 3 (three) times daily with meals., Disp: , Rfl:     flash glucose scanning reader (FREESTYLE JOSEPHINE 2 READER) Misc, Use as instructed, Disp: 1 each, Rfl: 1    flash glucose sensor (FREESTYLE JOSEPHINE 2 SENSOR) Kit, Use as instructed, Disp: 6 kit, Rfl: 3    FLUoxetine 10 MG capsule, Take 1 capsule (10 mg total) by mouth once daily., Disp: 90 capsule, Rfl: 3    furosemide (LASIX) 20 MG tablet, Take 1 tablet by mouth once daily, Disp: 90 tablet, Rfl: 0    gabapentin (NEURONTIN) 600 MG tablet, Take 1 tablet (600 mg total) by mouth 3 (three) times daily., Disp: 90 tablet, Rfl: 11    glucagon (BAQSIMI) 3 mg/actuation Spry, 2 pack.  Spray one device (3 mg) in one nostril to treat severe hypoglycemia. Disp 1 box (2 devices), Disp: 2 each, Rfl: 3    guaiFENesin (MUCINEX) 600 mg 12 hr tablet, Take 2 tablets (1,200 mg total) by mouth 2 (two) times daily., Disp: 40 tablet, Rfl: 0    insulin aspart U-100 (NOVOLOG FLEXPEN U-100 INSULIN) 100 unit/mL (3 mL) InPn pen, Take sliding scale insulin 4 times a day as instructed- max dose of 40 units/day., Disp: 15 mL, Rfl: 3    insulin glargine U-100, Lantus, (LANTUS SOLOSTAR U-100 INSULIN) 100 unit/mL (3 mL) InPn pen, Inject 16 Units into the skin once  "daily., Disp: 15 mL, Rfl: 3    letrozole (FEMARA) 2.5 mg Tab, Take 1 tablet (2.5 mg total) by mouth once daily., Disp: 30 tablet, Rfl: 11    LIDOcaine (LIDODERM) 5 %, SMARTSIG:Topical, Disp: , Rfl:     lisinopriL (PRINIVIL,ZESTRIL) 40 MG tablet, Take 1 tablet (40 mg total) by mouth every evening., Disp: 90 tablet, Rfl: 3    loperamide (IMODIUM) 2 mg capsule, Take 2 mg by mouth 4 (four) times daily as needed for Diarrhea., Disp: , Rfl:     metFORMIN (GLUCOPHAGE-XR) 500 MG ER 24hr tablet, Take 1 tablet (500 mg total) by mouth daily with breakfast., Disp: 90 tablet, Rfl: 3    montelukast (SINGULAIR) 10 mg tablet, Take 1 tablet (10 mg total) by mouth once daily., Disp: 90 tablet, Rfl: 3    mucus clearing device (AEROBIKA OSCILLATING PEP SYSTM MISC), 12 puffs by Misc.(Non-Drug; Combo Route) route daily as needed. (Patient not taking: Reported on 1/3/2025), Disp: , Rfl:     multivitamin capsule, Take 1 capsule by mouth once daily. (Patient taking differently: Take 1 capsule by mouth once daily. Taking 3 times weekly), Disp: , Rfl:     NYAMYC powder, , Disp: , Rfl:     omega-3 fatty acids/fish oil (FISH OIL-OMEGA-3 FATTY ACIDS) 300-1,000 mg capsule, Take by mouth once daily., Disp: , Rfl:     ONETOUCH DELICA PLUS LANCET 33 gauge Misc, USE   TO CHECK GLUCOSE 4 TIMES DAILY BEFORE  MEALS, Disp: 300 each, Rfl: 3    pen needle, diabetic (BD ULTRA-FINE MICRO PEN NEEDLE) 32 gauge x 1/4" Ndle, Use to inject insulin 4 to 5 times a day as instructed, Disp: 100 each, Rfl: 6    pen needle, diabetic (BD ULTRA-FINE MICRO PEN NEEDLE) 32 gauge x 1/4" Ndle, use to inject ozempic once a week, Disp: 100 each, Rfl: 3    potassium chloride (KLOR-CON) 10 MEQ TbSR, Take 99 mEq by mouth once. (Patient taking differently: Take 99 mEq by mouth once. Taking 3 x weekly), Disp: , Rfl:     trimethoprim (TRIMPEX) 100 mg Tab, Take 100 mg by mouth every 12 (twelve) hours., Disp: , Rfl:     Current Facility-Administered Medications:     betamethasone " acetate-betamethasone sodium phosphate injection 6 mg, 6 mg, Intramuscular, 1 time in Clinic/HOD,

## 2025-04-07 ENCOUNTER — CLINICAL SUPPORT (OUTPATIENT)
Dept: PRIMARY CARE CLINIC | Facility: CLINIC | Age: 85
End: 2025-04-07
Payer: MEDICARE

## 2025-04-07 DIAGNOSIS — Z23 NEED FOR ZOSTER VACCINATION: Primary | ICD-10-CM

## 2025-04-07 PROCEDURE — 99999PBSHW PR PBB SHADOW TECHNICAL ONLY FILED TO HB: Mod: PBBFAC,,,

## 2025-04-07 PROCEDURE — 90750 HZV VACC RECOMBINANT IM: CPT | Mod: PBBFAC

## 2025-04-07 RX ADMIN — Medication 0.5 ML: at 09:04

## 2025-04-10 ENCOUNTER — TELEPHONE (OUTPATIENT)
Dept: PRIMARY CARE CLINIC | Facility: CLINIC | Age: 85
End: 2025-04-10
Payer: MEDICARE

## 2025-04-10 DIAGNOSIS — B37.31 CANDIDIASIS OF VULVA AND VAGINA: Primary | ICD-10-CM

## 2025-04-10 RX ORDER — FLUCONAZOLE 150 MG/1
150 TABLET ORAL DAILY
Qty: 2 TABLET | Refills: 0 | Status: SHIPPED | OUTPATIENT
Start: 2025-04-10

## 2025-04-10 NOTE — TELEPHONE ENCOUNTER
Pt's daughter stating her mom has a yeast infection from all the antibiotics she has been taking . Can you send her an rx to [facility]

## 2025-05-05 ENCOUNTER — OFFICE VISIT (OUTPATIENT)
Dept: UROLOGY | Facility: CLINIC | Age: 85
End: 2025-05-05
Payer: MEDICARE

## 2025-05-05 ENCOUNTER — TELEPHONE (OUTPATIENT)
Dept: UROLOGY | Facility: CLINIC | Age: 85
End: 2025-05-05
Payer: MEDICARE

## 2025-05-05 VITALS — BODY MASS INDEX: 32.78 KG/M2 | WEIGHT: 192 LBS | HEIGHT: 64 IN

## 2025-05-05 DIAGNOSIS — Z97.8 CHRONIC INDWELLING FOLEY CATHETER: Primary | ICD-10-CM

## 2025-05-05 PROCEDURE — 99999 PR PBB SHADOW E&M-EST. PATIENT-LVL IV: CPT | Mod: PBBFAC,,, | Performed by: SPECIALIST

## 2025-05-05 PROCEDURE — 99214 OFFICE O/P EST MOD 30 MIN: CPT | Mod: S$PBB,,, | Performed by: SPECIALIST

## 2025-05-05 PROCEDURE — 99214 OFFICE O/P EST MOD 30 MIN: CPT | Mod: PBBFAC | Performed by: SPECIALIST

## 2025-05-05 RX ORDER — FUROSEMIDE 20 MG/1
20 TABLET ORAL DAILY
Qty: 90 TABLET | Refills: 0 | Status: SHIPPED | OUTPATIENT
Start: 2025-05-05

## 2025-05-05 NOTE — TELEPHONE ENCOUNTER
Josie  Patient asking for a renewal of Rx for Bactrim.  I'm cancelling it, lets see her for follow up appointment ([facility])  Thx, MS

## 2025-05-09 NOTE — PROGRESS NOTES
"[facility] Urology   Clinic Note    SUBJECTIVE:     Chief Complaint   Patient presents with    Follow-up     3 month follow up       Referral from: No ref. provider found.    History of Present Illness:  Kelsea Cadet is a 85 y.o. female who presents to clinic for recurrent UTIs..    Patient has been doing reasonably well.  She has a chronic indwelling Land catheter for retention.  She is asking for more antibiotics.  We discussed the risks versus benefits of antibiotic prophylaxis.  I explained that anybody width a chronic indwelling Land catheter or suprapubic tube will be chronically colonized.  Likewise, although off-label, I favor a trial of tap water irrigations.  We reviewed some of the data regarding this on the Internet.  Apparently the patient's daughter is a nurse and may be able to assist in addition to home health.  Past Medical History:   Diagnosis Date    Arthritis     Coronary artery disease     Environmental and seasonal allergies     Land catheter in place     Hard of hearing     Hyperlipidemia     Presence of indwelling Land catheter     Vitamin D deficiency        Medications Ordered Prior to Encounter[1]      OBJECTIVE:     Estimated body mass index is 32.96 kg/m² as calculated from the following:    Height as of this encounter: 5' 4" (1.626 m).    Weight as of this encounter: 87.1 kg (192 lb 0.3 oz).    Vital Signs (Most Recent)  There were no vitals filed for this visit.    Physical Exam:    Physical Exam     GENERAL: patient sitting comfortably    PSYCH: appropriate mood and affect    Genitourinary Exam:  Land catheter straight drainage, urine output clear.      LABS:     Lab Results   Component Value Date    BUN 18 (H) 04/28/2025    CREATININE 0.79 04/28/2025    WBC 6.60 04/28/2025    HGB 11.9 (L) 04/28/2025    HCT 35.9 (L) 04/28/2025     04/28/2025    AST 27 04/28/2025    ALT 17 04/28/2025    ALKPHOS 126 04/28/2025    ALBUMIN 3.8 04/28/2025    HGBA1C 7.0 (H) 03/12/2025    INR " "1.2 10/15/2019        Urinalysis:   No results found for: "UAREFLEX"       Imaging:  I have personally reviewed all relevant imaging studies.    Results for orders placed or performed during the hospital encounter of 03/11/25 (from the past 2160 hours)   CT Chest Without Contrast    Narrative    EXAMINATION:  CT CHEST WITHOUT CONTRAST    CLINICAL HISTORY:  Cough, persistent;evaluate right sided hilar prominence;    TECHNIQUE:  Iterative reconstruction technique was used.    CT/cardiac nuclear exam/s in prior 12 months:  2.    COMPARISON:  Chest CT 05/14/2024.    FINDINGS:  No enlarged hilar or mediastinal lymph nodes.  Stable volume loss of bronchiectasis right upper lobe.  Ground-glass opacities and reticular thickening with a basilar predominance, new since the previous exam suggesting and pneumonitis, possible atypical infection.  Small bilateral pleural fluid collections.  No abnormal pericardial fluid.  Dense vascular calcifications.      Impression    1. Bilateral ground-glass opacities and reticular thickening with a basilar predominance, new since the previous exam, suggesting pneumonitis, possible atypical infection.  2. Stable right upper lobe volume loss and bronchiectasis.      Electronically signed by: Brett Agrawal MD  Date:    03/13/2025  Time:    10:52     No results found for this or any previous visit (from the past 2160 hours).  X-Ray Chest PA And Lateral  Narrative: EXAMINATION:  XR CHEST PA AND LATERAL    CLINICAL HISTORY:  Other specified symptoms and signs involving the circulatory and respiratory systems S/P pneumonia and influenza, crackles;    TECHNIQUE:  Chest 2 views    COMPARISON:  CT chest 03/13/2025 and chest radiograph 03/15/2025    FINDINGS:  Stable cardiomediastinal contours.  Unchanged prominence of the right hilum.  Left lung is clear.  Ground-glass opacities at the periphery of the right mid and lower lung zones with overall improved aeration of the right lung since the prior " exam.  Prominent diffuse spondylosis.  Impression: Ground-glass opacities within the right lung with overall improved aeration since the prior study.    Electronically signed by: Lan Sanders MD  Date:    03/25/2025  Time:    15:21         ASSESSMENT     1. Chronic indwelling Land catheter        PLAN:     We agreed to hold on antibiotic prophylaxis at this point.  We will try tap water irrigations.  We reviewed some of the information from the Internet.  RTC 3 months    Jovany Collins MD  Urology  [facility]     Disclaimer: This note has been generated using voice-recognition software. There may be typographical errors that have been missed during proof-reading.          [1]   Current Outpatient Medications on File Prior to Visit   Medication Sig Dispense Refill    acarbose (PRECOSE) 100 MG Tab Take 1 tablet (100 mg total) by mouth 3 (three) times daily with meals. 270 tablet 3    acetaminophen (TYLENOL) 650 MG TbSR Take 650 mg by mouth 2 (two) times a day.      albuterol (PROVENTIL/VENTOLIN HFA) 90 mcg/actuation inhaler Inhale 2 puffs into the lungs every 6 (six) hours as needed for Wheezing. Rescue 18 g 11    alendronate (FOSAMAX) 70 MG tablet Take 1 tablet (70 mg total) by mouth every 7 days. 12 tablet 1    amoxicillin-clavulanate 875-125mg (AUGMENTIN) 875-125 mg per tablet Take 1 tablet by mouth 2 (two) times daily. 10 tablet 0    aspirin (ECOTRIN) 81 MG EC tablet Take 81 mg by mouth once daily. Stopped 01/19/2022      atorvastatin (LIPITOR) 40 MG tablet Take 1 tablet (40 mg total) by mouth once daily. (Patient taking differently: Take 20 mg by mouth once daily.) 90 tablet 3    blood sugar diagnostic (ONETOUCH ULTRA TEST) Strp Inject 1 each into the skin 3 (three) times daily before meals. 300 each 3    calcium citrate (CALCITRATE) 200 mg (950 mg) tablet Take 1 tablet by mouth once daily. (Patient taking differently: Take 1 tablet by mouth every Mon, Wed, Fri.)      carvediloL (COREG) 6.25 MG  tablet Take 1 tablet (6.25 mg total) by mouth 2 (two) times daily with meals. 180 tablet 3    cholecalciferol, vitamin D3, (VITAMIN D3) 50 mcg (2,000 unit) Tab Take 1 tablet (2,000 Units total) by mouth twice a week. 10,000 twice a week (Patient taking differently: Take 1 tablet by mouth twice a week. 10,000 3 times weekly)      clobetasoL (TEMOVATE) 0.05 % cream Apply twice a day for 2 weeks then nightly for 2 months. May decrease to three nights a week after that. 60 g 2    co-enzyme Q-10 30 mg capsule Take 1 capsule (30 mg total) by mouth every evening.      cranberry fruit extract (CRANBERRY CONCENTRATE ORAL) Take 2 capsules by mouth once daily. (Patient taking differently: Take 1 capsule by mouth once daily.)      dicyclomine (BENTYL) 20 mg tablet Take 1 tablet (20 mg total) by mouth 4 (four) times daily before meals and nightly. 120 tablet 11    ferrous sulfate 325 (65 FE) MG EC tablet Take 325 mg by mouth 3 (three) times daily with meals.      flash glucose scanning reader (FREESTYLE JOSEPHINE 2 READER) Misc Use as instructed 1 each 1    flash glucose sensor (FREESTYLE JOSEPHINE 2 SENSOR) Kit Use as instructed 6 kit 3    fluconazole (DIFLUCAN) 150 MG Tab Take 1 tablet (150 mg total) by mouth once daily. 2 tablet 0    FLUoxetine 10 MG capsule Take 1 capsule (10 mg total) by mouth once daily. 90 capsule 3    furosemide (LASIX) 20 MG tablet Take 1 tablet by mouth once daily 90 tablet 0    gabapentin (NEURONTIN) 600 MG tablet Take 1 tablet (600 mg total) by mouth 3 (three) times daily. 90 tablet 11    glucagon (BAQSIMI) 3 mg/actuation Spry 2 pack.  Spray one device (3 mg) in one nostril to treat severe hypoglycemia. Disp 1 box (2 devices) 2 each 3    guaiFENesin (MUCINEX) 600 mg 12 hr tablet Take 2 tablets (1,200 mg total) by mouth 2 (two) times daily. 40 tablet 0    insulin aspart U-100 (NOVOLOG FLEXPEN U-100 INSULIN) 100 unit/mL (3 mL) InPn pen Take sliding scale insulin 4 times a day as instructed- max dose of 40  "units/day. 15 mL 3    letrozole (FEMARA) 2.5 mg Tab Take 1 tablet (2.5 mg total) by mouth once daily. 30 tablet 11    LIDOcaine (LIDODERM) 5 % SMARTSIG:Topical      lisinopriL (PRINIVIL,ZESTRIL) 40 MG tablet Take 1 tablet (40 mg total) by mouth every evening. 90 tablet 3    loperamide (IMODIUM) 2 mg capsule Take 2 mg by mouth 4 (four) times daily as needed for Diarrhea.      metFORMIN (GLUCOPHAGE-XR) 500 MG ER 24hr tablet Take 1 tablet (500 mg total) by mouth daily with breakfast. 90 tablet 3    montelukast (SINGULAIR) 10 mg tablet Take 1 tablet (10 mg total) by mouth once daily. 90 tablet 3    mucus clearing device (AEROBIKA OSCILLATING PEP SYSTM MISC) 12 puffs by Misc.(Non-Drug; Combo Route) route daily as needed.      multivitamin capsule Take 1 capsule by mouth once daily. (Patient taking differently: Take 1 capsule by mouth once daily. Taking 3 times weekly)      NYAMYC powder       omega-3 fatty acids/fish oil (FISH OIL-OMEGA-3 FATTY ACIDS) 300-1,000 mg capsule Take by mouth once daily.      ONETOUCH DELICA PLUS LANCET 33 gauge Misc USE   TO CHECK GLUCOSE 4 TIMES DAILY BEFORE  MEALS 300 each 3    pen needle, diabetic (BD ULTRA-FINE MICRO PEN NEEDLE) 32 gauge x 1/4" Ndle Use to inject insulin 4 to 5 times a day as instructed 100 each 6    pen needle, diabetic (BD ULTRA-FINE MICRO PEN NEEDLE) 32 gauge x 1/4" Ndle use to inject ozempic once a week 100 each 3    potassium chloride (KLOR-CON) 10 MEQ TbSR Take 99 mEq by mouth once. (Patient taking differently: Take 99 mEq by mouth once. Taking 3 x weekly)      trimethoprim (TRIMPEX) 100 mg Tab Take 100 mg by mouth every 12 (twelve) hours.      blood-glucose meter Misc 1 Device by Misc.(Non-Drug; Combo Route) route once daily. One Touch Dx. Code:E11.9 1 each 0     No current facility-administered medications on file prior to visit.     "

## 2025-05-14 DIAGNOSIS — Z97.8 CHRONIC INDWELLING FOLEY CATHETER: Primary | ICD-10-CM

## 2025-05-30 ENCOUNTER — TELEPHONE (OUTPATIENT)
Dept: PRIMARY CARE CLINIC | Facility: CLINIC | Age: 85
End: 2025-05-30
Payer: MEDICARE

## 2025-05-30 DIAGNOSIS — G89.4 CHRONIC PAIN SYNDROME: Primary | ICD-10-CM

## 2025-05-30 RX ORDER — LIDOCAINE 50 MG/G
PATCH TOPICAL DAILY
Qty: 30 PATCH | Refills: 5 | Status: SHIPPED | OUTPATIENT
Start: 2025-05-30

## 2025-07-03 ENCOUNTER — LAB VISIT (OUTPATIENT)
Dept: LAB | Facility: HOSPITAL | Age: 85
End: 2025-07-03
Attending: NURSE PRACTITIONER
Payer: MEDICARE

## 2025-07-03 ENCOUNTER — HOSPITAL ENCOUNTER (OUTPATIENT)
Dept: RADIOLOGY | Facility: HOSPITAL | Age: 85
Discharge: HOME | End: 2025-07-03
Attending: NURSE PRACTITIONER
Payer: MEDICARE

## 2025-07-03 ENCOUNTER — TELEPHONE (OUTPATIENT)
Dept: PRIMARY CARE CLINIC | Facility: CLINIC | Age: 85
End: 2025-07-03
Payer: MEDICARE

## 2025-07-03 DIAGNOSIS — R05.1 ACUTE COUGH: ICD-10-CM

## 2025-07-03 DIAGNOSIS — R50.9 FEVER, UNSPECIFIED FEVER CAUSE: Primary | ICD-10-CM

## 2025-07-03 DIAGNOSIS — R50.9 FEVER, UNSPECIFIED FEVER CAUSE: ICD-10-CM

## 2025-07-03 LAB
B PERT DNA NPH QL NAA+PROBE: NOT DETECTED
C PNEUM DNA LOWER RESP QL NAA+NON-PROBE: NOT DETECTED
FLUAV RNA NPH QL NAA+NON-PROBE: NOT DETECTED
FLUBV RNA NPH QL NAA+NON-PROBE: NOT DETECTED
HADV DNA NPH QL NAA+NON-PROBE: NOT DETECTED
HCOV 229E RNA NPH QL NAA+NON-PROBE: NOT DETECTED
HCOV HKU1 RNA NPH QL NAA+NON-PROBE: NOT DETECTED
HCOV NL63 RNA NPH QL NAA+NON-PROBE: NOT DETECTED
HCOV OC43 RNA NPH QL NAA+NON-PROBE: NOT DETECTED
HMPV RNA LOWER RESP QL NAA+NON-PROBE: NOT DETECTED
HMPV RNA NPH QL NAA+NON-PROBE: NOT DETECTED
HPIV1 RNA NPH QL NAA+NON-PROBE: NOT DETECTED
HPIV2 RNA NPH QL NAA+NON-PROBE: NOT DETECTED
HPIV3 RNA NPH QL NAA+NON-PROBE: NOT DETECTED
HPIV4 RNA NPH QL NAA+NON-PROBE: NOT DETECTED
RSV RNA NPH QL NAA+NON-PROBE: NOT DETECTED
RSV RNA NPH QL NAA+NON-PROBE: NOT DETECTED
RV+EV RNA NPH QL NAA+NON-PROBE: NOT DETECTED
SARS-COV-2 RNA RESP QL NAA+PROBE: NOT DETECTED
SPECIMEN SOURCE: NORMAL

## 2025-07-03 PROCEDURE — 71046 X-RAY EXAM CHEST 2 VIEWS: CPT | Mod: TC

## 2025-07-03 PROCEDURE — 0202U NFCT DS 22 TRGT SARS-COV-2: CPT

## 2025-07-04 ENCOUNTER — RESULTS FOLLOW-UP (OUTPATIENT)
Dept: PRIMARY CARE CLINIC | Facility: CLINIC | Age: 85
End: 2025-07-04

## 2025-07-04 DIAGNOSIS — R50.9 FEVER, UNSPECIFIED FEVER CAUSE: Primary | ICD-10-CM

## 2025-07-04 RX ORDER — AZITHROMYCIN 250 MG/1
TABLET, FILM COATED ORAL
Qty: 6 TABLET | Refills: 0 | Status: ON HOLD | OUTPATIENT
Start: 2025-07-04 | End: 2025-07-14 | Stop reason: HOSPADM

## 2025-07-07 ENCOUNTER — HOSPITAL ENCOUNTER (INPATIENT)
Facility: HOSPITAL | Age: 85
LOS: 7 days | Discharge: HOME HEALTH | DRG: 194 | End: 2025-07-14
Attending: EMERGENCY MEDICINE | Admitting: INTERNAL MEDICINE
Payer: MEDICARE

## 2025-07-07 DIAGNOSIS — E78.5 HYPERLIPIDEMIA, UNSPECIFIED HYPERLIPIDEMIA TYPE: ICD-10-CM

## 2025-07-07 DIAGNOSIS — E55.9 VITAMIN D DEFICIENCY: ICD-10-CM

## 2025-07-07 DIAGNOSIS — I50.9 CHF (CONGESTIVE HEART FAILURE): ICD-10-CM

## 2025-07-07 DIAGNOSIS — R05.9 COUGH: ICD-10-CM

## 2025-07-07 DIAGNOSIS — J18.9 MULTIFOCAL PNEUMONIA: Primary | ICD-10-CM

## 2025-07-07 DIAGNOSIS — I50.30 HEART FAILURE WITH PRESERVED EJECTION FRACTION, NYHA CLASS II: ICD-10-CM

## 2025-07-07 LAB
ABSOLUTE NEUTROPHIL MANUAL (OHS): 13.1 K/UL
ALBUMIN SERPL BCP-MCNC: 2.6 G/DL (ref 3.5–5.2)
ALP SERPL-CCNC: 136 UNIT/L (ref 40–150)
ALT SERPL W/O P-5'-P-CCNC: 13 UNIT/L (ref 10–44)
ANION GAP (OHS): 13 MMOL/L (ref 8–16)
AST SERPL-CCNC: 23 UNIT/L (ref 11–45)
BACTERIA #/AREA URNS AUTO: ABNORMAL /HPF
BILIRUB SERPL-MCNC: 0.5 MG/DL (ref 0.1–1)
BILIRUB UR QL STRIP.AUTO: NEGATIVE
BUN SERPL-MCNC: 17 MG/DL (ref 8–23)
CALCIUM SERPL-MCNC: 8.7 MG/DL (ref 8.7–10.5)
CHLORIDE SERPL-SCNC: 92 MMOL/L (ref 95–110)
CLARITY UR: CLEAR
CO2 SERPL-SCNC: 24 MMOL/L (ref 23–29)
COLOR UR AUTO: YELLOW
CREAT SERPL-MCNC: 0.8 MG/DL (ref 0.5–1.4)
ERYTHROCYTE [DISTWIDTH] IN BLOOD BY AUTOMATED COUNT: 13 % (ref 11.5–14.5)
GFR SERPLBLD CREATININE-BSD FMLA CKD-EPI: >60 ML/MIN/1.73/M2
GLUCOSE SERPL-MCNC: 175 MG/DL (ref 70–110)
GLUCOSE UR QL STRIP: NEGATIVE
HCT VFR BLD AUTO: 34.1 % (ref 37–48.5)
HGB BLD-MCNC: 11.3 GM/DL (ref 12–16)
HGB UR QL STRIP: ABNORMAL
HOLD SPECIMEN: NORMAL
HOLD SPECIMEN: NORMAL
HYALINE CASTS UR QL AUTO: 0 /LPF (ref 0–1)
KETONES UR QL STRIP: ABNORMAL
LACTATE SERPL-SCNC: 1.3 MMOL/L (ref 0.5–2.2)
LACTATE SERPL-SCNC: 1.4 MMOL/L (ref 0.5–2.2)
LEUKOCYTE ESTERASE UR QL STRIP: ABNORMAL
LYMPHOCYTES NFR BLD MANUAL: 5 % (ref 18–48)
MAGNESIUM SERPL-MCNC: 1.8 MG/DL (ref 1.6–2.6)
MCH RBC QN AUTO: 27.7 PG (ref 27–31)
MCHC RBC AUTO-ENTMCNC: 33.1 G/DL (ref 32–36)
MCV RBC AUTO: 84 FL (ref 82–98)
MICROSCOPIC COMMENT: ABNORMAL
MONOCYTES NFR BLD MANUAL: 9 % (ref 4–15)
NEUTROPHILS NFR BLD MANUAL: 82 % (ref 38–73)
NEUTS BAND NFR BLD MANUAL: 4 %
NITRITE UR QL STRIP: NEGATIVE
NT-PROBNP SERPL-MCNC: ABNORMAL PG/ML
NUCLEATED RBC (/100WBC) (OHS): 0 /100 WBC
PH UR STRIP: 7 [PH]
PLATELET # BLD AUTO: 379 K/UL (ref 150–450)
PMV BLD AUTO: 10.1 FL (ref 9.2–12.9)
POTASSIUM SERPL-SCNC: 3.4 MMOL/L (ref 3.5–5.1)
PROCALCITONIN SERPL-MCNC: 0.44 NG/ML
PROT SERPL-MCNC: 7.2 GM/DL (ref 6–8.4)
PROT UR QL STRIP: ABNORMAL
RBC # BLD AUTO: 4.08 M/UL (ref 4–5.4)
RBC #/AREA URNS AUTO: 17 /HPF (ref 0–4)
SODIUM SERPL-SCNC: 129 MMOL/L (ref 136–145)
SP GR UR STRIP: 1.02
SQUAMOUS #/AREA URNS AUTO: 1 /HPF
UROBILINOGEN UR STRIP-ACNC: 1 EU/DL
WBC # BLD AUTO: 15.22 K/UL (ref 3.9–12.7)
WBC #/AREA URNS AUTO: 63 /HPF (ref 0–5)

## 2025-07-07 PROCEDURE — 80053 COMPREHEN METABOLIC PANEL: CPT | Performed by: EMERGENCY MEDICINE

## 2025-07-07 PROCEDURE — 93010 ELECTROCARDIOGRAM REPORT: CPT | Mod: ,,, | Performed by: INTERNAL MEDICINE

## 2025-07-07 PROCEDURE — 25000242 PHARM REV CODE 250 ALT 637 W/ HCPCS: Performed by: EMERGENCY MEDICINE

## 2025-07-07 PROCEDURE — 81003 URINALYSIS AUTO W/O SCOPE: CPT | Performed by: EMERGENCY MEDICINE

## 2025-07-07 PROCEDURE — 63600175 PHARM REV CODE 636 W HCPCS: Performed by: EMERGENCY MEDICINE

## 2025-07-07 PROCEDURE — 94640 AIRWAY INHALATION TREATMENT: CPT

## 2025-07-07 PROCEDURE — 87040 BLOOD CULTURE FOR BACTERIA: CPT | Performed by: EMERGENCY MEDICINE

## 2025-07-07 PROCEDURE — 25500020 PHARM REV CODE 255: Performed by: EMERGENCY MEDICINE

## 2025-07-07 PROCEDURE — 83735 ASSAY OF MAGNESIUM: CPT | Performed by: EMERGENCY MEDICINE

## 2025-07-07 PROCEDURE — 99900035 HC TECH TIME PER 15 MIN (STAT)

## 2025-07-07 PROCEDURE — 11000001 HC ACUTE MED/SURG PRIVATE ROOM

## 2025-07-07 PROCEDURE — 25000003 PHARM REV CODE 250: Performed by: INTERNAL MEDICINE

## 2025-07-07 PROCEDURE — 96374 THER/PROPH/DIAG INJ IV PUSH: CPT

## 2025-07-07 PROCEDURE — 27000221 HC OXYGEN, UP TO 24 HOURS

## 2025-07-07 PROCEDURE — 87086 URINE CULTURE/COLONY COUNT: CPT | Performed by: EMERGENCY MEDICINE

## 2025-07-07 PROCEDURE — 83605 ASSAY OF LACTIC ACID: CPT | Performed by: EMERGENCY MEDICINE

## 2025-07-07 PROCEDURE — 99900031 HC PATIENT EDUCATION (STAT)

## 2025-07-07 PROCEDURE — 85007 BL SMEAR W/DIFF WBC COUNT: CPT | Performed by: EMERGENCY MEDICINE

## 2025-07-07 PROCEDURE — 21400001 HC TELEMETRY ROOM

## 2025-07-07 PROCEDURE — 93005 ELECTROCARDIOGRAM TRACING: CPT

## 2025-07-07 PROCEDURE — 94799 UNLISTED PULMONARY SVC/PX: CPT

## 2025-07-07 PROCEDURE — 94761 N-INVAS EAR/PLS OXIMETRY MLT: CPT

## 2025-07-07 PROCEDURE — 83880 ASSAY OF NATRIURETIC PEPTIDE: CPT | Performed by: EMERGENCY MEDICINE

## 2025-07-07 PROCEDURE — 99285 EMERGENCY DEPT VISIT HI MDM: CPT | Mod: 25

## 2025-07-07 PROCEDURE — 25000003 PHARM REV CODE 250: Performed by: EMERGENCY MEDICINE

## 2025-07-07 PROCEDURE — 36415 COLL VENOUS BLD VENIPUNCTURE: CPT | Performed by: EMERGENCY MEDICINE

## 2025-07-07 PROCEDURE — 96375 TX/PRO/DX INJ NEW DRUG ADDON: CPT

## 2025-07-07 PROCEDURE — 84145 PROCALCITONIN (PCT): CPT | Performed by: EMERGENCY MEDICINE

## 2025-07-07 RX ORDER — SODIUM CHLORIDE 0.9 % (FLUSH) 0.9 %
10 SYRINGE (ML) INJECTION
Status: DISCONTINUED | OUTPATIENT
Start: 2025-07-07 | End: 2025-07-14 | Stop reason: HOSPADM

## 2025-07-07 RX ORDER — FLUOXETINE 10 MG/1
10 CAPSULE ORAL DAILY
Status: DISCONTINUED | OUTPATIENT
Start: 2025-07-08 | End: 2025-07-14 | Stop reason: HOSPADM

## 2025-07-07 RX ORDER — CEFTRIAXONE 1 G/1
1 INJECTION, POWDER, FOR SOLUTION INTRAMUSCULAR; INTRAVENOUS
Status: DISCONTINUED | OUTPATIENT
Start: 2025-07-07 | End: 2025-07-07

## 2025-07-07 RX ORDER — MUPIROCIN 20 MG/G
OINTMENT TOPICAL 2 TIMES DAILY
Status: COMPLETED | OUTPATIENT
Start: 2025-07-07 | End: 2025-07-12

## 2025-07-07 RX ORDER — HYDROCHLOROTHIAZIDE 12.5 MG/1
12.5 CAPSULE ORAL DAILY
Status: ON HOLD | COMMUNITY
Start: 2025-06-25 | End: 2025-07-14 | Stop reason: HOSPADM

## 2025-07-07 RX ORDER — TALC
6 POWDER (GRAM) TOPICAL NIGHTLY PRN
Status: DISCONTINUED | OUTPATIENT
Start: 2025-07-07 | End: 2025-07-11

## 2025-07-07 RX ORDER — FUROSEMIDE 10 MG/ML
40 INJECTION INTRAMUSCULAR; INTRAVENOUS
Status: COMPLETED | OUTPATIENT
Start: 2025-07-07 | End: 2025-07-07

## 2025-07-07 RX ORDER — IPRATROPIUM BROMIDE AND ALBUTEROL SULFATE 2.5; .5 MG/3ML; MG/3ML
3 SOLUTION RESPIRATORY (INHALATION)
Status: COMPLETED | OUTPATIENT
Start: 2025-07-07 | End: 2025-07-07

## 2025-07-07 RX ORDER — ONDANSETRON HYDROCHLORIDE 2 MG/ML
4 INJECTION, SOLUTION INTRAVENOUS EVERY 8 HOURS PRN
Status: DISCONTINUED | OUTPATIENT
Start: 2025-07-07 | End: 2025-07-14 | Stop reason: HOSPADM

## 2025-07-07 RX ORDER — INSULIN GLARGINE 100 [IU]/ML
16 INJECTION, SOLUTION SUBCUTANEOUS DAILY
Status: DISCONTINUED | OUTPATIENT
Start: 2025-07-08 | End: 2025-07-14 | Stop reason: HOSPADM

## 2025-07-07 RX ORDER — AZITHROMYCIN 250 MG/1
500 TABLET, FILM COATED ORAL
Status: DISCONTINUED | OUTPATIENT
Start: 2025-07-07 | End: 2025-07-07

## 2025-07-07 RX ORDER — ONDANSETRON HYDROCHLORIDE 2 MG/ML
4 INJECTION, SOLUTION INTRAVENOUS
Status: COMPLETED | OUTPATIENT
Start: 2025-07-07 | End: 2025-07-07

## 2025-07-07 RX ORDER — LISINOPRIL 20 MG/1
40 TABLET ORAL NIGHTLY
Status: DISCONTINUED | OUTPATIENT
Start: 2025-07-07 | End: 2025-07-14 | Stop reason: HOSPADM

## 2025-07-07 RX ORDER — ACETAMINOPHEN 325 MG/1
650 TABLET ORAL EVERY 8 HOURS PRN
Status: DISCONTINUED | OUTPATIENT
Start: 2025-07-07 | End: 2025-07-14 | Stop reason: HOSPADM

## 2025-07-07 RX ORDER — GABAPENTIN 300 MG/1
600 CAPSULE ORAL 3 TIMES DAILY
Status: DISCONTINUED | OUTPATIENT
Start: 2025-07-07 | End: 2025-07-14 | Stop reason: HOSPADM

## 2025-07-07 RX ORDER — CARVEDILOL 3.12 MG/1
6.25 TABLET ORAL 2 TIMES DAILY WITH MEALS
Status: DISCONTINUED | OUTPATIENT
Start: 2025-07-08 | End: 2025-07-13

## 2025-07-07 RX ADMIN — FUROSEMIDE 40 MG: 10 INJECTION, SOLUTION INTRAVENOUS at 08:07

## 2025-07-07 RX ADMIN — MUPIROCIN: 20 OINTMENT TOPICAL at 10:07

## 2025-07-07 RX ADMIN — GABAPENTIN 600 MG: 300 CAPSULE ORAL at 10:07

## 2025-07-07 RX ADMIN — IPRATROPIUM BROMIDE AND ALBUTEROL SULFATE 3 ML: 2.5; .5 SOLUTION RESPIRATORY (INHALATION) at 07:07

## 2025-07-07 RX ADMIN — LISINOPRIL 40 MG: 20 TABLET ORAL at 10:07

## 2025-07-07 RX ADMIN — IOHEXOL 100 ML: 350 INJECTION, SOLUTION INTRAVENOUS at 07:07

## 2025-07-07 RX ADMIN — ONDANSETRON 4 MG: 2 INJECTION INTRAMUSCULAR; INTRAVENOUS at 08:07

## 2025-07-07 RX ADMIN — PIPERACILLIN AND TAZOBACTAM 4.5 G: 4; .5 INJECTION, POWDER, LYOPHILIZED, FOR SOLUTION INTRAVENOUS; PARENTERAL at 08:07

## 2025-07-07 NOTE — ED PROVIDER NOTES
EMERGENCY DEPARTMENT HISTORY AND PHYSICAL EXAM     This note is dictated on M*Modal word recognition program.  There are word recognition mistakes and grammatical errors that are occasionally missed on review.     Date: 7/7/2025   Patient Name: Kelsea Cadet       History of Presenting Illness      Chief Complaint   Patient presents with    Shortness of Breath     Pt dx on Friday with pneumonia, states that she is almost done with antibiotics. Pt reports SOB, dizziness, weakness, nausea that started today.     Weakness    Nausea           Kelsea Cadet is a 85 y.o. female with PMHX of hypertension, pulmonary hypertension, cardiomyopathy, type 2 diabetes, hyperlipidemia, CKD, chronic indwelling Land, neurogenic bladder who presents to the emergency department C/O malaise.    Patient presents with malaise.  Was having fever last week and worsening cough. Saw PCP and had XR and was treated with a Z-Oswaldo.  Patient seemed to be getting better but today has been complaining of fatigue, malaise, and nausea.  Reports some shortness of breath.  No longer having fever.    PCP: Gabby Jackson, AUGIE      Current Medications[1]        Past History     Past Medical History:   Past Medical History:   Diagnosis Date    Arthritis     Asthma     Coronary artery disease     Environmental and seasonal allergies     Land catheter in place     Hard of hearing     Hyperlipidemia     Neurogenic bladder     Pneumonia, unspecified organism     Presence of indwelling Land catheter     Unspecified chronic bronchitis     Vitamin D deficiency         Past Surgical History:   Past Surgical History:   Procedure Laterality Date    BLADDER SUSPENSION      BREAST BIOPSY Bilateral 1996    BREAST SURGERY      lumpectomy, isotopes    CHOLECYSTECTOMY      had gal bladder removed    COLECTOMY      had part of colon removed    CYSTOSCOPY W/ RETROGRADES Bilateral 12/19/2019    Procedure: CYSTOSCOPY, WITH RETROGRADE PYELOGRAM;  Surgeon: Prasad  MD Aj;  Location: [facility] OR;  Service: Urology;  Laterality: Bilateral;    DILATION OF URETHRA N/A 12/19/2019    Procedure: DILATION, URETHRA;  Surgeon: Prasad Rocha MD;  Location: [facility] OR;  Service: Urology;  Laterality: N/A;    HYSTERECTOMY      LEFT HEART CATHETERIZATION Left 11/27/2019    Procedure: Left heart cath;  Surgeon: Urban Paiz MD;  Location: [facility] CATH LAB;  Service: Cardiology;  Laterality: Left;    MODIFIED RADICAL MASTECTOMY W/ AXILLARY LYMPH NODE DISSECTION Right 1/26/2022    Procedure: MASTECTOMY, MODIFIED RADICAL;  Surgeon: Keisha Cortez MD;  Location: [facility];  Service: General;  Laterality: Right;    OOPHORECTOMY      SENTINEL LYMPH NODE BIOPSY Right 1/26/2022    Procedure: BIOPSY, LYMPH NODE, SENTINEL;  Surgeon: Keisha Cortez MD;  Location: [facility];  Service: General;  Laterality: Right;  0800    SIMPLE MASTECTOMY Left 1/26/2022    Procedure: MASTECTOMY, SIMPLE;  Surgeon: Keisha Cortez MD;  Location: [facility];  Service: General;  Laterality: Left;  FROZEN SECTION        Family History:   Family History   Problem Relation Name Age of Onset    Cancer Mother  42        Bone    Bone cancer Mother      Breast cancer Mother      Heart disease Mother      Cancer Father          Bone    Bone cancer Father  86    Breast cancer Sister      Bell's palsy Sister      Breast cancer Sister          Social History:   Social History[2]     Allergies:   Review of patient's allergies indicates:   Allergen Reactions    Macrobid [nitrofurantoin monohyd/m-cryst] Swelling    Bactrim [sulfamethoxazole-trimethoprim] Diarrhea    Ciprofloxacin Other (See Comments)     GI problems    Codeine Other (See Comments)     headache    Latex, natural rubber Rash          Review of Systems   Review of Systems   See HPI for pertinent positives and negatives       Physical Exam     Vitals:    07/07/25 1814 07/07/25 1831   BP: (!) 201/83 (!) 173/77   BP Location: Right arm    Pulse: 84 79   Resp: 18 18  "  Temp: 98.8 °F (37.1 °C)    TempSrc: Oral    SpO2: (!) 91% (!) 90%   Weight: 88.9 kg (196 lb)    Height: 5' 4" (1.626 m)       Physical Exam  Vitals and nursing note reviewed.   Constitutional:       General: She is not in acute distress.     Appearance: Normal appearance. She is obese. She is not ill-appearing.   HENT:      Head: Normocephalic and atraumatic.      Right Ear: External ear normal.      Left Ear: External ear normal.      Nose: Nose normal. No congestion or rhinorrhea.      Mouth/Throat:      Mouth: Mucous membranes are moist.   Eyes:      Conjunctiva/sclera: Conjunctivae normal.      Pupils: Pupils are equal, round, and reactive to light.   Cardiovascular:      Rate and Rhythm: Normal rate and regular rhythm.   Pulmonary:      Effort: Pulmonary effort is normal. No respiratory distress.      Breath sounds: Examination of the right-upper field reveals decreased breath sounds and rales. Examination of the right-middle field reveals decreased breath sounds and rales. Examination of the right-lower field reveals decreased breath sounds and rales. Decreased breath sounds and rales present.   Chest:      Chest wall: No tenderness.   Abdominal:      Palpations: Abdomen is soft.   Musculoskeletal:         General: No deformity. Normal range of motion.      Cervical back: Normal range of motion. No rigidity.   Skin:     General: Skin is dry.      Coloration: Skin is pale.   Neurological:      General: No focal deficit present.      Mental Status: She is alert and oriented to person, place, and time. Mental status is at baseline.   Psychiatric:         Mood and Affect: Mood normal.         Behavior: Behavior normal.              Diagnostic Study Results      Labs -   No results found for this or any previous visit (from the past 12 hours).     Radiologic Studies -    CT Chest With Contrast   Final Result      1. There are mildly prominent paratracheal and AP window nodes which could be reactive however there " is a new large subcarinal node and right para hilar node which could be benign or malignant and are considered indeterminate.   2. Small right pleural effusion   3. Volume loss in the right upper lobe with focal area of right anterior upper lobe consolidation suggesting infiltrate and bronchiectasis which is significantly increased   4.  bronchiectasis in the lung bases and diffuse scattered patchy infiltrates consistent with multifocal pneumonia with subtle areas in the left upper lobe and more focal areas in the right upper lobe and lung bases         Electronically signed by: Dinorah Boogie MD   Date:    07/07/2025   Time:    19:40      X-Ray Chest 1 View   Final Result      Interval worsening of the bilateral pulmonary infiltrates.         Electronically signed by: Yasmany Sanders MD   Date:    07/08/2025   Time:    01:56           Medications given in the ED-   Medications - No data to display        Medical Decision Making    I am the first provider for this patient.     I reviewed the vital signs, available nursing notes, past medical history, past surgical history, family history and social history.     Vital Signs:  Reviewed the patient's vital signs.     Pulse Oximetry Analysis and Interpretation:    90% on Room Air, low normal        EKG Interpretation: (Per my independent interpretation, pending formal read)   Interpreted by Reynold Crooks MD at 1838   Normal sinus rhythm rate of 82, right bundle-branch block pattern, similar to prior study     CXR  Interpretation: (Per my independent interpretation, pending formal read)   CXR read by Dr. Reynold Crooks     Bilateral infiltrates, probable right pleural effusion, worse than prior study 7/3/25     External Test Results (Pertinent to encounter):    Records Reviewed: Nursing Notes, Current Prescription Medications, Old Medical Records, and Previous Radiology Studies    History Obtained By: Patient and Daughter    Provider Notes: Kelsea Cadet is a 85 y.o.  female with cough, malaise    Co-morbidities Considered: Age, CHF, HTN    Differential Diagnosis:  Pneumonia, malignancy, pneumonitis, pulmonary edema,      ED Course:      CONSULT NOTE:    8:15 PM      Dr. Crooks discussed care with?Dr Mina, Hospitalist   It was a standard discussion,?including history of patients chief complaint, available diagnostic results, and treatment course.?Discussed case. Agrees with admission    Patient presents with worsening cough and shortness of breath currently being treated outpatient for pneumonia   I reviewed prior imaging and patient has a history of these ground glass opacities going back into winter of this year.  Radiographs today shows progression from recent radiograph on July 3rd.  A CT chest was obtained which demonstrates multifocal pneumonia as well as prominent lymph nodes and underlying malignancy is in differential.  Patient is satting adequately on room air but was placed on nasal cannula for comfort.  Labs were notable for a mild leukocytosis, mild hyponatremia and hypokalemia, and increase in her baseline proBNP.  ProCal was elevated.  Lactate WNL. Not septic. Patient was started on Zosyn for pneumonia.          ED Course as of 07/07/25 2015 Mon Jul 07, 2025 1923 NT-proBNP(!): 10,746 [MO]   1930 WBC(!): 15.22 [MO]      ED Course User Index  [MO] Reynold Crooks MD       Problems Addressed:  Pneumonia    Procedures:   Procedures       Diagnosis and Disposition     Critical Care:             CLINICAL IMPRESSION:         1. Cough              PLAN:   1. Admit to Hospital  2.      Medication List        ASK your doctor about these medications      acarbose 100 MG Tab  Commonly known as: PRECOSE  Take 1 tablet (100 mg total) by mouth 3 (three) times daily with meals.     acetaminophen 650 MG Tbsr  Commonly known as: TYLENOL     AEROBIKA OSCILLATING PEP SYSTM MISC     albuterol 90 mcg/actuation inhaler  Commonly known as: PROVENTIL/VENTOLIN HFA  Inhale 2  puffs into the lungs every 6 (six) hours as needed for Wheezing. Rescue     alendronate 70 MG tablet  Commonly known as: FOSAMAX  Take 1 tablet (70 mg total) by mouth every 7 days.     amoxicillin-clavulanate 875-125mg 875-125 mg per tablet  Commonly known as: AUGMENTIN  Take 1 tablet by mouth 2 (two) times daily.     aspirin 81 MG EC tablet  Commonly known as: ECOTRIN     atorvastatin 40 MG tablet  Commonly known as: LIPITOR  Take 1 tablet (40 mg total) by mouth once daily.     azithromycin 250 MG tablet  Commonly known as: Z-JAIRO  Take 2 tablets by mouth on day 1; Take 1 tablet by mouth on days 2-5     BAQSIMI 3 mg/actuation Spry  Generic drug: glucagon  2 pack.  Spray one device (3 mg) in one nostril to treat severe hypoglycemia. Disp 1 box (2 devices)     blood sugar diagnostic Strp  Commonly known as: ONETOUCH ULTRA TEST  Inject 1 each into the skin 3 (three) times daily before meals.     blood-glucose meter Misc  1 Device by Misc.(Non-Drug; Combo Route) route once daily. One Touch Dx. Code:E11.9     calcium citrate 200 mg (950 mg) tablet  Commonly known as: CALCITRATE     carvediloL 6.25 MG tablet  Commonly known as: COREG  Take 1 tablet (6.25 mg total) by mouth 2 (two) times daily with meals.     cholecalciferol (vitamin D3) 50 mcg (2,000 unit) Tab  Commonly known as: VITAMIN D3  Take 1 tablet (2,000 Units total) by mouth twice a week. 10,000 twice a week     clobetasoL 0.05 % cream  Commonly known as: TEMOVATE  Apply twice a day for 2 weeks then nightly for 2 months. May decrease to three nights a week after that.     co-enzyme Q-10 30 mg capsule  Take 1 capsule (30 mg total) by mouth every evening.     CRANBERRY CONCENTRATE ORAL     dicyclomine 20 mg tablet  Commonly known as: BENTYL  Take 1 tablet (20 mg total) by mouth 4 (four) times daily before meals and nightly.     ferrous sulfate 325 (65 FE) MG EC tablet     fish oil-omega-3 fatty acids 300-1,000 mg capsule     fluconazole 150 MG Tab  Commonly known  as: DIFLUCAN  Take 1 tablet (150 mg total) by mouth once daily.     FLUoxetine 10 MG capsule  Take 1 capsule (10 mg total) by mouth once daily.     FREESTYLE JOSEPHINE 2 PLUS SENSOR Rose  Generic drug: blood-glucose sensor  Use as instructed     FREESTYLE JOSEPHINE 2 READER Misc  Generic drug: flash glucose scanning reader  Use as instructed     FREESTYLE JOSEPHINE 2 SENSOR Kit  Generic drug: flash glucose sensor  Use as instructed     * furosemide 20 MG tablet  Commonly known as: LASIX  Take 1 tablet by mouth once daily     * furosemide 20 MG tablet  Commonly known as: LASIX  Take 1 tablet (20 mg total) by mouth once daily.     gabapentin 600 MG tablet  Commonly known as: NEURONTIN  Take 1 tablet (600 mg total) by mouth 3 (three) times daily.     guaiFENesin 600 mg 12 hr tablet  Commonly known as: MUCINEX  Take 2 tablets (1,200 mg total) by mouth 2 (two) times daily.     insulin aspart U-100 100 unit/mL (3 mL) Inpn pen  Commonly known as: NovoLOG Flexpen U-100 Insulin  Take sliding scale insulin 4 times a day as instructed- max dose of 40 units/day.     LANTUS SOLOSTAR U-100 INSULIN 100 unit/mL (3 mL) Inpn pen  Generic drug: insulin glargine U-100 (Lantus)  Inject 16 Units into the skin once daily.     letrozole 2.5 mg Tab  Commonly known as: FEMARA  Take 1 tablet (2.5 mg total) by mouth once daily.     LIDOcaine 5 %  Commonly known as: LIDODERM  Place onto the skin once daily. Remove & Discard patch within 12 hours or as directed by MD     lisinopriL 40 MG tablet  Commonly known as: PRINIVIL,ZESTRIL  Take 1 tablet (40 mg total) by mouth every evening.     loperamide 2 mg capsule  Commonly known as: IMODIUM     metFORMIN 500 MG ER 24hr tablet  Commonly known as: GLUCOPHAGE-XR  Take 1 tablet (500 mg total) by mouth daily with breakfast.     montelukast 10 mg tablet  Commonly known as: SINGULAIR  Take 1 tablet (10 mg total) by mouth once daily.     multivitamin capsule     NYAMYC powder  Generic drug: nystatin     ONETOUCH  "DELICA PLUS LANCET 33 gauge Misc  Generic drug: lancets  USE   TO CHECK GLUCOSE 4 TIMES DAILY BEFORE  MEALS     * pen needle, diabetic 32 gauge x 1/4" Ndle  Commonly known as: BD ULTRA-FINE MICRO PEN NEEDLE  use to inject ozempic once a week     * pen needle, diabetic 32 gauge x 1/4" Ndle  Commonly known as: BD ULTRA-FINE MICRO PEN NEEDLE  Use to inject insulin 4 to 5 times a day as instructed     potassium chloride 10 MEQ Tbsr  Commonly known as: KLOR-CON     trimethoprim 100 mg Tab  Commonly known as: TRIMPEX           * This list has 4 medication(s) that are the same as other medications prescribed for you. Read the directions carefully, and ask your doctor or other care provider to review them with you.                 3. No follow-up provider specified.     _______________________________     Please note that this dictation was completed with [employer], the [employer] voice recognition software.  Quite often unanticipated grammatical, syntax, homophones, and other interpretive errors are inadvertently transcribed by the computer software.  Please disregard these errors.  Please excuse any errors that have escaped final proofreading.               [1]   No current facility-administered medications for this encounter.     Current Outpatient Medications   Medication Sig Dispense Refill    acarbose (PRECOSE) 100 MG Tab Take 1 tablet (100 mg total) by mouth 3 (three) times daily with meals. 270 tablet 3    acetaminophen (TYLENOL) 650 MG TbSR Take 650 mg by mouth 2 (two) times a day.      albuterol (PROVENTIL/VENTOLIN HFA) 90 mcg/actuation inhaler Inhale 2 puffs into the lungs every 6 (six) hours as needed for Wheezing. Rescue 18 g 11    alendronate (FOSAMAX) 70 MG tablet Take 1 tablet (70 mg total) by mouth every 7 days. 12 tablet 1    amoxicillin-clavulanate 875-125mg (AUGMENTIN) 875-125 mg per tablet Take 1 tablet by mouth 2 (two) times daily. 10 tablet 0    aspirin (ECOTRIN) 81 MG EC tablet Take 81 mg by mouth once " daily. Stopped 01/19/2022      atorvastatin (LIPITOR) 40 MG tablet Take 1 tablet (40 mg total) by mouth once daily. (Patient taking differently: Take 20 mg by mouth once daily.) 90 tablet 3    azithromycin (Z-JAIRO) 250 MG tablet Take 2 tablets by mouth on day 1; Take 1 tablet by mouth on days 2-5 6 tablet 0    blood sugar diagnostic (ONETOUCH ULTRA TEST) Strp Inject 1 each into the skin 3 (three) times daily before meals. 300 each 3    blood-glucose meter Misc 1 Device by Misc.(Non-Drug; Combo Route) route once daily. One Touch Dx. Code:E11.9 1 each 0    blood-glucose sensor (FREESTYLE JOSEPHINE 2 PLUS SENSOR) Orse Use as instructed 6 each 6    calcium citrate (CALCITRATE) 200 mg (950 mg) tablet Take 1 tablet by mouth once daily. (Patient taking differently: Take 1 tablet by mouth every Mon, Wed, Fri.)      carvediloL (COREG) 6.25 MG tablet Take 1 tablet (6.25 mg total) by mouth 2 (two) times daily with meals. 180 tablet 3    cholecalciferol, vitamin D3, (VITAMIN D3) 50 mcg (2,000 unit) Tab Take 1 tablet (2,000 Units total) by mouth twice a week. 10,000 twice a week (Patient taking differently: Take 1 tablet by mouth twice a week. 10,000 3 times weekly)      clobetasoL (TEMOVATE) 0.05 % cream Apply twice a day for 2 weeks then nightly for 2 months. May decrease to three nights a week after that. 60 g 2    co-enzyme Q-10 30 mg capsule Take 1 capsule (30 mg total) by mouth every evening.      cranberry fruit extract (CRANBERRY CONCENTRATE ORAL) Take 2 capsules by mouth once daily. (Patient taking differently: Take 1 capsule by mouth once daily.)      dicyclomine (BENTYL) 20 mg tablet Take 1 tablet (20 mg total) by mouth 4 (four) times daily before meals and nightly. 120 tablet 11    ferrous sulfate 325 (65 FE) MG EC tablet Take 325 mg by mouth 3 (three) times daily with meals.      flash glucose scanning reader (FREESTYLE JOSEPHINE 2 READER) Misc Use as instructed 1 each 1    flash glucose sensor (FREESTYLE JOSEPHINE 2 SENSOR) Kit  Use as instructed 6 kit 3    fluconazole (DIFLUCAN) 150 MG Tab Take 1 tablet (150 mg total) by mouth once daily. 2 tablet 0    FLUoxetine 10 MG capsule Take 1 capsule (10 mg total) by mouth once daily. 90 capsule 3    furosemide (LASIX) 20 MG tablet Take 1 tablet by mouth once daily 90 tablet 0    furosemide (LASIX) 20 MG tablet Take 1 tablet (20 mg total) by mouth once daily. 90 tablet 0    gabapentin (NEURONTIN) 600 MG tablet Take 1 tablet (600 mg total) by mouth 3 (three) times daily. 90 tablet 11    glucagon (BAQSIMI) 3 mg/actuation Spry 2 pack.  Spray one device (3 mg) in one nostril to treat severe hypoglycemia. Disp 1 box (2 devices) 2 each 3    guaiFENesin (MUCINEX) 600 mg 12 hr tablet Take 2 tablets (1,200 mg total) by mouth 2 (two) times daily. 40 tablet 0    insulin aspart U-100 (NOVOLOG FLEXPEN U-100 INSULIN) 100 unit/mL (3 mL) InPn pen Take sliding scale insulin 4 times a day as instructed- max dose of 40 units/day. 15 mL 3    insulin glargine U-100, Lantus, (LANTUS SOLOSTAR U-100 INSULIN) 100 unit/mL (3 mL) InPn pen Inject 16 Units into the skin once daily. 15 mL 3    letrozole (FEMARA) 2.5 mg Tab Take 1 tablet (2.5 mg total) by mouth once daily. 30 tablet 11    LIDOcaine (LIDODERM) 5 % Place onto the skin once daily. Remove & Discard patch within 12 hours or as directed by MD 30 patch 5    lisinopriL (PRINIVIL,ZESTRIL) 40 MG tablet Take 1 tablet (40 mg total) by mouth every evening. 90 tablet 3    loperamide (IMODIUM) 2 mg capsule Take 2 mg by mouth 4 (four) times daily as needed for Diarrhea.      metFORMIN (GLUCOPHAGE-XR) 500 MG ER 24hr tablet Take 1 tablet (500 mg total) by mouth daily with breakfast. 90 tablet 3    montelukast (SINGULAIR) 10 mg tablet Take 1 tablet (10 mg total) by mouth once daily. 90 tablet 3    mucus clearing device (AEROBIKA OSCILLATING PEP SYSTM MISC) 12 puffs by Misc.(Non-Drug; Combo Route) route daily as needed.      multivitamin capsule Take 1 capsule by mouth once daily.  "(Patient taking differently: Take 1 capsule by mouth once daily. Taking 3 times weekly)      NYAMYC powder       omega-3 fatty acids/fish oil (FISH OIL-OMEGA-3 FATTY ACIDS) 300-1,000 mg capsule Take by mouth once daily.      ONETOUCH DELICA PLUS LANCET 33 gauge Misc USE   TO CHECK GLUCOSE 4 TIMES DAILY BEFORE  MEALS 300 each 3    pen needle, diabetic (BD ULTRA-FINE MICRO PEN NEEDLE) 32 gauge x 1/4" Ndle use to inject ozempic once a week 100 each 3    pen needle, diabetic (BD ULTRA-FINE MICRO PEN NEEDLE) 32 gauge x 1/4" Ndle Use to inject insulin 4 to 5 times a day as instructed 100 each 6    potassium chloride (KLOR-CON) 10 MEQ TbSR Take 99 mEq by mouth once. (Patient taking differently: Take 99 mEq by mouth once. Taking 3 x weekly)      trimethoprim (TRIMPEX) 100 mg Tab Take 100 mg by mouth every 12 (twelve) hours.     [2]   Social History  Tobacco Use    Smoking status: Never    Smokeless tobacco: Never   Substance Use Topics    Alcohol use: Not Currently     Comment: rare    Drug use: Never        Reynold Crooks MD  07/08/25 1806    "

## 2025-07-08 ENCOUNTER — CLINICAL SUPPORT (OUTPATIENT)
Dept: CARDIOLOGY | Facility: HOSPITAL | Age: 85
End: 2025-07-08
Attending: INTERNAL MEDICINE
Payer: MEDICARE

## 2025-07-08 PROBLEM — J18.9 MULTIFOCAL PNEUMONIA: Status: ACTIVE | Noted: 2025-07-08

## 2025-07-08 PROBLEM — R05.9 COUGH: Status: ACTIVE | Noted: 2025-07-08

## 2025-07-08 PROBLEM — I50.9 CHF (CONGESTIVE HEART FAILURE): Status: ACTIVE | Noted: 2025-07-08

## 2025-07-08 LAB
ABSOLUTE NEUTROPHIL MANUAL (OHS): 12.9 K/UL
ANION GAP (OHS): 9 MMOL/L (ref 8–16)
BUN SERPL-MCNC: 16 MG/DL (ref 8–23)
CALCIUM SERPL-MCNC: 8.5 MG/DL (ref 8.7–10.5)
CHLORIDE SERPL-SCNC: 95 MMOL/L (ref 95–110)
CO2 SERPL-SCNC: 26 MMOL/L (ref 23–29)
CREAT SERPL-MCNC: 0.8 MG/DL (ref 0.5–1.4)
EAG (OHS): 137 MG/DL (ref 68–131)
EOSINOPHIL NFR BLD MANUAL: 1 % (ref 0–8)
ERYTHROCYTE [DISTWIDTH] IN BLOOD BY AUTOMATED COUNT: 13.1 % (ref 11.5–14.5)
GFR SERPLBLD CREATININE-BSD FMLA CKD-EPI: >60 ML/MIN/1.73/M2
GLUCOSE SERPL-MCNC: 173 MG/DL (ref 70–110)
HBA1C MFR BLD: 6.4 % (ref 4–5.6)
HCT VFR BLD AUTO: 34.7 % (ref 37–48.5)
HGB BLD-MCNC: 11.5 GM/DL (ref 12–16)
LYMPHOCYTES NFR BLD MANUAL: 5 % (ref 18–48)
MCH RBC QN AUTO: 27.9 PG (ref 27–31)
MCHC RBC AUTO-ENTMCNC: 33.1 G/DL (ref 32–36)
MCV RBC AUTO: 84 FL (ref 82–98)
MONOCYTES NFR BLD MANUAL: 11 % (ref 4–15)
NEUTROPHILS NFR BLD MANUAL: 75 % (ref 38–73)
NEUTS BAND NFR BLD MANUAL: 8 %
NUCLEATED RBC (/100WBC) (OHS): 0 /100 WBC
OHS QRS DURATION: 162 MS
OHS QTC CALCULATION: 511 MS
PLATELET # BLD AUTO: 365 K/UL (ref 150–450)
PMV BLD AUTO: 9.4 FL (ref 9.2–12.9)
POCT GLUCOSE: 166 MG/DL (ref 70–110)
POCT GLUCOSE: 222 MG/DL (ref 70–110)
POCT GLUCOSE: 299 MG/DL (ref 70–110)
POTASSIUM SERPL-SCNC: 2.9 MMOL/L (ref 3.5–5.1)
RBC # BLD AUTO: 4.12 M/UL (ref 4–5.4)
SODIUM SERPL-SCNC: 130 MMOL/L (ref 136–145)
WBC # BLD AUTO: 15.52 K/UL (ref 3.9–12.7)

## 2025-07-08 PROCEDURE — 85025 COMPLETE CBC W/AUTO DIFF WBC: CPT | Performed by: EMERGENCY MEDICINE

## 2025-07-08 PROCEDURE — 36415 COLL VENOUS BLD VENIPUNCTURE: CPT | Performed by: EMERGENCY MEDICINE

## 2025-07-08 PROCEDURE — 63600175 PHARM REV CODE 636 W HCPCS: Performed by: EMERGENCY MEDICINE

## 2025-07-08 PROCEDURE — 94640 AIRWAY INHALATION TREATMENT: CPT

## 2025-07-08 PROCEDURE — 94761 N-INVAS EAR/PLS OXIMETRY MLT: CPT

## 2025-07-08 PROCEDURE — 27000221 HC OXYGEN, UP TO 24 HOURS

## 2025-07-08 PROCEDURE — 80048 BASIC METABOLIC PNL TOTAL CA: CPT | Performed by: EMERGENCY MEDICINE

## 2025-07-08 PROCEDURE — 25000242 PHARM REV CODE 250 ALT 637 W/ HCPCS: Performed by: INTERNAL MEDICINE

## 2025-07-08 PROCEDURE — 25000003 PHARM REV CODE 250: Performed by: INTERNAL MEDICINE

## 2025-07-08 PROCEDURE — 21400001 HC TELEMETRY ROOM

## 2025-07-08 PROCEDURE — 27000207 HC ISOLATION

## 2025-07-08 PROCEDURE — 99900031 HC PATIENT EDUCATION (STAT)

## 2025-07-08 PROCEDURE — 83036 HEMOGLOBIN GLYCOSYLATED A1C: CPT | Performed by: INTERNAL MEDICINE

## 2025-07-08 PROCEDURE — 63600175 PHARM REV CODE 636 W HCPCS: Performed by: INTERNAL MEDICINE

## 2025-07-08 PROCEDURE — 25000003 PHARM REV CODE 250: Performed by: EMERGENCY MEDICINE

## 2025-07-08 PROCEDURE — 99900035 HC TECH TIME PER 15 MIN (STAT)

## 2025-07-08 PROCEDURE — 82962 GLUCOSE BLOOD TEST: CPT

## 2025-07-08 PROCEDURE — 93306 TTE W/DOPPLER COMPLETE: CPT

## 2025-07-08 RX ORDER — IBUPROFEN 200 MG
24 TABLET ORAL
Status: DISCONTINUED | OUTPATIENT
Start: 2025-07-08 | End: 2025-07-14 | Stop reason: HOSPADM

## 2025-07-08 RX ORDER — POTASSIUM CHLORIDE 20 MEQ/1
20 TABLET, EXTENDED RELEASE ORAL 3 TIMES DAILY
Status: DISCONTINUED | OUTPATIENT
Start: 2025-07-08 | End: 2025-07-11

## 2025-07-08 RX ORDER — GUAIFENESIN 600 MG/1
600 TABLET, EXTENDED RELEASE ORAL 2 TIMES DAILY
Status: DISCONTINUED | OUTPATIENT
Start: 2025-07-08 | End: 2025-07-14 | Stop reason: HOSPADM

## 2025-07-08 RX ORDER — IBUPROFEN 200 MG
16 TABLET ORAL
Status: DISCONTINUED | OUTPATIENT
Start: 2025-07-08 | End: 2025-07-14 | Stop reason: HOSPADM

## 2025-07-08 RX ORDER — GLUCAGON 1 MG
1 KIT INJECTION
Status: DISCONTINUED | OUTPATIENT
Start: 2025-07-08 | End: 2025-07-14 | Stop reason: HOSPADM

## 2025-07-08 RX ORDER — INSULIN ASPART 100 [IU]/ML
0-5 INJECTION, SOLUTION INTRAVENOUS; SUBCUTANEOUS
Status: DISCONTINUED | OUTPATIENT
Start: 2025-07-08 | End: 2025-07-14 | Stop reason: HOSPADM

## 2025-07-08 RX ORDER — POTASSIUM CHLORIDE 7.45 MG/ML
20 INJECTION INTRAVENOUS ONCE
Status: COMPLETED | OUTPATIENT
Start: 2025-07-08 | End: 2025-07-08

## 2025-07-08 RX ORDER — ALBUTEROL SULFATE 2.5 MG/.5ML
2.5 SOLUTION RESPIRATORY (INHALATION)
Status: DISCONTINUED | OUTPATIENT
Start: 2025-07-08 | End: 2025-07-14 | Stop reason: HOSPADM

## 2025-07-08 RX ORDER — LOPERAMIDE HYDROCHLORIDE 2 MG/1
2 CAPSULE ORAL 4 TIMES DAILY PRN
Status: DISCONTINUED | OUTPATIENT
Start: 2025-07-08 | End: 2025-07-14 | Stop reason: HOSPADM

## 2025-07-08 RX ORDER — ASPIRIN 81 MG/1
81 TABLET ORAL DAILY
Status: DISCONTINUED | OUTPATIENT
Start: 2025-07-08 | End: 2025-07-14 | Stop reason: HOSPADM

## 2025-07-08 RX ORDER — FUROSEMIDE 10 MG/ML
20 INJECTION INTRAMUSCULAR; INTRAVENOUS DAILY
Status: DISCONTINUED | OUTPATIENT
Start: 2025-07-08 | End: 2025-07-10

## 2025-07-08 RX ORDER — ATORVASTATIN CALCIUM 20 MG/1
20 TABLET, FILM COATED ORAL DAILY
Status: DISCONTINUED | OUTPATIENT
Start: 2025-07-08 | End: 2025-07-14 | Stop reason: HOSPADM

## 2025-07-08 RX ADMIN — LOPERAMIDE HYDROCHLORIDE 2 MG: 2 CAPSULE ORAL at 09:07

## 2025-07-08 RX ADMIN — LOPERAMIDE HYDROCHLORIDE 2 MG: 2 CAPSULE ORAL at 11:07

## 2025-07-08 RX ADMIN — ASPIRIN 81 MG: 81 TABLET, COATED ORAL at 09:07

## 2025-07-08 RX ADMIN — CARVEDILOL 6.25 MG: 3.12 TABLET, FILM COATED ORAL at 05:07

## 2025-07-08 RX ADMIN — FUROSEMIDE 20 MG: 10 INJECTION, SOLUTION INTRAMUSCULAR; INTRAVENOUS at 09:07

## 2025-07-08 RX ADMIN — INSULIN ASPART 1 UNITS: 100 INJECTION, SOLUTION INTRAVENOUS; SUBCUTANEOUS at 08:07

## 2025-07-08 RX ADMIN — POTASSIUM CHLORIDE 10 MEQ: 7.46 INJECTION, SOLUTION INTRAVENOUS at 08:07

## 2025-07-08 RX ADMIN — GABAPENTIN 600 MG: 300 CAPSULE ORAL at 03:07

## 2025-07-08 RX ADMIN — GABAPENTIN 600 MG: 300 CAPSULE ORAL at 08:07

## 2025-07-08 RX ADMIN — POTASSIUM CHLORIDE 20 MEQ: 1500 TABLET, FILM COATED, EXTENDED RELEASE ORAL at 03:07

## 2025-07-08 RX ADMIN — FLUOXETINE HYDROCHLORIDE 10 MG: 10 CAPSULE ORAL at 09:07

## 2025-07-08 RX ADMIN — MUPIROCIN: 20 OINTMENT TOPICAL at 09:07

## 2025-07-08 RX ADMIN — MUPIROCIN: 20 OINTMENT TOPICAL at 08:07

## 2025-07-08 RX ADMIN — POTASSIUM CHLORIDE 20 MEQ: 1500 TABLET, FILM COATED, EXTENDED RELEASE ORAL at 09:07

## 2025-07-08 RX ADMIN — INSULIN GLARGINE 16 UNITS: 100 INJECTION, SOLUTION SUBCUTANEOUS at 09:07

## 2025-07-08 RX ADMIN — ALBUTEROL SULFATE 2.5 MG: 2.5 SOLUTION RESPIRATORY (INHALATION) at 07:07

## 2025-07-08 RX ADMIN — GABAPENTIN 600 MG: 300 CAPSULE ORAL at 09:07

## 2025-07-08 RX ADMIN — GUAIFENESIN 600 MG: 600 TABLET, EXTENDED RELEASE ORAL at 08:07

## 2025-07-08 RX ADMIN — CARVEDILOL 6.25 MG: 3.12 TABLET, FILM COATED ORAL at 09:07

## 2025-07-08 RX ADMIN — ALBUTEROL SULFATE 2.5 MG: 2.5 SOLUTION RESPIRATORY (INHALATION) at 01:07

## 2025-07-08 RX ADMIN — LISINOPRIL 40 MG: 20 TABLET ORAL at 08:07

## 2025-07-08 RX ADMIN — POTASSIUM CHLORIDE 20 MEQ: 1500 TABLET, FILM COATED, EXTENDED RELEASE ORAL at 08:07

## 2025-07-08 RX ADMIN — ATORVASTATIN CALCIUM 20 MG: 20 TABLET, FILM COATED ORAL at 09:07

## 2025-07-08 RX ADMIN — PIPERACILLIN AND TAZOBACTAM 4.5 G: 4; .5 INJECTION, POWDER, LYOPHILIZED, FOR SOLUTION INTRAVENOUS; PARENTERAL at 05:07

## 2025-07-08 RX ADMIN — GUAIFENESIN 600 MG: 600 TABLET, EXTENDED RELEASE ORAL at 09:07

## 2025-07-08 RX ADMIN — ALBUTEROL SULFATE 2.5 MG: 2.5 SOLUTION RESPIRATORY (INHALATION) at 08:07

## 2025-07-08 RX ADMIN — PIPERACILLIN AND TAZOBACTAM 4.5 G: 4; .5 INJECTION, POWDER, LYOPHILIZED, FOR SOLUTION INTRAVENOUS; PARENTERAL at 11:07

## 2025-07-08 NOTE — ED NOTES
Bedside report rec'd from SHANNON Salcedo. Pt asleep in hospital bed, machado and IV in place. Remains on monitor. Awaiting admission to Med Surg.

## 2025-07-08 NOTE — ED NOTES
Pt arrived with 22F machado cath, due to be changed tomorrow per daughter. Urine cloudy, odorous.

## 2025-07-08 NOTE — PLAN OF CARE
07/08/25 1327   Medicare Message   Important Message from Medicare regarding Discharge Appeal Rights Given to patient/caregiver;Explained to patient/caregiver;Signed/date by patient/caregiver;Other (comments)  (completed in registration)   Date IMM was signed 07/07/25   Time IMM was signed [id number]

## 2025-07-08 NOTE — SUBJECTIVE & OBJECTIVE
Past Medical History:   Diagnosis Date    Arthritis     Asthma     Coronary artery disease     Environmental and seasonal allergies     Land catheter in place     Hard of hearing     Hyperlipidemia     Neurogenic bladder     Pneumonia, unspecified organism     Presence of indwelling Land catheter     Unspecified chronic bronchitis     Vitamin D deficiency        Past Surgical History:   Procedure Laterality Date    BLADDER SUSPENSION      BREAST BIOPSY Bilateral 1996    BREAST SURGERY      lumpectomy, isotopes    CHOLECYSTECTOMY      had gal bladder removed    COLECTOMY      had part of colon removed    CYSTOSCOPY W/ RETROGRADES Bilateral 12/19/2019    Procedure: CYSTOSCOPY, WITH RETROGRADE PYELOGRAM;  Surgeon: Prasad Rocha MD;  Location: [facility] OR;  Service: Urology;  Laterality: Bilateral;    DILATION OF URETHRA N/A 12/19/2019    Procedure: DILATION, URETHRA;  Surgeon: Prasad Rocha MD;  Location: [facility] OR;  Service: Urology;  Laterality: N/A;    HYSTERECTOMY      LEFT HEART CATHETERIZATION Left 11/27/2019    Procedure: Left heart cath;  Surgeon: Urban Paiz MD;  Location: [facility] CATH LAB;  Service: Cardiology;  Laterality: Left;    MODIFIED RADICAL MASTECTOMY W/ AXILLARY LYMPH NODE DISSECTION Right 1/26/2022    Procedure: MASTECTOMY, MODIFIED RADICAL;  Surgeon: Keisha Cortez MD;  Location: [facility] OR;  Service: General;  Laterality: Right;    OOPHORECTOMY      SENTINEL LYMPH NODE BIOPSY Right 1/26/2022    Procedure: BIOPSY, LYMPH NODE, SENTINEL;  Surgeon: Keisha Cortez MD;  Location: [facility];  Service: General;  Laterality: Right;  0800    SIMPLE MASTECTOMY Left 1/26/2022    Procedure: MASTECTOMY, SIMPLE;  Surgeon: Keisha Cortez MD;  Location: [facility];  Service: General;  Laterality: Left;  FROZEN SECTION       Review of patient's allergies indicates:   Allergen Reactions    Macrobid [nitrofurantoin monohyd/m-cryst] Swelling    Bactrim [sulfamethoxazole-trimethoprim] Diarrhea     Ciprofloxacin Other (See Comments)     GI problems    Codeine Other (See Comments)     headache    Latex, natural rubber Rash       No current facility-administered medications on file prior to encounter.     Current Outpatient Medications on File Prior to Encounter   Medication Sig    acarbose (PRECOSE) 100 MG Tab Take 1 tablet (100 mg total) by mouth 3 (three) times daily with meals.    albuterol (PROVENTIL/VENTOLIN HFA) 90 mcg/actuation inhaler Inhale 2 puffs into the lungs every 6 (six) hours as needed for Wheezing. Rescue    alendronate (FOSAMAX) 70 MG tablet Take 1 tablet (70 mg total) by mouth every 7 days.    aspirin (ECOTRIN) 81 MG EC tablet Take 81 mg by mouth once daily. 3 days a week    atorvastatin (LIPITOR) 40 MG tablet Take 1 tablet (40 mg total) by mouth once daily. (Patient taking differently: Take 20 mg by mouth once daily.)    azithromycin (Z-JAIRO) 250 MG tablet Take 2 tablets by mouth on day 1; Take 1 tablet by mouth on days 2-5    blood sugar diagnostic (ONETOUCH ULTRA TEST) Strp Inject 1 each into the skin 3 (three) times daily before meals.    calcium citrate (CALCITRATE) 200 mg (950 mg) tablet Take 1 tablet by mouth once daily. (Patient taking differently: Take 1 tablet by mouth every Mon, Wed, Fri.)    carvediloL (COREG) 6.25 MG tablet Take 1 tablet (6.25 mg total) by mouth 2 (two) times daily with meals.    cholecalciferol, vitamin D3, (VITAMIN D3) 50 mcg (2,000 unit) Tab Take 1 tablet (2,000 Units total) by mouth twice a week. 10,000 twice a week (Patient taking differently: Take 1 tablet by mouth twice a week. 10,000 3 times weekly)    clobetasoL (TEMOVATE) 0.05 % cream Apply twice a day for 2 weeks then nightly for 2 months. May decrease to three nights a week after that.    co-enzyme Q-10 30 mg capsule Take 1 capsule (30 mg total) by mouth every evening.    cranberry fruit extract (CRANBERRY CONCENTRATE ORAL) Take 2 capsules by mouth once daily. (Patient taking differently: Take 1  capsule by mouth once daily.)    dicyclomine (BENTYL) 20 mg tablet Take 1 tablet (20 mg total) by mouth 4 (four) times daily before meals and nightly.    ferrous sulfate 325 (65 FE) MG EC tablet Take 325 mg by mouth. 3 days a week    FLUoxetine 10 MG capsule Take 1 capsule (10 mg total) by mouth once daily.    furosemide (LASIX) 20 MG tablet Take 1 tablet by mouth once daily    gabapentin (NEURONTIN) 600 MG tablet Take 1 tablet (600 mg total) by mouth 3 (three) times daily.    guaiFENesin (MUCINEX) 600 mg 12 hr tablet Take 2 tablets (1,200 mg total) by mouth 2 (two) times daily.    hydroCHLOROthiazide (MICROZIDE) 12.5 mg capsule Take 12.5 mg by mouth once daily.    insulin aspart U-100 (NOVOLOG FLEXPEN U-100 INSULIN) 100 unit/mL (3 mL) InPn pen Take sliding scale insulin 4 times a day as instructed- max dose of 40 units/day.    insulin glargine U-100, Lantus, (LANTUS SOLOSTAR U-100 INSULIN) 100 unit/mL (3 mL) InPn pen Inject 16 Units into the skin once daily.    letrozole (FEMARA) 2.5 mg Tab Take 1 tablet (2.5 mg total) by mouth once daily.    LIDOcaine (LIDODERM) 5 % Place onto the skin once daily. Remove & Discard patch within 12 hours or as directed by MD    lisinopriL (PRINIVIL,ZESTRIL) 40 MG tablet Take 1 tablet (40 mg total) by mouth every evening.    metFORMIN (GLUCOPHAGE-XR) 500 MG ER 24hr tablet Take 1 tablet (500 mg total) by mouth daily with breakfast.    multivitamin capsule Take 1 capsule by mouth once daily. (Patient taking differently: Take 1 capsule by mouth once daily. Taking 3 times weekly)    omega-3 fatty acids/fish oil (FISH OIL-OMEGA-3 FATTY ACIDS) 300-1,000 mg capsule Take 1 capsule by mouth once a week.    potassium chloride (KLOR-CON) 10 MEQ TbSR Take 99 mEq by mouth once. (Patient taking differently: Take 99 mEq by mouth once. Taking 3 x weekly)    acetaminophen (TYLENOL) 650 MG TbSR Take 650 mg by mouth 2 (two) times a day.    amoxicillin-clavulanate 875-125mg (AUGMENTIN) 875-125 mg per  "tablet Take 1 tablet by mouth 2 (two) times daily.    blood-glucose meter Misc 1 Device by Misc.(Non-Drug; Combo Route) route once daily. One Touch Dx. Code:E11.9    blood-glucose sensor (FREESTYLE JOSEPHINE 2 PLUS SENSOR) Rose Use as instructed    flash glucose scanning reader (FREESTYLE JOSEPHINE 2 READER) Misc Use as instructed    flash glucose sensor (FREESTYLE JOSEPHINE 2 SENSOR) Kit Use as instructed    fluconazole (DIFLUCAN) 150 MG Tab Take 1 tablet (150 mg total) by mouth once daily.    furosemide (LASIX) 20 MG tablet Take 1 tablet (20 mg total) by mouth once daily.    glucagon (BAQSIMI) 3 mg/actuation Spry 2 pack.  Spray one device (3 mg) in one nostril to treat severe hypoglycemia. Disp 1 box (2 devices)    loperamide (IMODIUM) 2 mg capsule Take 2 mg by mouth 4 (four) times daily as needed for Diarrhea.    montelukast (SINGULAIR) 10 mg tablet Take 1 tablet (10 mg total) by mouth once daily.    mucus clearing device (AEROBIKA OSCILLATING PEP SYSTM MISC) 12 puffs by Misc.(Non-Drug; Combo Route) route daily as needed.    NYAMYC powder     ONETOUCH DELICA PLUS LANCET 33 gauge Misc USE   TO CHECK GLUCOSE 4 TIMES DAILY BEFORE  MEALS    pen needle, diabetic (BD ULTRA-FINE MICRO PEN NEEDLE) 32 gauge x 1/4" Ndle use to inject ozempic once a week    pen needle, diabetic (BD ULTRA-FINE MICRO PEN NEEDLE) 32 gauge x 1/4" Ndle Use to inject insulin 4 to 5 times a day as instructed    trimethoprim (TRIMPEX) 100 mg Tab Take 100 mg by mouth every 12 (twelve) hours.     Family History       Problem Relation (Age of Onset)    Bell's palsy Sister    Bone cancer Mother, Father (86)    Breast cancer Mother, Sister, Sister    Cancer Mother (42), Father    Heart disease Mother          Tobacco Use    Smoking status: Never    Smokeless tobacco: Never   Substance and Sexual Activity    Alcohol use: Not Currently     Comment: rare    Drug use: Never    Sexual activity: Not Currently     Partners: Male     Comment:      Review of Systems "   Constitutional:  Positive for activity change, chills and fatigue. Negative for fever.   HENT:  Positive for postnasal drip. Negative for sinus pressure and sore throat.    Respiratory:  Positive for cough and shortness of breath. Negative for wheezing.    Cardiovascular:  Negative for chest pain, palpitations and leg swelling.   Gastrointestinal:  Negative for abdominal pain, nausea and vomiting.   Skin:  Negative for rash and wound.   Neurological:  Positive for weakness. Negative for syncope.     Objective:     Vital Signs (Most Recent):  Temp: 97.6 °F (36.4 °C) (07/08/25 0752)  Pulse: 93 (07/08/25 0800)  Resp: 20 (07/08/25 0800)  BP: (!) 142/64 (07/08/25 0752)  SpO2: (!) 94 % (07/08/25 0800) Vital Signs (24h Range):  Temp:  [97.6 °F (36.4 °C)-98.8 °F (37.1 °C)] 97.6 °F (36.4 °C)  Pulse:  [79-93] 93  Resp:  [12-26] 20  SpO2:  [90 %-99 %] 94 %  BP: (132-201)/(60-84) 142/64     Weight: 88.9 kg (196 lb)  Body mass index is 33.64 kg/m².     Physical Exam  Constitutional:       Appearance: She is ill-appearing.   HENT:      Nose: Nose normal.      Mouth/Throat:      Mouth: Mucous membranes are moist.   Eyes:      Extraocular Movements: Extraocular movements intact.      Pupils: Pupils are equal, round, and reactive to light.   Cardiovascular:      Rate and Rhythm: Normal rate and regular rhythm.   Pulmonary:      Effort: Pulmonary effort is normal.      Breath sounds: Rhonchi present.      Comments: O2 via nc  Abdominal:      General: Bowel sounds are normal.      Palpations: Abdomen is soft.   Musculoskeletal:      Right lower leg: Edema present.      Left lower leg: Edema present.   Skin:     General: Skin is warm.   Neurological:      General: No focal deficit present.      Mental Status: She is alert.              CRANIAL NERVES     CN III, IV, VI   Pupils are equal, round, and reactive to light.       Significant Labs: All pertinent labs within the past 24 hours have been reviewed.  Recent Lab Results  (Last 5  results in the past 24 hours)        07/08/25  0430   07/08/25  0234   07/07/25  2126   07/07/25  1944   07/07/25  1843        Procalcitonin         0.44  Comment: A concentration < 0.25 ng/mL represents a low risk of bacterial infection.  Procalcitonin may not be accurate among patients with localized   infection, recent trauma or major surgery, immunosuppressed state,   invasive fungal infection, renal dysfunction. Decisions regarding   initiation or continuation of antibiotic therapy should not be based   solely on procalcitonin levels.       Albumin         2.6       ALP         136       ALT         13       Anion Gap 9         13       Appearance, UA       Clear         AST         23       Bacteria, UA       None         Bands 8.0         4.0       Bilirubin (UA)       Negative         BILIRUBIN TOTAL         0.5  Comment: For infants and newborns, interpretation of results should be based   on gestational age, weight and in agreement with clinical   observations.    Premature Infant recommended reference ranges:   0-24 hours:  <8.0 mg/dL   24-48 hours: <12.0 mg/dL   3-5 days:    <15.0 mg/dL   6-29 days:   <15.0 mg/dL       BUN 16         17       Calcium 8.5         8.7       Chloride 95         92       CO2 26         24       Color, UA       Yellow         Creatinine 0.8         0.8       eGFR >60  Comment: Estimated GFR calculated using the CKD-EPI creatinine (2021) equation.         >60  Comment: Estimated GFR calculated using the CKD-EPI creatinine (2021) equation.       Eos % 1.0               Glucose 173         175       Glucose, UA       Negative         Gran # (ANC) 12.9         13.1       Hematocrit 34.7         34.1       Hemoglobin 11.5         11.3       Extra Tube       Hold   Hold       Hyaline Casts, UA       0         Ketones, UA       1+         Lactic Acid Level     1.3     1.4       Leukocyte Esterase, UA       2+         Lymph % 5.0         5.0       Magnesium          1.8       MCH  27.9         27.7       MCHC 33.1         33.1       MCV 84         84       Microscopic Comment         Comment: Other formed elements not mentioned in the report are not present in the microscopic examination.         Mono % 11.0         9.0       MPV 9.4         10.1       NITRITE UA       Negative         nRBC 0         0       NT-proBNP         10,746       Blood, UA       1+         pH, UA       7.0         Platelet Count 365         379       POCT Glucose   166             Potassium 2.9         3.4       PROTEIN TOTAL         7.2       Protein, UA       2+  Comment: Recommend a 24 hour urine protein or a urine protein/creatinine ratio if globulin induced proteinuria is clinically suspected.         RBC 4.12         4.08       RBC, UA       17         RDW 13.1         13.0       Segmented Neutrophil % 75.0         82.0       Sodium 130         129       Spec Grav UA       1.020         Squam Epithel, UA       1         Urobilinogen, UA       1.0         WBC, UA       63         WBC 15.52         15.22                              Significant Imaging: I have reviewed all pertinent imaging results/findings within the past 24 hours.

## 2025-07-08 NOTE — PLAN OF CARE
Problem: Infection  Goal: Absence of Infection Signs and Symptoms  Outcome: Progressing     Problem: Adult Inpatient Plan of Care  Goal: Plan of Care Review  Outcome: Progressing  Goal: Patient-Specific Goal (Individualized)  Outcome: Progressing  Goal: Absence of Hospital-Acquired Illness or Injury  Outcome: Progressing  Goal: Optimal Comfort and Wellbeing  Outcome: Progressing  Goal: Readiness for Transition of Care  Outcome: Progressing     Problem: Diabetes Comorbidity  Goal: Blood Glucose Level Within Targeted Range  Outcome: Progressing     Problem: Pneumonia  Goal: Fluid Balance  Outcome: Progressing  Goal: Resolution of Infection Signs and Symptoms  Outcome: Progressing  Goal: Effective Oxygenation and Ventilation  Outcome: Progressing     Problem: Fall Injury Risk  Goal: Absence of Fall and Fall-Related Injury  Outcome: Progressing     Problem: Wound  Goal: Optimal Coping  Outcome: Progressing  Goal: Optimal Functional Ability  Outcome: Progressing  Goal: Absence of Infection Signs and Symptoms  Outcome: Progressing  Goal: Improved Oral Intake  Outcome: Progressing  Goal: Optimal Pain Control and Function  Outcome: Progressing  Goal: Skin Health and Integrity  Outcome: Progressing  Goal: Optimal Wound Healing  Outcome: Progressing

## 2025-07-08 NOTE — ASSESSMENT & PLAN NOTE
Patient has Diastolic (HFpEF) heart failure that is Acute on chronic. On presentation their CHF was decompensated. Evidence of decompensated CHF on presentation includes: edema, dyspnea on exertion (KIDD), and shortness of breath. The etiology of their decompensation is likely dietary indiscretion. Most recent BNP and echo results are listed below.  Recent Labs     07/07/25  1843   BNP 10,746*     Latest ECHO  Results for orders placed in visit on 09/20/23    Echo    Interpretation Summary    Left Ventricle: Moderately increased wall thickness. There is moderate concentric hypertrophy. Normal wall motion. Grade II diastolic dysfunction.    Left Atrium: Left atrium is moderately dilated.    Right Ventricle: Normal right ventricular cavity size. Systolic function is normal.    Right Atrium: Right atrium is mildly dilated.    Aortic Valve: The aortic valve is a trileaflet valve. Mildly calcified left, right and noncoronary cusps. Mildly restricted motion. By planimetry the aortic valve area measures 1.8 cm².    Mitral Valve: There is mild bileaflet sclerosis.    Current Heart Failure Medications  , Daily, Oral  carvediloL tablet 6.25 mg, 2 times daily with meals, Oral  lisinopriL tablet 40 mg, Nightly, Oral  furosemide injection 20 mg, Daily, Intravenous    Plan  - Monitor strict I&Os and daily weights.    - Place on telemetry  - Low sodium diet  - Place on fluid restriction of 2 L.   - Cardiology has been consulted  - The patient's volume status is improving but not at their baseline as indicated by edema, dyspnea on exertion (KIDD), and shortness of breath

## 2025-07-08 NOTE — ASSESSMENT & PLAN NOTE
Patient's blood pressure range in the last 24 hours was: BP  Min: 132/63  Max: 201/83.The patient's inpatient anti-hypertensive regimen is listed below:  Current Antihypertensives  , Daily, Oral  carvediloL tablet 6.25 mg, 2 times daily with meals, Oral  lisinopriL tablet 40 mg, Nightly, Oral  furosemide injection 20 mg, Daily, Intravenous    Plan  - BP is controlled, no changes needed to their regimen

## 2025-07-08 NOTE — PLAN OF CARE
[facility]  Initial Discharge Assessment       Primary Care Provider: Gabby Jackson NP    Admission Diagnosis: Cough [R05.9]  CHF (congestive heart failure) [I50.9]  Multifocal pneumonia [J18.9]    Admission Date: 7/7/2025  Expected Discharge Date:     Transition of Care Barriers: None    Payor: MEDICARE / Plan: MEDICARE PART A & B / Product Type: Government /     Extended Emergency Contact Information  Primary Emergency Contact: Elizabeth Reed   [facility]  Home Phone: [phone]  Mobile Phone: [phone]  Relation: Daughter    Discharge Plan A: Home Health  Discharge Plan B: Home Health      [facility] [address] [place], [place] - [address]  [address]  [facility] [address]  Phone: [phone] Fax: [phone]      Initial Assessment (most recent)       Adult Discharge Assessment - 07/08/25 0935          Discharge Assessment    Assessment Type Discharge Planning Assessment     Confirmed/corrected address, phone number and insurance Yes     Confirmed Demographics Correct on Facesheet     Source of Information patient     Communicated CATRACHITO with patient/caregiver Yes     People in Home child(eloy), adult     Name(s) of People in Home Elizabeth Reed (Daughter)  [phone]     Do you expect to return to your current living situation? Yes     Do you have help at home or someone to help you manage your care at home? Yes     Who are your caregiver(s) and their phone number(s)? donald Johnson     Prior to hospitilization cognitive status: Alert/Oriented     Current cognitive status: Alert/Oriented     Walking or Climbing Stairs Difficulty yes     Walking or Climbing Stairs ambulation difficulty, requires equipment     Mobility Management Utilizes rollator     Dressing/Bathing Difficulty yes     Dressing/Bathing bathing difficulty, assistance 1 person     Dressing/Bathing Management assisted by donald Johnson     Home Layout Able to live on 1st floor     Equipment  Currently Used at Home rollator;wheelchair;CPAP;glucometer;shower chair;bedside commode;lift device     Readmission within 30 days? No     Patient currently being followed by outpatient case management? Unable to determine (comments)     Do you currently have service(s) that help you manage your care at home? Yes     Name and Contact number of agency [facility]     Is the pt/caregiver preference to resume services with current agency Yes     Do you take prescription medications? Yes     Do you have prescription coverage? Yes     Do you have any problems affording any of your prescribed medications? No     Is the patient taking medications as prescribed? yes     Who is going to help you get home at discharge? Daughter Angelika     How do you get to doctors appointments? family or friend will provide     Are you on dialysis? No     Do you take coumadin? No     Discharge Plan A Home Health     Discharge Plan B Home Health     DME Needed Upon Discharge  none     Discharge Plan discussed with: Patient     Transition of Care Barriers None                      DCP completed at bedside with patient. Patient is AAO x 4. No social concerns noted at this time. Patient to resume home health services with [facility] upon discharge. No other needs from  at this time. CM to remain available for any needs.

## 2025-07-08 NOTE — CONSULTS
[facility]  Cardiology  Consult Note    Patient Name: Kelsea Cadet  MRN: [id number]  Admission Date: 7/7/2025  Hospital Length of Stay: 1 days  Code Status: Full Code   Attending Provider: Harika Mina MD   Consulting Provider: YNES Castillo  Primary Care Physician: Gabby Jackson NP  Principal Problem:Multifocal pneumonia    Patient information was obtained from patient, past medical records, and ER records.     Inpatient consult to Cardiology  Consult performed by: Dre Guerra FNP  Consult ordered by: Harika Mina MD        Subjective:     Chief Complaint:  SOB    HPI:   85 year old  female with PMH of breast CA, HHD, hyperlipidemia, CKD, Type II DM, Non-obstructive CAD, Pulm HTN, and asthma presents to the ED overnight with reports of fatigue, malaise, and SOB.     Patient reports fever, cough, and malasie since last week. She saw her PCP at that time with a chest XR and was treated with outpatient antibiotics. Symptoms worsened, did not improve with oral antibiotics.     Last seen by [facility] in 2021, now follows with Dr. Turner.     Chest CT concerning for multifocal pneumonia with a new large subcarinal node and right para hilar node which could be benign or malignant and are considered indeterminate. NT pro BNP elevated at 10,000. Cardiology asked to evaluate for possible CHF exacerbation.     Past Medical History:   Diagnosis Date    Arthritis     Asthma     Coronary artery disease     Environmental and seasonal allergies     Land catheter in place     Hard of hearing     Hyperlipidemia     Neurogenic bladder     Pneumonia, unspecified organism     Presence of indwelling Land catheter     Unspecified chronic bronchitis     Vitamin D deficiency        Past Surgical History:   Procedure Laterality Date    BLADDER SUSPENSION      BREAST BIOPSY Bilateral 1996    BREAST SURGERY      lumpectomy, isotopes    CHOLECYSTECTOMY      had gal bladder removed    COLECTOMY       had part of colon removed    CYSTOSCOPY W/ RETROGRADES Bilateral 12/19/2019    Procedure: CYSTOSCOPY, WITH RETROGRADE PYELOGRAM;  Surgeon: Prasad Rocha MD;  Location: [facility] OR;  Service: Urology;  Laterality: Bilateral;    DILATION OF URETHRA N/A 12/19/2019    Procedure: DILATION, URETHRA;  Surgeon: Prasad Rocha MD;  Location: [facility] OR;  Service: Urology;  Laterality: N/A;    HYSTERECTOMY      LEFT HEART CATHETERIZATION Left 11/27/2019    Procedure: Left heart cath;  Surgeon: Urban Paiz MD;  Location: [facility] CATH LAB;  Service: Cardiology;  Laterality: Left;    MODIFIED RADICAL MASTECTOMY W/ AXILLARY LYMPH NODE DISSECTION Right 1/26/2022    Procedure: MASTECTOMY, MODIFIED RADICAL;  Surgeon: Keisha Cortez MD;  Location: [facility] OR;  Service: General;  Laterality: Right;    OOPHORECTOMY      SENTINEL LYMPH NODE BIOPSY Right 1/26/2022    Procedure: BIOPSY, LYMPH NODE, SENTINEL;  Surgeon: Keisha Cortez MD;  Location: [facility];  Service: General;  Laterality: Right;  0800    SIMPLE MASTECTOMY Left 1/26/2022    Procedure: MASTECTOMY, SIMPLE;  Surgeon: Keisha Cortez MD;  Location: [facility];  Service: General;  Laterality: Left;  FROZEN SECTION       Review of patient's allergies indicates:   Allergen Reactions    Macrobid [nitrofurantoin monohyd/m-cryst] Swelling    Bactrim [sulfamethoxazole-trimethoprim] Diarrhea    Ciprofloxacin Other (See Comments)     GI problems    Codeine Other (See Comments)     headache    Latex, natural rubber Rash       No current facility-administered medications on file prior to encounter.     Current Outpatient Medications on File Prior to Encounter   Medication Sig    acarbose (PRECOSE) 100 MG Tab Take 1 tablet (100 mg total) by mouth 3 (three) times daily with meals.    albuterol (PROVENTIL/VENTOLIN HFA) 90 mcg/actuation inhaler Inhale 2 puffs into the lungs every 6 (six) hours as needed for Wheezing. Rescue    alendronate (FOSAMAX) 70 MG tablet Take 1 tablet (70 mg  total) by mouth every 7 days.    aspirin (ECOTRIN) 81 MG EC tablet Take 81 mg by mouth once daily. 3 days a week    atorvastatin (LIPITOR) 40 MG tablet Take 1 tablet (40 mg total) by mouth once daily. (Patient taking differently: Take 20 mg by mouth once daily.)    azithromycin (Z-JAIRO) 250 MG tablet Take 2 tablets by mouth on day 1; Take 1 tablet by mouth on days 2-5    blood sugar diagnostic (ONETOUCH ULTRA TEST) Strp Inject 1 each into the skin 3 (three) times daily before meals.    calcium citrate (CALCITRATE) 200 mg (950 mg) tablet Take 1 tablet by mouth once daily. (Patient taking differently: Take 1 tablet by mouth every Mon, Wed, Fri.)    carvediloL (COREG) 6.25 MG tablet Take 1 tablet (6.25 mg total) by mouth 2 (two) times daily with meals.    cholecalciferol, vitamin D3, (VITAMIN D3) 50 mcg (2,000 unit) Tab Take 1 tablet (2,000 Units total) by mouth twice a week. 10,000 twice a week (Patient taking differently: Take 1 tablet by mouth twice a week. 10,000 3 times weekly)    clobetasoL (TEMOVATE) 0.05 % cream Apply twice a day for 2 weeks then nightly for 2 months. May decrease to three nights a week after that.    co-enzyme Q-10 30 mg capsule Take 1 capsule (30 mg total) by mouth every evening.    cranberry fruit extract (CRANBERRY CONCENTRATE ORAL) Take 2 capsules by mouth once daily. (Patient taking differently: Take 1 capsule by mouth once daily.)    dicyclomine (BENTYL) 20 mg tablet Take 1 tablet (20 mg total) by mouth 4 (four) times daily before meals and nightly.    ferrous sulfate 325 (65 FE) MG EC tablet Take 325 mg by mouth. 3 days a week    FLUoxetine 10 MG capsule Take 1 capsule (10 mg total) by mouth once daily.    furosemide (LASIX) 20 MG tablet Take 1 tablet by mouth once daily    gabapentin (NEURONTIN) 600 MG tablet Take 1 tablet (600 mg total) by mouth 3 (three) times daily.    guaiFENesin (MUCINEX) 600 mg 12 hr tablet Take 2 tablets (1,200 mg total) by mouth 2 (two) times daily.     hydroCHLOROthiazide (MICROZIDE) 12.5 mg capsule Take 12.5 mg by mouth once daily.    insulin aspart U-100 (NOVOLOG FLEXPEN U-100 INSULIN) 100 unit/mL (3 mL) InPn pen Take sliding scale insulin 4 times a day as instructed- max dose of 40 units/day.    insulin glargine U-100, Lantus, (LANTUS SOLOSTAR U-100 INSULIN) 100 unit/mL (3 mL) InPn pen Inject 16 Units into the skin once daily.    letrozole (FEMARA) 2.5 mg Tab Take 1 tablet (2.5 mg total) by mouth once daily.    LIDOcaine (LIDODERM) 5 % Place onto the skin once daily. Remove & Discard patch within 12 hours or as directed by MD    lisinopriL (PRINIVIL,ZESTRIL) 40 MG tablet Take 1 tablet (40 mg total) by mouth every evening.    metFORMIN (GLUCOPHAGE-XR) 500 MG ER 24hr tablet Take 1 tablet (500 mg total) by mouth daily with breakfast.    multivitamin capsule Take 1 capsule by mouth once daily. (Patient taking differently: Take 1 capsule by mouth once daily. Taking 3 times weekly)    omega-3 fatty acids/fish oil (FISH OIL-OMEGA-3 FATTY ACIDS) 300-1,000 mg capsule Take 1 capsule by mouth once a week.    potassium chloride (KLOR-CON) 10 MEQ TbSR Take 99 mEq by mouth once. (Patient taking differently: Take 99 mEq by mouth once. Taking 3 x weekly)    acetaminophen (TYLENOL) 650 MG TbSR Take 650 mg by mouth 2 (two) times a day.    amoxicillin-clavulanate 875-125mg (AUGMENTIN) 875-125 mg per tablet Take 1 tablet by mouth 2 (two) times daily.    blood-glucose meter Misc 1 Device by Misc.(Non-Drug; Combo Route) route once daily. One Touch Dx. Code:E11.9    blood-glucose sensor (FREESTYLE JOSEPHINE 2 PLUS SENSOR) Rose Use as instructed    flash glucose scanning reader (FREESTYLE JOSEPHINE 2 READER) Misc Use as instructed    flash glucose sensor (FREESTYLE JOSEPHINE 2 SENSOR) Kit Use as instructed    fluconazole (DIFLUCAN) 150 MG Tab Take 1 tablet (150 mg total) by mouth once daily.    furosemide (LASIX) 20 MG tablet Take 1 tablet (20 mg total) by mouth once daily.    glucagon (BAQSIMI)  "3 mg/actuation Spry 2 pack.  Spray one device (3 mg) in one nostril to treat severe hypoglycemia. Disp 1 box (2 devices)    loperamide (IMODIUM) 2 mg capsule Take 2 mg by mouth 4 (four) times daily as needed for Diarrhea.    montelukast (SINGULAIR) 10 mg tablet Take 1 tablet (10 mg total) by mouth once daily.    mucus clearing device (AEROBIKA OSCILLATING PEP SYSTM MISC) 12 puffs by Misc.(Non-Drug; Combo Route) route daily as needed.    NYAMYC powder     ONETOUCH DELICA PLUS LANCET 33 gauge Misc USE   TO CHECK GLUCOSE 4 TIMES DAILY BEFORE  MEALS    pen needle, diabetic (BD ULTRA-FINE MICRO PEN NEEDLE) 32 gauge x 1/4" Ndle use to inject ozempic once a week    pen needle, diabetic (BD ULTRA-FINE MICRO PEN NEEDLE) 32 gauge x 1/4" Ndle Use to inject insulin 4 to 5 times a day as instructed    trimethoprim (TRIMPEX) 100 mg Tab Take 100 mg by mouth every 12 (twelve) hours.     Family History       Problem Relation (Age of Onset)    Bell's palsy Sister    Bone cancer Mother, Father (86)    Breast cancer Mother, Sister, Sister    Cancer Mother (42), Father    Heart disease Mother          Tobacco Use    Smoking status: Never    Smokeless tobacco: Never   Substance and Sexual Activity    Alcohol use: Not Currently     Comment: rare    Drug use: Never    Sexual activity: Not Currently     Partners: Male     Comment:      Review of Systems   Constitutional: Positive for fever and malaise/fatigue.   Cardiovascular:  Positive for dyspnea on exertion. Negative for chest pain.   Respiratory:  Positive for cough and shortness of breath.    Skin: Negative.    Musculoskeletal: Negative.    Gastrointestinal:  Negative for nausea and vomiting.   Neurological:  Positive for weakness.     Objective:     Vital Signs (Most Recent):  Temp: 97.6 °F (36.4 °C) (07/08/25 0752)  Pulse: 93 (07/08/25 0800)  Resp: 20 (07/08/25 0800)  BP: (!) 142/64 (07/08/25 0752)  SpO2: (!) 94 % (07/08/25 0800) Vital Signs (24h Range):  Temp:  [97.6 °F (36.4 " °C)-98.8 °F (37.1 °C)] 97.6 °F (36.4 °C)  Pulse:  [79-93] 93  Resp:  [12-26] 20  SpO2:  [90 %-99 %] 94 %  BP: (132-201)/(60-84) 142/64     Weight: 88.9 kg (196 lb)  Body mass index is 33.64 kg/m².    SpO2: (!) 94 %         Intake/Output Summary (Last 24 hours) at 7/8/2025 0900  Last data filed at 7/8/2025 0600  Gross per 24 hour   Intake 100 ml   Output 2000 ml   Net -1900 ml       Lines/Drains/Airways       Drain  Duration                  Urethral Catheter 07/07/25 1905 Coude 22 Fr. <1 day              Peripheral Intravenous Line  Duration             Peripheral IV 07/07/25 1847 20 G Anterior;Right Forearm <1 day                    Significant Labs:   Recent Lab Results  (Last 5 results in the past 72 hours)        07/08/25  0430   07/08/25  0234   07/07/25  2126   07/07/25  1944   07/07/25  1843        Procalcitonin         0.44  Comment: A concentration < 0.25 ng/mL represents a low risk of bacterial infection.  Procalcitonin may not be accurate among patients with localized   infection, recent trauma or major surgery, immunosuppressed state,   invasive fungal infection, renal dysfunction. Decisions regarding   initiation or continuation of antibiotic therapy should not be based   solely on procalcitonin levels.       Albumin         2.6       ALP         136       ALT         13       Anion Gap 9         13       Appearance, UA       Clear         AST         23       Bacteria, UA       None         Bands 8.0         4.0       Bilirubin (UA)       Negative         BILIRUBIN TOTAL         0.5  Comment: For infants and newborns, interpretation of results should be based   on gestational age, weight and in agreement with clinical   observations.    Premature Infant recommended reference ranges:   0-24 hours:  <8.0 mg/dL   24-48 hours: <12.0 mg/dL   3-5 days:    <15.0 mg/dL   6-29 days:   <15.0 mg/dL       BUN 16         17       Calcium 8.5         8.7       Chloride 95         92       CO2 26         24        Color, UA       Yellow         Creatinine 0.8         0.8       eGFR >60  Comment: Estimated GFR calculated using the CKD-EPI creatinine (2021) equation.         >60  Comment: Estimated GFR calculated using the CKD-EPI creatinine (2021) equation.       Eos % 1.0               Glucose 173         175       Glucose, UA       Negative         Gran # (ANC) 12.9         13.1       Hematocrit 34.7         34.1       Hemoglobin 11.5         11.3       Extra Tube       Hold   Hold       Hyaline Casts, UA       0         Ketones, UA       1+         Lactic Acid Level     1.3     1.4       Leukocyte Esterase, UA       2+         Lymph % 5.0         5.0       Magnesium          1.8       MCH 27.9         27.7       MCHC 33.1         33.1       MCV 84         84       Microscopic Comment         Comment: Other formed elements not mentioned in the report are not present in the microscopic examination.         Mono % 11.0         9.0       MPV 9.4         10.1       NITRITE UA       Negative         nRBC 0         0       NT-proBNP         10,746       Blood, UA       1+         pH, UA       7.0         Platelet Count 365         379       POCT Glucose   166             Potassium 2.9         3.4       PROTEIN TOTAL         7.2       Protein, UA       2+  Comment: Recommend a 24 hour urine protein or a urine protein/creatinine ratio if globulin induced proteinuria is clinically suspected.         RBC 4.12         4.08       RBC, UA       17         RDW 13.1         13.0       Segmented Neutrophil % 75.0         82.0       Sodium 130         129       Spec Grav UA       1.020         Squam Epithel, UA       1         Urobilinogen, UA       1.0         WBC, UA       63         WBC 15.52         15.22                              Significant Imaging:   Imaging Results              CT Chest With Contrast (Final result)  Result time 07/07/25 19:40:16      Final result by Dinorah Bogoie MD (07/07/25 19:40:16)                    Impression:      1. There are mildly prominent paratracheal and AP window nodes which could be reactive however there is a new large subcarinal node and right para hilar node which could be benign or malignant and are considered indeterminate.  2. Small right pleural effusion  3. Volume loss in the right upper lobe with focal area of right anterior upper lobe consolidation suggesting infiltrate and bronchiectasis which is significantly increased  4.  bronchiectasis in the lung bases and diffuse scattered patchy infiltrates consistent with multifocal pneumonia with subtle areas in the left upper lobe and more focal areas in the right upper lobe and lung bases      Electronically signed by: Dinorah Boogie MD  Date:    07/07/2025  Time:    19:40               Narrative:    EXAMINATION:  CT CHEST WITH CONTRAST    CLINICAL HISTORY:  Pneumonia, unresolved;Dyspnea, chronic, unclear etiology;    TECHNIQUE:  Iterative reconstruction technique was used.    CT/Cardiac Nuclear exams in prior 12 months: 1    COMPARISON:  03/13/2025.    FINDINGS:  Multiple small thyroid nodules are present.  There is some abnormal paratracheal and mediastinal nodes with a right paratracheal node 1.4 cm right hilar node approximately 2.5 cm and a subcarinal node approximately 3 cm which are new.  There is a right pleural effusion measuring 3 cm in thickness there is been a cholecystectomy and intrahepatic ductal dilatation some small proximally 1 cm and less AP window nodes and several 1-1.5 cm paratracheal nodes.  There is new right upper lobe consolidation with volume loss on the right with extensive right upper lobe bronchiectasis and right middle lobe bronchiectasis.  There is patchy ground-glass infiltrate in the lingula and lung bases this suggest multifocal pneumonia there is small patchy areas of ground-glass infiltrate in the left upper lobe                                       X-Ray Chest 1 View (Final result)  Result time 07/08/25  01:56:36      Final result by Yasmany Sanders MD (07/08/25 01:56:36)                   Impression:      Interval worsening of the bilateral pulmonary infiltrates.      Electronically signed by: Yasmany Sanders MD  Date:    07/08/2025  Time:    01:56               Narrative:    EXAMINATION:  XR CHEST 1 VIEW    CLINICAL HISTORY:  Shortness of breath    COMPARISON:  07/03/2025    FINDINGS:  Cardiac silhouette is unchanged.  Heterogeneous opacities at the bibasilar and right perihilar regions of the lungs have increased in the interval, possibly worsening pneumonia.  No obvious pleural effusion.  Bones are unchanged.                                      Recent Diagnostics:    Last Echocardiogram:  8/22/2023    Left Ventricle: Moderately increased wall thickness. There is moderate concentric hypertrophy. Normal wall motion. Grade II diastolic dysfunction.    Left Atrium: Left atrium is moderately dilated.    Right Ventricle: Normal right ventricular cavity size. Systolic function is normal.    Right Atrium: Right atrium is mildly dilated.    Aortic Valve: The aortic valve is a trileaflet valve. Mildly calcified left, right and noncoronary cusps. Mildly restricted motion. By planimetry the aortic valve area measures 1.8 cm².    Mitral Valve: There is mild bileaflet sclerosis.  Last Nuclear &/or SHANICE:    Last Lipids:   Lab Results   Component Value Date    CHOL 77 (L) 12/11/2024    CHOL 94 (L) 07/01/2024    CHOL 131 12/27/2023     Lab Results   Component Value Date    HDL 42 12/11/2024    HDL 40 07/01/2024    HDL 44 12/27/2023     Lab Results   Component Value Date    LDLCALC 14.6 (L) 12/11/2024    LDLCALC 30.0 (L) 07/01/2024    LDLCALC 44.4 (L) 12/27/2023     Lab Results   Component Value Date    TRIG 102 12/11/2024    TRIG 120 07/01/2024    TRIG 213 (H) 12/27/2023     Lab Results   Component Value Date    CHOLHDL 54.5 (H) 12/11/2024    CHOLHDL 42.6 07/01/2024    CHOLHDL 33.6 12/27/2023           Telemetry:  NSR    CCS  Functional Classification of Angina:  I  NYHA Functional Classification:  II      Physical Exam:  Physical Exam  Constitutional:       Comments: frail   HENT:      Head: Normocephalic and atraumatic.      Nose: Nose normal.      Mouth/Throat:      Mouth: Mucous membranes are moist.   Eyes:      Extraocular Movements: Extraocular movements intact.      Pupils: Pupils are equal, round, and reactive to light.   Cardiovascular:      Rate and Rhythm: Normal rate and regular rhythm.      Pulses: Normal pulses.      Heart sounds: Normal heart sounds. No murmur heard.  Pulmonary:      Effort: Pulmonary effort is normal.      Breath sounds: Normal breath sounds.   Abdominal:      Palpations: Abdomen is soft.   Musculoskeletal:         General: Normal range of motion.      Cervical back: Normal range of motion.      Right lower leg: No edema.      Left lower leg: No edema.   Skin:     General: Skin is warm and dry.      Capillary Refill: Capillary refill takes less than 2 seconds.   Neurological:      General: No focal deficit present.      Mental Status: She is alert and oriented to person, place, and time.      Motor: No weakness.   Psychiatric:         Mood and Affect: Mood normal.         Behavior: Behavior normal.         Home Medications:  Medications Ordered Prior to Encounter[1]    Current Inpatient Medications:  Current Medications[2]      VTE Risk Mitigation (From admission, onward)           Ordered     IP VTE HIGH RISK PATIENT  Once         07/07/25 2105     Place PK hose  Until discontinued         07/07/25 2105                    Assessment:     Active Diagnoses:    Diagnosis Date Noted POA    PRINCIPAL PROBLEM:  Multifocal pneumonia [J18.9] 07/08/2025 Yes    Cough [R05.9] 07/08/2025 Yes    CHF (congestive heart failure) [I50.9] 07/08/2025 Yes    Urinary retention [R33.9] 01/06/2021 Yes    Type 2 diabetes mellitus without complication, with long-term current use of insulin [E11.9, Z79.4] 06/10/2019 Not  Applicable    Essential hypertension [I10] 05/13/2019 Yes      Problems Resolved During this Admission:       Multifocal Pneumonia   -patient admitted with SOB, cough, and malaise  -CT concerning for multifocal pneumonia  -WBC elevated at 15   -Management per primary team   -Agree with IV antibiotics     Chronic HFpEF  -Patient with chronic HFpEF  - Last Echo in 2023 with normal LVEF, Grade II diastolic CHF   -Symptoms this admission more consistent with pneumonia, although NT pro BNP is elevated at 10,000  -Repeat Echocardiogram to assess LVEF and valvular structure/function   -Continue Coreg, Lisinopril as dosed   -Agree with gentle diuresis with IV lasix per primary team   -Consider adjusting GDMT depending on Echo results     CAD  -Hx of mild CAD  -No chest pain or anginal equivalent reported   -Continue ASA and statin as dosed     HHD  -BP reasonably well controlled this morning   -Continue current medical therapy   -Adjust as needed     Further recommendations to follow Echocardiogram     Thank you for your consult. We will continue to follow       YNES Castillo  Cardiology  [facility]  07/08/2025         [1]   No current facility-administered medications on file prior to encounter.     Current Outpatient Medications on File Prior to Encounter   Medication Sig Dispense Refill    acarbose (PRECOSE) 100 MG Tab Take 1 tablet (100 mg total) by mouth 3 (three) times daily with meals. 270 tablet 3    albuterol (PROVENTIL/VENTOLIN HFA) 90 mcg/actuation inhaler Inhale 2 puffs into the lungs every 6 (six) hours as needed for Wheezing. Rescue 18 g 11    alendronate (FOSAMAX) 70 MG tablet Take 1 tablet (70 mg total) by mouth every 7 days. 12 tablet 1    aspirin (ECOTRIN) 81 MG EC tablet Take 81 mg by mouth once daily. 3 days a week      atorvastatin (LIPITOR) 40 MG tablet Take 1 tablet (40 mg total) by mouth once daily. (Patient taking differently: Take 20 mg by mouth once daily.) 90 tablet 3     azithromycin (Z-JAIRO) 250 MG tablet Take 2 tablets by mouth on day 1; Take 1 tablet by mouth on days 2-5 6 tablet 0    blood sugar diagnostic (ONETOUCH ULTRA TEST) Strp Inject 1 each into the skin 3 (three) times daily before meals. 300 each 3    calcium citrate (CALCITRATE) 200 mg (950 mg) tablet Take 1 tablet by mouth once daily. (Patient taking differently: Take 1 tablet by mouth every Mon, Wed, Fri.)      carvediloL (COREG) 6.25 MG tablet Take 1 tablet (6.25 mg total) by mouth 2 (two) times daily with meals. 180 tablet 3    cholecalciferol, vitamin D3, (VITAMIN D3) 50 mcg (2,000 unit) Tab Take 1 tablet (2,000 Units total) by mouth twice a week. 10,000 twice a week (Patient taking differently: Take 1 tablet by mouth twice a week. 10,000 3 times weekly)      clobetasoL (TEMOVATE) 0.05 % cream Apply twice a day for 2 weeks then nightly for 2 months. May decrease to three nights a week after that. 60 g 2    co-enzyme Q-10 30 mg capsule Take 1 capsule (30 mg total) by mouth every evening.      cranberry fruit extract (CRANBERRY CONCENTRATE ORAL) Take 2 capsules by mouth once daily. (Patient taking differently: Take 1 capsule by mouth once daily.)      dicyclomine (BENTYL) 20 mg tablet Take 1 tablet (20 mg total) by mouth 4 (four) times daily before meals and nightly. 120 tablet 11    ferrous sulfate 325 (65 FE) MG EC tablet Take 325 mg by mouth. 3 days a week      FLUoxetine 10 MG capsule Take 1 capsule (10 mg total) by mouth once daily. 90 capsule 3    furosemide (LASIX) 20 MG tablet Take 1 tablet by mouth once daily 90 tablet 0    gabapentin (NEURONTIN) 600 MG tablet Take 1 tablet (600 mg total) by mouth 3 (three) times daily. 90 tablet 11    guaiFENesin (MUCINEX) 600 mg 12 hr tablet Take 2 tablets (1,200 mg total) by mouth 2 (two) times daily. 40 tablet 0    hydroCHLOROthiazide (MICROZIDE) 12.5 mg capsule Take 12.5 mg by mouth once daily.      insulin aspart U-100 (NOVOLOG FLEXPEN U-100 INSULIN) 100 unit/mL (3 mL)  InPn pen Take sliding scale insulin 4 times a day as instructed- max dose of 40 units/day. 15 mL 3    insulin glargine U-100, Lantus, (LANTUS SOLOSTAR U-100 INSULIN) 100 unit/mL (3 mL) InPn pen Inject 16 Units into the skin once daily. 15 mL 3    letrozole (FEMARA) 2.5 mg Tab Take 1 tablet (2.5 mg total) by mouth once daily. 30 tablet 11    LIDOcaine (LIDODERM) 5 % Place onto the skin once daily. Remove & Discard patch within 12 hours or as directed by MD 30 patch 5    lisinopriL (PRINIVIL,ZESTRIL) 40 MG tablet Take 1 tablet (40 mg total) by mouth every evening. 90 tablet 3    metFORMIN (GLUCOPHAGE-XR) 500 MG ER 24hr tablet Take 1 tablet (500 mg total) by mouth daily with breakfast. 90 tablet 3    multivitamin capsule Take 1 capsule by mouth once daily. (Patient taking differently: Take 1 capsule by mouth once daily. Taking 3 times weekly)      omega-3 fatty acids/fish oil (FISH OIL-OMEGA-3 FATTY ACIDS) 300-1,000 mg capsule Take 1 capsule by mouth once a week.      potassium chloride (KLOR-CON) 10 MEQ TbSR Take 99 mEq by mouth once. (Patient taking differently: Take 99 mEq by mouth once. Taking 3 x weekly)      acetaminophen (TYLENOL) 650 MG TbSR Take 650 mg by mouth 2 (two) times a day.      amoxicillin-clavulanate 875-125mg (AUGMENTIN) 875-125 mg per tablet Take 1 tablet by mouth 2 (two) times daily. 10 tablet 0    blood-glucose meter Misc 1 Device by Misc.(Non-Drug; Combo Route) route once daily. One Touch Dx. Code:E11.9 1 each 0    blood-glucose sensor (FREESTYLE JOSEPHINE 2 PLUS SENSOR) Rose Use as instructed 6 each 6    flash glucose scanning reader (FREESTYLE JOSEPHINE 2 READER) Misc Use as instructed 1 each 1    flash glucose sensor (FREESTYLE JOSEPHINE 2 SENSOR) Kit Use as instructed 6 kit 3    fluconazole (DIFLUCAN) 150 MG Tab Take 1 tablet (150 mg total) by mouth once daily. 2 tablet 0    furosemide (LASIX) 20 MG tablet Take 1 tablet (20 mg total) by mouth once daily. 90 tablet 0    glucagon (BAQSIMI) 3 mg/actuation  "Spry 2 pack.  Spray one device (3 mg) in one nostril to treat severe hypoglycemia. Disp 1 box (2 devices) 2 each 3    loperamide (IMODIUM) 2 mg capsule Take 2 mg by mouth 4 (four) times daily as needed for Diarrhea.      montelukast (SINGULAIR) 10 mg tablet Take 1 tablet (10 mg total) by mouth once daily. 90 tablet 3    mucus clearing device (AEROBIKA OSCILLATING PEP SYSTM MISC) 12 puffs by Misc.(Non-Drug; Combo Route) route daily as needed.      NYAMYC powder       ONETOUCH DELICA PLUS LANCET 33 gauge Misc USE   TO CHECK GLUCOSE 4 TIMES DAILY BEFORE  MEALS 300 each 3    pen needle, diabetic (BD ULTRA-FINE MICRO PEN NEEDLE) 32 gauge x 1/4" Ndle use to inject ozempic once a week 100 each 3    pen needle, diabetic (BD ULTRA-FINE MICRO PEN NEEDLE) 32 gauge x 1/4" Ndle Use to inject insulin 4 to 5 times a day as instructed 100 each 6    trimethoprim (TRIMPEX) 100 mg Tab Take 100 mg by mouth every 12 (twelve) hours.     [2]   Current Facility-Administered Medications:     acetaminophen tablet 650 mg, 650 mg, Oral, Q8H PRN, Reynold Crooks MD    albuterol sulfate nebulizer solution 2.5 mg, 2.5 mg, Nebulization, Q6H WAKE, Harika Mina MD, 2.5 mg at 07/08/25 0800    aspirin EC tablet 81 mg, 81 mg, Oral, Daily, Harika Mina MD    atorvastatin tablet 20 mg, 20 mg, Oral, Daily, Harika Mina MD    carvediloL tablet 6.25 mg, 6.25 mg, Oral, BID WM, Reynold Crooks MD    dextrose 50% injection 12.5 g, 12.5 g, Intravenous, PRN, Harika Mina MD    dextrose 50% injection 25 g, 25 g, Intravenous, PRN, Harika Mina MD    FLUoxetine capsule 10 mg, 10 mg, Oral, Daily, Reynold Crooks MD    furosemide injection 20 mg, 20 mg, Intravenous, Daily, Harika Mina MD    gabapentin capsule 600 mg, 600 mg, Oral, TID, Reynold Crooks MD, 600 mg at 07/07/25 2221    glucagon (human recombinant) injection 1 mg, 1 mg, Intramuscular, PRN, Harika Mina MD    glucose chewable " tablet 16 g, 16 g, Oral, PRN, Harika Mina MD    glucose chewable tablet 24 g, 24 g, Oral, PRN, Harika Mina MD    guaiFENesin 12 hr tablet 600 mg, 600 mg, Oral, BID, Harika Mina MD    insulin aspart U-100 pen 0-5 Units, 0-5 Units, Subcutaneous, QID (AC + HS) PRN, Harika Mina MD    insulin glargine U-100 (Lantus) pen 16 Units, 16 Units, Subcutaneous, Daily, Reynold Crooks MD    lisinopriL tablet 40 mg, 40 mg, Oral, QHS, Reynold Crooks MD, 40 mg at 07/07/25 2221    loperamide capsule 2 mg, 2 mg, Oral, QID PRN, Harika Mina MD    melatonin tablet 6 mg, 6 mg, Oral, Nightly PRN, Reynold Crooks MD    mupirocin 2 % ointment, , Nasal, BID, Harika Mina MD, Given at 07/07/25 2221    ondansetron injection 4 mg, 4 mg, Intravenous, Q8H PRN, Reynold Crooks MD    piperacillin-tazobactam (ZOSYN) 4.5 g in D5W 100 mL IVPB (MB+), 4.5 g, Intravenous, Q8H, Harika Mina MD    potassium chloride SA CR tablet 20 mEq, 20 mEq, Oral, TID, Harika Mina MD    sodium chloride 0.9% flush 10 mL, 10 mL, Intravenous, PRN, Reynold Crooks MD

## 2025-07-08 NOTE — HPI
Kelsea Cadet is a 85 y.o. female with PMHX of hypertension, pulmonary hypertension, cardiomyopathy, type 2 diabetes, hyperlipidemia, CKD, chronic indwelling Land, neurogenic bladder who presents to the emergency department C/O malaise.     Patient presents with malaise.  Was having fever last week and worsening cough. Saw PCP and had XR and was treated with a Z-Oswaldo.  Patient seemed to be getting better but today has been complaining of fatigue, malaise, and nausea.  Reports some shortness of breath.  No longer having fever. Pt reports she has been coughign but unable to get the mucus up.      Reports she has recently switched cards to dr perera and saw him a few weeks ago. Pt reports taking all of her meds

## 2025-07-08 NOTE — ED NOTES
"Patient moved into a hospital bed. Daughter, Elizabeth, leaving to go home, set up pass code "[email or web address]" to call for updates. Patient is now resting comfortably.  "

## 2025-07-08 NOTE — H&P
[facility] Medicine  History & Physical    Patient Name: Kelsea Cadet  MRN: [id number]  Patient Class: IP- Inpatient  Admission Date: 7/7/2025  Attending Physician: Harika Mina MD   Primary Care Provider: Gabby Jackson NP         Patient information was obtained from patient and ER records.     Subjective:     Principal Problem:Multifocal pneumonia    Chief Complaint:   Chief Complaint   Patient presents with    Shortness of Breath     Pt dx on Friday with pneumonia, states that she is almost done with antibiotics. Pt reports SOB, dizziness, weakness, nausea that started today.     Weakness    Nausea        HPI: Kelsea Cadet is a 85 y.o. female with PMHX of hypertension, pulmonary hypertension, cardiomyopathy, type 2 diabetes, hyperlipidemia, CKD, chronic indwelling Land, neurogenic bladder who presents to the emergency department C/O malaise.     Patient presents with malaise.  Was having fever last week and worsening cough. Saw PCP and had XR and was treated with a Z-Oswaldo.  Patient seemed to be getting better but today has been complaining of fatigue, malaise, and nausea.  Reports some shortness of breath.  No longer having fever. Pt reports she has been coughign but unable to get the mucus up.      Reports she has recently switched cards to dr perera and saw him a few weeks ago. Pt reports taking all of her meds    Past Medical History:   Diagnosis Date    Arthritis     Asthma     Coronary artery disease     Environmental and seasonal allergies     Land catheter in place     Hard of hearing     Hyperlipidemia     Neurogenic bladder     Pneumonia, unspecified organism     Presence of indwelling Land catheter     Unspecified chronic bronchitis     Vitamin D deficiency        Past Surgical History:   Procedure Laterality Date    BLADDER SUSPENSION      BREAST BIOPSY Bilateral 1996    BREAST SURGERY      lumpectomy, isotopes    CHOLECYSTECTOMY      had gal bladder removed     COLECTOMY      had part of colon removed    CYSTOSCOPY W/ RETROGRADES Bilateral 12/19/2019    Procedure: CYSTOSCOPY, WITH RETROGRADE PYELOGRAM;  Surgeon: Prasad Rocha MD;  Location: [facility] OR;  Service: Urology;  Laterality: Bilateral;    DILATION OF URETHRA N/A 12/19/2019    Procedure: DILATION, URETHRA;  Surgeon: Prasad Rocha MD;  Location: [facility] OR;  Service: Urology;  Laterality: N/A;    HYSTERECTOMY      LEFT HEART CATHETERIZATION Left 11/27/2019    Procedure: Left heart cath;  Surgeon: Urban Paiz MD;  Location: [facility] CATH LAB;  Service: Cardiology;  Laterality: Left;    MODIFIED RADICAL MASTECTOMY W/ AXILLARY LYMPH NODE DISSECTION Right 1/26/2022    Procedure: MASTECTOMY, MODIFIED RADICAL;  Surgeon: Keisha Cortez MD;  Location: [facility] OR;  Service: General;  Laterality: Right;    OOPHORECTOMY      SENTINEL LYMPH NODE BIOPSY Right 1/26/2022    Procedure: BIOPSY, LYMPH NODE, SENTINEL;  Surgeon: Keisha Cortez MD;  Location: [facility];  Service: General;  Laterality: Right;  0800    SIMPLE MASTECTOMY Left 1/26/2022    Procedure: MASTECTOMY, SIMPLE;  Surgeon: Keisha Cortez MD;  Location: [facility];  Service: General;  Laterality: Left;  FROZEN SECTION       Review of patient's allergies indicates:   Allergen Reactions    Macrobid [nitrofurantoin monohyd/m-cryst] Swelling    Bactrim [sulfamethoxazole-trimethoprim] Diarrhea    Ciprofloxacin Other (See Comments)     GI problems    Codeine Other (See Comments)     headache    Latex, natural rubber Rash       No current facility-administered medications on file prior to encounter.     Current Outpatient Medications on File Prior to Encounter   Medication Sig    acarbose (PRECOSE) 100 MG Tab Take 1 tablet (100 mg total) by mouth 3 (three) times daily with meals.    albuterol (PROVENTIL/VENTOLIN HFA) 90 mcg/actuation inhaler Inhale 2 puffs into the lungs every 6 (six) hours as needed for Wheezing. Rescue    alendronate (FOSAMAX) 70 MG tablet Take 1  tablet (70 mg total) by mouth every 7 days.    aspirin (ECOTRIN) 81 MG EC tablet Take 81 mg by mouth once daily. 3 days a week    atorvastatin (LIPITOR) 40 MG tablet Take 1 tablet (40 mg total) by mouth once daily. (Patient taking differently: Take 20 mg by mouth once daily.)    azithromycin (Z-JAIRO) 250 MG tablet Take 2 tablets by mouth on day 1; Take 1 tablet by mouth on days 2-5    blood sugar diagnostic (ONETOUCH ULTRA TEST) Strp Inject 1 each into the skin 3 (three) times daily before meals.    calcium citrate (CALCITRATE) 200 mg (950 mg) tablet Take 1 tablet by mouth once daily. (Patient taking differently: Take 1 tablet by mouth every Mon, Wed, Fri.)    carvediloL (COREG) 6.25 MG tablet Take 1 tablet (6.25 mg total) by mouth 2 (two) times daily with meals.    cholecalciferol, vitamin D3, (VITAMIN D3) 50 mcg (2,000 unit) Tab Take 1 tablet (2,000 Units total) by mouth twice a week. 10,000 twice a week (Patient taking differently: Take 1 tablet by mouth twice a week. 10,000 3 times weekly)    clobetasoL (TEMOVATE) 0.05 % cream Apply twice a day for 2 weeks then nightly for 2 months. May decrease to three nights a week after that.    co-enzyme Q-10 30 mg capsule Take 1 capsule (30 mg total) by mouth every evening.    cranberry fruit extract (CRANBERRY CONCENTRATE ORAL) Take 2 capsules by mouth once daily. (Patient taking differently: Take 1 capsule by mouth once daily.)    dicyclomine (BENTYL) 20 mg tablet Take 1 tablet (20 mg total) by mouth 4 (four) times daily before meals and nightly.    ferrous sulfate 325 (65 FE) MG EC tablet Take 325 mg by mouth. 3 days a week    FLUoxetine 10 MG capsule Take 1 capsule (10 mg total) by mouth once daily.    furosemide (LASIX) 20 MG tablet Take 1 tablet by mouth once daily    gabapentin (NEURONTIN) 600 MG tablet Take 1 tablet (600 mg total) by mouth 3 (three) times daily.    guaiFENesin (MUCINEX) 600 mg 12 hr tablet Take 2 tablets (1,200 mg total) by mouth 2 (two) times  daily.    hydroCHLOROthiazide (MICROZIDE) 12.5 mg capsule Take 12.5 mg by mouth once daily.    insulin aspart U-100 (NOVOLOG FLEXPEN U-100 INSULIN) 100 unit/mL (3 mL) InPn pen Take sliding scale insulin 4 times a day as instructed- max dose of 40 units/day.    insulin glargine U-100, Lantus, (LANTUS SOLOSTAR U-100 INSULIN) 100 unit/mL (3 mL) InPn pen Inject 16 Units into the skin once daily.    letrozole (FEMARA) 2.5 mg Tab Take 1 tablet (2.5 mg total) by mouth once daily.    LIDOcaine (LIDODERM) 5 % Place onto the skin once daily. Remove & Discard patch within 12 hours or as directed by MD    lisinopriL (PRINIVIL,ZESTRIL) 40 MG tablet Take 1 tablet (40 mg total) by mouth every evening.    metFORMIN (GLUCOPHAGE-XR) 500 MG ER 24hr tablet Take 1 tablet (500 mg total) by mouth daily with breakfast.    multivitamin capsule Take 1 capsule by mouth once daily. (Patient taking differently: Take 1 capsule by mouth once daily. Taking 3 times weekly)    omega-3 fatty acids/fish oil (FISH OIL-OMEGA-3 FATTY ACIDS) 300-1,000 mg capsule Take 1 capsule by mouth once a week.    potassium chloride (KLOR-CON) 10 MEQ TbSR Take 99 mEq by mouth once. (Patient taking differently: Take 99 mEq by mouth once. Taking 3 x weekly)    acetaminophen (TYLENOL) 650 MG TbSR Take 650 mg by mouth 2 (two) times a day.    amoxicillin-clavulanate 875-125mg (AUGMENTIN) 875-125 mg per tablet Take 1 tablet by mouth 2 (two) times daily.    blood-glucose meter Misc 1 Device by Misc.(Non-Drug; Combo Route) route once daily. One Touch Dx. Code:E11.9    blood-glucose sensor (FREESTYLE JOSEPHINE 2 PLUS SENSOR) Rose Use as instructed    flash glucose scanning reader (FREESTYLE JOSEPHINE 2 READER) Misc Use as instructed    flash glucose sensor (FREESTYLE JOSEPHINE 2 SENSOR) Kit Use as instructed    fluconazole (DIFLUCAN) 150 MG Tab Take 1 tablet (150 mg total) by mouth once daily.    furosemide (LASIX) 20 MG tablet Take 1 tablet (20 mg total) by mouth once daily.    glucagon  "(BAQSIMI) 3 mg/actuation Spry 2 pack.  Spray one device (3 mg) in one nostril to treat severe hypoglycemia. Disp 1 box (2 devices)    loperamide (IMODIUM) 2 mg capsule Take 2 mg by mouth 4 (four) times daily as needed for Diarrhea.    montelukast (SINGULAIR) 10 mg tablet Take 1 tablet (10 mg total) by mouth once daily.    mucus clearing device (AEROBIKA OSCILLATING PEP SYSTM MISC) 12 puffs by Misc.(Non-Drug; Combo Route) route daily as needed.    NYAMYC powder     ONETOUCH DELICA PLUS LANCET 33 gauge Misc USE   TO CHECK GLUCOSE 4 TIMES DAILY BEFORE  MEALS    pen needle, diabetic (BD ULTRA-FINE MICRO PEN NEEDLE) 32 gauge x 1/4" Ndle use to inject ozempic once a week    pen needle, diabetic (BD ULTRA-FINE MICRO PEN NEEDLE) 32 gauge x 1/4" Ndle Use to inject insulin 4 to 5 times a day as instructed    trimethoprim (TRIMPEX) 100 mg Tab Take 100 mg by mouth every 12 (twelve) hours.     Family History       Problem Relation (Age of Onset)    Bell's palsy Sister    Bone cancer Mother, Father (86)    Breast cancer Mother, Sister, Sister    Cancer Mother (42), Father    Heart disease Mother          Tobacco Use    Smoking status: Never    Smokeless tobacco: Never   Substance and Sexual Activity    Alcohol use: Not Currently     Comment: rare    Drug use: Never    Sexual activity: Not Currently     Partners: Male     Comment:      Review of Systems   Constitutional:  Positive for activity change, chills and fatigue. Negative for fever.   HENT:  Positive for postnasal drip. Negative for sinus pressure and sore throat.    Respiratory:  Positive for cough and shortness of breath. Negative for wheezing.    Cardiovascular:  Negative for chest pain, palpitations and leg swelling.   Gastrointestinal:  Negative for abdominal pain, nausea and vomiting.   Skin:  Negative for rash and wound.   Neurological:  Positive for weakness. Negative for syncope.     Objective:     Vital Signs (Most Recent):  Temp: 97.6 °F (36.4 °C) (07/08/25 " 0752)  Pulse: 93 (07/08/25 0800)  Resp: 20 (07/08/25 0800)  BP: (!) 142/64 (07/08/25 0752)  SpO2: (!) 94 % (07/08/25 0800) Vital Signs (24h Range):  Temp:  [97.6 °F (36.4 °C)-98.8 °F (37.1 °C)] 97.6 °F (36.4 °C)  Pulse:  [79-93] 93  Resp:  [12-26] 20  SpO2:  [90 %-99 %] 94 %  BP: (132-201)/(60-84) 142/64     Weight: 88.9 kg (196 lb)  Body mass index is 33.64 kg/m².     Physical Exam  Constitutional:       Appearance: She is ill-appearing.   HENT:      Nose: Nose normal.      Mouth/Throat:      Mouth: Mucous membranes are moist.   Eyes:      Extraocular Movements: Extraocular movements intact.      Pupils: Pupils are equal, round, and reactive to light.   Cardiovascular:      Rate and Rhythm: Normal rate and regular rhythm.   Pulmonary:      Effort: Pulmonary effort is normal.      Breath sounds: Rhonchi present.      Comments: O2 via nc  Abdominal:      General: Bowel sounds are normal.      Palpations: Abdomen is soft.   Musculoskeletal:      Right lower leg: Edema present.      Left lower leg: Edema present.   Skin:     General: Skin is warm.   Neurological:      General: No focal deficit present.      Mental Status: She is alert.              CRANIAL NERVES     CN III, IV, VI   Pupils are equal, round, and reactive to light.       Significant Labs: All pertinent labs within the past 24 hours have been reviewed.  Recent Lab Results  (Last 5 results in the past 24 hours)        07/08/25  0430   07/08/25  0234   07/07/25  2126   07/07/25  1944   07/07/25  1843        Procalcitonin         0.44  Comment: A concentration < 0.25 ng/mL represents a low risk of bacterial infection.  Procalcitonin may not be accurate among patients with localized   infection, recent trauma or major surgery, immunosuppressed state,   invasive fungal infection, renal dysfunction. Decisions regarding   initiation or continuation of antibiotic therapy should not be based   solely on procalcitonin levels.       Albumin         2.6       ALP          136       ALT         13       Anion Gap 9         13       Appearance, UA       Clear         AST         23       Bacteria, UA       None         Bands 8.0         4.0       Bilirubin (UA)       Negative         BILIRUBIN TOTAL         0.5  Comment: For infants and newborns, interpretation of results should be based   on gestational age, weight and in agreement with clinical   observations.    Premature Infant recommended reference ranges:   0-24 hours:  <8.0 mg/dL   24-48 hours: <12.0 mg/dL   3-5 days:    <15.0 mg/dL   6-29 days:   <15.0 mg/dL       BUN 16         17       Calcium 8.5         8.7       Chloride 95         92       CO2 26         24       Color, UA       Yellow         Creatinine 0.8         0.8       eGFR >60  Comment: Estimated GFR calculated using the CKD-EPI creatinine (2021) equation.         >60  Comment: Estimated GFR calculated using the CKD-EPI creatinine (2021) equation.       Eos % 1.0               Glucose 173         175       Glucose, UA       Negative         Gran # (ANC) 12.9         13.1       Hematocrit 34.7         34.1       Hemoglobin 11.5         11.3       Extra Tube       Hold   Hold       Hyaline Casts, UA       0         Ketones, UA       1+         Lactic Acid Level     1.3     1.4       Leukocyte Esterase, UA       2+         Lymph % 5.0         5.0       Magnesium          1.8       MCH 27.9         27.7       MCHC 33.1         33.1       MCV 84         84       Microscopic Comment         Comment: Other formed elements not mentioned in the report are not present in the microscopic examination.         Mono % 11.0         9.0       MPV 9.4         10.1       NITRITE UA       Negative         nRBC 0         0       NT-proBNP         10,746       Blood, UA       1+         pH, UA       7.0         Platelet Count 365         379       POCT Glucose   166             Potassium 2.9         3.4       PROTEIN TOTAL         7.2       Protein, UA       2+  Comment:  Recommend a 24 hour urine protein or a urine protein/creatinine ratio if globulin induced proteinuria is clinically suspected.         RBC 4.12         4.08       RBC, UA       17         RDW 13.1         13.0       Segmented Neutrophil % 75.0         82.0       Sodium 130         129       Spec Grav UA       1.020         Squam Epithel, UA       1         Urobilinogen, UA       1.0         WBC, UA       63         WBC 15.52         15.22                              Significant Imaging: I have reviewed all pertinent imaging results/findings within the past 24 hours.  Assessment/Plan:     Assessment & Plan  Multifocal pneumonia  Patient has a diagnosis of pneumonia. The cause of the pneumonia is suspected to be bacterial in etiology but organism is not known. The pneumonia is worsening due to sob, cough. The patient has the following signs/symptoms of pneumonia: persistent hypoxia , cough, and sputum production. The patient does have a current oxygen requirement and the patient does not have a home oxygen requirement. I have reviewed the pertinent imaging. The following cultures have been collected: Blood cultures The culture results are listed below.     Current antimicrobial regimen consists of the antibiotics listed below. Will monitor patient closely and continue current treatment plan unchanged.    Antibiotics (From admission, onward)      Start     Stop Route Frequency Ordered    07/08/25 0900  piperacillin-tazobactam (ZOSYN) 4.5 g in D5W 100 mL IVPB (MB+)         -- IV Every 8 hours (non-standard times) 07/08/25 0745    07/07/25 2145  mupirocin 2 % ointment         07/12/25 2059 Nasl 2 times daily 07/07/25 2035            Microbiology Results (last 7 days)       Procedure Component Value Units Date/Time    Clostridium difficile EIA [[id number]]     Order Status: Sent Specimen: Stool     Urine culture [[id number]] Collected: 07/07/25 1944    Order Status: Sent Specimen: Urine, Catheterized Updated: 07/07/25  2014    Culture, Respiratory with Gram Stain [[id number]]     Order Status: Sent Specimen: Respiratory     Blood culture x two cultures. Draw prior to antibiotics. [[id number]] Collected: 07/07/25 1835    Order Status: Resulted Specimen: Blood from Peripheral, Forearm, Right Updated: 07/07/25 1853    Blood culture x two cultures. Draw prior to antibiotics. [[id number]] Collected: 07/07/25 1843    Order Status: Resulted Specimen: Blood from Peripheral, Hand, Left Updated: 07/07/25 1853          Essential hypertension  Patient's blood pressure range in the last 24 hours was: BP  Min: 132/63  Max: 201/83.The patient's inpatient anti-hypertensive regimen is listed below:  Current Antihypertensives  , Daily, Oral  carvediloL tablet 6.25 mg, 2 times daily with meals, Oral  lisinopriL tablet 40 mg, Nightly, Oral  furosemide injection 20 mg, Daily, Intravenous    Plan  - BP is controlled, no changes needed to their regimen    Type 2 diabetes mellitus without complication, with long-term current use of insulin  Ada diet  Accuchecks and ssi  Cont lantus  Urinary retention  Has chronic machado    Cough  Tx pneumonia    CHF (congestive heart failure)  Patient has Diastolic (HFpEF) heart failure that is Acute on chronic. On presentation their CHF was decompensated. Evidence of decompensated CHF on presentation includes: edema, dyspnea on exertion (KIDD), and shortness of breath. The etiology of their decompensation is likely dietary indiscretion. Most recent BNP and echo results are listed below.  Recent Labs     07/07/25 1843   BNP 10,746*     Latest ECHO  Results for orders placed in visit on 09/20/23    Echo    Interpretation Summary    Left Ventricle: Moderately increased wall thickness. There is moderate concentric hypertrophy. Normal wall motion. Grade II diastolic dysfunction.    Left Atrium: Left atrium is moderately dilated.    Right Ventricle: Normal right ventricular cavity size. Systolic function is normal.    Right  Atrium: Right atrium is mildly dilated.    Aortic Valve: The aortic valve is a trileaflet valve. Mildly calcified left, right and noncoronary cusps. Mildly restricted motion. By planimetry the aortic valve area measures 1.8 cm².    Mitral Valve: There is mild bileaflet sclerosis.    Current Heart Failure Medications  , Daily, Oral  carvediloL tablet 6.25 mg, 2 times daily with meals, Oral  lisinopriL tablet 40 mg, Nightly, Oral  furosemide injection 20 mg, Daily, Intravenous    Plan  - Monitor strict I&Os and daily weights.    - Place on telemetry  - Low sodium diet  - Place on fluid restriction of 2 L.   - Cardiology has been consulted  - The patient's volume status is improving but not at their baseline as indicated by edema, dyspnea on exertion (KIDD), and shortness of breath    VTE Risk Mitigation (From admission, onward)           Ordered     IP VTE HIGH RISK PATIENT  Once         07/07/25 2105     Place PK hose  Until discontinued         07/07/25 2105                                                Harika Mina MD  Department of Hospital Medicine  [facility]

## 2025-07-08 NOTE — ASSESSMENT & PLAN NOTE
Patient has a diagnosis of pneumonia. The cause of the pneumonia is suspected to be bacterial in etiology but organism is not known. The pneumonia is worsening due to sob, cough. The patient has the following signs/symptoms of pneumonia: persistent hypoxia , cough, and sputum production. The patient does have a current oxygen requirement and the patient does not have a home oxygen requirement. I have reviewed the pertinent imaging. The following cultures have been collected: Blood cultures The culture results are listed below.     Current antimicrobial regimen consists of the antibiotics listed below. Will monitor patient closely and continue current treatment plan unchanged.    Antibiotics (From admission, onward)      Start     Stop Route Frequency Ordered    07/08/25 0900  piperacillin-tazobactam (ZOSYN) 4.5 g in D5W 100 mL IVPB (MB+)         -- IV Every 8 hours (non-standard times) 07/08/25 0745    07/07/25 2145  mupirocin 2 % ointment         07/12/25 2059 Nasl 2 times daily 07/07/25 2035            Microbiology Results (last 7 days)       Procedure Component Value Units Date/Time    Clostridium difficile EIA [[id number]]     Order Status: Sent Specimen: Stool     Urine culture [[id number]] Collected: 07/07/25 1944    Order Status: Sent Specimen: Urine, Catheterized Updated: 07/07/25 2014    Culture, Respiratory with Gram Stain [[id number]]     Order Status: Sent Specimen: Respiratory     Blood culture x two cultures. Draw prior to antibiotics. [[id number]] Collected: 07/07/25 1835    Order Status: Resulted Specimen: Blood from Peripheral, Forearm, Right Updated: 07/07/25 1853    Blood culture x two cultures. Draw prior to antibiotics. [[id number]] Collected: 07/07/25 1843    Order Status: Resulted Specimen: Blood from Peripheral, Hand, Left Updated: 07/07/25 1853

## 2025-07-09 PROBLEM — E87.6 HYPOKALEMIA: Status: ACTIVE | Noted: 2025-07-09

## 2025-07-09 LAB
ABSOLUTE EOSINOPHIL (OHS): 0.78 K/UL
ABSOLUTE MONOCYTE (OHS): 1.04 K/UL (ref 0.3–1)
ABSOLUTE NEUTROPHIL COUNT (OHS): 11.05 K/UL (ref 1.8–7.7)
ALBUMIN SERPL BCP-MCNC: 2.3 G/DL (ref 3.5–5.2)
ALP SERPL-CCNC: 120 UNIT/L (ref 40–150)
ALT SERPL W/O P-5'-P-CCNC: 11 UNIT/L (ref 10–44)
ANION GAP (OHS): 10 MMOL/L (ref 8–16)
AORTIC ROOT ANNULUS: 3.1 CM
AORTIC VALVE CUSP SEPERATION: 0.46 CM
AST SERPL-CCNC: 22 UNIT/L (ref 11–45)
AV INDEX (PROSTH): 0.4
AV MEAN GRADIENT: 21 MMHG
AV PEAK GRADIENT: 41 MMHG
AV VALVE AREA BY VELOCITY RATIO: 1.1 CM²
AV VALVE AREA: 1.2 CM²
AV VELOCITY RATIO: 0.34
BACTERIA UR CULT: NORMAL
BASOPHILS # BLD AUTO: 0.09 K/UL
BASOPHILS NFR BLD AUTO: 0.6 %
BILIRUB SERPL-MCNC: 0.3 MG/DL (ref 0.1–1)
BSA FOR ECHO PROCEDURE: 2 M2
BUN SERPL-MCNC: 31 MG/DL (ref 8–23)
CALCIUM SERPL-MCNC: 8.6 MG/DL (ref 8.7–10.5)
CHLORIDE SERPL-SCNC: 95 MMOL/L (ref 95–110)
CO2 SERPL-SCNC: 27 MMOL/L (ref 23–29)
CREAT SERPL-MCNC: 2 MG/DL (ref 0.5–1.4)
CV ECHO LV RWT: 0.49 CM
DOP CALC AO PEAK VEL: 3.2 M/S
DOP CALC AO VTI: 54.8 CM
DOP CALC LVOT AREA: 3.1 CM2
DOP CALC LVOT DIAMETER: 2 CM
DOP CALC LVOT PEAK VEL: 1.1 M/S
DOP CALC LVOT STROKE VOLUME: 68.5 CM3
DOP CALC RVOT PEAK VEL: 1.04 M/S
DOP CALCLVOT PEAK VEL VTI: 21.8 CM
E WAVE DECELERATION TIME: 316 MSEC
E/A RATIO: 0.56
E/E' RATIO: 19 M/S
ECHO LV POSTERIOR WALL: 1.1 CM (ref 0.6–1.1)
ERYTHROCYTE [DISTWIDTH] IN BLOOD BY AUTOMATED COUNT: 13.4 % (ref 11.5–14.5)
FRACTIONAL SHORTENING: 46.7 % (ref 28–44)
GFR SERPLBLD CREATININE-BSD FMLA CKD-EPI: 24 ML/MIN/1.73/M2
GLUCOSE SERPL-MCNC: 185 MG/DL (ref 70–110)
HCT VFR BLD AUTO: 36.8 % (ref 37–48.5)
HGB BLD-MCNC: 12.1 GM/DL (ref 12–16)
IMM GRANULOCYTES # BLD AUTO: 0.19 K/UL (ref 0–0.04)
IMM GRANULOCYTES NFR BLD AUTO: 1.3 % (ref 0–0.5)
INTERVENTRICULAR SEPTUM: 1 CM (ref 0.6–1.1)
IVC DIAMETER: 1.79 CM
LA MAJOR: 6.1 CM
LEFT ATRIUM SIZE: 4.5 CM
LEFT INTERNAL DIMENSION IN SYSTOLE: 2.4 CM (ref 2.1–4)
LEFT VENTRICLE DIASTOLIC VOLUME INDEX: 46.39 ML/M2
LEFT VENTRICLE DIASTOLIC VOLUME: 90 ML
LEFT VENTRICLE MASS INDEX: 84.5 G/M2
LEFT VENTRICLE SYSTOLIC VOLUME INDEX: 10.3 ML/M2
LEFT VENTRICLE SYSTOLIC VOLUME: 20 ML
LEFT VENTRICULAR INTERNAL DIMENSION IN DIASTOLE: 4.5 CM (ref 3.5–6)
LEFT VENTRICULAR MASS: 164 G
LV LATERAL E/E' RATIO: 19.3 M/S
LV SEPTAL E/E' RATIO: 19.3 M/S
LVED V (TEICH): 90.26 ML
LVES V (TEICH): 19.65 ML
LVOT MG: 2.84 MMHG
LVOT MV: 0.82 CM/S
LYMPHOCYTES # BLD AUTO: 1.41 K/UL (ref 1–4.8)
MAGNESIUM SERPL-MCNC: 1.9 MG/DL (ref 1.6–2.6)
MCH RBC QN AUTO: 28.1 PG (ref 27–31)
MCHC RBC AUTO-ENTMCNC: 32.9 G/DL (ref 32–36)
MCV RBC AUTO: 85 FL (ref 82–98)
MV PEAK A VEL: 2.06 M/S
MV PEAK E VEL: 1.16 M/S
MV STENOSIS PRESSURE HALF TIME: 91.71 MS
MV VALVE AREA P 1/2 METHOD: 2.4 CM2
NUCLEATED RBC (/100WBC) (OHS): 0 /100 WBC
OHS CV RV/LV RATIO: 0.69 CM
PISA MRMAX VEL: 2.96 M/S
PISA TR MAX VEL: 3.4 M/S
PLATELET # BLD AUTO: 462 K/UL (ref 150–450)
PMV BLD AUTO: 9.6 FL (ref 9.2–12.9)
POCT GLUCOSE: 176 MG/DL (ref 70–110)
POCT GLUCOSE: 225 MG/DL (ref 70–110)
POCT GLUCOSE: 231 MG/DL (ref 70–110)
POCT GLUCOSE: 251 MG/DL (ref 70–110)
POCT GLUCOSE: 255 MG/DL (ref 70–110)
POTASSIUM SERPL-SCNC: 4.1 MMOL/L (ref 3.5–5.1)
PROT SERPL-MCNC: 6.9 GM/DL (ref 6–8.4)
RA MAJOR: 5.21 CM
RA PRESSURE ESTIMATED: 3 MMHG
RBC # BLD AUTO: 4.31 M/UL (ref 4–5.4)
RELATIVE EOSINOPHIL (OHS): 5.4 %
RELATIVE LYMPHOCYTE (OHS): 9.7 % (ref 18–48)
RELATIVE MONOCYTE (OHS): 7.1 % (ref 4–15)
RELATIVE NEUTROPHIL (OHS): 75.9 % (ref 38–73)
RIGHT VENTRICLE DIASTOLIC BASEL DIMENSION: 3.1 CM
RIGHT VENTRICULAR END-DIASTOLIC DIMENSION: 3.08 CM
RV TB RVSP: 6 MMHG
SODIUM SERPL-SCNC: 132 MMOL/L (ref 136–145)
TDI LATERAL: 0.06 M/S
TDI SEPTAL: 0.06 M/S
TDI: 0.06 M/S
TR MAX PG: 47 MMHG
TV REST PULMONARY ARTERY PRESSURE: 49 MMHG
WBC # BLD AUTO: 14.56 K/UL (ref 3.9–12.7)
Z-SCORE OF LEFT VENTRICULAR DIMENSION IN END DIASTOLE: -2.03
Z-SCORE OF LEFT VENTRICULAR DIMENSION IN END SYSTOLE: -2.73

## 2025-07-09 PROCEDURE — 94640 AIRWAY INHALATION TREATMENT: CPT

## 2025-07-09 PROCEDURE — 94761 N-INVAS EAR/PLS OXIMETRY MLT: CPT

## 2025-07-09 PROCEDURE — 99900031 HC PATIENT EDUCATION (STAT)

## 2025-07-09 PROCEDURE — 63600175 PHARM REV CODE 636 W HCPCS: Performed by: INTERNAL MEDICINE

## 2025-07-09 PROCEDURE — 27000207 HC ISOLATION

## 2025-07-09 PROCEDURE — 36415 COLL VENOUS BLD VENIPUNCTURE: CPT | Performed by: INTERNAL MEDICINE

## 2025-07-09 PROCEDURE — 25000242 PHARM REV CODE 250 ALT 637 W/ HCPCS: Performed by: INTERNAL MEDICINE

## 2025-07-09 PROCEDURE — 85025 COMPLETE CBC W/AUTO DIFF WBC: CPT | Performed by: INTERNAL MEDICINE

## 2025-07-09 PROCEDURE — 63600175 PHARM REV CODE 636 W HCPCS: Performed by: EMERGENCY MEDICINE

## 2025-07-09 PROCEDURE — 25000003 PHARM REV CODE 250: Performed by: EMERGENCY MEDICINE

## 2025-07-09 PROCEDURE — 27000221 HC OXYGEN, UP TO 24 HOURS

## 2025-07-09 PROCEDURE — 99900035 HC TECH TIME PER 15 MIN (STAT)

## 2025-07-09 PROCEDURE — 82435 ASSAY OF BLOOD CHLORIDE: CPT | Performed by: INTERNAL MEDICINE

## 2025-07-09 PROCEDURE — 25000003 PHARM REV CODE 250: Performed by: INTERNAL MEDICINE

## 2025-07-09 PROCEDURE — 97162 PT EVAL MOD COMPLEX 30 MIN: CPT

## 2025-07-09 PROCEDURE — 97166 OT EVAL MOD COMPLEX 45 MIN: CPT

## 2025-07-09 PROCEDURE — 83735 ASSAY OF MAGNESIUM: CPT | Performed by: INTERNAL MEDICINE

## 2025-07-09 PROCEDURE — 21400001 HC TELEMETRY ROOM

## 2025-07-09 RX ORDER — FAMOTIDINE 20 MG/1
20 TABLET, FILM COATED ORAL
Status: DISCONTINUED | OUTPATIENT
Start: 2025-07-11 | End: 2025-07-14 | Stop reason: HOSPADM

## 2025-07-09 RX ORDER — FAMOTIDINE 20 MG/1
20 TABLET, FILM COATED ORAL 2 TIMES DAILY
Status: DISCONTINUED | OUTPATIENT
Start: 2025-07-09 | End: 2025-07-09

## 2025-07-09 RX ORDER — ENOXAPARIN SODIUM 100 MG/ML
40 INJECTION SUBCUTANEOUS EVERY 24 HOURS
Status: DISCONTINUED | OUTPATIENT
Start: 2025-07-09 | End: 2025-07-09

## 2025-07-09 RX ORDER — ENOXAPARIN SODIUM 100 MG/ML
30 INJECTION SUBCUTANEOUS EVERY 24 HOURS
Status: DISCONTINUED | OUTPATIENT
Start: 2025-07-09 | End: 2025-07-14 | Stop reason: HOSPADM

## 2025-07-09 RX ORDER — FAMOTIDINE 20 MG/1
20 TABLET, FILM COATED ORAL DAILY
Status: DISCONTINUED | OUTPATIENT
Start: 2025-07-10 | End: 2025-07-09

## 2025-07-09 RX ADMIN — PIPERACILLIN AND TAZOBACTAM 4.5 G: 4; .5 INJECTION, POWDER, LYOPHILIZED, FOR SOLUTION INTRAVENOUS; PARENTERAL at 05:07

## 2025-07-09 RX ADMIN — POTASSIUM CHLORIDE 20 MEQ: 1500 TABLET, FILM COATED, EXTENDED RELEASE ORAL at 08:07

## 2025-07-09 RX ADMIN — ENOXAPARIN SODIUM 30 MG: 30 INJECTION SUBCUTANEOUS at 05:07

## 2025-07-09 RX ADMIN — ATORVASTATIN CALCIUM 20 MG: 20 TABLET, FILM COATED ORAL at 08:07

## 2025-07-09 RX ADMIN — ASPIRIN 81 MG: 81 TABLET, COATED ORAL at 08:07

## 2025-07-09 RX ADMIN — MUPIROCIN: 20 OINTMENT TOPICAL at 08:07

## 2025-07-09 RX ADMIN — INSULIN GLARGINE 16 UNITS: 100 INJECTION, SOLUTION SUBCUTANEOUS at 08:07

## 2025-07-09 RX ADMIN — POTASSIUM CHLORIDE 20 MEQ: 1500 TABLET, FILM COATED, EXTENDED RELEASE ORAL at 03:07

## 2025-07-09 RX ADMIN — GUAIFENESIN 600 MG: 600 TABLET, EXTENDED RELEASE ORAL at 08:07

## 2025-07-09 RX ADMIN — GABAPENTIN 600 MG: 300 CAPSULE ORAL at 08:07

## 2025-07-09 RX ADMIN — FAMOTIDINE 20 MG: 20 TABLET, FILM COATED ORAL at 10:07

## 2025-07-09 RX ADMIN — FUROSEMIDE 20 MG: 10 INJECTION, SOLUTION INTRAMUSCULAR; INTRAVENOUS at 09:07

## 2025-07-09 RX ADMIN — ALBUTEROL SULFATE 2.5 MG: 2.5 SOLUTION RESPIRATORY (INHALATION) at 08:07

## 2025-07-09 RX ADMIN — INSULIN ASPART 3 UNITS: 100 INJECTION, SOLUTION INTRAVENOUS; SUBCUTANEOUS at 12:07

## 2025-07-09 RX ADMIN — ALBUTEROL SULFATE 2.5 MG: 2.5 SOLUTION RESPIRATORY (INHALATION) at 02:07

## 2025-07-09 RX ADMIN — CARVEDILOL 6.25 MG: 3.12 TABLET, FILM COATED ORAL at 08:07

## 2025-07-09 RX ADMIN — CARVEDILOL 6.25 MG: 3.12 TABLET, FILM COATED ORAL at 05:07

## 2025-07-09 RX ADMIN — LOPERAMIDE HYDROCHLORIDE 2 MG: 2 CAPSULE ORAL at 09:07

## 2025-07-09 RX ADMIN — PIPERACILLIN AND TAZOBACTAM 4.5 G: 4; .5 INJECTION, POWDER, LYOPHILIZED, FOR SOLUTION INTRAVENOUS; PARENTERAL at 09:07

## 2025-07-09 RX ADMIN — INSULIN ASPART 2 UNITS: 100 INJECTION, SOLUTION INTRAVENOUS; SUBCUTANEOUS at 05:07

## 2025-07-09 RX ADMIN — LISINOPRIL 40 MG: 20 TABLET ORAL at 08:07

## 2025-07-09 RX ADMIN — INSULIN ASPART 1 UNITS: 100 INJECTION, SOLUTION INTRAVENOUS; SUBCUTANEOUS at 08:07

## 2025-07-09 RX ADMIN — GABAPENTIN 600 MG: 300 CAPSULE ORAL at 03:07

## 2025-07-09 RX ADMIN — PIPERACILLIN AND TAZOBACTAM 4.5 G: 4; .5 INJECTION, POWDER, LYOPHILIZED, FOR SOLUTION INTRAVENOUS; PARENTERAL at 01:07

## 2025-07-09 RX ADMIN — FLUOXETINE HYDROCHLORIDE 10 MG: 10 CAPSULE ORAL at 08:07

## 2025-07-09 NOTE — HOSPITAL COURSE
7/9 ND pt repotrs feeling better this am - diureisng well  7/10 ND pt feeling better - less sob.   Pt diruesed well - will hold lasix today due to maria fernanda  7/11 ND less sob and cough - cxr reviewed from yesterday - cont to hold lasix due to maria fernanda  7/12 AA, weekend crosscover, IV antibiotics on board for pneumonia, Lasix on hold, MARIA FERNANDA improving, renal function trending down, continue to monitor urine output  7/13 AA, weekend crosscover, antibiotics on board, Lasix on hold MARIA FERNANDA slowly improving, renal function trending down blood pressure elevated, blood pressure meds adjusted  7/14 ND pt feelign well - no complaints today -ready to go home

## 2025-07-09 NOTE — ASSESSMENT & PLAN NOTE
Patient's blood pressure range in the last 24 hours was: BP  Min: 106/66  Max: 142/64.The patient's inpatient anti-hypertensive regimen is listed below:  Current Antihypertensives  , Daily, Oral  carvediloL tablet 6.25 mg, 2 times daily with meals, Oral  lisinopriL tablet 40 mg, Nightly, Oral  furosemide injection 20 mg, Daily, Intravenous    Plan  - BP is controlled, no changes needed to their regimen

## 2025-07-09 NOTE — ASSESSMENT & PLAN NOTE
Patient has a diagnosis of pneumonia. The cause of the pneumonia is suspected to be bacterial in etiology but organism is not known. The pneumonia is worsening due to sob, cough. The patient has the following signs/symptoms of pneumonia: persistent hypoxia , cough, and sputum production. The patient does have a current oxygen requirement and the patient does not have a home oxygen requirement. I have reviewed the pertinent imaging. The following cultures have been collected: Blood cultures The culture results are listed below.     Current antimicrobial regimen consists of the antibiotics listed below. Will monitor patient closely and continue current treatment plan unchanged.    Antibiotics (From admission, onward)      Start     Stop Route Frequency Ordered    07/08/25 0900  piperacillin-tazobactam (ZOSYN) 4.5 g in D5W 100 mL IVPB (MB+)         -- IV Every 8 hours (non-standard times) 07/08/25 0745    07/07/25 2145  mupirocin 2 % ointment         07/12/25 2059 Nasl 2 times daily 07/07/25 2035            Microbiology Results (last 7 days)       Procedure Component Value Units Date/Time    Blood culture x two cultures. Draw prior to antibiotics. [[id number]]  (Normal) Collected: 07/07/25 1843    Order Status: Completed Specimen: Blood from Peripheral, Hand, Left Updated: 07/09/25 0700     Blood Culture No Growth After 24 Hours    Blood culture x two cultures. Draw prior to antibiotics. [[id number]]  (Normal) Collected: 07/07/25 1835    Order Status: Completed Specimen: Blood from Peripheral, Forearm, Right Updated: 07/09/25 0700     Blood Culture No Growth After 24 Hours    Clostridium difficile EIA [[id number]]     Order Status: Sent Specimen: Stool     Urine culture [[id number]] Collected: 07/07/25 1944    Order Status: Sent Specimen: Urine, Catheterized Updated: 07/07/25 2014    Culture, Respiratory with Gram Stain [[id number]]     Order Status: Sent Specimen: Respiratory

## 2025-07-09 NOTE — SUBJECTIVE & OBJECTIVE
Past Medical History:   Diagnosis Date    Arthritis     Asthma     Coronary artery disease     Environmental and seasonal allergies     Land catheter in place     Hard of hearing     Hyperlipidemia     Neurogenic bladder     Pneumonia, unspecified organism     Presence of indwelling Land catheter     Unspecified chronic bronchitis     Vitamin D deficiency        Past Surgical History:   Procedure Laterality Date    BLADDER SUSPENSION      BREAST BIOPSY Bilateral 1996    BREAST SURGERY      lumpectomy, isotopes    CHOLECYSTECTOMY      had gal bladder removed    COLECTOMY      had part of colon removed    CYSTOSCOPY W/ RETROGRADES Bilateral 12/19/2019    Procedure: CYSTOSCOPY, WITH RETROGRADE PYELOGRAM;  Surgeon: Prasad Rocha MD;  Location: [facility] OR;  Service: Urology;  Laterality: Bilateral;    DILATION OF URETHRA N/A 12/19/2019    Procedure: DILATION, URETHRA;  Surgeon: Prasad Rocha MD;  Location: [facility] OR;  Service: Urology;  Laterality: N/A;    HYSTERECTOMY      LEFT HEART CATHETERIZATION Left 11/27/2019    Procedure: Left heart cath;  Surgeon: Urban Paiz MD;  Location: [facility] CATH LAB;  Service: Cardiology;  Laterality: Left;    MODIFIED RADICAL MASTECTOMY W/ AXILLARY LYMPH NODE DISSECTION Right 1/26/2022    Procedure: MASTECTOMY, MODIFIED RADICAL;  Surgeon: Keisha Cortez MD;  Location: [facility] OR;  Service: General;  Laterality: Right;    OOPHORECTOMY      SENTINEL LYMPH NODE BIOPSY Right 1/26/2022    Procedure: BIOPSY, LYMPH NODE, SENTINEL;  Surgeon: Keisha Cortez MD;  Location: [facility];  Service: General;  Laterality: Right;  0800    SIMPLE MASTECTOMY Left 1/26/2022    Procedure: MASTECTOMY, SIMPLE;  Surgeon: Keisha Cortez MD;  Location: [facility];  Service: General;  Laterality: Left;  FROZEN SECTION       Review of patient's allergies indicates:   Allergen Reactions    Macrobid [nitrofurantoin monohyd/m-cryst] Swelling    Bactrim [sulfamethoxazole-trimethoprim] Diarrhea     Ciprofloxacin Other (See Comments)     GI problems    Codeine Other (See Comments)     headache    Latex, natural rubber Rash       No current facility-administered medications on file prior to encounter.     Current Outpatient Medications on File Prior to Encounter   Medication Sig    acarbose (PRECOSE) 100 MG Tab Take 1 tablet (100 mg total) by mouth 3 (three) times daily with meals.    albuterol (PROVENTIL/VENTOLIN HFA) 90 mcg/actuation inhaler Inhale 2 puffs into the lungs every 6 (six) hours as needed for Wheezing. Rescue    alendronate (FOSAMAX) 70 MG tablet Take 1 tablet (70 mg total) by mouth every 7 days.    aspirin (ECOTRIN) 81 MG EC tablet Take 81 mg by mouth once daily. 3 days a week    atorvastatin (LIPITOR) 40 MG tablet Take 1 tablet (40 mg total) by mouth once daily. (Patient taking differently: Take 20 mg by mouth once daily.)    azithromycin (Z-JAIRO) 250 MG tablet Take 2 tablets by mouth on day 1; Take 1 tablet by mouth on days 2-5    blood sugar diagnostic (ONETOUCH ULTRA TEST) Strp Inject 1 each into the skin 3 (three) times daily before meals.    calcium citrate (CALCITRATE) 200 mg (950 mg) tablet Take 1 tablet by mouth once daily. (Patient taking differently: Take 1 tablet by mouth every Mon, Wed, Fri.)    carvediloL (COREG) 6.25 MG tablet Take 1 tablet (6.25 mg total) by mouth 2 (two) times daily with meals.    cholecalciferol, vitamin D3, (VITAMIN D3) 50 mcg (2,000 unit) Tab Take 1 tablet (2,000 Units total) by mouth twice a week. 10,000 twice a week (Patient taking differently: Take 1 tablet by mouth twice a week. 10,000 3 times weekly)    clobetasoL (TEMOVATE) 0.05 % cream Apply twice a day for 2 weeks then nightly for 2 months. May decrease to three nights a week after that.    co-enzyme Q-10 30 mg capsule Take 1 capsule (30 mg total) by mouth every evening.    cranberry fruit extract (CRANBERRY CONCENTRATE ORAL) Take 2 capsules by mouth once daily. (Patient taking differently: Take 1  capsule by mouth once daily.)    dicyclomine (BENTYL) 20 mg tablet Take 1 tablet (20 mg total) by mouth 4 (four) times daily before meals and nightly.    ferrous sulfate 325 (65 FE) MG EC tablet Take 325 mg by mouth. 3 days a week    FLUoxetine 10 MG capsule Take 1 capsule (10 mg total) by mouth once daily.    furosemide (LASIX) 20 MG tablet Take 1 tablet by mouth once daily    gabapentin (NEURONTIN) 600 MG tablet Take 1 tablet (600 mg total) by mouth 3 (three) times daily.    guaiFENesin (MUCINEX) 600 mg 12 hr tablet Take 2 tablets (1,200 mg total) by mouth 2 (two) times daily.    hydroCHLOROthiazide (MICROZIDE) 12.5 mg capsule Take 12.5 mg by mouth once daily.    insulin aspart U-100 (NOVOLOG FLEXPEN U-100 INSULIN) 100 unit/mL (3 mL) InPn pen Take sliding scale insulin 4 times a day as instructed- max dose of 40 units/day.    insulin glargine U-100, Lantus, (LANTUS SOLOSTAR U-100 INSULIN) 100 unit/mL (3 mL) InPn pen Inject 16 Units into the skin once daily.    letrozole (FEMARA) 2.5 mg Tab Take 1 tablet (2.5 mg total) by mouth once daily.    LIDOcaine (LIDODERM) 5 % Place onto the skin once daily. Remove & Discard patch within 12 hours or as directed by MD    lisinopriL (PRINIVIL,ZESTRIL) 40 MG tablet Take 1 tablet (40 mg total) by mouth every evening.    metFORMIN (GLUCOPHAGE-XR) 500 MG ER 24hr tablet Take 1 tablet (500 mg total) by mouth daily with breakfast.    multivitamin capsule Take 1 capsule by mouth once daily. (Patient taking differently: Take 1 capsule by mouth once daily. Taking 3 times weekly)    omega-3 fatty acids/fish oil (FISH OIL-OMEGA-3 FATTY ACIDS) 300-1,000 mg capsule Take 1 capsule by mouth once a week.    potassium chloride (KLOR-CON) 10 MEQ TbSR Take 99 mEq by mouth once. (Patient taking differently: Take 99 mEq by mouth once. Taking 3 x weekly)    acetaminophen (TYLENOL) 650 MG TbSR Take 650 mg by mouth 2 (two) times a day.    amoxicillin-clavulanate 875-125mg (AUGMENTIN) 875-125 mg per  "tablet Take 1 tablet by mouth 2 (two) times daily.    blood-glucose meter Misc 1 Device by Misc.(Non-Drug; Combo Route) route once daily. One Touch Dx. Code:E11.9    blood-glucose sensor (FREESTYLE JOSEPHINE 2 PLUS SENSOR) Rose Use as instructed    flash glucose scanning reader (FREESTYLE JOSEPHINE 2 READER) Misc Use as instructed    flash glucose sensor (FREESTYLE JOSEPHINE 2 SENSOR) Kit Use as instructed    fluconazole (DIFLUCAN) 150 MG Tab Take 1 tablet (150 mg total) by mouth once daily.    furosemide (LASIX) 20 MG tablet Take 1 tablet (20 mg total) by mouth once daily.    glucagon (BAQSIMI) 3 mg/actuation Spry 2 pack.  Spray one device (3 mg) in one nostril to treat severe hypoglycemia. Disp 1 box (2 devices)    loperamide (IMODIUM) 2 mg capsule Take 2 mg by mouth 4 (four) times daily as needed for Diarrhea.    montelukast (SINGULAIR) 10 mg tablet Take 1 tablet (10 mg total) by mouth once daily.    mucus clearing device (AEROBIKA OSCILLATING PEP SYSTM MISC) 12 puffs by Misc.(Non-Drug; Combo Route) route daily as needed.    NYAMYC powder     ONETOUCH DELICA PLUS LANCET 33 gauge Misc USE   TO CHECK GLUCOSE 4 TIMES DAILY BEFORE  MEALS    pen needle, diabetic (BD ULTRA-FINE MICRO PEN NEEDLE) 32 gauge x 1/4" Ndle use to inject ozempic once a week    pen needle, diabetic (BD ULTRA-FINE MICRO PEN NEEDLE) 32 gauge x 1/4" Ndle Use to inject insulin 4 to 5 times a day as instructed    trimethoprim (TRIMPEX) 100 mg Tab Take 100 mg by mouth every 12 (twelve) hours.     Family History       Problem Relation (Age of Onset)    Bell's palsy Sister    Bone cancer Mother, Father (86)    Breast cancer Mother, Sister, Sister    Cancer Mother (42), Father    Heart disease Mother          Tobacco Use    Smoking status: Never    Smokeless tobacco: Never   Substance and Sexual Activity    Alcohol use: Not Currently     Comment: rare    Drug use: Never    Sexual activity: Not Currently     Partners: Male     Comment:      Review of Systems "   Constitutional:  Positive for activity change, chills and fatigue. Negative for fever.   HENT:  Positive for postnasal drip. Negative for sinus pressure and sore throat.    Respiratory:  Positive for cough and shortness of breath. Negative for wheezing.    Cardiovascular:  Negative for chest pain, palpitations and leg swelling.   Gastrointestinal:  Negative for abdominal pain, nausea and vomiting.   Skin:  Negative for rash and wound.   Neurological:  Positive for weakness. Negative for syncope.     Objective:     Vital Signs (Most Recent):  Temp: 96.4 °F (35.8 °C) (07/09/25 0735)  Pulse: 90 (07/09/25 0845)  Resp: 20 (07/09/25 0845)  BP: (!) 140/63 (07/09/25 0809)  SpO2: (!) 94 % (07/09/25 0845) Vital Signs (24h Range):  Temp:  [96.4 °F (35.8 °C)-98.1 °F (36.7 °C)] 96.4 °F (35.8 °C)  Pulse:  [] 90  Resp:  [14-24] 20  SpO2:  [91 %-97 %] 94 %  BP: (106-142)/(57-66) 140/63     Weight: 88.9 kg (196 lb)  Body mass index is 33.64 kg/m².     Physical Exam  Constitutional:       Appearance: She is ill-appearing.   HENT:      Nose: Nose normal.      Mouth/Throat:      Mouth: Mucous membranes are moist.   Eyes:      Extraocular Movements: Extraocular movements intact.      Pupils: Pupils are equal, round, and reactive to light.   Cardiovascular:      Rate and Rhythm: Normal rate and regular rhythm.   Pulmonary:      Effort: Pulmonary effort is normal.      Breath sounds: Rhonchi present.      Comments: O2 via nc  Abdominal:      General: Bowel sounds are normal.      Palpations: Abdomen is soft.   Genitourinary:     Comments: Land with yellow urine  Musculoskeletal:      Right lower leg: Edema present.      Left lower leg: Edema present.   Skin:     General: Skin is warm.   Neurological:      General: No focal deficit present.      Mental Status: She is alert.              CRANIAL NERVES     CN III, IV, VI   Pupils are equal, round, and reactive to light.       Significant Labs: All pertinent labs within the past 24  hours have been reviewed.  Recent Lab Results         07/09/25  0823 07/08/25 2028 07/08/25  1535        Magnesium  1.9           POCT Glucose   299   222               Significant Imaging: I have reviewed all pertinent imaging results/findings within the past 24 hours.

## 2025-07-09 NOTE — ASSESSMENT & PLAN NOTE
Patient has Diastolic (HFpEF) heart failure that is Acute on chronic. On presentation their CHF was decompensated. Evidence of decompensated CHF on presentation includes: edema, dyspnea on exertion (KIDD), and shortness of breath. The etiology of their decompensation is likely dietary indiscretion. Most recent BNP and echo results are listed below.  Recent Labs     07/07/25  1843   BNP 10,746*     Latest ECHO  Results for orders placed in visit on 09/20/23    Echo    Interpretation Summary    Left Ventricle: Moderately increased wall thickness. There is moderate concentric hypertrophy. Normal wall motion. Grade II diastolic dysfunction.    Left Atrium: Left atrium is moderately dilated.    Right Ventricle: Normal right ventricular cavity size. Systolic function is normal.    Right Atrium: Right atrium is mildly dilated.    Aortic Valve: The aortic valve is a trileaflet valve. Mildly calcified left, right and noncoronary cusps. Mildly restricted motion. By planimetry the aortic valve area measures 1.8 cm².    Mitral Valve: There is mild bileaflet sclerosis.    Current Heart Failure Medications  , Daily, Oral  carvediloL tablet 6.25 mg, 2 times daily with meals, Oral  lisinopriL tablet 40 mg, Nightly, Oral  furosemide injection 20 mg, Daily, Intravenous    Plan  - Monitor strict I&Os and daily weights.    - Place on telemetry  - Low sodium diet  - Place on fluid restriction of 2 L.   - Cardiology has been consulted  - The patient's volume status is improving but not at their baseline as indicated by edema, dyspnea on exertion (KIDD), and shortness of breath  7/9 cont current meds

## 2025-07-09 NOTE — PROGRESS NOTES
Pharmacist Renal Dose Adjustment Note    Kelsea Cadet is a 85 y.o. female being treated with the medication Lovenox    Patient Data:    Vital Signs (Most Recent):  Temp: 96.4 °F (35.8 °C) (07/09/25 0735)  Pulse: 90 (07/09/25 0845)  Resp: 20 (07/09/25 0845)  BP: (!) 140/63 (07/09/25 0809)  SpO2: (!) 94 % (07/09/25 0845) Vital Signs (72h Range):  Temp:  [96.4 °F (35.8 °C)-98.8 °F (37.1 °C)]   Pulse:  []   Resp:  [12-26]   BP: (106-201)/(57-84)   SpO2:  [90 %-99 %]      Recent Labs   Lab 07/07/25  1843 07/08/25  0430 07/09/25  0823   CREATININE 0.8 0.8 2.0*     Serum creatinine: 2 mg/dL (H) 07/09/25 0823  Estimated creatinine clearance: 23 mL/min (A)    Medication:Lovenox dose: 40mg frequency q24hr will be changed to medication:Lovenox dose:30mg frequency:q24hr    Pharmacist's Name: Lucía Mcarthur

## 2025-07-09 NOTE — PROGRESS NOTES
[facility]  Cardiology  Progress Note    Patient Name: Kelsea Cadet  MRN: [id number]  Admission Date: 7/7/2025  Hospital Length of Stay: 2 days  Code Status: Full Code   Attending Provider: Harika Mina MD   Consulting Provider: Colton Cordova NP  Primary Care Physician: Gabby Jackson NP  Principal Problem:Multifocal pneumonia    Patient information was obtained from patient, past medical records, and ER records.     Consults  Subjective:     Chief Complaint:  SOB    HPI:   85 year old  female with PMH of breast CA, HHD, hyperlipidemia, CKD, Type II DM, Non-obstructive CAD, Pulm HTN, and asthma presents to the ED overnight with reports of fatigue, malaise, and SOB.     Patient reports fever, cough, and malasie since last week. She saw her PCP at that time with a chest XR and was treated with outpatient antibiotics. Symptoms worsened, did not improve with oral antibiotics.     Last seen by [facility] in 2021, now follows with Dr. Turner.     Chest CT concerning for multifocal pneumonia with a new large subcarinal node and right para hilar node which could be benign or malignant and are considered indeterminate. NT pro BNP elevated at 10,000. Cardiology asked to evaluate for possible CHF exacerbation.     Past Medical History:   Diagnosis Date    Arthritis     Asthma     Coronary artery disease     Environmental and seasonal allergies     Land catheter in place     Hard of hearing     Hyperlipidemia     Neurogenic bladder     Pneumonia, unspecified organism     Presence of indwelling Land catheter     Unspecified chronic bronchitis     Vitamin D deficiency        Past Surgical History:   Procedure Laterality Date    BLADDER SUSPENSION      BREAST BIOPSY Bilateral 1996    BREAST SURGERY      lumpectomy, isotopes    CHOLECYSTECTOMY      had gal bladder removed    COLECTOMY      had part of colon removed    CYSTOSCOPY W/ RETROGRADES Bilateral 12/19/2019    Procedure: CYSTOSCOPY, WITH  RETROGRADE PYELOGRAM;  Surgeon: Prasad Rocha MD;  Location: [facility] OR;  Service: Urology;  Laterality: Bilateral;    DILATION OF URETHRA N/A 12/19/2019    Procedure: DILATION, URETHRA;  Surgeon: Prasad Rocha MD;  Location: [facility] OR;  Service: Urology;  Laterality: N/A;    HYSTERECTOMY      LEFT HEART CATHETERIZATION Left 11/27/2019    Procedure: Left heart cath;  Surgeon: Urban Paiz MD;  Location: [facility] CATH LAB;  Service: Cardiology;  Laterality: Left;    MODIFIED RADICAL MASTECTOMY W/ AXILLARY LYMPH NODE DISSECTION Right 1/26/2022    Procedure: MASTECTOMY, MODIFIED RADICAL;  Surgeon: Keisha Cortez MD;  Location: [facility] OR;  Service: General;  Laterality: Right;    OOPHORECTOMY      SENTINEL LYMPH NODE BIOPSY Right 1/26/2022    Procedure: BIOPSY, LYMPH NODE, SENTINEL;  Surgeon: Keisha Cortez MD;  Location: [facility] OR;  Service: General;  Laterality: Right;  0800    SIMPLE MASTECTOMY Left 1/26/2022    Procedure: MASTECTOMY, SIMPLE;  Surgeon: Keisha Cortez MD;  Location: [facility];  Service: General;  Laterality: Left;  FROZEN SECTION       Review of patient's allergies indicates:   Allergen Reactions    Macrobid [nitrofurantoin monohyd/m-cryst] Swelling    Bactrim [sulfamethoxazole-trimethoprim] Diarrhea    Ciprofloxacin Other (See Comments)     GI problems    Codeine Other (See Comments)     headache    Latex, natural rubber Rash       No current facility-administered medications on file prior to encounter.     Current Outpatient Medications on File Prior to Encounter   Medication Sig    acarbose (PRECOSE) 100 MG Tab Take 1 tablet (100 mg total) by mouth 3 (three) times daily with meals.    albuterol (PROVENTIL/VENTOLIN HFA) 90 mcg/actuation inhaler Inhale 2 puffs into the lungs every 6 (six) hours as needed for Wheezing. Rescue    alendronate (FOSAMAX) 70 MG tablet Take 1 tablet (70 mg total) by mouth every 7 days.    aspirin (ECOTRIN) 81 MG EC tablet Take 81 mg by mouth once daily. 3 days a  week    atorvastatin (LIPITOR) 40 MG tablet Take 1 tablet (40 mg total) by mouth once daily. (Patient taking differently: Take 20 mg by mouth once daily.)    azithromycin (Z-JAIRO) 250 MG tablet Take 2 tablets by mouth on day 1; Take 1 tablet by mouth on days 2-5    blood sugar diagnostic (ONETOUCH ULTRA TEST) Strp Inject 1 each into the skin 3 (three) times daily before meals.    calcium citrate (CALCITRATE) 200 mg (950 mg) tablet Take 1 tablet by mouth once daily. (Patient taking differently: Take 1 tablet by mouth every Mon, Wed, Fri.)    carvediloL (COREG) 6.25 MG tablet Take 1 tablet (6.25 mg total) by mouth 2 (two) times daily with meals.    cholecalciferol, vitamin D3, (VITAMIN D3) 50 mcg (2,000 unit) Tab Take 1 tablet (2,000 Units total) by mouth twice a week. 10,000 twice a week (Patient taking differently: Take 1 tablet by mouth twice a week. 10,000 3 times weekly)    clobetasoL (TEMOVATE) 0.05 % cream Apply twice a day for 2 weeks then nightly for 2 months. May decrease to three nights a week after that.    co-enzyme Q-10 30 mg capsule Take 1 capsule (30 mg total) by mouth every evening.    cranberry fruit extract (CRANBERRY CONCENTRATE ORAL) Take 2 capsules by mouth once daily. (Patient taking differently: Take 1 capsule by mouth once daily.)    dicyclomine (BENTYL) 20 mg tablet Take 1 tablet (20 mg total) by mouth 4 (four) times daily before meals and nightly.    ferrous sulfate 325 (65 FE) MG EC tablet Take 325 mg by mouth. 3 days a week    FLUoxetine 10 MG capsule Take 1 capsule (10 mg total) by mouth once daily.    furosemide (LASIX) 20 MG tablet Take 1 tablet by mouth once daily    gabapentin (NEURONTIN) 600 MG tablet Take 1 tablet (600 mg total) by mouth 3 (three) times daily.    guaiFENesin (MUCINEX) 600 mg 12 hr tablet Take 2 tablets (1,200 mg total) by mouth 2 (two) times daily.    hydroCHLOROthiazide (MICROZIDE) 12.5 mg capsule Take 12.5 mg by mouth once daily.    insulin aspart U-100 (NOVOLOG  FLEXPEN U-100 INSULIN) 100 unit/mL (3 mL) InPn pen Take sliding scale insulin 4 times a day as instructed- max dose of 40 units/day.    insulin glargine U-100, Lantus, (LANTUS SOLOSTAR U-100 INSULIN) 100 unit/mL (3 mL) InPn pen Inject 16 Units into the skin once daily.    letrozole (FEMARA) 2.5 mg Tab Take 1 tablet (2.5 mg total) by mouth once daily.    LIDOcaine (LIDODERM) 5 % Place onto the skin once daily. Remove & Discard patch within 12 hours or as directed by MD    lisinopriL (PRINIVIL,ZESTRIL) 40 MG tablet Take 1 tablet (40 mg total) by mouth every evening.    metFORMIN (GLUCOPHAGE-XR) 500 MG ER 24hr tablet Take 1 tablet (500 mg total) by mouth daily with breakfast.    multivitamin capsule Take 1 capsule by mouth once daily. (Patient taking differently: Take 1 capsule by mouth once daily. Taking 3 times weekly)    omega-3 fatty acids/fish oil (FISH OIL-OMEGA-3 FATTY ACIDS) 300-1,000 mg capsule Take 1 capsule by mouth once a week.    potassium chloride (KLOR-CON) 10 MEQ TbSR Take 99 mEq by mouth once. (Patient taking differently: Take 99 mEq by mouth once. Taking 3 x weekly)    acetaminophen (TYLENOL) 650 MG TbSR Take 650 mg by mouth 2 (two) times a day.    amoxicillin-clavulanate 875-125mg (AUGMENTIN) 875-125 mg per tablet Take 1 tablet by mouth 2 (two) times daily.    blood-glucose meter Misc 1 Device by Misc.(Non-Drug; Combo Route) route once daily. One Touch Dx. Code:E11.9    blood-glucose sensor (FREESTYLE JOSEPHINE 2 PLUS SENSOR) Rose Use as instructed    flash glucose scanning reader (FREESTYLE JOSEPHINE 2 READER) Misc Use as instructed    flash glucose sensor (FREESTYLE JOSEPHINE 2 SENSOR) Kit Use as instructed    fluconazole (DIFLUCAN) 150 MG Tab Take 1 tablet (150 mg total) by mouth once daily.    furosemide (LASIX) 20 MG tablet Take 1 tablet (20 mg total) by mouth once daily.    glucagon (BAQSIMI) 3 mg/actuation Spry 2 pack.  Spray one device (3 mg) in one nostril to treat severe hypoglycemia. Disp 1 box (2  "devices)    loperamide (IMODIUM) 2 mg capsule Take 2 mg by mouth 4 (four) times daily as needed for Diarrhea.    montelukast (SINGULAIR) 10 mg tablet Take 1 tablet (10 mg total) by mouth once daily.    mucus clearing device (AEROBIKA OSCILLATING PEP SYSTM MISC) 12 puffs by Misc.(Non-Drug; Combo Route) route daily as needed.    NYAMYC powder     ONETOUCH DELICA PLUS LANCET 33 gauge Misc USE   TO CHECK GLUCOSE 4 TIMES DAILY BEFORE  MEALS    pen needle, diabetic (BD ULTRA-FINE MICRO PEN NEEDLE) 32 gauge x 1/4" Ndle use to inject ozempic once a week    pen needle, diabetic (BD ULTRA-FINE MICRO PEN NEEDLE) 32 gauge x 1/4" Ndle Use to inject insulin 4 to 5 times a day as instructed    trimethoprim (TRIMPEX) 100 mg Tab Take 100 mg by mouth every 12 (twelve) hours.     Family History       Problem Relation (Age of Onset)    Bell's palsy Sister    Bone cancer Mother, Father (86)    Breast cancer Mother, Sister, Sister    Cancer Mother (42), Father    Heart disease Mother          Tobacco Use    Smoking status: Never    Smokeless tobacco: Never   Substance and Sexual Activity    Alcohol use: Not Currently     Comment: rare    Drug use: Never    Sexual activity: Not Currently     Partners: Male     Comment:      Review of Systems   Constitutional: Positive for fever and malaise/fatigue.   Cardiovascular:  Positive for dyspnea on exertion. Negative for chest pain.   Respiratory:  Positive for cough and shortness of breath.    Skin: Negative.    Musculoskeletal: Negative.    Gastrointestinal:  Negative for nausea and vomiting.   Neurological:  Positive for weakness.     Objective:     Vital Signs (Most Recent):  Temp: 96.4 °F (35.8 °C) (07/09/25 0735)  Pulse: 90 (07/09/25 0845)  Resp: 20 (07/09/25 0845)  BP: (!) 140/63 (07/09/25 0809)  SpO2: (!) 94 % (07/09/25 0845) Vital Signs (24h Range):  Temp:  [96.4 °F (35.8 °C)-98.1 °F (36.7 °C)] 96.4 °F (35.8 °C)  Pulse:  [73-92] 90  Resp:  [14-24] 20  SpO2:  [91 %-97 %] 94 %  BP: " (106-140)/(57-66) 140/63     Weight: 88.9 kg (196 lb)  Body mass index is 33.64 kg/m².    SpO2: (!) 94 %         Intake/Output Summary (Last 24 hours) at 7/9/2025 0941  Last data filed at 7/9/2025 0441  Gross per 24 hour   Intake 370 ml   Output 500 ml   Net -130 ml       Lines/Drains/Airways       Drain  Duration                  Urethral Catheter 07/07/25 1905 Coude 22 Fr. 1 day              Peripheral Intravenous Line  Duration             Peripheral IV 07/07/25 1847 20 G Anterior;Right Forearm 1 day                    Significant Labs:   Recent Lab Results  (Last 5 results in the past 72 hours)        07/09/25  0823   07/08/25  2028   07/08/25  1535   07/08/25  0430   07/08/25  0234        Albumin 2.3                              ALT 11               Anion Gap 10       9         AST 22               Bands       8.0         BILIRUBIN TOTAL 0.3  Comment: For infants and newborns, interpretation of results should be based   on gestational age, weight and in agreement with clinical   observations.    Premature Infant recommended reference ranges:   0-24 hours:  <8.0 mg/dL   24-48 hours: <12.0 mg/dL   3-5 days:    <15.0 mg/dL   6-29 days:   <15.0 mg/dL               BUN 31       16         Calcium 8.6       8.5         Chloride 95       95         CO2 27       26         Creatinine 2.0       0.8         eGFR 24  Comment: Estimated GFR calculated using the CKD-EPI creatinine (2021) equation.       >60  Comment: Estimated GFR calculated using the CKD-EPI creatinine (2021) equation.         Eos %       1.0         Estimated Avg Glucose       137         Glucose 185       173         Gran # (ANC)       12.9         Hematocrit       34.7         Hemoglobin       11.5         Hemoglobin A1C External       6.4  Comment: ADA Screening Guidelines:  5.7-6.4%  Consistent with prediabetes  >=6.5%  Consistent with diabetes    High levels of fetal hemoglobin interfere with the HbA1C  assay. Heterozygous hemoglobin variants  (HbS, HgC, etc)do  not significantly interfere with this assay.   However, presence of multiple variants may affect accuracy.         Lactic Acid Level               Lymph %       5.0         Magnesium  1.9               MCH       27.9         MCHC       33.1         MCV       84         Mono %       11.0         MPV       9.4         nRBC       0         Platelet Count       365         POCT Glucose   299   222     166       Potassium 4.1       2.9         PROTEIN TOTAL 6.9               RBC       4.12         RDW       13.1         Segmented Neutrophil %       75.0         Sodium 132       130         WBC       15.52                                Significant Imaging:   Imaging Results              CT Chest With Contrast (Final result)  Result time 07/07/25 19:40:16      Final result by Dinorah Boogie MD (07/07/25 19:40:16)                   Impression:      1. There are mildly prominent paratracheal and AP window nodes which could be reactive however there is a new large subcarinal node and right para hilar node which could be benign or malignant and are considered indeterminate.  2. Small right pleural effusion  3. Volume loss in the right upper lobe with focal area of right anterior upper lobe consolidation suggesting infiltrate and bronchiectasis which is significantly increased  4.  bronchiectasis in the lung bases and diffuse scattered patchy infiltrates consistent with multifocal pneumonia with subtle areas in the left upper lobe and more focal areas in the right upper lobe and lung bases      Electronically signed by: Dinorah Boogie MD  Date:    07/07/2025  Time:    19:40               Narrative:    EXAMINATION:  CT CHEST WITH CONTRAST    CLINICAL HISTORY:  Pneumonia, unresolved;Dyspnea, chronic, unclear etiology;    TECHNIQUE:  Iterative reconstruction technique was used.    CT/Cardiac Nuclear exams in prior 12 months: 1    COMPARISON:  03/13/2025.    FINDINGS:  Multiple small thyroid nodules are present.   There is some abnormal paratracheal and mediastinal nodes with a right paratracheal node 1.4 cm right hilar node approximately 2.5 cm and a subcarinal node approximately 3 cm which are new.  There is a right pleural effusion measuring 3 cm in thickness there is been a cholecystectomy and intrahepatic ductal dilatation some small proximally 1 cm and less AP window nodes and several 1-1.5 cm paratracheal nodes.  There is new right upper lobe consolidation with volume loss on the right with extensive right upper lobe bronchiectasis and right middle lobe bronchiectasis.  There is patchy ground-glass infiltrate in the lingula and lung bases this suggest multifocal pneumonia there is small patchy areas of ground-glass infiltrate in the left upper lobe                                       X-Ray Chest 1 View (Final result)  Result time 07/08/25 01:56:36      Final result by Yasmany Sanders MD (07/08/25 01:56:36)                   Impression:      Interval worsening of the bilateral pulmonary infiltrates.      Electronically signed by: Yasmany Sanders MD  Date:    07/08/2025  Time:    01:56               Narrative:    EXAMINATION:  XR CHEST 1 VIEW    CLINICAL HISTORY:  Shortness of breath    COMPARISON:  07/03/2025    FINDINGS:  Cardiac silhouette is unchanged.  Heterogeneous opacities at the bibasilar and right perihilar regions of the lungs have increased in the interval, possibly worsening pneumonia.  No obvious pleural effusion.  Bones are unchanged.                                      Recent Diagnostics:    Last Echocardiogram:  7/8/2025    Left Ventricle: The left ventricle is normal in size. Mildly increased wall thickness. There is hyperdynamic systolic function with a visually estimated ejection fraction of 75 - 80%. Grade I diastolic dysfunction.    Right Ventricle: The right ventricle is mildly dialted measuring 3.1 cm. Systolic function is normal.    Left Atrium: The left atrium is mildly dilated    Right Atrium:  The right atrium is mildly dilated .    Aortic Valve: The aortic valve is a trileaflet valve. Moderately calcified cusps. There is annular calcification present. Moderately restricted motion. There is moderate stenosis. Aortic valve area by VTI is 1.2 cm². Aortic valve peak velocity is 3.2 m/s. Mean gradient is 21 mmHg. The dimensionless index is 0.40.    Mitral Valve: Moderately calcified posterior leaflet. There is moderate mitral annular calcification.    Tricuspid Valve: There is mild regurgitation.    IVC/SVC: Normal venous pressure at 3 mmHg.  Last Nuclear &/or SHANICE:    Last Lipids:   Lab Results   Component Value Date    CHOL 77 (L) 12/11/2024    CHOL 94 (L) 07/01/2024    CHOL 131 12/27/2023     Lab Results   Component Value Date    HDL 42 12/11/2024    HDL 40 07/01/2024    HDL 44 12/27/2023     Lab Results   Component Value Date    LDLCALC 14.6 (L) 12/11/2024    LDLCALC 30.0 (L) 07/01/2024    LDLCALC 44.4 (L) 12/27/2023     Lab Results   Component Value Date    TRIG 102 12/11/2024    TRIG 120 07/01/2024    TRIG 213 (H) 12/27/2023     Lab Results   Component Value Date    CHOLHDL 54.5 (H) 12/11/2024    CHOLHDL 42.6 07/01/2024    CHOLHDL 33.6 12/27/2023           Telemetry:  NSR    CCS Functional Classification of Angina:  I  NYHA Functional Classification:  II      Physical Exam:  Physical Exam  Constitutional:       Comments: frail   HENT:      Head: Normocephalic and atraumatic.      Nose: Nose normal.      Mouth/Throat:      Mouth: Mucous membranes are moist.   Eyes:      Extraocular Movements: Extraocular movements intact.      Pupils: Pupils are equal, round, and reactive to light.   Cardiovascular:      Rate and Rhythm: Normal rate and regular rhythm.      Pulses: Normal pulses.      Heart sounds: Normal heart sounds. No murmur heard.  Pulmonary:      Effort: Pulmonary effort is normal.      Breath sounds: Normal breath sounds.   Abdominal:      Palpations: Abdomen is soft.   Musculoskeletal:          General: Normal range of motion.      Cervical back: Normal range of motion.      Right lower leg: No edema.      Left lower leg: No edema.   Skin:     General: Skin is warm and dry.      Capillary Refill: Capillary refill takes less than 2 seconds.   Neurological:      General: No focal deficit present.      Mental Status: She is alert and oriented to person, place, and time.      Motor: No weakness.   Psychiatric:         Mood and Affect: Mood normal.         Behavior: Behavior normal.         Home Medications:  Medications Ordered Prior to Encounter[1]    Current Inpatient Medications:  Current Medications[2]      VTE Risk Mitigation (From admission, onward)           Ordered     enoxaparin injection 40 mg  Every 24 hours         07/09/25 0859     IP VTE HIGH RISK PATIENT  Once         07/07/25 2105     Place PK hose  Until discontinued         07/07/25 2105                    Assessment:     Active Diagnoses:    Diagnosis Date Noted POA    PRINCIPAL PROBLEM:  Multifocal pneumonia [J18.9] 07/08/2025 Yes    Hypokalemia [E87.6] 07/09/2025 Yes    Cough [R05.9] 07/08/2025 Yes    CHF (congestive heart failure) [I50.9] 07/08/2025 Yes    Urinary retention [R33.9] 01/06/2021 Yes    Type 2 diabetes mellitus without complication, with long-term current use of insulin [E11.9, Z79.4] 06/10/2019 Not Applicable    Essential hypertension [I10] 05/13/2019 Yes      Problems Resolved During this Admission:       Multifocal Pneumonia   -patient admitted with SOB, cough, and malaise  -CT concerning for multifocal pneumonia  -WBC elevated  -Management per primary team   -Agree with IV antibiotics     Chronic HFpEF  -Patient with chronic HFpEF  - Echo yesterday with hyperdynamic EF   -Symptoms this admission more consistent with pneumonia, although NT pro BNP is elevated at 10,000  -Continue Coreg, Lisinopril as dosed   -Agree with gentle diuresis with IV lasix per primary team       CAD  -Hx of mild CAD  -No chest pain or anginal  equivalent reported   -Continue ASA and statin as dosed     HHD  -BP reasonably well controlled this morning   -Continue current medical therapy   -Adjust as needed     Provider's Attestation:  I, Lorenzo Temple MD, confirm that AUGIE Cordova worked under my direction at all times.  Documentation shall reflect findings and decisions made by myself in the course of the patient's treatment.  I have performed a face to face evaluation of the patient and reviewed the medical record. In addition, I have performed the substantive portion of the medical decision making. I have reviewed the notes and assessment, and I concur with her documentation.  CMS guidelines regarding the use of scribes shall be adhered to.  MD Colton Sepulveda NP scribed for Dr Temple   Cardiology  [facility]  07/09/2025           [1]   No current facility-administered medications on file prior to encounter.     Current Outpatient Medications on File Prior to Encounter   Medication Sig Dispense Refill    acarbose (PRECOSE) 100 MG Tab Take 1 tablet (100 mg total) by mouth 3 (three) times daily with meals. 270 tablet 3    albuterol (PROVENTIL/VENTOLIN HFA) 90 mcg/actuation inhaler Inhale 2 puffs into the lungs every 6 (six) hours as needed for Wheezing. Rescue 18 g 11    alendronate (FOSAMAX) 70 MG tablet Take 1 tablet (70 mg total) by mouth every 7 days. 12 tablet 1    aspirin (ECOTRIN) 81 MG EC tablet Take 81 mg by mouth once daily. 3 days a week      atorvastatin (LIPITOR) 40 MG tablet Take 1 tablet (40 mg total) by mouth once daily. (Patient taking differently: Take 20 mg by mouth once daily.) 90 tablet 3    azithromycin (Z-JAIRO) 250 MG tablet Take 2 tablets by mouth on day 1; Take 1 tablet by mouth on days 2-5 6 tablet 0    blood sugar diagnostic (ONETOUCH ULTRA TEST) Strp Inject 1 each into the skin 3 (three) times daily before meals. 300 each 3    calcium citrate (CALCITRATE) 200 mg (950 mg) tablet Take 1 tablet by  mouth once daily. (Patient taking differently: Take 1 tablet by mouth every Mon, Wed, Fri.)      carvediloL (COREG) 6.25 MG tablet Take 1 tablet (6.25 mg total) by mouth 2 (two) times daily with meals. 180 tablet 3    cholecalciferol, vitamin D3, (VITAMIN D3) 50 mcg (2,000 unit) Tab Take 1 tablet (2,000 Units total) by mouth twice a week. 10,000 twice a week (Patient taking differently: Take 1 tablet by mouth twice a week. 10,000 3 times weekly)      clobetasoL (TEMOVATE) 0.05 % cream Apply twice a day for 2 weeks then nightly for 2 months. May decrease to three nights a week after that. 60 g 2    co-enzyme Q-10 30 mg capsule Take 1 capsule (30 mg total) by mouth every evening.      cranberry fruit extract (CRANBERRY CONCENTRATE ORAL) Take 2 capsules by mouth once daily. (Patient taking differently: Take 1 capsule by mouth once daily.)      dicyclomine (BENTYL) 20 mg tablet Take 1 tablet (20 mg total) by mouth 4 (four) times daily before meals and nightly. 120 tablet 11    ferrous sulfate 325 (65 FE) MG EC tablet Take 325 mg by mouth. 3 days a week      FLUoxetine 10 MG capsule Take 1 capsule (10 mg total) by mouth once daily. 90 capsule 3    furosemide (LASIX) 20 MG tablet Take 1 tablet by mouth once daily 90 tablet 0    gabapentin (NEURONTIN) 600 MG tablet Take 1 tablet (600 mg total) by mouth 3 (three) times daily. 90 tablet 11    guaiFENesin (MUCINEX) 600 mg 12 hr tablet Take 2 tablets (1,200 mg total) by mouth 2 (two) times daily. 40 tablet 0    hydroCHLOROthiazide (MICROZIDE) 12.5 mg capsule Take 12.5 mg by mouth once daily.      insulin aspart U-100 (NOVOLOG FLEXPEN U-100 INSULIN) 100 unit/mL (3 mL) InPn pen Take sliding scale insulin 4 times a day as instructed- max dose of 40 units/day. 15 mL 3    insulin glargine U-100, Lantus, (LANTUS SOLOSTAR U-100 INSULIN) 100 unit/mL (3 mL) InPn pen Inject 16 Units into the skin once daily. 15 mL 3    letrozole (FEMARA) 2.5 mg Tab Take 1 tablet (2.5 mg total) by mouth  once daily. 30 tablet 11    LIDOcaine (LIDODERM) 5 % Place onto the skin once daily. Remove & Discard patch within 12 hours or as directed by MD 30 patch 5    lisinopriL (PRINIVIL,ZESTRIL) 40 MG tablet Take 1 tablet (40 mg total) by mouth every evening. 90 tablet 3    metFORMIN (GLUCOPHAGE-XR) 500 MG ER 24hr tablet Take 1 tablet (500 mg total) by mouth daily with breakfast. 90 tablet 3    multivitamin capsule Take 1 capsule by mouth once daily. (Patient taking differently: Take 1 capsule by mouth once daily. Taking 3 times weekly)      omega-3 fatty acids/fish oil (FISH OIL-OMEGA-3 FATTY ACIDS) 300-1,000 mg capsule Take 1 capsule by mouth once a week.      potassium chloride (KLOR-CON) 10 MEQ TbSR Take 99 mEq by mouth once. (Patient taking differently: Take 99 mEq by mouth once. Taking 3 x weekly)      acetaminophen (TYLENOL) 650 MG TbSR Take 650 mg by mouth 2 (two) times a day.      amoxicillin-clavulanate 875-125mg (AUGMENTIN) 875-125 mg per tablet Take 1 tablet by mouth 2 (two) times daily. 10 tablet 0    blood-glucose meter Misc 1 Device by Misc.(Non-Drug; Combo Route) route once daily. One Touch Dx. Code:E11.9 1 each 0    blood-glucose sensor (FREESTYLE JOSEPHINE 2 PLUS SENSOR) Rose Use as instructed 6 each 6    flash glucose scanning reader (FREESTYLE JOSEPHINE 2 READER) Misc Use as instructed 1 each 1    flash glucose sensor (FREESTYLE JOESPHINE 2 SENSOR) Kit Use as instructed 6 kit 3    fluconazole (DIFLUCAN) 150 MG Tab Take 1 tablet (150 mg total) by mouth once daily. 2 tablet 0    furosemide (LASIX) 20 MG tablet Take 1 tablet (20 mg total) by mouth once daily. 90 tablet 0    glucagon (BAQSIMI) 3 mg/actuation Spry 2 pack.  Spray one device (3 mg) in one nostril to treat severe hypoglycemia. Disp 1 box (2 devices) 2 each 3    loperamide (IMODIUM) 2 mg capsule Take 2 mg by mouth 4 (four) times daily as needed for Diarrhea.      montelukast (SINGULAIR) 10 mg tablet Take 1 tablet (10 mg total) by mouth once daily. 90  "tablet 3    mucus clearing device (AEROBIKA OSCILLATING PEP SYSTM MISC) 12 puffs by Misc.(Non-Drug; Combo Route) route daily as needed.      NYAMYC powder       ONETOUCH DELICA PLUS LANCET 33 gauge Misc USE   TO CHECK GLUCOSE 4 TIMES DAILY BEFORE  MEALS 300 each 3    pen needle, diabetic (BD ULTRA-FINE MICRO PEN NEEDLE) 32 gauge x 1/4" Ndle use to inject ozempic once a week 100 each 3    pen needle, diabetic (BD ULTRA-FINE MICRO PEN NEEDLE) 32 gauge x 1/4" Ndle Use to inject insulin 4 to 5 times a day as instructed 100 each 6    trimethoprim (TRIMPEX) 100 mg Tab Take 100 mg by mouth every 12 (twelve) hours.     [2]   Current Facility-Administered Medications:     acetaminophen tablet 650 mg, 650 mg, Oral, Q8H PRN, Reynold Crooks MD    albuterol sulfate nebulizer solution 2.5 mg, 2.5 mg, Nebulization, Q6H WAKE, Harika Mina MD, 2.5 mg at 07/09/25 0845    aspirin EC tablet 81 mg, 81 mg, Oral, Daily, Harika Mina MD, 81 mg at 07/09/25 0845    atorvastatin tablet 20 mg, 20 mg, Oral, Daily, Harika Mina MD, 20 mg at 07/09/25 0845    carvediloL tablet 6.25 mg, 6.25 mg, Oral, BID WM, Reynold Crooks MD, 6.25 mg at 07/09/25 0809    dextrose 50% injection 12.5 g, 12.5 g, Intravenous, PRN, Harika Mina MD    dextrose 50% injection 25 g, 25 g, Intravenous, PRN, Harika Mina MD    enoxaparin injection 40 mg, 40 mg, Subcutaneous, Q24H (prophylaxis, 1700), Harika Mina MD    famotidine tablet 20 mg, 20 mg, Oral, BID, Harika Mina MD    FLUoxetine capsule 10 mg, 10 mg, Oral, Daily, Reynold Crooks MD, 10 mg at 07/09/25 0845    furosemide injection 20 mg, 20 mg, Intravenous, Daily, Harika Mina MD, 20 mg at 07/08/25 0935    gabapentin capsule 600 mg, 600 mg, Oral, TID, Reynold Crooks MD, 600 mg at 07/09/25 0845    glucagon (human recombinant) injection 1 mg, 1 mg, Intramuscular, PRN, Harika Mina MD    glucose chewable tablet 16 g, 16 " g, Oral, PRN, Harika Mina MD    glucose chewable tablet 24 g, 24 g, Oral, PRN, Harika Mina MD    guaiFENesin 12 hr tablet 600 mg, 600 mg, Oral, BID, Harika Mina MD, 600 mg at 07/09/25 0845    insulin aspart U-100 pen 0-5 Units, 0-5 Units, Subcutaneous, QID (AC + HS) PRN, Harika Mina MD, 1 Units at 07/08/25 2038    insulin glargine U-100 (Lantus) pen 16 Units, 16 Units, Subcutaneous, Daily, Reynold Crooks MD, 16 Units at 07/09/25 0845    lisinopriL tablet 40 mg, 40 mg, Oral, QHS, Reynold Crooks MD, 40 mg at 07/08/25 2024    loperamide capsule 2 mg, 2 mg, Oral, QID PRN, Harika Mina MD, 2 mg at 07/08/25 1150    melatonin tablet 6 mg, 6 mg, Oral, Nightly PRN, Reynold Crooks MD    mupirocin 2 % ointment, , Nasal, BID, Harika Mina MD, Given at 07/09/25 0855    ondansetron injection 4 mg, 4 mg, Intravenous, Q8H PRN, Reynold Crooks MD    piperacillin-tazobactam (ZOSYN) 4.5 g in D5W 100 mL IVPB (MB+), 4.5 g, Intravenous, Q8H, Harika Mina MD, Last Rate: 25 mL/hr at 07/09/25 0900, 4.5 g at 07/09/25 0900    potassium chloride SA CR tablet 20 mEq, 20 mEq, Oral, TID, Harika Mina MD, 20 mEq at 07/09/25 0845    sodium chloride 0.9% flush 10 mL, 10 mL, Intravenous, PRN, Reynold Crooks MD

## 2025-07-09 NOTE — PROGRESS NOTES
[facility] Medicine  Progress Note    Patient Name: Kelsea Cadet  MRN: [id number]  Patient Class: IP- Inpatient   Admission Date: 7/7/2025  Length of Stay: 2 days  Attending Physician: Harika Mina MD  Primary Care Provider: Gabby Jackson NP        Subjective     Principal Problem:Multifocal pneumonia        HPI:  Kelsea Cadet is a 85 y.o. female with PMHX of hypertension, pulmonary hypertension, cardiomyopathy, type 2 diabetes, hyperlipidemia, CKD, chronic indwelling Land, neurogenic bladder who presents to the emergency department C/O malaise.     Patient presents with malaise.  Was having fever last week and worsening cough. Saw PCP and had XR and was treated with a Z-Oswaldo.  Patient seemed to be getting better but today has been complaining of fatigue, malaise, and nausea.  Reports some shortness of breath.  No longer having fever. Pt reports she has been coughign but unable to get the mucus up.      Reports she has recently switched cards to dr perera and saw him a few weeks ago. Pt reports taking all of her meds    Overview/Hospital Course:  7/9 ND pt repotrs feeling better this am - diureisng well    Past Medical History:   Diagnosis Date    Arthritis     Asthma     Coronary artery disease     Environmental and seasonal allergies     Land catheter in place     Hard of hearing     Hyperlipidemia     Neurogenic bladder     Pneumonia, unspecified organism     Presence of indwelling Land catheter     Unspecified chronic bronchitis     Vitamin D deficiency        Past Surgical History:   Procedure Laterality Date    BLADDER SUSPENSION      BREAST BIOPSY Bilateral 1996    BREAST SURGERY      lumpectomy, isotopes    CHOLECYSTECTOMY      had gal bladder removed    COLECTOMY      had part of colon removed    CYSTOSCOPY W/ RETROGRADES Bilateral 12/19/2019    Procedure: CYSTOSCOPY, WITH RETROGRADE PYELOGRAM;  Surgeon: Prasad Rocah MD;  Location: [facility] OR;  Service: Urology;   Laterality: Bilateral;    DILATION OF URETHRA N/A 12/19/2019    Procedure: DILATION, URETHRA;  Surgeon: Prasad Rocha MD;  Location: [facility] OR;  Service: Urology;  Laterality: N/A;    HYSTERECTOMY      LEFT HEART CATHETERIZATION Left 11/27/2019    Procedure: Left heart cath;  Surgeon: Urban Paiz MD;  Location: [facility] CATH LAB;  Service: Cardiology;  Laterality: Left;    MODIFIED RADICAL MASTECTOMY W/ AXILLARY LYMPH NODE DISSECTION Right 1/26/2022    Procedure: MASTECTOMY, MODIFIED RADICAL;  Surgeon: Keisha Cortez MD;  Location: [facility] OR;  Service: General;  Laterality: Right;    OOPHORECTOMY      SENTINEL LYMPH NODE BIOPSY Right 1/26/2022    Procedure: BIOPSY, LYMPH NODE, SENTINEL;  Surgeon: Keisha Cortez MD;  Location: [facility] OR;  Service: General;  Laterality: Right;  0800    SIMPLE MASTECTOMY Left 1/26/2022    Procedure: MASTECTOMY, SIMPLE;  Surgeon: Keisha Cortez MD;  Location: [facility];  Service: General;  Laterality: Left;  FROZEN SECTION       Review of patient's allergies indicates:   Allergen Reactions    Macrobid [nitrofurantoin monohyd/m-cryst] Swelling    Bactrim [sulfamethoxazole-trimethoprim] Diarrhea    Ciprofloxacin Other (See Comments)     GI problems    Codeine Other (See Comments)     headache    Latex, natural rubber Rash       No current facility-administered medications on file prior to encounter.     Current Outpatient Medications on File Prior to Encounter   Medication Sig    acarbose (PRECOSE) 100 MG Tab Take 1 tablet (100 mg total) by mouth 3 (three) times daily with meals.    albuterol (PROVENTIL/VENTOLIN HFA) 90 mcg/actuation inhaler Inhale 2 puffs into the lungs every 6 (six) hours as needed for Wheezing. Rescue    alendronate (FOSAMAX) 70 MG tablet Take 1 tablet (70 mg total) by mouth every 7 days.    aspirin (ECOTRIN) 81 MG EC tablet Take 81 mg by mouth once daily. 3 days a week    atorvastatin (LIPITOR) 40 MG tablet Take 1 tablet (40 mg total) by mouth once daily.  (Patient taking differently: Take 20 mg by mouth once daily.)    azithromycin (Z-JAIRO) 250 MG tablet Take 2 tablets by mouth on day 1; Take 1 tablet by mouth on days 2-5    blood sugar diagnostic (ONETOUCH ULTRA TEST) Strp Inject 1 each into the skin 3 (three) times daily before meals.    calcium citrate (CALCITRATE) 200 mg (950 mg) tablet Take 1 tablet by mouth once daily. (Patient taking differently: Take 1 tablet by mouth every Mon, Wed, Fri.)    carvediloL (COREG) 6.25 MG tablet Take 1 tablet (6.25 mg total) by mouth 2 (two) times daily with meals.    cholecalciferol, vitamin D3, (VITAMIN D3) 50 mcg (2,000 unit) Tab Take 1 tablet (2,000 Units total) by mouth twice a week. 10,000 twice a week (Patient taking differently: Take 1 tablet by mouth twice a week. 10,000 3 times weekly)    clobetasoL (TEMOVATE) 0.05 % cream Apply twice a day for 2 weeks then nightly for 2 months. May decrease to three nights a week after that.    co-enzyme Q-10 30 mg capsule Take 1 capsule (30 mg total) by mouth every evening.    cranberry fruit extract (CRANBERRY CONCENTRATE ORAL) Take 2 capsules by mouth once daily. (Patient taking differently: Take 1 capsule by mouth once daily.)    dicyclomine (BENTYL) 20 mg tablet Take 1 tablet (20 mg total) by mouth 4 (four) times daily before meals and nightly.    ferrous sulfate 325 (65 FE) MG EC tablet Take 325 mg by mouth. 3 days a week    FLUoxetine 10 MG capsule Take 1 capsule (10 mg total) by mouth once daily.    furosemide (LASIX) 20 MG tablet Take 1 tablet by mouth once daily    gabapentin (NEURONTIN) 600 MG tablet Take 1 tablet (600 mg total) by mouth 3 (three) times daily.    guaiFENesin (MUCINEX) 600 mg 12 hr tablet Take 2 tablets (1,200 mg total) by mouth 2 (two) times daily.    hydroCHLOROthiazide (MICROZIDE) 12.5 mg capsule Take 12.5 mg by mouth once daily.    insulin aspart U-100 (NOVOLOG FLEXPEN U-100 INSULIN) 100 unit/mL (3 mL) InPn pen Take sliding scale insulin 4 times a day as  instructed- max dose of 40 units/day.    insulin glargine U-100, Lantus, (LANTUS SOLOSTAR U-100 INSULIN) 100 unit/mL (3 mL) InPn pen Inject 16 Units into the skin once daily.    letrozole (FEMARA) 2.5 mg Tab Take 1 tablet (2.5 mg total) by mouth once daily.    LIDOcaine (LIDODERM) 5 % Place onto the skin once daily. Remove & Discard patch within 12 hours or as directed by MD    lisinopriL (PRINIVIL,ZESTRIL) 40 MG tablet Take 1 tablet (40 mg total) by mouth every evening.    metFORMIN (GLUCOPHAGE-XR) 500 MG ER 24hr tablet Take 1 tablet (500 mg total) by mouth daily with breakfast.    multivitamin capsule Take 1 capsule by mouth once daily. (Patient taking differently: Take 1 capsule by mouth once daily. Taking 3 times weekly)    omega-3 fatty acids/fish oil (FISH OIL-OMEGA-3 FATTY ACIDS) 300-1,000 mg capsule Take 1 capsule by mouth once a week.    potassium chloride (KLOR-CON) 10 MEQ TbSR Take 99 mEq by mouth once. (Patient taking differently: Take 99 mEq by mouth once. Taking 3 x weekly)    acetaminophen (TYLENOL) 650 MG TbSR Take 650 mg by mouth 2 (two) times a day.    amoxicillin-clavulanate 875-125mg (AUGMENTIN) 875-125 mg per tablet Take 1 tablet by mouth 2 (two) times daily.    blood-glucose meter Misc 1 Device by Misc.(Non-Drug; Combo Route) route once daily. One Touch Dx. Code:E11.9    blood-glucose sensor (FREESTYLE JOSEPHINE 2 PLUS SENSOR) Rose Use as instructed    flash glucose scanning reader (FREESTYLE JOSEPHINE 2 READER) Misc Use as instructed    flash glucose sensor (FREESTYLE JOSEPHINE 2 SENSOR) Kit Use as instructed    fluconazole (DIFLUCAN) 150 MG Tab Take 1 tablet (150 mg total) by mouth once daily.    furosemide (LASIX) 20 MG tablet Take 1 tablet (20 mg total) by mouth once daily.    glucagon (BAQSIMI) 3 mg/actuation Spry 2 pack.  Spray one device (3 mg) in one nostril to treat severe hypoglycemia. Disp 1 box (2 devices)    loperamide (IMODIUM) 2 mg capsule Take 2 mg by mouth 4 (four) times daily as needed  "for Diarrhea.    montelukast (SINGULAIR) 10 mg tablet Take 1 tablet (10 mg total) by mouth once daily.    mucus clearing device (AEROBIKA OSCILLATING PEP SYSTM MISC) 12 puffs by Misc.(Non-Drug; Combo Route) route daily as needed.    NYAMYC powder     ONETOUCH DELICA PLUS LANCET 33 gauge Misc USE   TO CHECK GLUCOSE 4 TIMES DAILY BEFORE  MEALS    pen needle, diabetic (BD ULTRA-FINE MICRO PEN NEEDLE) 32 gauge x 1/4" Ndle use to inject ozempic once a week    pen needle, diabetic (BD ULTRA-FINE MICRO PEN NEEDLE) 32 gauge x 1/4" Ndle Use to inject insulin 4 to 5 times a day as instructed    trimethoprim (TRIMPEX) 100 mg Tab Take 100 mg by mouth every 12 (twelve) hours.     Family History       Problem Relation (Age of Onset)    Bell's palsy Sister    Bone cancer Mother, Father (86)    Breast cancer Mother, Sister, Sister    Cancer Mother (42), Father    Heart disease Mother          Tobacco Use    Smoking status: Never    Smokeless tobacco: Never   Substance and Sexual Activity    Alcohol use: Not Currently     Comment: rare    Drug use: Never    Sexual activity: Not Currently     Partners: Male     Comment:      Review of Systems   Constitutional:  Positive for activity change, chills and fatigue. Negative for fever.   HENT:  Positive for postnasal drip. Negative for sinus pressure and sore throat.    Respiratory:  Positive for cough and shortness of breath. Negative for wheezing.    Cardiovascular:  Negative for chest pain, palpitations and leg swelling.   Gastrointestinal:  Negative for abdominal pain, nausea and vomiting.   Skin:  Negative for rash and wound.   Neurological:  Positive for weakness. Negative for syncope.     Objective:     Vital Signs (Most Recent):  Temp: 96.4 °F (35.8 °C) (07/09/25 0735)  Pulse: 90 (07/09/25 0845)  Resp: 20 (07/09/25 0845)  BP: (!) 140/63 (07/09/25 0809)  SpO2: (!) 94 % (07/09/25 0845) Vital Signs (24h Range):  Temp:  [96.4 °F (35.8 °C)-98.1 °F (36.7 °C)] 96.4 °F (35.8 " °C)  Pulse:  [] 90  Resp:  [14-24] 20  SpO2:  [91 %-97 %] 94 %  BP: (106-142)/(57-66) 140/63     Weight: 88.9 kg (196 lb)  Body mass index is 33.64 kg/m².     Physical Exam  Constitutional:       Appearance: She is ill-appearing.   HENT:      Nose: Nose normal.      Mouth/Throat:      Mouth: Mucous membranes are moist.   Eyes:      Extraocular Movements: Extraocular movements intact.      Pupils: Pupils are equal, round, and reactive to light.   Cardiovascular:      Rate and Rhythm: Normal rate and regular rhythm.   Pulmonary:      Effort: Pulmonary effort is normal.      Breath sounds: Rhonchi present.      Comments: O2 via nc  Abdominal:      General: Bowel sounds are normal.      Palpations: Abdomen is soft.   Genitourinary:     Comments: Land with yellow urine  Musculoskeletal:      Right lower leg: Edema present.      Left lower leg: Edema present.   Skin:     General: Skin is warm.   Neurological:      General: No focal deficit present.      Mental Status: She is alert.              CRANIAL NERVES     CN III, IV, VI   Pupils are equal, round, and reactive to light.       Significant Labs: All pertinent labs within the past 24 hours have been reviewed.  Recent Lab Results         07/09/25  0823   07/08/25 2028 07/08/25  1535        Magnesium  1.9           POCT Glucose   299   222               Significant Imaging: I have reviewed all pertinent imaging results/findings within the past 24 hours.      Assessment & Plan  Multifocal pneumonia  Patient has a diagnosis of pneumonia. The cause of the pneumonia is suspected to be bacterial in etiology but organism is not known. The pneumonia is worsening due to sob, cough. The patient has the following signs/symptoms of pneumonia: persistent hypoxia , cough, and sputum production. The patient does have a current oxygen requirement and the patient does not have a home oxygen requirement. I have reviewed the pertinent imaging. The following cultures have been  collected: Blood cultures The culture results are listed below.     Current antimicrobial regimen consists of the antibiotics listed below. Will monitor patient closely and continue current treatment plan unchanged.    Antibiotics (From admission, onward)      Start     Stop Route Frequency Ordered    07/08/25 0900  piperacillin-tazobactam (ZOSYN) 4.5 g in D5W 100 mL IVPB (MB+)         -- IV Every 8 hours (non-standard times) 07/08/25 0745    07/07/25 2145  mupirocin 2 % ointment         07/12/25 2059 Nasl 2 times daily 07/07/25 2035            Microbiology Results (last 7 days)       Procedure Component Value Units Date/Time    Blood culture x two cultures. Draw prior to antibiotics. [[id number]]  (Normal) Collected: 07/07/25 1843    Order Status: Completed Specimen: Blood from Peripheral, Hand, Left Updated: 07/09/25 0700     Blood Culture No Growth After 24 Hours    Blood culture x two cultures. Draw prior to antibiotics. [[id number]]  (Normal) Collected: 07/07/25 1835    Order Status: Completed Specimen: Blood from Peripheral, Forearm, Right Updated: 07/09/25 0700     Blood Culture No Growth After 24 Hours    Clostridium difficile EIA [[id number]]     Order Status: Sent Specimen: Stool     Urine culture [[id number]] Collected: 07/07/25 1944    Order Status: Sent Specimen: Urine, Catheterized Updated: 07/07/25 2014    Culture, Respiratory with Gram Stain [[id number]]     Order Status: Sent Specimen: Respiratory           Essential hypertension  Patient's blood pressure range in the last 24 hours was: BP  Min: 106/66  Max: 142/64.The patient's inpatient anti-hypertensive regimen is listed below:  Current Antihypertensives  , Daily, Oral  carvediloL tablet 6.25 mg, 2 times daily with meals, Oral  lisinopriL tablet 40 mg, Nightly, Oral  furosemide injection 20 mg, Daily, Intravenous    Plan  - BP is controlled, no changes needed to their regimen    Type 2 diabetes mellitus without complication, with long-term  current use of insulin  Ada diet  Accuchecks and ssi  Cont lantus  7/9 A1c 6.4% - cont plan  Urinary retention  Has chronic machado    Cough  Tx pneumonia    CHF (congestive heart failure)  Patient has Diastolic (HFpEF) heart failure that is Acute on chronic. On presentation their CHF was decompensated. Evidence of decompensated CHF on presentation includes: edema, dyspnea on exertion (KIDD), and shortness of breath. The etiology of their decompensation is likely dietary indiscretion. Most recent BNP and echo results are listed below.  Recent Labs     07/07/25  1843   BNP 10,746*     Latest ECHO  Results for orders placed in visit on 09/20/23    Echo    Interpretation Summary    Left Ventricle: Moderately increased wall thickness. There is moderate concentric hypertrophy. Normal wall motion. Grade II diastolic dysfunction.    Left Atrium: Left atrium is moderately dilated.    Right Ventricle: Normal right ventricular cavity size. Systolic function is normal.    Right Atrium: Right atrium is mildly dilated.    Aortic Valve: The aortic valve is a trileaflet valve. Mildly calcified left, right and noncoronary cusps. Mildly restricted motion. By planimetry the aortic valve area measures 1.8 cm².    Mitral Valve: There is mild bileaflet sclerosis.    Current Heart Failure Medications  , Daily, Oral  carvediloL tablet 6.25 mg, 2 times daily with meals, Oral  lisinopriL tablet 40 mg, Nightly, Oral  furosemide injection 20 mg, Daily, Intravenous    Plan  - Monitor strict I&Os and daily weights.    - Place on telemetry  - Low sodium diet  - Place on fluid restriction of 2 L.   - Cardiology has been consulted  - The patient's volume status is improving but not at their baseline as indicated by edema, dyspnea on exertion (KIDD), and shortness of breath  7/9 cont current meds  Hypokalemia  Patient's most recent potassium results are listed below.   Recent Labs     07/07/25  1843 07/08/25  0430   K 3.4* 2.9*     Plan  - Replete  potassium per protocol  - Monitor potassium Daily  - Patient's hypokalemia is on supplements - await labs from today    VTE Risk Mitigation (From admission, onward)           Ordered     IP VTE HIGH RISK PATIENT  Once         07/07/25 2105     Place PK hose  Until discontinued         07/07/25 2105                    Discharge Planning   CATRACHITO: 7/11/2025     Code Status: Full Code   Medical Readiness for Discharge Date:   Discharge Plan A: Home Health                  Please place Justification for DME      Harika Mina MD  Department of Hospital Medicine   [facility] S[facility]

## 2025-07-09 NOTE — ASSESSMENT & PLAN NOTE
Patient's most recent potassium results are listed below.   Recent Labs     07/07/25  1843 07/08/25  0430   K 3.4* 2.9*     Plan  - Replete potassium per protocol  - Monitor potassium Daily  - Patient's hypokalemia is on supplements - await labs from today

## 2025-07-09 NOTE — PLAN OF CARE
Problem: Occupational Therapy  Goal: Occupational Therapy Goal  Description: Goals to be met by: 7/16/25     Patient will increase functional independence with ADLs by performing:    UE Dressing with Minimal Assistance.  LE Dressing with Minimal Assistance.  Grooming while seated with Set-up Assistance.  Upper extremity exercise program x30 reps per handout, with assistance as needed.    Outcome: Established plan of care     Princess Leon, OT

## 2025-07-09 NOTE — PT/OT/SLP EVAL
"Physical Therapy Evaluation    Patient Name:  Kelsea Cadet   MRN:  [id number]    Recommendations:     Discharge Recommendations: Low Intensity Therapy   Discharge Equipment Recommendations: none   Barriers to discharge: current medical and functional status    Assessment:     Kelsea Cadet is a 85 y.o. female admitted with a medical diagnosis of Multifocal pneumonia.  She presents with the following impairments/functional limitations: weakness, gait instability, impaired endurance, impaired balance, decreased lower extremity function, decreased safety awareness, impaired self care skills, impaired functional mobility. Patient presents with some weakness in core and minimal weakness in B LE in weight bearing. Patient is able to perform bed mobility and transfers with Min A for transfers secondary to posterior lean. Patient is able to perform step forward and backward 5 feet with posterior lean noted and weakness noted in B LE secondary to knee "buckling."     Rehab Prognosis: Good; patient would benefit from acute skilled PT services to address these deficits and reach maximum level of function.    Recent Surgery: * No surgery found *      Plan:     During this hospitalization, patient to be seen  (3-5x per week) to address the identified rehab impairments via gait training, therapeutic activities, therapeutic exercises, neuromuscular re-education and progress toward the following goals:    Plan of Care Expires:  07/18/25    Subjective     Chief Complaint: Knee buckling   Patient/Family Comments/goals: return to PLOF   Pain/Comfort:  Pain Rating 1: 0/10    Patients cultural, spiritual, Sabianism conflicts given the current situation: no    Living Environment:  Patient lives with daughter in [facility] with grab bars and shower-tub combo  Prior to admission, patients level of function was assistance from daughter.  Equipment used at home: glucometer, bedside commode, hospital bed, lift device, wheelchair, rollator.  " DME owned (not currently used): none.  Upon discharge, patient will have assistance from daughter.    Objective:     Communicated with nursing prior to session.  Patient found HOB elevated with machado catheter, peripheral IV  upon PT entry to room.    General Precautions: Standard, fall  Orthopedic Precautions:N/A   Braces: N/A  Respiratory Status: Room air    Exams:  Cognitive Exam:  Patient is oriented to Person, Place, Time, and Situation  Gross Motor Coordination:  WFL  RLE ROM: WFL  RLE Strength: Deficits: deficits into knee extension   LLE ROM: WFL  LLE Strength: Deficits: deficits into knee extension     Functional Mobility:  Bed Mobility:     Rolling Left:  stand by assistance  Rolling Right: stand by assistance  Scooting: stand by assistance  Bridging: stand by assistance  Supine to Sit: stand by assistance  Sit to Supine: stand by assistance  Transfers:     Sit to Stand:  contact guard assistance and minimum assistance with rolling walker  Gait: 5 feet with CGA secondary to posterior lean throughout standing and ambulation   Balance: posterior lean noted throughout standing and ambulation       AM-PAC 6 CLICK MOBILITY  Total Score:18       Treatment & Education:  Patient educated on safety and glute activation in order to decrease posterior lean and improve safety in order to decrease fall risk in weight bearing and ambulation     Patient left HOB elevated with all lines intact, call button in reach, and nursing notified.    GOALS:   Multidisciplinary Problems       Physical Therapy Goals          Problem: Physical Therapy    Goal Priority Disciplines Outcome Interventions   Physical Therapy Goal     PT, PT/OT Progressing    Description: Patient will increase functional independence with mobility by performin. Supine to sit with Modified Independent  2. Sit to supine with Modified Independent  3. Bed to chair transfer with Modified Independentwith or without rolling walker using Stand Pivot  TECHNIQUE  4. Gait  x 100  feet with Supervision or Set-up Assistance with or without rolling walker  5. Lower extremity exercise program x10 reps per handout, with assistance as needed                          History:     Past Medical History:   Diagnosis Date    Arthritis     Asthma     Coronary artery disease     Environmental and seasonal allergies     Land catheter in place     Hard of hearing     Hyperlipidemia     Neurogenic bladder     Pneumonia, unspecified organism     Presence of indwelling Land catheter     Unspecified chronic bronchitis     Vitamin D deficiency        Past Surgical History:   Procedure Laterality Date    BLADDER SUSPENSION      BREAST BIOPSY Bilateral 1996    BREAST SURGERY      lumpectomy, isotopes    CHOLECYSTECTOMY      had gal bladder removed    COLECTOMY      had part of colon removed    CYSTOSCOPY W/ RETROGRADES Bilateral 12/19/2019    Procedure: CYSTOSCOPY, WITH RETROGRADE PYELOGRAM;  Surgeon: Prasad Rocha MD;  [facility];  Service: Urology;  Laterality: Bilateral;    DILATION OF URETHRA N/A 12/19/2019    Procedure: DILATION, URETHRA;  Surgeon: Prasad Rocha MD;  [facility];  Service: Urology;  Laterality: N/A;    HYSTERECTOMY      LEFT HEART CATHETERIZATION Left 11/27/2019    Procedure: Left heart cath;  Surgeon: Urban Paiz MD;  Location: [facility] CATH LAB;  Service: Cardiology;  Laterality: Left;    MODIFIED RADICAL MASTECTOMY W/ AXILLARY LYMPH NODE DISSECTION Right 1/26/2022    Procedure: MASTECTOMY, MODIFIED RADICAL;  Surgeon: Keisha Cortez MD;  Location: [facility] OR;  Service: General;  Laterality: Right;    OOPHORECTOMY      SENTINEL LYMPH NODE BIOPSY Right 1/26/2022    Procedure: BIOPSY, LYMPH NODE, SENTINEL;  Surgeon: Keisha Cortez MD;  Location: [facility];  Service: General;  Laterality: Right;  0800    SIMPLE MASTECTOMY Left 1/26/2022    Procedure: MASTECTOMY, SIMPLE;  Surgeon: Keisha Cortez MD;  Location: [facility] OR;  Service:  General;  Laterality: Left;  FROZEN SECTION       Time Tracking:     PT Received On: 07/09/25  PT Start Time: 1020     PT Stop Time: 1035  PT Total Time (min): 15 min     Billable Minutes: Evaluation 15 minutes      07/09/2025

## 2025-07-09 NOTE — PROGRESS NOTES
Pharmacist Renal Dose Adjustment Note    Kelsea Cadet is a 85 y.o. female being treated with the medication famotidine    Patient Data:    Vital Signs (Most Recent):  Temp: 96.4 °F (35.8 °C) (07/09/25 0735)  Pulse: 90 (07/09/25 0845)  Resp: 20 (07/09/25 0845)  BP: (!) 140/63 (07/09/25 0809)  SpO2: (!) 94 % (07/09/25 0845) Vital Signs (72h Range):  Temp:  [96.4 °F (35.8 °C)-98.8 °F (37.1 °C)]   Pulse:  []   Resp:  [12-26]   BP: (106-201)/(57-84)   SpO2:  [90 %-99 %]      Recent Labs   Lab 07/07/25  1843 07/08/25  0430 07/09/25  0823   CREATININE 0.8 0.8 2.0*     Serum creatinine: 2 mg/dL (H) 07/09/25 0823  Estimated creatinine clearance: 23 mL/min (A)    Medication:famotidine dose: 20mg frequency BID will be changed to medication: famotidine dose:20mg frequency:q48hr    Pharmacist's Name: Lucía Mcarthur

## 2025-07-09 NOTE — PT/OT/SLP EVAL
"Occupational Therapy Evaluation     Name: Kelsea Cadet  MRN: [id number]  Admitting Diagnosis: Multifocal pneumonia  Recent Surgery: * No surgery found *      Recommendations:     Discharge Recommendations: Low Intensity Therapy  Level of Assistance Recommended: 24 hours light assistance  Discharge Equipment Recommendations: transfer tub bench  Barriers to discharge: None    Assessment:     Kelsea Cadet is a 85 y.o. female with a medical diagnosis of Multifocal pneumonia. She presents with performance deficits affecting function including weakness, impaired endurance, impaired self care skills, impaired functional mobility, gait instability, impaired balance, decreased ROM. Per patient report, patient required assistance with self care tasks such as dressing, bathing and toileting( incontinence) prior to admit. Supervision for FM using rollator for short distances. Based on prior level of function and performance this date, acute occupational therapy services are recommended to address to aforementioned deficits and improve safety and independence during ADLs and mobility. Patient educated on POF and use of call button; verbalized understanding.     Rehab Prognosis: Fair; patient would benefit from acute OT services to address these deficits and reach maximum level of function.    Plan:     Patient to be seen  (3-5X/WK) to address the above listed problems via self-care/home management, therapeutic activities, therapeutic exercises  Plan of Care Expires: 07/16/25  Plan of Care Reviewed with: patient    Subjective     Chief Complaint: No new complaints. Agreeable to therapy .  Patient Comments/Goals: To return home   Pain/Comfort:  Pain Rating 1: other (see comments) (DID NOT RATE)  Location - Orientation 1:  ("ALL OVER. IM 85.")  Pain Addressed 1: Distraction, Reposition  Pain Rating Post-Intervention 1: other (see comments) (DID NOT RATE)    Patients cultural, spiritual, Holiness conflicts given the current " situation: no    Social History:  Living Environment: Patient lives with their daughter in a single story home with tub-shower combo and grab bars  Prior Level of Function: Prior to admission, patient requires assistance with ADLs including dressing, bathing, and toileting.   Roles and Routines: Patient was not driving and not working prior to admission.  Equipment Used at Home: rollator, wheelchair, glucometer, bedside commode, hospital bed, lift device  DME owned (not currently used): none  Assistance Upon Discharge: family    Objective:     Communicated with patient prior to session. Patient found HOB elevated with peripheral IV, machado catheter upon OT entry to room.    General Precautions: Standard, fall   Orthopedic Precautions: N/A   Braces: N/A    Respiratory Status: Room air    Occupational Performance      Bed Mobility:   Scooting anteriorly to EOB to have both feet planted on floor: stand by assistance  Supine to sit from right side of bed with stand by assistance  Sit to Supine with stand by assistance on right side of bed    Functional Mobility/Transfers:  Sit <> Stand Transfer with contact guard assistance with rolling walker  Functional Mobility: Patient performed right side steps CGA using RW.     Activities of Daily Living:  Feeding: set up assistance  Grooming: minimum assistance  Upper Body Dressing: moderate assist  Lower Body Dressing: maximal assistance  Toileting: chronic machado catheter and incontinent    Cognitive/Visual Perceptual:  Cognitive/Psychosocial Skills:    -     Oriented to: Person, Place, Time, Situation  -     Follows Commands/attention: Follows multistep  commands  -     Communication: clear/fluent  -     Safety awareness/insight to disability: impaired  -     Mood/Affect/Coping skills/emotional control: Cooperative    Physical Exam:  Balance:    -     Sitting: stand by assistance  -     Standing: contact guard assistance and minimum assistance  Postural examination/scapula  alignment:    -       Rounded shoulders  -       Forward head  Upper Extremity Range of Motion:     -       Right Upper Extremity: Deficits: active range of motion shoulder flexion/extension and abd which is less than 505 of normal range, distal active range of motion WFL.   -       Left Upper Extremity: Deficits: active range of motion shoulder flexion/extension and abd which is less than 505 of normal range, distal active range of motion WFL.  Upper Extremity Strength:    -       Right Upper Extremity: 3/5 GROSSLY   -       Left Upper Extremity: 3/5 GROSSLY    Strength:    -       Right Upper Extremity: WFL  -       Left Upper Extremity: WFL    AMPAC 6 Click ADL:  AMPA Total Score: 19    Treatment & Education:  Therapist provided facilitation and instruction of proper body mechanics, energy conservation, and fall prevention strategies during tasks listed above  Patient educated on role of OT, POC, and goals for therapy    Patient left HOB elevated with all lines intact and call button in reach.    GOALS:   Multidisciplinary Problems       Occupational Therapy Goals          Problem: Occupational Therapy    Goal Priority Disciplines Outcome Interventions   Occupational Therapy Goal     OT, PT/OT Progressing    Description: Goals to be met by: 7/16/25     Patient will increase functional independence with ADLs by performing:    UE Dressing with Minimal Assistance.  LE Dressing with Minimal Assistance.  Grooming while seated with Set-up Assistance.  Upper extremity exercise program x30 reps per handout, with assistance as needed.                         History:     Past Medical History:   Diagnosis Date    Arthritis     Asthma     Coronary artery disease     Environmental and seasonal allergies     Land catheter in place     Hard of hearing     Hyperlipidemia     Neurogenic bladder     Pneumonia, unspecified organism     Presence of indwelling Land catheter     Unspecified chronic bronchitis     Vitamin D  deficiency          Past Surgical History:   Procedure Laterality Date    BLADDER SUSPENSION      BREAST BIOPSY Bilateral 1996    BREAST SURGERY      lumpectomy, isotopes    CHOLECYSTECTOMY      had gal bladder removed    COLECTOMY      had part of colon removed    CYSTOSCOPY W/ RETROGRADES Bilateral 12/19/2019    Procedure: CYSTOSCOPY, WITH RETROGRADE PYELOGRAM;  Surgeon: Prasad Rocha MD;  Location: [facility] OR;  Service: Urology;  Laterality: Bilateral;    DILATION OF URETHRA N/A 12/19/2019    Procedure: DILATION, URETHRA;  Surgeon: Prasad Rocha MD;  Location: [facility] OR;  Service: Urology;  Laterality: N/A;    HYSTERECTOMY      LEFT HEART CATHETERIZATION Left 11/27/2019    Procedure: Left heart cath;  Surgeon: Urban Paiz MD;  Location: [facility] CATH LAB;  Service: Cardiology;  Laterality: Left;    MODIFIED RADICAL MASTECTOMY W/ AXILLARY LYMPH NODE DISSECTION Right 1/26/2022    Procedure: MASTECTOMY, MODIFIED RADICAL;  Surgeon: Keisha Cortez MD;  Location: [facility];  Service: General;  Laterality: Right;    OOPHORECTOMY      SENTINEL LYMPH NODE BIOPSY Right 1/26/2022    Procedure: BIOPSY, LYMPH NODE, SENTINEL;  Surgeon: Keisha Cortez MD;  Location: [facility];  Service: General;  Laterality: Right;  0800    SIMPLE MASTECTOMY Left 1/26/2022    Procedure: MASTECTOMY, SIMPLE;  Surgeon: Keisha Cortez MD;  Location: [facility];  Service: General;  Laterality: Left;  FROZEN SECTION       Time Tracking:     OT Date of Treatment: 07/09/25  OT Start Time: 0931  OT Stop Time: 0948  OT Total Time (min): 17 min    Billable Minutes: Evaluation 17 Mins    7/9/2025

## 2025-07-09 NOTE — PLAN OF CARE
Plan of care discussed with pt., on contact isolation for special contact pending stool specimen needed, pt. Able to make needs known, glucose monitoring, on IV antibiotics as scheduled, on O2 per nasal cannula at 2L, denies pain, denies SOB, no respiratory distress noted, frequent nonproductive cough noted this shift, takes meds whole, assists with turning in bed, walker at bedside, chronic machado present, call bell in reach, redness noted to perineum, barrier cream applied, encouraging to call for needs/assistance, denies needs at this time, will continue to monitor.      Problem: Pneumonia  Goal: Fluid Balance  Outcome: Progressing  Goal: Resolution of Infection Signs and Symptoms  Outcome: Progressing  Goal: Effective Oxygenation and Ventilation  Outcome: Progressing     Problem: Fall Injury Risk  Goal: Absence of Fall and Fall-Related Injury  Outcome: Progressing     Problem: Wound  Goal: Optimal Coping  Outcome: Progressing  Goal: Optimal Functional Ability  Outcome: Progressing  Goal: Absence of Infection Signs and Symptoms  Outcome: Progressing  Goal: Improved Oral Intake  Outcome: Progressing  Goal: Optimal Pain Control and Function  Outcome: Progressing  Goal: Skin Health and Integrity  Outcome: Progressing  Goal: Optimal Wound Healing  Outcome: Progressing     Problem: Infection  Goal: Absence of Infection Signs and Symptoms  Outcome: Progressing     Problem: Adult Inpatient Plan of Care  Goal: Plan of Care Review  Outcome: Progressing  Goal: Patient-Specific Goal (Individualized)  Outcome: Progressing  Goal: Absence of Hospital-Acquired Illness or Injury  Outcome: Progressing  Goal: Optimal Comfort and Wellbeing  Outcome: Progressing  Goal: Readiness for Transition of Care  Outcome: Progressing

## 2025-07-09 NOTE — PROGRESS NOTES
Pharmacist Renal Dose Adjustment Note    Kelsea Cadet is a 85 y.o. female being treated with the medication Famotidine    Patient Data:    Vital Signs (Most Recent):  Temp: 96.4 °F (35.8 °C) (07/09/25 0735)  Pulse: 90 (07/09/25 0845)  Resp: 20 (07/09/25 0845)  BP: (!) 140/63 (07/09/25 0809)  SpO2: (!) 94 % (07/09/25 0845) Vital Signs (72h Range):  Temp:  [96.4 °F (35.8 °C)-98.8 °F (37.1 °C)]   Pulse:  []   Resp:  [12-26]   BP: (106-201)/(57-84)   SpO2:  [90 %-99 %]      Recent Labs   Lab 07/07/25  1843 07/08/25  0430 07/09/25  0823   CREATININE 0.8 0.8 2.0*     Serum creatinine: 2 mg/dL (H) 07/09/25 0823  Estimated creatinine clearance: 23 mL/min (A)    Medication:Famotidine dose: 20mg frequency BID will be changed to medication:Famotidine dose:20mg frequency:daily    Pharmacist's Name: Lucía Mcarthur

## 2025-07-09 NOTE — PLAN OF CARE
Problem: Physical Therapy  Goal: Physical Therapy Goal  Description: Patient will increase functional independence with mobility by performin. Supine to sit with Modified Independent  2. Sit to supine with Modified Independent  3. Bed to chair transfer with Modified Independentwith or without rolling walker using Stand Pivot TECHNIQUE  4. Gait  x 100  feet with Supervision or Set-up Assistance with or without rolling walker  5. Lower extremity exercise program x10 reps per handout, with assistance as needed   Outcome: Progressing

## 2025-07-10 LAB
ABSOLUTE NEUTROPHIL MANUAL (OHS): 11.2 K/UL
ALBUMIN SERPL BCP-MCNC: 2.1 G/DL (ref 3.5–5.2)
ALP SERPL-CCNC: 110 UNIT/L (ref 40–150)
ALT SERPL W/O P-5'-P-CCNC: 12 UNIT/L (ref 10–44)
ANION GAP (OHS): 10 MMOL/L (ref 8–16)
AST SERPL-CCNC: 20 UNIT/L (ref 11–45)
BILIRUB SERPL-MCNC: 0.2 MG/DL (ref 0.1–1)
BUN SERPL-MCNC: 39 MG/DL (ref 8–23)
CALCIUM SERPL-MCNC: 8.5 MG/DL (ref 8.7–10.5)
CHLORIDE SERPL-SCNC: 96 MMOL/L (ref 95–110)
CO2 SERPL-SCNC: 24 MMOL/L (ref 23–29)
CREAT SERPL-MCNC: 2.7 MG/DL (ref 0.5–1.4)
EOSINOPHIL NFR BLD MANUAL: 2 % (ref 0–8)
ERYTHROCYTE [DISTWIDTH] IN BLOOD BY AUTOMATED COUNT: 13.6 % (ref 11.5–14.5)
GFR SERPLBLD CREATININE-BSD FMLA CKD-EPI: 17 ML/MIN/1.73/M2
GLUCOSE SERPL-MCNC: 187 MG/DL (ref 70–110)
HCT VFR BLD AUTO: 34.7 % (ref 37–48.5)
HGB BLD-MCNC: 11.2 GM/DL (ref 12–16)
LYMPHOCYTES NFR BLD MANUAL: 6 % (ref 18–48)
MAGNESIUM SERPL-MCNC: 1.9 MG/DL (ref 1.6–2.6)
MCH RBC QN AUTO: 28.1 PG (ref 27–31)
MCHC RBC AUTO-ENTMCNC: 32.3 G/DL (ref 32–36)
MCV RBC AUTO: 87 FL (ref 82–98)
MONOCYTES NFR BLD MANUAL: 2 % (ref 4–15)
MYELOCYTES NFR BLD MANUAL: 4 %
NEUTROPHILS NFR BLD MANUAL: 86 % (ref 38–73)
NUCLEATED RBC (/100WBC) (OHS): 0 /100 WBC
PLATELET # BLD AUTO: 416 K/UL (ref 150–450)
PMV BLD AUTO: 10.2 FL (ref 9.2–12.9)
POCT GLUCOSE: 190 MG/DL (ref 70–110)
POCT GLUCOSE: 196 MG/DL (ref 70–110)
POCT GLUCOSE: 241 MG/DL (ref 70–110)
POTASSIUM SERPL-SCNC: 4.6 MMOL/L (ref 3.5–5.1)
PROT SERPL-MCNC: 6.4 GM/DL (ref 6–8.4)
RBC # BLD AUTO: 3.99 M/UL (ref 4–5.4)
SODIUM SERPL-SCNC: 130 MMOL/L (ref 136–145)
WBC # BLD AUTO: 12.99 K/UL (ref 3.9–12.7)

## 2025-07-10 PROCEDURE — 27000207 HC ISOLATION

## 2025-07-10 PROCEDURE — 63600175 PHARM REV CODE 636 W HCPCS: Performed by: INTERNAL MEDICINE

## 2025-07-10 PROCEDURE — 25000242 PHARM REV CODE 250 ALT 637 W/ HCPCS: Performed by: INTERNAL MEDICINE

## 2025-07-10 PROCEDURE — 21400001 HC TELEMETRY ROOM

## 2025-07-10 PROCEDURE — 80053 COMPREHEN METABOLIC PANEL: CPT | Performed by: INTERNAL MEDICINE

## 2025-07-10 PROCEDURE — 97530 THERAPEUTIC ACTIVITIES: CPT

## 2025-07-10 PROCEDURE — 94640 AIRWAY INHALATION TREATMENT: CPT

## 2025-07-10 PROCEDURE — 99900035 HC TECH TIME PER 15 MIN (STAT)

## 2025-07-10 PROCEDURE — 85027 COMPLETE CBC AUTOMATED: CPT | Performed by: INTERNAL MEDICINE

## 2025-07-10 PROCEDURE — 25000003 PHARM REV CODE 250: Performed by: INTERNAL MEDICINE

## 2025-07-10 PROCEDURE — 36415 COLL VENOUS BLD VENIPUNCTURE: CPT | Performed by: INTERNAL MEDICINE

## 2025-07-10 PROCEDURE — 87324 CLOSTRIDIUM AG IA: CPT | Performed by: INTERNAL MEDICINE

## 2025-07-10 PROCEDURE — 97535 SELF CARE MNGMENT TRAINING: CPT

## 2025-07-10 PROCEDURE — 27000221 HC OXYGEN, UP TO 24 HOURS

## 2025-07-10 PROCEDURE — 97116 GAIT TRAINING THERAPY: CPT

## 2025-07-10 PROCEDURE — 83735 ASSAY OF MAGNESIUM: CPT | Performed by: INTERNAL MEDICINE

## 2025-07-10 PROCEDURE — 25000003 PHARM REV CODE 250: Performed by: EMERGENCY MEDICINE

## 2025-07-10 PROCEDURE — 94761 N-INVAS EAR/PLS OXIMETRY MLT: CPT

## 2025-07-10 PROCEDURE — 99900031 HC PATIENT EDUCATION (STAT)

## 2025-07-10 RX ADMIN — PIPERACILLIN AND TAZOBACTAM 4.5 G: 4; .5 INJECTION, POWDER, LYOPHILIZED, FOR SOLUTION INTRAVENOUS; PARENTERAL at 01:07

## 2025-07-10 RX ADMIN — ENOXAPARIN SODIUM 30 MG: 30 INJECTION SUBCUTANEOUS at 05:07

## 2025-07-10 RX ADMIN — ALBUTEROL SULFATE 2.5 MG: 2.5 SOLUTION RESPIRATORY (INHALATION) at 02:07

## 2025-07-10 RX ADMIN — ALBUTEROL SULFATE 2.5 MG: 2.5 SOLUTION RESPIRATORY (INHALATION) at 08:07

## 2025-07-10 RX ADMIN — MUPIROCIN: 20 OINTMENT TOPICAL at 09:07

## 2025-07-10 RX ADMIN — ASPIRIN 81 MG: 81 TABLET, COATED ORAL at 10:07

## 2025-07-10 RX ADMIN — POTASSIUM CHLORIDE 20 MEQ: 1500 TABLET, FILM COATED, EXTENDED RELEASE ORAL at 09:07

## 2025-07-10 RX ADMIN — GUAIFENESIN 600 MG: 600 TABLET, EXTENDED RELEASE ORAL at 09:07

## 2025-07-10 RX ADMIN — POTASSIUM CHLORIDE 20 MEQ: 1500 TABLET, FILM COATED, EXTENDED RELEASE ORAL at 10:07

## 2025-07-10 RX ADMIN — ATORVASTATIN CALCIUM 20 MG: 20 TABLET, FILM COATED ORAL at 10:07

## 2025-07-10 RX ADMIN — PIPERACILLIN AND TAZOBACTAM 4.5 G: 4; .5 INJECTION, POWDER, LYOPHILIZED, FOR SOLUTION INTRAVENOUS; PARENTERAL at 10:07

## 2025-07-10 RX ADMIN — LISINOPRIL 40 MG: 20 TABLET ORAL at 09:07

## 2025-07-10 RX ADMIN — GABAPENTIN 600 MG: 300 CAPSULE ORAL at 02:07

## 2025-07-10 RX ADMIN — ALBUTEROL SULFATE 2.5 MG: 2.5 SOLUTION RESPIRATORY (INHALATION) at 07:07

## 2025-07-10 RX ADMIN — INSULIN GLARGINE 16 UNITS: 100 INJECTION, SOLUTION SUBCUTANEOUS at 10:07

## 2025-07-10 RX ADMIN — GABAPENTIN 600 MG: 300 CAPSULE ORAL at 09:07

## 2025-07-10 RX ADMIN — PIPERACILLIN AND TAZOBACTAM 4.5 G: 4; .5 INJECTION, POWDER, LYOPHILIZED, FOR SOLUTION INTRAVENOUS; PARENTERAL at 02:07

## 2025-07-10 RX ADMIN — GUAIFENESIN 600 MG: 600 TABLET, EXTENDED RELEASE ORAL at 10:07

## 2025-07-10 RX ADMIN — FLUOXETINE HYDROCHLORIDE 10 MG: 10 CAPSULE ORAL at 10:07

## 2025-07-10 RX ADMIN — GABAPENTIN 600 MG: 300 CAPSULE ORAL at 10:07

## 2025-07-10 RX ADMIN — INSULIN ASPART 2 UNITS: 100 INJECTION, SOLUTION INTRAVENOUS; SUBCUTANEOUS at 05:07

## 2025-07-10 RX ADMIN — POTASSIUM CHLORIDE 20 MEQ: 1500 TABLET, FILM COATED, EXTENDED RELEASE ORAL at 02:07

## 2025-07-10 RX ADMIN — Medication 6 MG: at 09:07

## 2025-07-10 RX ADMIN — MUPIROCIN: 20 OINTMENT TOPICAL at 10:07

## 2025-07-10 RX ADMIN — CARVEDILOL 6.25 MG: 3.12 TABLET, FILM COATED ORAL at 10:07

## 2025-07-10 RX ADMIN — INSULIN ASPART 3 UNITS: 100 INJECTION, SOLUTION INTRAVENOUS; SUBCUTANEOUS at 11:07

## 2025-07-10 RX ADMIN — CARVEDILOL 6.25 MG: 3.12 TABLET, FILM COATED ORAL at 05:07

## 2025-07-10 NOTE — PROGRESS NOTES
[facility]  Cardiology  Progress Note    Patient Name: Kelsea Cadet  MRN: [id number]  Admission Date: 7/7/2025  Hospital Length of Stay: 3 days  Code Status: Full Code   Attending Provider: Harika Mina MD   Consulting Provider: YNES Castillo  Primary Care Physician: Gabby Jackson NP  Principal Problem:Multifocal pneumonia    Patient information was obtained from patient, past medical records, and ER records.     Consults  Subjective:     Chief Complaint:  SOB    HPI:   85 year old  female with PMH of breast CA, HHD, hyperlipidemia, CKD, Type II DM, Non-obstructive CAD, Pulm HTN, and asthma presents to the ED overnight with reports of fatigue, malaise, and SOB.     Patient reports fever, cough, and malasie since last week. She saw her PCP at that time with a chest XR and was treated with outpatient antibiotics. Symptoms worsened, did not improve with oral antibiotics.     Last seen by [facility] in 2021, now follows with Dr. Turner.     Chest CT concerning for multifocal pneumonia with a new large subcarinal node and right para hilar node which could be benign or malignant and are considered indeterminate. NT pro BNP elevated at 10,000. Cardiology asked to evaluate for possible CHF exacerbation.     Hospital course:   7/7: Admission from the ED with SOB, imaging concerning for multifocal pneumonia   7/8: Cardiology consulted due to elevated NT pro BNP, concerns for CHF  7/9: Repeat echocardiogram reveals preserved LVEF with moderate AS   -Improvement in symptoms since admission   - Net negative 2 L since admission   -BP elevated this morning     Past Medical History:   Diagnosis Date    Arthritis     Asthma     Coronary artery disease     Environmental and seasonal allergies     Land catheter in place     Hard of hearing     Hyperlipidemia     Neurogenic bladder     Pneumonia, unspecified organism     Presence of indwelling Land catheter     Unspecified chronic bronchitis     Vitamin  D deficiency        Past Surgical History:   Procedure Laterality Date    BLADDER SUSPENSION      BREAST BIOPSY Bilateral 1996    BREAST SURGERY      lumpectomy, isotopes    CHOLECYSTECTOMY      had gal bladder removed    COLECTOMY      had part of colon removed    CYSTOSCOPY W/ RETROGRADES Bilateral 12/19/2019    Procedure: CYSTOSCOPY, WITH RETROGRADE PYELOGRAM;  Surgeon: Prasad Rocha MD;  Location: [facility] OR;  Service: Urology;  Laterality: Bilateral;    DILATION OF URETHRA N/A 12/19/2019    Procedure: DILATION, URETHRA;  Surgeon: Prasad Rocha MD;  Location: [facility] OR;  Service: Urology;  Laterality: N/A;    HYSTERECTOMY      LEFT HEART CATHETERIZATION Left 11/27/2019    Procedure: Left heart cath;  Surgeon: Urban Paiz MD;  Location: [facility] CATH LAB;  Service: Cardiology;  Laterality: Left;    MODIFIED RADICAL MASTECTOMY W/ AXILLARY LYMPH NODE DISSECTION Right 1/26/2022    Procedure: MASTECTOMY, MODIFIED RADICAL;  Surgeon: Keisha Cortez MD;  Location: [facility];  Service: General;  Laterality: Right;    OOPHORECTOMY      SENTINEL LYMPH NODE BIOPSY Right 1/26/2022    Procedure: BIOPSY, LYMPH NODE, SENTINEL;  Surgeon: Keisha Cortez MD;  Location: [facility] OR;  Service: General;  Laterality: Right;  0800    SIMPLE MASTECTOMY Left 1/26/2022    Procedure: MASTECTOMY, SIMPLE;  Surgeon: Keisha Cortez MD;  Location: [facility];  Service: General;  Laterality: Left;  FROZEN SECTION       Review of patient's allergies indicates:   Allergen Reactions    Macrobid [nitrofurantoin monohyd/m-cryst] Swelling    Bactrim [sulfamethoxazole-trimethoprim] Diarrhea    Ciprofloxacin Other (See Comments)     GI problems    Codeine Other (See Comments)     headache    Latex, natural rubber Rash       No current facility-administered medications on file prior to encounter.     Current Outpatient Medications on File Prior to Encounter   Medication Sig    acarbose (PRECOSE) 100 MG Tab Take 1 tablet (100 mg total) by mouth 3  (three) times daily with meals.    albuterol (PROVENTIL/VENTOLIN HFA) 90 mcg/actuation inhaler Inhale 2 puffs into the lungs every 6 (six) hours as needed for Wheezing. Rescue    alendronate (FOSAMAX) 70 MG tablet Take 1 tablet (70 mg total) by mouth every 7 days.    aspirin (ECOTRIN) 81 MG EC tablet Take 81 mg by mouth once daily. 3 days a week    atorvastatin (LIPITOR) 40 MG tablet Take 1 tablet (40 mg total) by mouth once daily. (Patient taking differently: Take 20 mg by mouth once daily.)    [] azithromycin (Z-JIARO) 250 MG tablet Take 2 tablets by mouth on day 1; Take 1 tablet by mouth on days 2-5    blood sugar diagnostic (ONETOUCH ULTRA TEST) Strp Inject 1 each into the skin 3 (three) times daily before meals.    calcium citrate (CALCITRATE) 200 mg (950 mg) tablet Take 1 tablet by mouth once daily. (Patient taking differently: Take 1 tablet by mouth every Mon, Wed, Fri.)    carvediloL (COREG) 6.25 MG tablet Take 1 tablet (6.25 mg total) by mouth 2 (two) times daily with meals.    cholecalciferol, vitamin D3, (VITAMIN D3) 50 mcg (2,000 unit) Tab Take 1 tablet (2,000 Units total) by mouth twice a week. 10,000 twice a week (Patient taking differently: Take 1 tablet by mouth twice a week. 10,000 3 times weekly)    clobetasoL (TEMOVATE) 0.05 % cream Apply twice a day for 2 weeks then nightly for 2 months. May decrease to three nights a week after that.    co-enzyme Q-10 30 mg capsule Take 1 capsule (30 mg total) by mouth every evening.    cranberry fruit extract (CRANBERRY CONCENTRATE ORAL) Take 2 capsules by mouth once daily. (Patient taking differently: Take 1 capsule by mouth once daily.)    dicyclomine (BENTYL) 20 mg tablet Take 1 tablet (20 mg total) by mouth 4 (four) times daily before meals and nightly.    ferrous sulfate 325 (65 FE) MG EC tablet Take 325 mg by mouth. 3 days a week    FLUoxetine 10 MG capsule Take 1 capsule (10 mg total) by mouth once daily.    furosemide (LASIX) 20 MG tablet Take 1  tablet by mouth once daily    gabapentin (NEURONTIN) 600 MG tablet Take 1 tablet (600 mg total) by mouth 3 (three) times daily.    guaiFENesin (MUCINEX) 600 mg 12 hr tablet Take 2 tablets (1,200 mg total) by mouth 2 (two) times daily.    hydroCHLOROthiazide (MICROZIDE) 12.5 mg capsule Take 12.5 mg by mouth once daily.    insulin aspart U-100 (NOVOLOG FLEXPEN U-100 INSULIN) 100 unit/mL (3 mL) InPn pen Take sliding scale insulin 4 times a day as instructed- max dose of 40 units/day.    insulin glargine U-100, Lantus, (LANTUS SOLOSTAR U-100 INSULIN) 100 unit/mL (3 mL) InPn pen Inject 16 Units into the skin once daily.    letrozole (FEMARA) 2.5 mg Tab Take 1 tablet (2.5 mg total) by mouth once daily.    LIDOcaine (LIDODERM) 5 % Place onto the skin once daily. Remove & Discard patch within 12 hours or as directed by MD    lisinopriL (PRINIVIL,ZESTRIL) 40 MG tablet Take 1 tablet (40 mg total) by mouth every evening.    metFORMIN (GLUCOPHAGE-XR) 500 MG ER 24hr tablet Take 1 tablet (500 mg total) by mouth daily with breakfast.    multivitamin capsule Take 1 capsule by mouth once daily. (Patient taking differently: Take 1 capsule by mouth once daily. Taking 3 times weekly)    omega-3 fatty acids/fish oil (FISH OIL-OMEGA-3 FATTY ACIDS) 300-1,000 mg capsule Take 1 capsule by mouth once a week.    potassium chloride (KLOR-CON) 10 MEQ TbSR Take 99 mEq by mouth once. (Patient taking differently: Take 99 mEq by mouth once. Taking 3 x weekly)    acetaminophen (TYLENOL) 650 MG TbSR Take 650 mg by mouth 2 (two) times a day.    amoxicillin-clavulanate 875-125mg (AUGMENTIN) 875-125 mg per tablet Take 1 tablet by mouth 2 (two) times daily.    blood-glucose meter Misc 1 Device by Misc.(Non-Drug; Combo Route) route once daily. One Touch Dx. Code:E11.9    blood-glucose sensor (FREESTYLE JOSEPHINE 2 PLUS SENSOR) Rose Use as instructed    flash glucose scanning reader (FREESTYLE JOSEPHINE 2 READER) Misc Use as instructed    flash glucose sensor  "(FREESTYLE JOSEPHINE 2 SENSOR) Kit Use as instructed    fluconazole (DIFLUCAN) 150 MG Tab Take 1 tablet (150 mg total) by mouth once daily.    furosemide (LASIX) 20 MG tablet Take 1 tablet (20 mg total) by mouth once daily.    glucagon (BAQSIMI) 3 mg/actuation Spry 2 pack.  Spray one device (3 mg) in one nostril to treat severe hypoglycemia. Disp 1 box (2 devices)    loperamide (IMODIUM) 2 mg capsule Take 2 mg by mouth 4 (four) times daily as needed for Diarrhea.    montelukast (SINGULAIR) 10 mg tablet Take 1 tablet (10 mg total) by mouth once daily.    mucus clearing device (AEROBIKA OSCILLATING PEP SYSTM MISC) 12 puffs by Misc.(Non-Drug; Combo Route) route daily as needed.    NYAMYC powder     ONETOUCH DELICA PLUS LANCET 33 gauge Misc USE   TO CHECK GLUCOSE 4 TIMES DAILY BEFORE  MEALS    pen needle, diabetic (BD ULTRA-FINE MICRO PEN NEEDLE) 32 gauge x 1/4" Ndle use to inject ozempic once a week    pen needle, diabetic (BD ULTRA-FINE MICRO PEN NEEDLE) 32 gauge x 1/4" Ndle Use to inject insulin 4 to 5 times a day as instructed    trimethoprim (TRIMPEX) 100 mg Tab Take 100 mg by mouth every 12 (twelve) hours.     Family History       Problem Relation (Age of Onset)    Bell's palsy Sister    Bone cancer Mother, Father (86)    Breast cancer Mother, Sister, Sister    Cancer Mother (42), Father    Heart disease Mother          Tobacco Use    Smoking status: Never    Smokeless tobacco: Never   Substance and Sexual Activity    Alcohol use: Not Currently     Comment: rare    Drug use: Never    Sexual activity: Not Currently     Partners: Male     Comment:      Review of Systems   Constitutional: Positive for fever and malaise/fatigue.   Cardiovascular:  Positive for dyspnea on exertion. Negative for chest pain.   Respiratory:  Positive for cough and shortness of breath.    Skin: Negative.    Musculoskeletal: Negative.    Gastrointestinal:  Negative for nausea and vomiting.   Neurological:  Positive for weakness. "     Objective:     Vital Signs (Most Recent):  Temp: 97.3 °F (36.3 °C) (07/10/25 0732)  Pulse: 91 (07/10/25 0751)  Resp: 20 (07/10/25 0751)  BP: (!) 161/70 (07/10/25 0732)  SpO2: 97 % (07/10/25 0751) Vital Signs (24h Range):  Temp:  [97.3 °F (36.3 °C)-98.6 °F (37 °C)] 97.3 °F (36.3 °C)  Pulse:  [69-92] 91  Resp:  [18-20] 20  SpO2:  [94 %-97 %] 97 %  BP: (117-169)/(57-75) 161/70     Weight: 88.9 kg (196 lb)  Body mass index is 33.64 kg/m².    SpO2: 97 %         Intake/Output Summary (Last 24 hours) at 7/10/2025 0822  Last data filed at 7/10/2025 0430  Gross per 24 hour   Intake 911.02 ml   Output 1100 ml   Net -188.98 ml       Lines/Drains/Airways       Drain  Duration                  Urethral Catheter 07/07/25 1905 Coude 22 Fr. 2 days              Peripheral Intravenous Line  Duration             Peripheral IV 07/07/25 1847 20 G Anterior;Right Forearm 2 days                    Significant Labs:   Recent Lab Results  (Last 5 results in the past 72 hours)        07/10/25  0725   07/10/25  0541   07/10/25  0540   07/09/25  2057   07/09/25  1617        Albumin   2.1             ALP   110             ALT   12             Anion Gap   10             Ao root annulus               Ao peak kiet               Ao VTI               AST   20             AV valve area               NENA by Velocity Ratio               AORTIC VALVE CUSP SEPERATION               AV mean gradient               AV index (prosthetic)               AV peak gradient               AV Velocity Ratio               Baso #               Basophil %               BILIRUBIN TOTAL   0.2  Comment: For infants and newborns, interpretation of results should be based   on gestational age, weight and in agreement with clinical   observations.    Premature Infant recommended reference ranges:   0-24 hours:  <8.0 mg/dL   24-48 hours: <12.0 mg/dL   3-5 days:    <15.0 mg/dL   6-29 days:   <15.0 mg/dL             BSA               BUN   39             Calcium   8.5              Chloride   96             CO2   24             Creatinine   2.7             Left Ventricle Relative Wall Thickness               E/A ratio               E/E' ratio               eGFR   17  Comment: Estimated GFR calculated using the CKD-EPI creatinine (2021) equation.             Eos #               Eos %     2.0           E wave deceleration time               FS               Glucose   187             Gran # (ANC)     11.2           Hematocrit     34.7           Hemoglobin     11.2           Immature Grans (Abs)               Immature Granulocytes               IVC diameter               IVSd               Left Atrium Major Axis               LA size               LVOT area               LV LATERAL E/E' RATIO               LV SEPTAL E/E' RATIO               LV EDV BP               LV Diastolic Volume Index               Left Ventricular End Diastolic Volume by Teichholz Method               Left Ventricular End Systolic Volume by Teichholz Method               LVIDd               LVIDs               LV mass               LV Mass Index               Left Ventricular Outflow Tract Mean Gradient               Left Ventricular Outflow Tract Mean Velocity               LVOT diameter               LVOT peak floyd               LVOT stroke volume               LVOT peak VTI               LV ESV BP               LV Systolic Volume Index               Lymph #               Lymph %     6.0           Magnesium    1.9             MCH     28.1           MCHC     32.3           MCV     87           Mean e'               Mono #               Mono %     2.0           MPV     10.2           Mr max floyd               MV valve area p 1/2 method               MV Peak A Floyd               MV Peak E Floyd               MV stenosis pressure 1/2 time               Myelocytes     4.0           Neut %               nRBC     0           Platelet Count     416           POCT Glucose 190       231   225       Potassium   4.6             PROTEIN  TOTAL   6.4             PW               RA Major Axis               Est. RA pres               RBC     3.99           RDW     13.6           RV TB RVSP               RV/LV Ratio               RVDD               RV- sylvester basal diam               RVOT peak floyd               Segmented Neutrophil %     86.0           Sodium   130             TDI SEPTAL               TDI LATERAL               Triscuspid Valve Regurgitation Peak Gradient               TR Max Floyd               TV resting pulmonary artery pressure               WBC     12.99           ZLVIDD               ZLVIDS                                      Significant Imaging:   Imaging Results              CT Chest With Contrast (Final result)  Result time 07/07/25 19:40:16      Final result by Dinorah Boogie MD (07/07/25 19:40:16)                   Impression:      1. There are mildly prominent paratracheal and AP window nodes which could be reactive however there is a new large subcarinal node and right para hilar node which could be benign or malignant and are considered indeterminate.  2. Small right pleural effusion  3. Volume loss in the right upper lobe with focal area of right anterior upper lobe consolidation suggesting infiltrate and bronchiectasis which is significantly increased  4.  bronchiectasis in the lung bases and diffuse scattered patchy infiltrates consistent with multifocal pneumonia with subtle areas in the left upper lobe and more focal areas in the right upper lobe and lung bases      Electronically signed by: Dinorah Boogie MD  Date:    07/07/2025  Time:    19:40               Narrative:    EXAMINATION:  CT CHEST WITH CONTRAST    CLINICAL HISTORY:  Pneumonia, unresolved;Dyspnea, chronic, unclear etiology;    TECHNIQUE:  Iterative reconstruction technique was used.    CT/Cardiac Nuclear exams in prior 12 months: 1    COMPARISON:  03/13/2025.    FINDINGS:  Multiple small thyroid nodules are present.  There is some abnormal paratracheal and  mediastinal nodes with a right paratracheal node 1.4 cm right hilar node approximately 2.5 cm and a subcarinal node approximately 3 cm which are new.  There is a right pleural effusion measuring 3 cm in thickness there is been a cholecystectomy and intrahepatic ductal dilatation some small proximally 1 cm and less AP window nodes and several 1-1.5 cm paratracheal nodes.  There is new right upper lobe consolidation with volume loss on the right with extensive right upper lobe bronchiectasis and right middle lobe bronchiectasis.  There is patchy ground-glass infiltrate in the lingula and lung bases this suggest multifocal pneumonia there is small patchy areas of ground-glass infiltrate in the left upper lobe                                       X-Ray Chest 1 View (Final result)  Result time 07/08/25 01:56:36      Final result by Yasmany Sanders MD (07/08/25 01:56:36)                   Impression:      Interval worsening of the bilateral pulmonary infiltrates.      Electronically signed by: Yasmany Sanders MD  Date:    07/08/2025  Time:    01:56               Narrative:    EXAMINATION:  XR CHEST 1 VIEW    CLINICAL HISTORY:  Shortness of breath    COMPARISON:  07/03/2025    FINDINGS:  Cardiac silhouette is unchanged.  Heterogeneous opacities at the bibasilar and right perihilar regions of the lungs have increased in the interval, possibly worsening pneumonia.  No obvious pleural effusion.  Bones are unchanged.                                      Recent Diagnostics:    Last Echocardiogram:  7/8/2025    Left Ventricle: The left ventricle is normal in size. Mildly increased wall thickness. There is hyperdynamic systolic function with a visually estimated ejection fraction of 75 - 80%. Grade I diastolic dysfunction.    Right Ventricle: The right ventricle is mildly dialted measuring 3.1 cm. Systolic function is normal.    Left Atrium: The left atrium is mildly dilated    Right Atrium: The right atrium is mildly dilated .     Aortic Valve: The aortic valve is a trileaflet valve. Moderately calcified cusps. There is annular calcification present. Moderately restricted motion. There is moderate stenosis. Aortic valve area by VTI is 1.2 cm². Aortic valve peak velocity is 3.2 m/s. Mean gradient is 21 mmHg. The dimensionless index is 0.40.    Mitral Valve: Moderately calcified posterior leaflet. There is moderate mitral annular calcification.    Tricuspid Valve: There is mild regurgitation.    IVC/SVC: Normal venous pressure at 3 mmHg.  Last Nuclear &/or SHANICE:    Last Lipids:   Lab Results   Component Value Date    CHOL 77 (L) 12/11/2024    CHOL 94 (L) 07/01/2024    CHOL 131 12/27/2023     Lab Results   Component Value Date    HDL 42 12/11/2024    HDL 40 07/01/2024    HDL 44 12/27/2023     Lab Results   Component Value Date    LDLCALC 14.6 (L) 12/11/2024    LDLCALC 30.0 (L) 07/01/2024    LDLCALC 44.4 (L) 12/27/2023     Lab Results   Component Value Date    TRIG 102 12/11/2024    TRIG 120 07/01/2024    TRIG 213 (H) 12/27/2023     Lab Results   Component Value Date    CHOLHDL 54.5 (H) 12/11/2024    CHOLHDL 42.6 07/01/2024    CHOLHDL 33.6 12/27/2023           Telemetry:  NSR    CCS Functional Classification of Angina:  I  NYHA Functional Classification:  II      Physical Exam:  Physical Exam  Constitutional:       Comments: frail   HENT:      Head: Normocephalic and atraumatic.      Nose: Nose normal.      Mouth/Throat:      Mouth: Mucous membranes are moist.   Eyes:      Extraocular Movements: Extraocular movements intact.      Pupils: Pupils are equal, round, and reactive to light.   Cardiovascular:      Rate and Rhythm: Normal rate and regular rhythm.      Pulses: Normal pulses.      Heart sounds: Murmur heard.   Pulmonary:      Effort: Pulmonary effort is normal.      Breath sounds: Normal breath sounds.   Abdominal:      Palpations: Abdomen is soft.   Musculoskeletal:         General: Normal range of motion.      Cervical back: Normal range  of motion.      Right lower leg: No edema.      Left lower leg: No edema.   Skin:     General: Skin is warm and dry.      Capillary Refill: Capillary refill takes less than 2 seconds.   Neurological:      General: No focal deficit present.      Mental Status: She is alert and oriented to person, place, and time.      Motor: Weakness present.   Psychiatric:         Mood and Affect: Mood normal.         Behavior: Behavior normal.         Home Medications:  Medications Ordered Prior to Encounter[1]    Current Inpatient Medications:  Current Medications[2]      VTE Risk Mitigation (From admission, onward)           Ordered     enoxaparin injection 30 mg  Every 24 hours         07/09/25 1020     IP VTE HIGH RISK PATIENT  Once         07/07/25 2105     Place KP hose  Until discontinued         07/07/25 2105                    Assessment:     Active Diagnoses:    Diagnosis Date Noted POA    PRINCIPAL PROBLEM:  Multifocal pneumonia [J18.9] 07/08/2025 Yes    Hypokalemia [E87.6] 07/09/2025 Yes    Cough [R05.9] 07/08/2025 Yes    CHF (congestive heart failure) [I50.9] 07/08/2025 Yes    Urinary retention [R33.9] 01/06/2021 Yes    Type 2 diabetes mellitus without complication, with long-term current use of insulin [E11.9, Z79.4] 06/10/2019 Not Applicable    Essential hypertension [I10] 05/13/2019 Yes      Problems Resolved During this Admission:       Multifocal Pneumonia   -patient admitted with SOB, cough, and malaise  -CT concerning for multifocal pneumonia  -WBC elevated  -Management per primary team   -Agree with IV antibiotics     Chronic HFpEF  -Patient with chronic HFpEF  - Echo yesterday with hyperdynamic EF   -Symptoms this admission more consistent with pneumonia, although NT pro BNP is elevated at 10,000  -Increase Coreg to 12.5 mg PO BID for better BP control   -Continue Lisinopril as dosed   -Renal function worse with IV diuresis   -Hold Lasix for now     CAD  -Hx of mild CAD  -No chest pain or anginal equivalent  reported   -Continue ASA and statin as dosed     HHD  -BP elevated this morning   -Increase Coreg to 12.5 mg PO BID   -Adjust as needed     Stable from a CV standpoint  Cardiology signing off at this time   Plan to follow up with Dr. Turner after discharge     Provider's Attestation:  I, Lorenzo Temple MD, confirm that AUGIE Guerra worked under my direction at all times.  Documentation shall reflect findings and decisions made by myself in the course of the patient's treatment.  I have performed a face to face evaluation of the patient and reviewed the medical record. In addition, I have performed the substantive portion of the medical decision making. I have reviewed the notes and assessment, and I concur with her documentation.  CMS guidelines regarding the use of scribes shall be adhered to.  MD Dre Sepulveda FNP scribed for Dr Temple   Cardiology  [facility]  07/10/2025             [1]   No current facility-administered medications on file prior to encounter.     Current Outpatient Medications on File Prior to Encounter   Medication Sig Dispense Refill    acarbose (PRECOSE) 100 MG Tab Take 1 tablet (100 mg total) by mouth 3 (three) times daily with meals. 270 tablet 3    albuterol (PROVENTIL/VENTOLIN HFA) 90 mcg/actuation inhaler Inhale 2 puffs into the lungs every 6 (six) hours as needed for Wheezing. Rescue 18 g 11    alendronate (FOSAMAX) 70 MG tablet Take 1 tablet (70 mg total) by mouth every 7 days. 12 tablet 1    aspirin (ECOTRIN) 81 MG EC tablet Take 81 mg by mouth once daily. 3 days a week      atorvastatin (LIPITOR) 40 MG tablet Take 1 tablet (40 mg total) by mouth once daily. (Patient taking differently: Take 20 mg by mouth once daily.) 90 tablet 3    [] azithromycin (Z-JAIRO) 250 MG tablet Take 2 tablets by mouth on day 1; Take 1 tablet by mouth on days 2-5 6 tablet 0    blood sugar diagnostic (ONETOUCH ULTRA TEST) Strp Inject 1 each into the skin 3 (three) times  daily before meals. 300 each 3    calcium citrate (CALCITRATE) 200 mg (950 mg) tablet Take 1 tablet by mouth once daily. (Patient taking differently: Take 1 tablet by mouth every Mon, Wed, Fri.)      carvediloL (COREG) 6.25 MG tablet Take 1 tablet (6.25 mg total) by mouth 2 (two) times daily with meals. 180 tablet 3    cholecalciferol, vitamin D3, (VITAMIN D3) 50 mcg (2,000 unit) Tab Take 1 tablet (2,000 Units total) by mouth twice a week. 10,000 twice a week (Patient taking differently: Take 1 tablet by mouth twice a week. 10,000 3 times weekly)      clobetasoL (TEMOVATE) 0.05 % cream Apply twice a day for 2 weeks then nightly for 2 months. May decrease to three nights a week after that. 60 g 2    co-enzyme Q-10 30 mg capsule Take 1 capsule (30 mg total) by mouth every evening.      cranberry fruit extract (CRANBERRY CONCENTRATE ORAL) Take 2 capsules by mouth once daily. (Patient taking differently: Take 1 capsule by mouth once daily.)      dicyclomine (BENTYL) 20 mg tablet Take 1 tablet (20 mg total) by mouth 4 (four) times daily before meals and nightly. 120 tablet 11    ferrous sulfate 325 (65 FE) MG EC tablet Take 325 mg by mouth. 3 days a week      FLUoxetine 10 MG capsule Take 1 capsule (10 mg total) by mouth once daily. 90 capsule 3    furosemide (LASIX) 20 MG tablet Take 1 tablet by mouth once daily 90 tablet 0    gabapentin (NEURONTIN) 600 MG tablet Take 1 tablet (600 mg total) by mouth 3 (three) times daily. 90 tablet 11    guaiFENesin (MUCINEX) 600 mg 12 hr tablet Take 2 tablets (1,200 mg total) by mouth 2 (two) times daily. 40 tablet 0    hydroCHLOROthiazide (MICROZIDE) 12.5 mg capsule Take 12.5 mg by mouth once daily.      insulin aspart U-100 (NOVOLOG FLEXPEN U-100 INSULIN) 100 unit/mL (3 mL) InPn pen Take sliding scale insulin 4 times a day as instructed- max dose of 40 units/day. 15 mL 3    insulin glargine U-100, Lantus, (LANTUS SOLOSTAR U-100 INSULIN) 100 unit/mL (3 mL) InPn pen Inject 16 Units  into the skin once daily. 15 mL 3    letrozole (FEMARA) 2.5 mg Tab Take 1 tablet (2.5 mg total) by mouth once daily. 30 tablet 11    LIDOcaine (LIDODERM) 5 % Place onto the skin once daily. Remove & Discard patch within 12 hours or as directed by MD 30 patch 5    lisinopriL (PRINIVIL,ZESTRIL) 40 MG tablet Take 1 tablet (40 mg total) by mouth every evening. 90 tablet 3    metFORMIN (GLUCOPHAGE-XR) 500 MG ER 24hr tablet Take 1 tablet (500 mg total) by mouth daily with breakfast. 90 tablet 3    multivitamin capsule Take 1 capsule by mouth once daily. (Patient taking differently: Take 1 capsule by mouth once daily. Taking 3 times weekly)      omega-3 fatty acids/fish oil (FISH OIL-OMEGA-3 FATTY ACIDS) 300-1,000 mg capsule Take 1 capsule by mouth once a week.      potassium chloride (KLOR-CON) 10 MEQ TbSR Take 99 mEq by mouth once. (Patient taking differently: Take 99 mEq by mouth once. Taking 3 x weekly)      acetaminophen (TYLENOL) 650 MG TbSR Take 650 mg by mouth 2 (two) times a day.      amoxicillin-clavulanate 875-125mg (AUGMENTIN) 875-125 mg per tablet Take 1 tablet by mouth 2 (two) times daily. 10 tablet 0    blood-glucose meter Misc 1 Device by Misc.(Non-Drug; Combo Route) route once daily. One Touch Dx. Code:E11.9 1 each 0    blood-glucose sensor (FREESTYLE JOSEPHINE 2 PLUS SENSOR) Rose Use as instructed 6 each 6    flash glucose scanning reader (FREESTYLE JOSEPHINE 2 READER) Misc Use as instructed 1 each 1    flash glucose sensor (FREESTYLE JOSEPHINE 2 SENSOR) Kit Use as instructed 6 kit 3    fluconazole (DIFLUCAN) 150 MG Tab Take 1 tablet (150 mg total) by mouth once daily. 2 tablet 0    furosemide (LASIX) 20 MG tablet Take 1 tablet (20 mg total) by mouth once daily. 90 tablet 0    glucagon (BAQSIMI) 3 mg/actuation Spry 2 pack.  Spray one device (3 mg) in one nostril to treat severe hypoglycemia. Disp 1 box (2 devices) 2 each 3    loperamide (IMODIUM) 2 mg capsule Take 2 mg by mouth 4 (four) times daily as needed for  "Diarrhea.      montelukast (SINGULAIR) 10 mg tablet Take 1 tablet (10 mg total) by mouth once daily. 90 tablet 3    mucus clearing device (AEROBIKA OSCILLATING PEP SYSTM MISC) 12 puffs by Misc.(Non-Drug; Combo Route) route daily as needed.      NYAMYC powder       ONETOUCH DELICA PLUS LANCET 33 gauge Misc USE   TO CHECK GLUCOSE 4 TIMES DAILY BEFORE  MEALS 300 each 3    pen needle, diabetic (BD ULTRA-FINE MICRO PEN NEEDLE) 32 gauge x 1/4" Ndle use to inject ozempic once a week 100 each 3    pen needle, diabetic (BD ULTRA-FINE MICRO PEN NEEDLE) 32 gauge x 1/4" Ndle Use to inject insulin 4 to 5 times a day as instructed 100 each 6    trimethoprim (TRIMPEX) 100 mg Tab Take 100 mg by mouth every 12 (twelve) hours.     [2]   Current Facility-Administered Medications:     acetaminophen tablet 650 mg, 650 mg, Oral, Q8H PRN, Reynold Crooks MD    albuterol sulfate nebulizer solution 2.5 mg, 2.5 mg, Nebulization, Q6H WAKE, Harika Mina MD, 2.5 mg at 07/10/25 0751    aspirin EC tablet 81 mg, 81 mg, Oral, Daily, Harika Mina MD, 81 mg at 07/09/25 0845    atorvastatin tablet 20 mg, 20 mg, Oral, Daily, Harika Mina MD, 20 mg at 07/09/25 0845    carvediloL tablet 6.25 mg, 6.25 mg, Oral, BID WM, Reynold Crooks MD, 6.25 mg at 07/09/25 1714    dextrose 50% injection 12.5 g, 12.5 g, Intravenous, PRN, Harika Mina MD    dextrose 50% injection 25 g, 25 g, Intravenous, PRN, Harika Mina MD    enoxaparin injection 30 mg, 30 mg, Subcutaneous, Q24H (prophylaxis, 1700), Harika Mina MD, 30 mg at 07/09/25 1714    [START ON 7/11/2025] famotidine tablet 20 mg, 20 mg, Oral, Q48H, Harika Mina MD    FLUoxetine capsule 10 mg, 10 mg, Oral, Daily, Reynold Crooks MD, 10 mg at 07/09/25 0845    gabapentin capsule 600 mg, 600 mg, Oral, TID, Reynold Crooks MD, 600 mg at 07/09/25 2059    glucagon (human recombinant) injection 1 mg, 1 mg, Intramuscular, PRN, Harika Mina " MD AMBAR    glucose chewable tablet 16 g, 16 g, Oral, PRN, Harika Mina MD    glucose chewable tablet 24 g, 24 g, Oral, PRN, Harika Mina MD    guaiFENesin 12 hr tablet 600 mg, 600 mg, Oral, BID, Harika Mina MD, 600 mg at 07/09/25 2059    insulin aspart U-100 pen 0-5 Units, 0-5 Units, Subcutaneous, QID (AC + HS) PRN, Harika Mina MD, 1 Units at 07/09/25 2058    insulin glargine U-100 (Lantus) pen 16 Units, 16 Units, Subcutaneous, Daily, Reynold Crooks MD, 16 Units at 07/09/25 0845    lisinopriL tablet 40 mg, 40 mg, Oral, QHS, Reynold Crooks MD, 40 mg at 07/09/25 2059    loperamide capsule 2 mg, 2 mg, Oral, QID PRN, Harika Mina MD, 2 mg at 07/09/25 0946    melatonin tablet 6 mg, 6 mg, Oral, Nightly PRN, Reynold Crooks MD    mupirocin 2 % ointment, , Nasal, BID, Harika Mina MD, Given at 07/09/25 2059    ondansetron injection 4 mg, 4 mg, Intravenous, Q8H PRN, Reynold Crooks MD    piperacillin-tazobactam (ZOSYN) 4.5 g in D5W 100 mL IVPB (MB+), 4.5 g, Intravenous, Q8H, Harika Mina MD, Stopped at 07/10/25 0501    potassium chloride SA CR tablet 20 mEq, 20 mEq, Oral, TID, Harika Mina MD, 20 mEq at 07/09/25 2059    sodium chloride 0.9% flush 10 mL, 10 mL, Intravenous, PRN, Reynold Crooks MD

## 2025-07-10 NOTE — PROGRESS NOTES
[facility] Medicine  Progress Note    Patient Name: Kelsea Cadet  MRN: [id number]  Patient Class: IP- Inpatient   Admission Date: 7/7/2025  Length of Stay: 3 days  Attending Physician: Harika Mina MD  Primary Care Provider: Gabby Jackson NP        Subjective     Principal Problem:Multifocal pneumonia        HPI:  Kelsea Cadet is a 85 y.o. female with PMHX of hypertension, pulmonary hypertension, cardiomyopathy, type 2 diabetes, hyperlipidemia, CKD, chronic indwelling Land, neurogenic bladder who presents to the emergency department C/O malaise.     Patient presents with malaise.  Was having fever last week and worsening cough. Saw PCP and had XR and was treated with a Z-Oswaldo.  Patient seemed to be getting better but today has been complaining of fatigue, malaise, and nausea.  Reports some shortness of breath.  No longer having fever. Pt reports she has been coughign but unable to get the mucus up.      Reports she has recently switched cards to dr perera and saw him a few weeks ago. Pt reports taking all of her meds    Overview/Hospital Course:  7/9 ND pt repotrs feeling better this am - diureisng well  7/10 ND pt feeling better - less sob.   Pt diruesed well - will hold lasix today due to kacy    Past Medical History:   Diagnosis Date    Arthritis     Asthma     Coronary artery disease     Environmental and seasonal allergies     Land catheter in place     Hard of hearing     Hyperlipidemia     Neurogenic bladder     Pneumonia, unspecified organism     Presence of indwelling Land catheter     Unspecified chronic bronchitis     Vitamin D deficiency        Past Surgical History:   Procedure Laterality Date    BLADDER SUSPENSION      BREAST BIOPSY Bilateral 1996    BREAST SURGERY      lumpectomy, isotopes    CHOLECYSTECTOMY      had gal bladder removed    COLECTOMY      had part of colon removed    CYSTOSCOPY W/ RETROGRADES Bilateral 12/19/2019    Procedure: CYSTOSCOPY, WITH  RETROGRADE PYELOGRAM;  Surgeon: Prasad Rocha MD;  Location: [facility] OR;  Service: Urology;  Laterality: Bilateral;    DILATION OF URETHRA N/A 12/19/2019    Procedure: DILATION, URETHRA;  Surgeon: Prasad Rocha MD;  Location: [facility] OR;  Service: Urology;  Laterality: N/A;    HYSTERECTOMY      LEFT HEART CATHETERIZATION Left 11/27/2019    Procedure: Left heart cath;  Surgeon: Urban Paiz MD;  Location: [facility] CATH LAB;  Service: Cardiology;  Laterality: Left;    MODIFIED RADICAL MASTECTOMY W/ AXILLARY LYMPH NODE DISSECTION Right 1/26/2022    Procedure: MASTECTOMY, MODIFIED RADICAL;  Surgeon: Keisha Cortez MD;  Location: [facility] OR;  Service: General;  Laterality: Right;    OOPHORECTOMY      SENTINEL LYMPH NODE BIOPSY Right 1/26/2022    Procedure: BIOPSY, LYMPH NODE, SENTINEL;  Surgeon: Keisha Cortez MD;  Location: [facility] OR;  Service: General;  Laterality: Right;  0800    SIMPLE MASTECTOMY Left 1/26/2022    Procedure: MASTECTOMY, SIMPLE;  Surgeon: Keisha Cortez MD;  Location: [facility];  Service: General;  Laterality: Left;  FROZEN SECTION       Review of patient's allergies indicates:   Allergen Reactions    Macrobid [nitrofurantoin monohyd/m-cryst] Swelling    Bactrim [sulfamethoxazole-trimethoprim] Diarrhea    Ciprofloxacin Other (See Comments)     GI problems    Codeine Other (See Comments)     headache    Latex, natural rubber Rash       No current facility-administered medications on file prior to encounter.     Current Outpatient Medications on File Prior to Encounter   Medication Sig    acarbose (PRECOSE) 100 MG Tab Take 1 tablet (100 mg total) by mouth 3 (three) times daily with meals.    albuterol (PROVENTIL/VENTOLIN HFA) 90 mcg/actuation inhaler Inhale 2 puffs into the lungs every 6 (six) hours as needed for Wheezing. Rescue    alendronate (FOSAMAX) 70 MG tablet Take 1 tablet (70 mg total) by mouth every 7 days.    aspirin (ECOTRIN) 81 MG EC tablet Take 81 mg by mouth once daily. 3 days a  week    atorvastatin (LIPITOR) 40 MG tablet Take 1 tablet (40 mg total) by mouth once daily. (Patient taking differently: Take 20 mg by mouth once daily.)    [] azithromycin (Z-JAIRO) 250 MG tablet Take 2 tablets by mouth on day 1; Take 1 tablet by mouth on days 2-5    blood sugar diagnostic (ONETOUCH ULTRA TEST) Strp Inject 1 each into the skin 3 (three) times daily before meals.    calcium citrate (CALCITRATE) 200 mg (950 mg) tablet Take 1 tablet by mouth once daily. (Patient taking differently: Take 1 tablet by mouth every Mon, Wed, Fri.)    carvediloL (COREG) 6.25 MG tablet Take 1 tablet (6.25 mg total) by mouth 2 (two) times daily with meals.    cholecalciferol, vitamin D3, (VITAMIN D3) 50 mcg (2,000 unit) Tab Take 1 tablet (2,000 Units total) by mouth twice a week. 10,000 twice a week (Patient taking differently: Take 1 tablet by mouth twice a week. 10,000 3 times weekly)    clobetasoL (TEMOVATE) 0.05 % cream Apply twice a day for 2 weeks then nightly for 2 months. May decrease to three nights a week after that.    co-enzyme Q-10 30 mg capsule Take 1 capsule (30 mg total) by mouth every evening.    cranberry fruit extract (CRANBERRY CONCENTRATE ORAL) Take 2 capsules by mouth once daily. (Patient taking differently: Take 1 capsule by mouth once daily.)    dicyclomine (BENTYL) 20 mg tablet Take 1 tablet (20 mg total) by mouth 4 (four) times daily before meals and nightly.    ferrous sulfate 325 (65 FE) MG EC tablet Take 325 mg by mouth. 3 days a week    FLUoxetine 10 MG capsule Take 1 capsule (10 mg total) by mouth once daily.    furosemide (LASIX) 20 MG tablet Take 1 tablet by mouth once daily    gabapentin (NEURONTIN) 600 MG tablet Take 1 tablet (600 mg total) by mouth 3 (three) times daily.    guaiFENesin (MUCINEX) 600 mg 12 hr tablet Take 2 tablets (1,200 mg total) by mouth 2 (two) times daily.    hydroCHLOROthiazide (MICROZIDE) 12.5 mg capsule Take 12.5 mg by mouth once daily.    insulin aspart U-100  (NOVOLOG FLEXPEN U-100 INSULIN) 100 unit/mL (3 mL) InPn pen Take sliding scale insulin 4 times a day as instructed- max dose of 40 units/day.    insulin glargine U-100, Lantus, (LANTUS SOLOSTAR U-100 INSULIN) 100 unit/mL (3 mL) InPn pen Inject 16 Units into the skin once daily.    letrozole (FEMARA) 2.5 mg Tab Take 1 tablet (2.5 mg total) by mouth once daily.    LIDOcaine (LIDODERM) 5 % Place onto the skin once daily. Remove & Discard patch within 12 hours or as directed by MD    lisinopriL (PRINIVIL,ZESTRIL) 40 MG tablet Take 1 tablet (40 mg total) by mouth every evening.    metFORMIN (GLUCOPHAGE-XR) 500 MG ER 24hr tablet Take 1 tablet (500 mg total) by mouth daily with breakfast.    multivitamin capsule Take 1 capsule by mouth once daily. (Patient taking differently: Take 1 capsule by mouth once daily. Taking 3 times weekly)    omega-3 fatty acids/fish oil (FISH OIL-OMEGA-3 FATTY ACIDS) 300-1,000 mg capsule Take 1 capsule by mouth once a week.    potassium chloride (KLOR-CON) 10 MEQ TbSR Take 99 mEq by mouth once. (Patient taking differently: Take 99 mEq by mouth once. Taking 3 x weekly)    acetaminophen (TYLENOL) 650 MG TbSR Take 650 mg by mouth 2 (two) times a day.    amoxicillin-clavulanate 875-125mg (AUGMENTIN) 875-125 mg per tablet Take 1 tablet by mouth 2 (two) times daily.    blood-glucose meter Misc 1 Device by Misc.(Non-Drug; Combo Route) route once daily. One Touch Dx. Code:E11.9    blood-glucose sensor (FREESTYLE JOSEPHINE 2 PLUS SENSOR) Rose Use as instructed    flash glucose scanning reader (FREESTYLE JOSEPHINE 2 READER) Misc Use as instructed    flash glucose sensor (FREESTYLE JOSEPHINE 2 SENSOR) Kit Use as instructed    fluconazole (DIFLUCAN) 150 MG Tab Take 1 tablet (150 mg total) by mouth once daily.    furosemide (LASIX) 20 MG tablet Take 1 tablet (20 mg total) by mouth once daily.    glucagon (BAQSIMI) 3 mg/actuation Spry 2 pack.  Spray one device (3 mg) in one nostril to treat severe hypoglycemia. Disp 1  "box (2 devices)    loperamide (IMODIUM) 2 mg capsule Take 2 mg by mouth 4 (four) times daily as needed for Diarrhea.    montelukast (SINGULAIR) 10 mg tablet Take 1 tablet (10 mg total) by mouth once daily.    mucus clearing device (AEROBIKA OSCILLATING PEP SYSTM MISC) 12 puffs by Misc.(Non-Drug; Combo Route) route daily as needed.    NYAMYC powder     ONETOUCH DELICA PLUS LANCET 33 gauge Misc USE   TO CHECK GLUCOSE 4 TIMES DAILY BEFORE  MEALS    pen needle, diabetic (BD ULTRA-FINE MICRO PEN NEEDLE) 32 gauge x 1/4" Ndle use to inject ozempic once a week    pen needle, diabetic (BD ULTRA-FINE MICRO PEN NEEDLE) 32 gauge x 1/4" Ndle Use to inject insulin 4 to 5 times a day as instructed    trimethoprim (TRIMPEX) 100 mg Tab Take 100 mg by mouth every 12 (twelve) hours.     Family History       Problem Relation (Age of Onset)    Bell's palsy Sister    Bone cancer Mother, Father (86)    Breast cancer Mother, Sister, Sister    Cancer Mother (42), Father    Heart disease Mother          Tobacco Use    Smoking status: Never    Smokeless tobacco: Never   Substance and Sexual Activity    Alcohol use: Not Currently     Comment: rare    Drug use: Never    Sexual activity: Not Currently     Partners: Male     Comment:      Review of Systems   Constitutional:  Positive for activity change, chills and fatigue. Negative for fever.   HENT:  Positive for postnasal drip. Negative for sinus pressure and sore throat.    Respiratory:  Positive for cough and shortness of breath. Negative for wheezing.    Cardiovascular:  Negative for chest pain, palpitations and leg swelling.   Gastrointestinal:  Negative for abdominal pain, nausea and vomiting.   Skin:  Negative for rash and wound.   Neurological:  Positive for weakness. Negative for syncope.     Objective:     Vital Signs (Most Recent):  Temp: 97.3 °F (36.3 °C) (07/10/25 0732)  Pulse: 91 (07/10/25 0751)  Resp: 20 (07/10/25 0751)  BP: (!) 161/70 (07/10/25 0732)  SpO2: 97 % (07/10/25 " 0751) Vital Signs (24h Range):  Temp:  [97.3 °F (36.3 °C)-98.6 °F (37 °C)] 97.3 °F (36.3 °C)  Pulse:  [69-92] 91  Resp:  [18-20] 20  SpO2:  [94 %-97 %] 97 %  BP: (117-169)/(57-75) 161/70     Weight: 88.9 kg (196 lb)  Body mass index is 33.64 kg/m².     Physical Exam  Constitutional:       Appearance: She is ill-appearing.   HENT:      Nose: Nose normal.      Mouth/Throat:      Mouth: Mucous membranes are moist.   Eyes:      Extraocular Movements: Extraocular movements intact.      Pupils: Pupils are equal, round, and reactive to light.   Cardiovascular:      Rate and Rhythm: Normal rate and regular rhythm.   Pulmonary:      Effort: Pulmonary effort is normal.      Breath sounds: Rhonchi present.      Comments: O2 via nc  Abdominal:      General: Bowel sounds are normal.      Palpations: Abdomen is soft.   Genitourinary:     Comments: Land with yellow urine  Musculoskeletal:      Right lower leg: Edema present.      Left lower leg: Edema present.   Skin:     General: Skin is warm.   Neurological:      General: No focal deficit present.      Mental Status: She is alert.              CRANIAL NERVES     CN III, IV, VI   Pupils are equal, round, and reactive to light.       Significant Labs: All pertinent labs within the past 24 hours have been reviewed.  Recent Lab Results  (Last 5 results in the past 24 hours)        07/10/25  0725   07/10/25  0541   07/10/25  0540   07/09/25  2057   07/09/25  1617        Albumin   2.1             ALP   110             ALT   12             Anion Gap   10             AST   20             BILIRUBIN TOTAL   0.2  Comment: For infants and newborns, interpretation of results should be based   on gestational age, weight and in agreement with clinical   observations.    Premature Infant recommended reference ranges:   0-24 hours:  <8.0 mg/dL   24-48 hours: <12.0 mg/dL   3-5 days:    <15.0 mg/dL   6-29 days:   <15.0 mg/dL             BUN   39             Calcium   8.5             Chloride    96             CO2   24             Creatinine   2.7             eGFR   17  Comment: Estimated GFR calculated using the CKD-EPI creatinine (2021) equation.             Eos %     2.0           Glucose   187             Gran # (ANC)     11.2           Hematocrit     34.7           Hemoglobin     11.2           Lymph %     6.0           Magnesium    1.9             MCH     28.1           MCHC     32.3           MCV     87           Mono %     2.0           MPV     10.2           Myelocytes     4.0           nRBC     0           Platelet Count     416           POCT Glucose 190       231   225       Potassium   4.6             PROTEIN TOTAL   6.4             RBC     3.99           RDW     13.6           Segmented Neutrophil %     86.0           Sodium   130             WBC     12.99                                  Significant Imaging: I have reviewed all pertinent imaging results/findings within the past 24 hours.      Assessment & Plan  Multifocal pneumonia  Patient has a diagnosis of pneumonia. The cause of the pneumonia is suspected to be bacterial in etiology but organism is not known. The pneumonia is worsening due to sob, cough. The patient has the following signs/symptoms of pneumonia: persistent hypoxia , cough, and sputum production. The patient does have a current oxygen requirement and the patient does not have a home oxygen requirement. I have reviewed the pertinent imaging. The following cultures have been collected: Blood cultures The culture results are listed below.     Current antimicrobial regimen consists of the antibiotics listed below. Will monitor patient closely and continue current treatment plan unchanged.    Antibiotics (From admission, onward)      Start     Stop Route Frequency Ordered    07/08/25 0900  piperacillin-tazobactam (ZOSYN) 4.5 g in D5W 100 mL IVPB (MB+)         -- IV Every 8 hours (non-standard times) 07/08/25 0745    07/07/25 2141  mupirocin 2 % ointment         07/12/25 7422  Nasl 2 times daily 07/07/25 2035            Microbiology Results (last 7 days)       Procedure Component Value Units Date/Time    Blood culture x two cultures. Draw prior to antibiotics. [[id number]]  (Normal) Collected: 07/07/25 1835    Order Status: Completed Specimen: Blood from Peripheral, Forearm, Right Updated: 07/10/25 0700     Blood Culture No Growth After 48 Hours    Blood culture x two cultures. Draw prior to antibiotics. [[id number]]  (Normal) Collected: 07/07/25 1843    Order Status: Completed Specimen: Blood from Peripheral, Hand, Left Updated: 07/10/25 0700     Blood Culture No Growth After 48 Hours    Urine culture [[id number]] Collected: 07/07/25 1944    Order Status: Completed Specimen: Urine, Catheterized Updated: 07/09/25 1136     Urine Culture Multiple organisms isolated. None in predominance. Repeat if clinically necessary.    Clostridium difficile EIA [[id number]]     Order Status: Canceled Specimen: Stool     Culture, Respiratory with Gram Stain [[id number]]     Order Status: Sent Specimen: Respiratory           Essential hypertension  Patient's blood pressure range in the last 24 hours was: BP  Min: 117/57  Max: 169/75.The patient's inpatient anti-hypertensive regimen is listed below:  Current Antihypertensives  , Daily, Oral  carvediloL tablet 6.25 mg, 2 times daily with meals, Oral  lisinopriL tablet 40 mg, Nightly, Oral    Plan  - BP is controlled, no changes needed to their regimen    Type 2 diabetes mellitus without complication, with long-term current use of insulin  Ada diet  Accuchecks and ssi  Cont lantus  7/9 A1c 6.4% - cont plan  Urinary retention  Has chronic machado    Cough  Tx pneumonia    CHF (congestive heart failure)  Patient has Diastolic (HFpEF) heart failure that is Acute on chronic. On presentation their CHF was decompensated. Evidence of decompensated CHF on presentation includes: edema, dyspnea on exertion (KIDD), and shortness of breath. The etiology of their  decompensation is likely dietary indiscretion. Most recent BNP and echo results are listed below.  Recent Labs     07/07/25  1843   BNP 10,746*     Latest ECHO  Results for orders placed in visit on 09/20/23    Echo    Interpretation Summary    Left Ventricle: Moderately increased wall thickness. There is moderate concentric hypertrophy. Normal wall motion. Grade II diastolic dysfunction.    Left Atrium: Left atrium is moderately dilated.    Right Ventricle: Normal right ventricular cavity size. Systolic function is normal.    Right Atrium: Right atrium is mildly dilated.    Aortic Valve: The aortic valve is a trileaflet valve. Mildly calcified left, right and noncoronary cusps. Mildly restricted motion. By planimetry the aortic valve area measures 1.8 cm².    Mitral Valve: There is mild bileaflet sclerosis.    Current Heart Failure Medications  , Daily, Oral  carvediloL tablet 6.25 mg, 2 times daily with meals, Oral  lisinopriL tablet 40 mg, Nightly, Oral    Plan  - Monitor strict I&Os and daily weights.    - Place on telemetry  - Low sodium diet  - Place on fluid restriction of 2 L.   - Cardiology has been consulted  - The patient's volume status is improving but not at their baseline as indicated by edema, dyspnea on exertion (KIDD), and shortness of breath  7/9 cont current meds  7/10 hold lasix today due to kacy; repeat cxr this am  Hypokalemia  Patient's most recent potassium results are listed below.   Recent Labs     07/08/25  0430 07/09/25  0823 07/10/25  0541   K 2.9* 4.1 4.6     Plan  - Replete potassium per protocol  - Monitor potassium Daily  - Patient's hypokalemia is on supplements - await labs from today  7/10 decrease potasium replacement  VTE Risk Mitigation (From admission, onward)           Ordered     enoxaparin injection 30 mg  Every 24 hours         07/09/25 1020     IP VTE HIGH RISK PATIENT  Once         07/07/25 2105     Place PK hose  Until discontinued         07/07/25 2105                     Discharge Planning   CATRACHITO: 7/11/2025     Code Status: Full Code   Medical Readiness for Discharge Date:   Discharge Plan A: Home Health                  Please place Justification for DME      Harika Mina MD  Department of Hospital Medicine   [facility]

## 2025-07-10 NOTE — PLAN OF CARE
Plan of care reviewed with patient. Peripheral IV in place and saline locked. Pt tolerated meds well. Pt with persistent dry cough with no productive sputum. VSS on 3L via NC. NADN. Land in place and adequately draining. Pt free from falls and injury. Questions and concerns addressed. Pt belongings and call bell within reach.   Problem: Infection  Goal: Absence of Infection Signs and Symptoms  Outcome: Progressing     Problem: Adult Inpatient Plan of Care  Goal: Plan of Care Review  Outcome: Progressing  Goal: Patient-Specific Goal (Individualized)  Outcome: Progressing  Goal: Absence of Hospital-Acquired Illness or Injury  Outcome: Progressing  Goal: Optimal Comfort and Wellbeing  Outcome: Progressing  Goal: Readiness for Transition of Care  Outcome: Progressing     Problem: Diabetes Comorbidity  Goal: Blood Glucose Level Within Targeted Range  Outcome: Progressing     Problem: Pneumonia  Goal: Fluid Balance  Outcome: Progressing  Goal: Resolution of Infection Signs and Symptoms  Outcome: Progressing  Goal: Effective Oxygenation and Ventilation  Outcome: Progressing     Problem: Fall Injury Risk  Goal: Absence of Fall and Fall-Related Injury  Outcome: Progressing     Problem: Wound  Goal: Optimal Coping  Outcome: Progressing  Goal: Optimal Functional Ability  Outcome: Progressing  Goal: Absence of Infection Signs and Symptoms  Outcome: Progressing  Goal: Improved Oral Intake  Outcome: Progressing  Goal: Optimal Pain Control and Function  Outcome: Progressing  Goal: Skin Health and Integrity  Outcome: Progressing  Goal: Optimal Wound Healing  Outcome: Progressing

## 2025-07-10 NOTE — ASSESSMENT & PLAN NOTE
Patient has a diagnosis of pneumonia. The cause of the pneumonia is suspected to be bacterial in etiology but organism is not known. The pneumonia is worsening due to sob, cough. The patient has the following signs/symptoms of pneumonia: persistent hypoxia , cough, and sputum production. The patient does have a current oxygen requirement and the patient does not have a home oxygen requirement. I have reviewed the pertinent imaging. The following cultures have been collected: Blood cultures The culture results are listed below.     Current antimicrobial regimen consists of the antibiotics listed below. Will monitor patient closely and continue current treatment plan unchanged.    Antibiotics (From admission, onward)      Start     Stop Route Frequency Ordered    07/08/25 0900  piperacillin-tazobactam (ZOSYN) 4.5 g in D5W 100 mL IVPB (MB+)         -- IV Every 8 hours (non-standard times) 07/08/25 0745    07/07/25 2145  mupirocin 2 % ointment         07/12/25 2059 Nasl 2 times daily 07/07/25 2035            Microbiology Results (last 7 days)       Procedure Component Value Units Date/Time    Blood culture x two cultures. Draw prior to antibiotics. [[id number]]  (Normal) Collected: 07/07/25 1835    Order Status: Completed Specimen: Blood from Peripheral, Forearm, Right Updated: 07/10/25 0700     Blood Culture No Growth After 48 Hours    Blood culture x two cultures. Draw prior to antibiotics. [[id number]]  (Normal) Collected: 07/07/25 1843    Order Status: Completed Specimen: Blood from Peripheral, Hand, Left Updated: 07/10/25 0700     Blood Culture No Growth After 48 Hours    Urine culture [[id number]] Collected: 07/07/25 1944    Order Status: Completed Specimen: Urine, Catheterized Updated: 07/09/25 1136     Urine Culture Multiple organisms isolated. None in predominance. Repeat if clinically necessary.    Clostridium difficile EIA [[id number]]     Order Status: Canceled Specimen: Stool     Culture, Respiratory with  Gram Stain [[id number]]     Order Status: Sent Specimen: Respiratory

## 2025-07-10 NOTE — PLAN OF CARE
Problem: Occupational Therapy  Goal: Occupational Therapy Goal  Description: Goals to be met by: 7/16/25     Patient will increase functional independence with ADLs by performing:    UE Dressing with Minimal Assistance.  LE Dressing with Minimal Assistance.  Grooming while seated with Set-up Assistance.  Upper extremity exercise program x30 reps per handout, with assistance as needed.    Outcome: Progressing

## 2025-07-10 NOTE — ASSESSMENT & PLAN NOTE
Patient's most recent potassium results are listed below.   Recent Labs     07/08/25  0430 07/09/25  0823 07/10/25  0541   K 2.9* 4.1 4.6     Plan  - Replete potassium per protocol  - Monitor potassium Daily  - Patient's hypokalemia is on supplements - await labs from today  7/10 decrease potasium replacement

## 2025-07-10 NOTE — PROGRESS NOTES
Pharmacist Renal Dose Adjustment Note    Kelsea Cadet is a 85 y.o. female being treated with the medication Zosyn    Patient Data:    Vital Signs (Most Recent):  Temp: 97.3 °F (36.3 °C) (07/10/25 0732)  Pulse: 91 (07/10/25 0751)  Resp: 20 (07/10/25 0751)  BP: (!) 161/70 (07/10/25 0732)  SpO2: 97 % (07/10/25 0751) Vital Signs (72h Range):  Temp:  [96.4 °F (35.8 °C)-98.8 °F (37.1 °C)]   Pulse:  []   Resp:  [12-26]   BP: (106-201)/(57-84)   SpO2:  [90 %-99 %]      Recent Labs   Lab 07/08/25  0430 07/09/25  0823 07/10/25  0541   CREATININE 0.8 2.0* 2.7*     Serum creatinine: 2.7 mg/dL (H) 07/10/25 0541  Estimated creatinine clearance: 17 mL/min (A)    Medication: Zosyn dose: 4.5 g frequency q8h will be changed to medication: Zosyn dose: 4.5 g frequency: q12h    Pharmacist's Name: Marcin Granados  Pharmacist's Extension: [phone]

## 2025-07-10 NOTE — PT/OT/SLP PROGRESS
Physical Therapy Treatment    Patient Name:  Kelsea Cadet   MRN:  [id number]    Recommendations:     Discharge Recommendations: Low Intensity Therapy  Discharge Equipment Recommendations: none  Barriers to discharge: current medical status    Assessment:     Kelsea Cadet is a 85 y.o. female admitted with a medical diagnosis of Multifocal pneumonia.  She presents with the following impairments/functional limitations: weakness, gait instability, impaired endurance, impaired balance, decreased lower extremity function, decreased safety awareness, impaired self care skills, impaired functional mobility. Patient presents with improved functional mobility this visit with improved bed mobility, transfers, and ambulation. She is able to ambulate 25 feet with RW with SBA in the room this visit. She is able to perform sit<>stand well with SBA this visit with use of RW. No posterior lean or LOB is noted this visit     Rehab Prognosis: Good; patient would benefit from acute skilled PT services to address these deficits and reach maximum level of function.    Recent Surgery: * No surgery found *      Plan:     During this hospitalization, patient to be seen  (3-5x per week) to address the identified rehab impairments via gait training, therapeutic activities, therapeutic exercises, neuromuscular re-education and progress toward the following goals:    Plan of Care Expires:  07/18/25    Subjective     Chief Complaint: weakness   Patient/Family Comments/goals: return home   Pain/Comfort:  Pain Rating 1: 0/10      Objective:     Communicated with nursing prior to session.  Patient found HOB elevated with machado catheter, peripheral IV upon PT entry to room.     General Precautions: Standard, fall  Orthopedic Precautions: N/A  Braces: N/A  Respiratory Status: Nasal cannula, flow 2.0 L/min     Functional Mobility:  Bed Mobility:     Rolling Left:  modified independence  Rolling Right: modified independence  Scooting: modified  independence  Bridging: modified independence  Supine to Sit: modified independence  Sit to Supine: modified independence  Transfers:     Sit to Stand:  stand by assistance with rolling walker  Gait: 25 feet with RW with SBA and cuing for step length.   Balance: No LOB or posterior lean noted this visit       AM-PAC 6 CLICK MOBILITY  Turning over in bed (including adjusting bedclothes, sheets and blankets)?: 4  Sitting down on and standing up from a chair with arms (e.g., wheelchair, bedside commode, etc.): 4  Moving from lying on back to sitting on the side of the bed?: 4  Moving to and from a bed to a chair (including a wheelchair)?: 3  Need to walk in hospital room?: 3  Climbing 3-5 steps with a railing?: 3  Basic Mobility Total Score: 21       Treatment & Education:  Patient educated on safety throughout visit.   Sit<>Stand multiple times  Bed mobility  Gait training with cuing for step length and proper use of RW. Patient is familiar with rollator, and she is reporting some difficulty with use of RW     Patient left HOB elevated with all lines intact, call button in reach, and nursing notified..    GOALS:   Multidisciplinary Problems       Physical Therapy Goals          Problem: Physical Therapy    Goal Priority Disciplines Outcome Interventions   Physical Therapy Goal     PT, PT/OT Progressing    Description: Patient will increase functional independence with mobility by performin. Supine to sit with Modified Independent  2. Sit to supine with Modified Independent  3. Bed to chair transfer with Modified Independentwith or without rolling walker using Stand Pivot TECHNIQUE  4. Gait  x 100  feet with Supervision or Set-up Assistance with or without rolling walker  5. Lower extremity exercise program x10 reps per handout, with assistance as needed                          Time Tracking:     PT Received On: 07/10/25  PT Start Time: 1010     PT Stop Time: 1024  PT Total Time (min): 14 min     Billable  Minutes: Gait Training 14    Treatment Type: Treatment  PT/PTA: PT           07/10/2025

## 2025-07-10 NOTE — ASSESSMENT & PLAN NOTE
Patient has Diastolic (HFpEF) heart failure that is Acute on chronic. On presentation their CHF was decompensated. Evidence of decompensated CHF on presentation includes: edema, dyspnea on exertion (KIDD), and shortness of breath. The etiology of their decompensation is likely dietary indiscretion. Most recent BNP and echo results are listed below.  Recent Labs     07/07/25  1843   BNP 10,746*     Latest ECHO  Results for orders placed in visit on 09/20/23    Echo    Interpretation Summary    Left Ventricle: Moderately increased wall thickness. There is moderate concentric hypertrophy. Normal wall motion. Grade II diastolic dysfunction.    Left Atrium: Left atrium is moderately dilated.    Right Ventricle: Normal right ventricular cavity size. Systolic function is normal.    Right Atrium: Right atrium is mildly dilated.    Aortic Valve: The aortic valve is a trileaflet valve. Mildly calcified left, right and noncoronary cusps. Mildly restricted motion. By planimetry the aortic valve area measures 1.8 cm².    Mitral Valve: There is mild bileaflet sclerosis.    Current Heart Failure Medications  , Daily, Oral  carvediloL tablet 6.25 mg, 2 times daily with meals, Oral  lisinopriL tablet 40 mg, Nightly, Oral    Plan  - Monitor strict I&Os and daily weights.    - Place on telemetry  - Low sodium diet  - Place on fluid restriction of 2 L.   - Cardiology has been consulted  - The patient's volume status is improving but not at their baseline as indicated by edema, dyspnea on exertion (KIDD), and shortness of breath  7/9 cont current meds  7/10 hold lasix today due to kacy; repeat cxr this am

## 2025-07-10 NOTE — ASSESSMENT & PLAN NOTE
Patient's blood pressure range in the last 24 hours was: BP  Min: 117/57  Max: 169/75.The patient's inpatient anti-hypertensive regimen is listed below:  Current Antihypertensives  , Daily, Oral  carvediloL tablet 6.25 mg, 2 times daily with meals, Oral  lisinopriL tablet 40 mg, Nightly, Oral    Plan  - BP is controlled, no changes needed to their regimen

## 2025-07-10 NOTE — PT/OT/SLP PROGRESS
Occupational Therapy   Treatment    Name: Kelsea Cadet  MRN: [id number]  Admitting Diagnosis:  Multifocal pneumonia       Recommendations:     Discharge Recommendations: Low Intensity Therapy  Discharge Equipment Recommendations:  none  Barriers to discharge:       Assessment:     Kelsea Cadet is a 85 y.o. female with a medical diagnosis of Multifocal pneumonia.  She presents with improved functional performance with LB dressing as noted by mod assist. Performance deficits affecting function are weakness, impaired endurance, impaired self care skills, impaired functional mobility, gait instability, impaired balance, decreased ROM.     Rehab Prognosis:  Fair; patient would benefit from acute skilled OT services to address these deficits and reach maximum level of function.       Plan:     Patient to be seen  (3-5X/WK) to address the above listed problems via self-care/home management, therapeutic activities, therapeutic exercises  Plan of Care Expires: 07/16/25  Plan of Care Reviewed with: patient    Subjective     Chief Complaint: weakness  Patient/Family Comments/goals: Pt would like to return home.   Pain/Comfort:  Pain Rating 1: 0/10  Pain Rating Post-Intervention 1: 0/10    Objective:     Communicated with: nurse prior to session.  Patient found HOB elevated with peripheral IV, telemetry, machado catheter upon OT entry to room.    General Precautions: Standard, fall    Orthopedic Precautions:N/A  Braces: N/A  Respiratory Status: Nasal cannula, flow 2 L/min     Occupational Performance:     Bed Mobility:    Patient completed Rolling/Turning to Left with  supervision  Patient completed Rolling/Turning to Right with supervision  Patient completed Scooting/Bridging with supervision  Patient completed Supine to Sit with supervision  Patient completed Sit to Supine with supervision     Functional Mobility/Transfers:  Patient completed Sit <> Stand Transfer with contact guard assistance  with  rolling walker    Patient completed Toilet Transfer Step Transfer technique with minimum assistance with  grab bars  Functional Mobility: Pt ambulated 116' between surfaces requiring steadying assist utilizing RW.     Activities of Daily Living:  Lower Body Dressing: moderate assistance        AMPAC 6 Click ADL:      Treatment & Education:  Pt was cooperative and motivated without verbal encouragement while exhibiting positive affect. She performed bed mobility requiring supervision secondary to verbal cueing for technique with use of bedrails. Pt then participated in ADL retraining regarding LB dressing emphasizing use of compensatory strategies providing extra time with repetition requiring mod assist secondary to assist to everardo bilateral socks while donning bilateral shoes seated EOB unsupported with additional steadying assist of trunk and cueing. Also, she participated in functional transfer retraining to / from bed and toilet emphasizing fall prevention providing extra time requiring CGA to min assist secondary to some lifting assist from lower surface of toilet utilizing grab bar. Pt ambulated 116' between surfaces requiring steadying assist utilizing RW.     Patient left HOB elevated with all lines intact, call button in reach, and nurse notified    GOALS:   Multidisciplinary Problems       Occupational Therapy Goals          Problem: Occupational Therapy    Goal Priority Disciplines Outcome Interventions   Occupational Therapy Goal     OT, PT/OT Progressing    Description: Goals to be met by: 7/16/25     Patient will increase functional independence with ADLs by performing:    UE Dressing with Minimal Assistance.  LE Dressing with Minimal Assistance.  Grooming while seated with Set-up Assistance.  Upper extremity exercise program x30 reps per handout, with assistance as needed.                         DME Justifications:  No DME recommended requiring DME justifications    Time Tracking:     OT Date of Treatment:  07/10/25  OT Start Time: 1545  OT Stop Time: 1617  OT Total Time (min): 32 min    Billable Minutes:Self Care/Home Management 15  Therapeutic Activity 17    OT/LOGAN: OT     Number of LOGAN visits since last OT visit: 0    7/10/2025

## 2025-07-10 NOTE — SUBJECTIVE & OBJECTIVE
Past Medical History:   Diagnosis Date    Arthritis     Asthma     Coronary artery disease     Environmental and seasonal allergies     Land catheter in place     Hard of hearing     Hyperlipidemia     Neurogenic bladder     Pneumonia, unspecified organism     Presence of indwelling Land catheter     Unspecified chronic bronchitis     Vitamin D deficiency        Past Surgical History:   Procedure Laterality Date    BLADDER SUSPENSION      BREAST BIOPSY Bilateral 1996    BREAST SURGERY      lumpectomy, isotopes    CHOLECYSTECTOMY      had gal bladder removed    COLECTOMY      had part of colon removed    CYSTOSCOPY W/ RETROGRADES Bilateral 12/19/2019    Procedure: CYSTOSCOPY, WITH RETROGRADE PYELOGRAM;  Surgeon: Prasad Rocha MD;  Location: [facility] OR;  Service: Urology;  Laterality: Bilateral;    DILATION OF URETHRA N/A 12/19/2019    Procedure: DILATION, URETHRA;  Surgeon: Prasad Rocha MD;  Location: [facility] OR;  Service: Urology;  Laterality: N/A;    HYSTERECTOMY      LEFT HEART CATHETERIZATION Left 11/27/2019    Procedure: Left heart cath;  Surgeon: Urban Paiz MD;  Location: [facility] CATH LAB;  Service: Cardiology;  Laterality: Left;    MODIFIED RADICAL MASTECTOMY W/ AXILLARY LYMPH NODE DISSECTION Right 1/26/2022    Procedure: MASTECTOMY, MODIFIED RADICAL;  Surgeon: Keisha Cortez MD;  Location: [facility] OR;  Service: General;  Laterality: Right;    OOPHORECTOMY      SENTINEL LYMPH NODE BIOPSY Right 1/26/2022    Procedure: BIOPSY, LYMPH NODE, SENTINEL;  Surgeon: Keisha Cortez MD;  Location: [facility];  Service: General;  Laterality: Right;  0800    SIMPLE MASTECTOMY Left 1/26/2022    Procedure: MASTECTOMY, SIMPLE;  Surgeon: Keisha Cortez MD;  Location: [facility];  Service: General;  Laterality: Left;  FROZEN SECTION       Review of patient's allergies indicates:   Allergen Reactions    Macrobid [nitrofurantoin monohyd/m-cryst] Swelling    Bactrim [sulfamethoxazole-trimethoprim] Diarrhea     Ciprofloxacin Other (See Comments)     GI problems    Codeine Other (See Comments)     headache    Latex, natural rubber Rash       No current facility-administered medications on file prior to encounter.     Current Outpatient Medications on File Prior to Encounter   Medication Sig    acarbose (PRECOSE) 100 MG Tab Take 1 tablet (100 mg total) by mouth 3 (three) times daily with meals.    albuterol (PROVENTIL/VENTOLIN HFA) 90 mcg/actuation inhaler Inhale 2 puffs into the lungs every 6 (six) hours as needed for Wheezing. Rescue    alendronate (FOSAMAX) 70 MG tablet Take 1 tablet (70 mg total) by mouth every 7 days.    aspirin (ECOTRIN) 81 MG EC tablet Take 81 mg by mouth once daily. 3 days a week    atorvastatin (LIPITOR) 40 MG tablet Take 1 tablet (40 mg total) by mouth once daily. (Patient taking differently: Take 20 mg by mouth once daily.)    [] azithromycin (Z-JAIRO) 250 MG tablet Take 2 tablets by mouth on day 1; Take 1 tablet by mouth on days 2-5    blood sugar diagnostic (ONETOUCH ULTRA TEST) Strp Inject 1 each into the skin 3 (three) times daily before meals.    calcium citrate (CALCITRATE) 200 mg (950 mg) tablet Take 1 tablet by mouth once daily. (Patient taking differently: Take 1 tablet by mouth every Mon, Wed, Fri.)    carvediloL (COREG) 6.25 MG tablet Take 1 tablet (6.25 mg total) by mouth 2 (two) times daily with meals.    cholecalciferol, vitamin D3, (VITAMIN D3) 50 mcg (2,000 unit) Tab Take 1 tablet (2,000 Units total) by mouth twice a week. 10,000 twice a week (Patient taking differently: Take 1 tablet by mouth twice a week. 10,000 3 times weekly)    clobetasoL (TEMOVATE) 0.05 % cream Apply twice a day for 2 weeks then nightly for 2 months. May decrease to three nights a week after that.    co-enzyme Q-10 30 mg capsule Take 1 capsule (30 mg total) by mouth every evening.    cranberry fruit extract (CRANBERRY CONCENTRATE ORAL) Take 2 capsules by mouth once daily. (Patient taking differently:  Take 1 capsule by mouth once daily.)    dicyclomine (BENTYL) 20 mg tablet Take 1 tablet (20 mg total) by mouth 4 (four) times daily before meals and nightly.    ferrous sulfate 325 (65 FE) MG EC tablet Take 325 mg by mouth. 3 days a week    FLUoxetine 10 MG capsule Take 1 capsule (10 mg total) by mouth once daily.    furosemide (LASIX) 20 MG tablet Take 1 tablet by mouth once daily    gabapentin (NEURONTIN) 600 MG tablet Take 1 tablet (600 mg total) by mouth 3 (three) times daily.    guaiFENesin (MUCINEX) 600 mg 12 hr tablet Take 2 tablets (1,200 mg total) by mouth 2 (two) times daily.    hydroCHLOROthiazide (MICROZIDE) 12.5 mg capsule Take 12.5 mg by mouth once daily.    insulin aspart U-100 (NOVOLOG FLEXPEN U-100 INSULIN) 100 unit/mL (3 mL) InPn pen Take sliding scale insulin 4 times a day as instructed- max dose of 40 units/day.    insulin glargine U-100, Lantus, (LANTUS SOLOSTAR U-100 INSULIN) 100 unit/mL (3 mL) InPn pen Inject 16 Units into the skin once daily.    letrozole (FEMARA) 2.5 mg Tab Take 1 tablet (2.5 mg total) by mouth once daily.    LIDOcaine (LIDODERM) 5 % Place onto the skin once daily. Remove & Discard patch within 12 hours or as directed by MD    lisinopriL (PRINIVIL,ZESTRIL) 40 MG tablet Take 1 tablet (40 mg total) by mouth every evening.    metFORMIN (GLUCOPHAGE-XR) 500 MG ER 24hr tablet Take 1 tablet (500 mg total) by mouth daily with breakfast.    multivitamin capsule Take 1 capsule by mouth once daily. (Patient taking differently: Take 1 capsule by mouth once daily. Taking 3 times weekly)    omega-3 fatty acids/fish oil (FISH OIL-OMEGA-3 FATTY ACIDS) 300-1,000 mg capsule Take 1 capsule by mouth once a week.    potassium chloride (KLOR-CON) 10 MEQ TbSR Take 99 mEq by mouth once. (Patient taking differently: Take 99 mEq by mouth once. Taking 3 x weekly)    acetaminophen (TYLENOL) 650 MG TbSR Take 650 mg by mouth 2 (two) times a day.    amoxicillin-clavulanate 875-125mg (AUGMENTIN) 875-125 mg  "per tablet Take 1 tablet by mouth 2 (two) times daily.    blood-glucose meter Misc 1 Device by Misc.(Non-Drug; Combo Route) route once daily. One Touch Dx. Code:E11.9    blood-glucose sensor (FREESTYLE JOSEPHINE 2 PLUS SENSOR) Rose Use as instructed    flash glucose scanning reader (FREESTYLE JOSEPHINE 2 READER) Misc Use as instructed    flash glucose sensor (FREESTYLE JOSEPHINE 2 SENSOR) Kit Use as instructed    fluconazole (DIFLUCAN) 150 MG Tab Take 1 tablet (150 mg total) by mouth once daily.    furosemide (LASIX) 20 MG tablet Take 1 tablet (20 mg total) by mouth once daily.    glucagon (BAQSIMI) 3 mg/actuation Spry 2 pack.  Spray one device (3 mg) in one nostril to treat severe hypoglycemia. Disp 1 box (2 devices)    loperamide (IMODIUM) 2 mg capsule Take 2 mg by mouth 4 (four) times daily as needed for Diarrhea.    montelukast (SINGULAIR) 10 mg tablet Take 1 tablet (10 mg total) by mouth once daily.    mucus clearing device (AEROBIKA OSCILLATING PEP SYSTM MISC) 12 puffs by Misc.(Non-Drug; Combo Route) route daily as needed.    NYAMYC powder     ONETOUCH DELICA PLUS LANCET 33 gauge Misc USE   TO CHECK GLUCOSE 4 TIMES DAILY BEFORE  MEALS    pen needle, diabetic (BD ULTRA-FINE MICRO PEN NEEDLE) 32 gauge x 1/4" Ndle use to inject ozempic once a week    pen needle, diabetic (BD ULTRA-FINE MICRO PEN NEEDLE) 32 gauge x 1/4" Ndle Use to inject insulin 4 to 5 times a day as instructed    trimethoprim (TRIMPEX) 100 mg Tab Take 100 mg by mouth every 12 (twelve) hours.     Family History       Problem Relation (Age of Onset)    Bell's palsy Sister    Bone cancer Mother, Father (86)    Breast cancer Mother, Sister, Sister    Cancer Mother (42), Father    Heart disease Mother          Tobacco Use    Smoking status: Never    Smokeless tobacco: Never   Substance and Sexual Activity    Alcohol use: Not Currently     Comment: rare    Drug use: Never    Sexual activity: Not Currently     Partners: Male     Comment:      Review of " Systems   Constitutional:  Positive for activity change, chills and fatigue. Negative for fever.   HENT:  Positive for postnasal drip. Negative for sinus pressure and sore throat.    Respiratory:  Positive for cough and shortness of breath. Negative for wheezing.    Cardiovascular:  Negative for chest pain, palpitations and leg swelling.   Gastrointestinal:  Negative for abdominal pain, nausea and vomiting.   Skin:  Negative for rash and wound.   Neurological:  Positive for weakness. Negative for syncope.     Objective:     Vital Signs (Most Recent):  Temp: 97.3 °F (36.3 °C) (07/10/25 0732)  Pulse: 91 (07/10/25 0751)  Resp: 20 (07/10/25 0751)  BP: (!) 161/70 (07/10/25 0732)  SpO2: 97 % (07/10/25 0751) Vital Signs (24h Range):  Temp:  [97.3 °F (36.3 °C)-98.6 °F (37 °C)] 97.3 °F (36.3 °C)  Pulse:  [69-92] 91  Resp:  [18-20] 20  SpO2:  [94 %-97 %] 97 %  BP: (117-169)/(57-75) 161/70     Weight: 88.9 kg (196 lb)  Body mass index is 33.64 kg/m².     Physical Exam  Constitutional:       Appearance: She is ill-appearing.   HENT:      Nose: Nose normal.      Mouth/Throat:      Mouth: Mucous membranes are moist.   Eyes:      Extraocular Movements: Extraocular movements intact.      Pupils: Pupils are equal, round, and reactive to light.   Cardiovascular:      Rate and Rhythm: Normal rate and regular rhythm.   Pulmonary:      Effort: Pulmonary effort is normal.      Breath sounds: Rhonchi present.      Comments: O2 via nc  Abdominal:      General: Bowel sounds are normal.      Palpations: Abdomen is soft.   Genitourinary:     Comments: Land with yellow urine  Musculoskeletal:      Right lower leg: Edema present.      Left lower leg: Edema present.   Skin:     General: Skin is warm.   Neurological:      General: No focal deficit present.      Mental Status: She is alert.              CRANIAL NERVES     CN III, IV, VI   Pupils are equal, round, and reactive to light.       Significant Labs: All pertinent labs within the past 24  hours have been reviewed.  Recent Lab Results  (Last 5 results in the past 24 hours)        07/10/25  0725   07/10/25  0541   07/10/25  0540   07/09/25 2057 07/09/25  1617        Albumin   2.1             ALP   110             ALT   12             Anion Gap   10             AST   20             BILIRUBIN TOTAL   0.2  Comment: For infants and newborns, interpretation of results should be based   on gestational age, weight and in agreement with clinical   observations.    Premature Infant recommended reference ranges:   0-24 hours:  <8.0 mg/dL   24-48 hours: <12.0 mg/dL   3-5 days:    <15.0 mg/dL   6-29 days:   <15.0 mg/dL             BUN   39             Calcium   8.5             Chloride   96             CO2   24             Creatinine   2.7             eGFR   17  Comment: Estimated GFR calculated using the CKD-EPI creatinine (2021) equation.             Eos %     2.0           Glucose   187             Gran # (ANC)     11.2           Hematocrit     34.7           Hemoglobin     11.2           Lymph %     6.0           Magnesium    1.9             MCH     28.1           MCHC     32.3           MCV     87           Mono %     2.0           MPV     10.2           Myelocytes     4.0           nRBC     0           Platelet Count     416           POCT Glucose 190       231   225       Potassium   4.6             PROTEIN TOTAL   6.4             RBC     3.99           RDW     13.6           Segmented Neutrophil %     86.0           Sodium   130             WBC     12.99                                  Significant Imaging: I have reviewed all pertinent imaging results/findings within the past 24 hours.

## 2025-07-10 NOTE — PLAN OF CARE
Problem: Physical Therapy  Goal: Physical Therapy Goal  Description: Patient will increase functional independence with mobility by performin. Supine to sit with Modified Independent- MET  2. Sit to supine with Modified Independent- MET  3. Bed to chair transfer with Modified Independentwith or without rolling walker using Stand Pivot TECHNIQUE  4. Gait  x 100  feet with Supervision or Set-up Assistance with or without rolling walker  5. Lower extremity exercise program x10 reps per handout, with assistance as needed   Outcome: Progressing

## 2025-07-11 LAB
ABSOLUTE EOSINOPHIL (OHS): 0.7 K/UL
ABSOLUTE MONOCYTE (OHS): 0.85 K/UL (ref 0.3–1)
ABSOLUTE NEUTROPHIL COUNT (OHS): 10.95 K/UL (ref 1.8–7.7)
ALBUMIN SERPL BCP-MCNC: 2.2 G/DL (ref 3.5–5.2)
ALP SERPL-CCNC: 109 UNIT/L (ref 40–150)
ALT SERPL W/O P-5'-P-CCNC: 15 UNIT/L (ref 10–44)
ANION GAP (OHS): 10 MMOL/L (ref 8–16)
AST SERPL-CCNC: 26 UNIT/L (ref 11–45)
BASOPHILS # BLD AUTO: 0.14 K/UL
BASOPHILS NFR BLD AUTO: 0.9 %
BILIRUB SERPL-MCNC: 0.2 MG/DL (ref 0.1–1)
BUN SERPL-MCNC: 38 MG/DL (ref 8–23)
C DIFF GDH STL QL: NEGATIVE
C DIFF TOX A+B STL QL IA: NEGATIVE
CALCIUM SERPL-MCNC: 9 MG/DL (ref 8.7–10.5)
CHLORIDE SERPL-SCNC: 102 MMOL/L (ref 95–110)
CO2 SERPL-SCNC: 24 MMOL/L (ref 23–29)
CREAT SERPL-MCNC: 2.5 MG/DL (ref 0.5–1.4)
ERYTHROCYTE [DISTWIDTH] IN BLOOD BY AUTOMATED COUNT: 13.8 % (ref 11.5–14.5)
GFR SERPLBLD CREATININE-BSD FMLA CKD-EPI: 19 ML/MIN/1.73/M2
GLUCOSE SERPL-MCNC: 194 MG/DL (ref 70–110)
HCT VFR BLD AUTO: 34 % (ref 37–48.5)
HGB BLD-MCNC: 11.1 GM/DL (ref 12–16)
IMM GRANULOCYTES # BLD AUTO: 0.58 K/UL (ref 0–0.04)
IMM GRANULOCYTES NFR BLD AUTO: 3.9 % (ref 0–0.5)
LYMPHOCYTES # BLD AUTO: 1.53 K/UL (ref 1–4.8)
MAGNESIUM SERPL-MCNC: 2.1 MG/DL (ref 1.6–2.6)
MCH RBC QN AUTO: 27.8 PG (ref 27–31)
MCHC RBC AUTO-ENTMCNC: 32.6 G/DL (ref 32–36)
MCV RBC AUTO: 85 FL (ref 82–98)
NUCLEATED RBC (/100WBC) (OHS): 0 /100 WBC
PLATELET # BLD AUTO: 502 K/UL (ref 150–450)
PMV BLD AUTO: 9.1 FL (ref 9.2–12.9)
POCT GLUCOSE: 215 MG/DL (ref 70–110)
POCT GLUCOSE: 226 MG/DL (ref 70–110)
POCT GLUCOSE: 257 MG/DL (ref 70–110)
POTASSIUM SERPL-SCNC: 5.1 MMOL/L (ref 3.5–5.1)
PROT SERPL-MCNC: 7.1 GM/DL (ref 6–8.4)
RBC # BLD AUTO: 3.99 M/UL (ref 4–5.4)
RELATIVE EOSINOPHIL (OHS): 4.7 %
RELATIVE LYMPHOCYTE (OHS): 10.4 % (ref 18–48)
RELATIVE MONOCYTE (OHS): 5.8 % (ref 4–15)
RELATIVE NEUTROPHIL (OHS): 74.3 % (ref 38–73)
SODIUM SERPL-SCNC: 136 MMOL/L (ref 136–145)
WBC # BLD AUTO: 14.75 K/UL (ref 3.9–12.7)

## 2025-07-11 PROCEDURE — 97530 THERAPEUTIC ACTIVITIES: CPT

## 2025-07-11 PROCEDURE — 99900031 HC PATIENT EDUCATION (STAT)

## 2025-07-11 PROCEDURE — 27000221 HC OXYGEN, UP TO 24 HOURS

## 2025-07-11 PROCEDURE — 85025 COMPLETE CBC W/AUTO DIFF WBC: CPT | Performed by: INTERNAL MEDICINE

## 2025-07-11 PROCEDURE — 25000003 PHARM REV CODE 250: Performed by: INTERNAL MEDICINE

## 2025-07-11 PROCEDURE — 99900035 HC TECH TIME PER 15 MIN (STAT)

## 2025-07-11 PROCEDURE — 94640 AIRWAY INHALATION TREATMENT: CPT

## 2025-07-11 PROCEDURE — 94761 N-INVAS EAR/PLS OXIMETRY MLT: CPT

## 2025-07-11 PROCEDURE — 97116 GAIT TRAINING THERAPY: CPT

## 2025-07-11 PROCEDURE — 80053 COMPREHEN METABOLIC PANEL: CPT | Performed by: INTERNAL MEDICINE

## 2025-07-11 PROCEDURE — 83735 ASSAY OF MAGNESIUM: CPT | Performed by: INTERNAL MEDICINE

## 2025-07-11 PROCEDURE — 21400001 HC TELEMETRY ROOM

## 2025-07-11 PROCEDURE — 36415 COLL VENOUS BLD VENIPUNCTURE: CPT | Performed by: INTERNAL MEDICINE

## 2025-07-11 PROCEDURE — 25000003 PHARM REV CODE 250: Performed by: EMERGENCY MEDICINE

## 2025-07-11 PROCEDURE — 25000242 PHARM REV CODE 250 ALT 637 W/ HCPCS: Performed by: INTERNAL MEDICINE

## 2025-07-11 PROCEDURE — 63600175 PHARM REV CODE 636 W HCPCS: Performed by: INTERNAL MEDICINE

## 2025-07-11 RX ORDER — DICLOFENAC SODIUM 10 MG/G
2 GEL TOPICAL 3 TIMES DAILY PRN
Status: DISCONTINUED | OUTPATIENT
Start: 2025-07-11 | End: 2025-07-14 | Stop reason: HOSPADM

## 2025-07-11 RX ADMIN — GABAPENTIN 600 MG: 300 CAPSULE ORAL at 09:07

## 2025-07-11 RX ADMIN — FAMOTIDINE 20 MG: 20 TABLET, FILM COATED ORAL at 08:07

## 2025-07-11 RX ADMIN — GABAPENTIN 600 MG: 300 CAPSULE ORAL at 08:07

## 2025-07-11 RX ADMIN — CARVEDILOL 6.25 MG: 3.12 TABLET, FILM COATED ORAL at 04:07

## 2025-07-11 RX ADMIN — FLUOXETINE HYDROCHLORIDE 10 MG: 10 CAPSULE ORAL at 08:07

## 2025-07-11 RX ADMIN — INSULIN ASPART 2 UNITS: 100 INJECTION, SOLUTION INTRAVENOUS; SUBCUTANEOUS at 04:07

## 2025-07-11 RX ADMIN — MUPIROCIN: 20 OINTMENT TOPICAL at 08:07

## 2025-07-11 RX ADMIN — PIPERACILLIN AND TAZOBACTAM 4.5 G: 4; .5 INJECTION, POWDER, LYOPHILIZED, FOR SOLUTION INTRAVENOUS; PARENTERAL at 01:07

## 2025-07-11 RX ADMIN — DICLOFENAC SODIUM 2 G: 10 GEL TOPICAL at 05:07

## 2025-07-11 RX ADMIN — INSULIN GLARGINE 16 UNITS: 100 INJECTION, SOLUTION SUBCUTANEOUS at 08:07

## 2025-07-11 RX ADMIN — ALBUTEROL SULFATE 2.5 MG: 2.5 SOLUTION RESPIRATORY (INHALATION) at 07:07

## 2025-07-11 RX ADMIN — LISINOPRIL 40 MG: 20 TABLET ORAL at 09:07

## 2025-07-11 RX ADMIN — GUAIFENESIN 600 MG: 600 TABLET, EXTENDED RELEASE ORAL at 09:07

## 2025-07-11 RX ADMIN — GUAIFENESIN 600 MG: 600 TABLET, EXTENDED RELEASE ORAL at 08:07

## 2025-07-11 RX ADMIN — ATORVASTATIN CALCIUM 20 MG: 20 TABLET, FILM COATED ORAL at 08:07

## 2025-07-11 RX ADMIN — ALBUTEROL SULFATE 2.5 MG: 2.5 SOLUTION RESPIRATORY (INHALATION) at 01:07

## 2025-07-11 RX ADMIN — ASPIRIN 81 MG: 81 TABLET, COATED ORAL at 08:07

## 2025-07-11 RX ADMIN — ENOXAPARIN SODIUM 30 MG: 30 INJECTION SUBCUTANEOUS at 04:07

## 2025-07-11 RX ADMIN — CARVEDILOL 6.25 MG: 3.12 TABLET, FILM COATED ORAL at 08:07

## 2025-07-11 RX ADMIN — MUPIROCIN: 20 OINTMENT TOPICAL at 09:07

## 2025-07-11 NOTE — PLAN OF CARE
Problem: Infection  Goal: Absence of Infection Signs and Symptoms  Outcome: Progressing     Problem: Adult Inpatient Plan of Care  Goal: Plan of Care Review  Outcome: Progressing  Goal: Patient-Specific Goal (Individualized)  Outcome: Progressing  Goal: Absence of Hospital-Acquired Illness or Injury  Outcome: Progressing  Goal: Optimal Comfort and Wellbeing  Outcome: Progressing  Goal: Readiness for Transition of Care  Outcome: Progressing     Problem: Diabetes Comorbidity  Goal: Blood Glucose Level Within Targeted Range  Outcome: Progressing     Problem: Pneumonia  Goal: Fluid Balance  Outcome: Progressing  Goal: Resolution of Infection Signs and Symptoms  Outcome: Progressing  Goal: Effective Oxygenation and Ventilation  Outcome: Progressing     Problem: Fall Injury Risk  Goal: Absence of Fall and Fall-Related Injury  Outcome: Progressing     Problem: Wound  Goal: Optimal Coping  Outcome: Progressing  Goal: Optimal Functional Ability  Outcome: Progressing  Goal: Absence of Infection Signs and Symptoms  Outcome: Progressing  Goal: Improved Oral Intake  Outcome: Progressing  Goal: Optimal Pain Control and Function  Outcome: Progressing  Goal: Skin Health and Integrity  Outcome: Progressing  Goal: Optimal Wound Healing  Outcome: Progressing     Problem: Skin Injury Risk Increased  Goal: Skin Health and Integrity  Outcome: Progressing

## 2025-07-11 NOTE — PLAN OF CARE
07/11/25 1139   Post-Acute Status   Post-Acute Authorization Home Health   Home Health Status Set-up Complete/Auth obtained   Hospital Resources/Appts/Education Provided Provided patient/caregiver with written discharge plan information   Discharge Delays None known at this time   Discharge Plan   Discharge Plan A Home Health   Discharge Plan B Home Health     Patient is going with [facility]. Will send packet when discharged.

## 2025-07-11 NOTE — PLAN OF CARE
07/11/25 0902   Post-Acute Status   Post-Acute Authorization Home Health   Home Health Status Referrals Sent   Hospital Resources/Appts/Education Provided Provided patient/caregiver with written discharge plan information   Discharge Delays None known at this time   Discharge Plan   Discharge Plan A Home Health   Discharge Plan B Home Health     Met with patient to review discharge recommendation of Home Health and is agreeable to plan    Patient/family provided list of facilities in-network with patient's payor plan. Providers that are owned, operated, or affiliated with [facility] are included on the list.     Notified that referral sent to below listed facilities from in-network list based on proximity to home/family support:   [facility]  2.  3.  4.  5. (can send more than 5)    Patient/family instructed to identify preference.    Preferred Facility: (if more than 1, listed in order of descending preference)  1.  OR  Patient has declined to select a preferred provider and elects placement with the first accepting in network provider that is available to provide services as ordered by the physician       If an additional preferred facility not listed above is identified, additional referral to be sent. If above facilities unable to accept, will send additional referrals to in-network providers.        Patient reports she was previously with them and wants to go back. SW will send referral and wound care order to [facility]

## 2025-07-11 NOTE — ASSESSMENT & PLAN NOTE
Patient's most recent potassium results are listed below.   Recent Labs     07/09/25  0823 07/10/25  0541 07/11/25  0354   K 4.1 4.6 5.1     Plan  - Replete potassium per protocol  - Monitor potassium Daily  - Patient's hypokalemia is on supplements - await labs from today  7/10 decrease potasium replacement  7/11 stop potassium replacement and monitor

## 2025-07-11 NOTE — ASSESSMENT & PLAN NOTE
Patient's blood pressure range in the last 24 hours was: BP  Min: 125/67  Max: 166/67.The patient's inpatient anti-hypertensive regimen is listed below:  Current Antihypertensives  , Daily, Oral  carvediloL tablet 6.25 mg, 2 times daily with meals, Oral  lisinopriL tablet 40 mg, Nightly, Oral    Plan  - BP is controlled, no changes needed to their regimen

## 2025-07-11 NOTE — ASSESSMENT & PLAN NOTE
"Patient has Diastolic (HFpEF) heart failure that is Acute on chronic. On presentation their CHF was decompensated. Evidence of decompensated CHF on presentation includes: edema, dyspnea on exertion (KIDD), and shortness of breath. The etiology of their decompensation is likely dietary indiscretion. Most recent BNP and echo results are listed below.  No results for input(s): "BNP" in the last 72 hours.    Latest ECHO  Results for orders placed in visit on 09/20/23    Echo    Interpretation Summary    Left Ventricle: Moderately increased wall thickness. There is moderate concentric hypertrophy. Normal wall motion. Grade II diastolic dysfunction.    Left Atrium: Left atrium is moderately dilated.    Right Ventricle: Normal right ventricular cavity size. Systolic function is normal.    Right Atrium: Right atrium is mildly dilated.    Aortic Valve: The aortic valve is a trileaflet valve. Mildly calcified left, right and noncoronary cusps. Mildly restricted motion. By planimetry the aortic valve area measures 1.8 cm².    Mitral Valve: There is mild bileaflet sclerosis.    Current Heart Failure Medications  , Daily, Oral  carvediloL tablet 6.25 mg, 2 times daily with meals, Oral  lisinopriL tablet 40 mg, Nightly, Oral    Plan  - Monitor strict I&Os and daily weights.    - Place on telemetry  - Low sodium diet  - Place on fluid restriction of 2 L.   - Cardiology has been consulted  - The patient's volume status is improving but not at their baseline as indicated by edema, dyspnea on exertion (KIDD), and shortness of breath  7/9 cont current meds  7/10 hold lasix today due to kacy; repeat cxr this am  7/11 cont meds - holding lasix due to kacy - urinary output still good  "

## 2025-07-11 NOTE — PLAN OF CARE
07/11/25 1134   Post-Acute Status   Post-Acute Authorization Home Health   Home Health Status Set-up Complete/Auth obtained   Hospital Resources/Appts/Education Provided Provided patient/caregiver with written discharge plan information   Discharge Delays None known at this time   Discharge Plan   Discharge Plan A Home Health   Discharge Plan B Home Health     Patient has been accepted by [facility]. SW will send off D/C packet, when patient is discharged.

## 2025-07-11 NOTE — PT/OT/SLP PROGRESS
"Physical Therapy Treatment    Patient Name:  Kelsea Cadet   MRN:  [id number]    Recommendations:     Discharge Recommendations: Low Intensity Therapy  Discharge Equipment Recommendations: none  Barriers to discharge: None    Assessment:     Kelsea Cadet is a 85 y.o. female admitted with a medical diagnosis of Multifocal pneumonia.  She presents with the following impairments/functional limitations: weakness, impaired joint extensibility, impaired endurance, impaired self care skills, impaired functional mobility, decreased lower extremity function, impaired muscle length, gait instability, impaired balance, impaired cardiopulmonary response to activity,  and impaired coordination .  Patient is now off contact isolation per attending nurse.  .Patient required set up assistance with supine to sit, SBA with sit <> stand using  a RW, ambulated ~ 78 feet and 18 feet with RW and 2 liters of O2 via nasal cannula with CGA, sitting rest breaks in between.  Left sitting at edge of bed with call light within reach and nurse made aware.     Rehab Prognosis: Good and Fair; patient would benefit from acute skilled PT services to address these deficits and reach maximum level of function.    Recent Surgery: * No surgery found *      Plan:     During this hospitalization, patient to be seen  (3-5x a week) to address the identified rehab impairments via gait training, therapeutic activities, therapeutic exercises, neuromuscular re-education and progress toward the following goals:    Plan of Care Expires:  07/18/25    Subjective     Chief Complaint: "I feel okay."   Patient/Family Comments/goals: unstated   Pain/Comfort:  Pain Rating 1: 0/10  Pain Rating Post-Intervention 1: 0/10      Objective:     Communicated with nurse and patient  prior to session.  Patient found supine with peripheral IV, telemetry, machado catheter, oxygen upon PT entry to room.     General Precautions: Standard, fall, respiratory  Orthopedic Precautions: " N/A  Braces: N/A  Respiratory Status: Nasal cannula, flow 2 L/min     Functional Mobility:  Bed Mobility:     Rolling Left:  supervision  Rolling Right: supervision  Scooting: supervision  Bridging: supervision  Supine to Sit: supervision  Transfers:     Sit to Stand:  stand by assistance with rolling walker  Gait: ~ 78 feet and 18 feet with RW and 2 liters of O2 via nasal cannula with CGA, sitting rest breaks in between.    Balance: independent with static sitting, SBA with static standing usign a RW       AM-PAC 6 CLICK MOBILITY  Turning over in bed (including adjusting bedclothes, sheets and blankets)?: 3  Sitting down on and standing up from a chair with arms (e.g., wheelchair, bedside commode, etc.): 3  Moving from lying on back to sitting on the side of the bed?: 3  Moving to and from a bed to a chair (including a wheelchair)?: 3  Need to walk in hospital room?: 3  Climbing 3-5 steps with a railing?: 3  Basic Mobility Total Score: 18       Treatment & Education:  Rolling side <> side  Supine > sit  Scooting to the edge  of the bed   Sitting balance/tolerance  Sit <> stand with RW  Standing balance/tolerance   Gait with RW  and O2 supplement   Bed organization to reduce risk of skin breakdowns   Line management/organization   Continuous verbal cues for situational/environmental awareness       Patient left sitting edge of bed with call button in reach and nurse  notified..    GOALS:   Multidisciplinary Problems       Physical Therapy Goals          Problem: Physical Therapy    Goal Priority Disciplines Outcome Interventions   Physical Therapy Goal     PT, PT/OT Progressing    Description: Goals to be met by 2025   Patient will increase functional independence with mobility by performin. Supine to sit with Modified Independent  2. Sit to supine with Modified Independent  3. Bed to chair transfer with Modified Independent with rolling walker using Stand step  TECHNIQUE  4. Gait  x 100  feet with  Supervision with rolling walker and O2 supplement   5. Lower extremity exercise program x10 reps with assistance as needed                        DME Justifications:  No DME recommended requiring DME justifications    Time Tracking:     PT Received On: 07/11/25  PT Start Time: 0848     PT Stop Time: 0914  PT Total Time (min): 26 min     Billable Minutes: Gait Training 15 and Therapeutic Activity 11    Treatment Type: Treatment  PT/PTA: PT     Number of PTA visits since last PT visit: 0     07/11/2025

## 2025-07-11 NOTE — ASSESSMENT & PLAN NOTE
Patient has a diagnosis of pneumonia. The cause of the pneumonia is suspected to be bacterial in etiology but organism is not known. The pneumonia is worsening due to sob, cough. The patient has the following signs/symptoms of pneumonia: persistent hypoxia , cough, and sputum production. The patient does have a current oxygen requirement and the patient does not have a home oxygen requirement. I have reviewed the pertinent imaging. The following cultures have been collected: Blood cultures The culture results are listed below.     Current antimicrobial regimen consists of the antibiotics listed below. Will monitor patient closely and continue current treatment plan unchanged.    Antibiotics (From admission, onward)      Start     Stop Route Frequency Ordered    07/10/25 1300  piperacillin-tazobactam (ZOSYN) 4.5 g in D5W 100 mL IVPB (MB+)         -- IV Every 12 hours (non-standard times) 07/10/25 0943    07/07/25 2145  mupirocin 2 % ointment         07/12/25 2059 Nasl 2 times daily 07/07/25 2035            Microbiology Results (last 7 days)       Procedure Component Value Units Date/Time    Blood culture x two cultures. Draw prior to antibiotics. [[id number]]  (Normal) Collected: 07/07/25 1835    Order Status: Completed Specimen: Blood from Peripheral, Forearm, Right Updated: 07/11/25 0700     Blood Culture No Growth After 72 Hours    Blood culture x two cultures. Draw prior to antibiotics. [[id number]]  (Normal) Collected: 07/07/25 1843    Order Status: Completed Specimen: Blood from Peripheral, Hand, Left Updated: 07/11/25 0700     Blood Culture No Growth After 72 Hours    Clostridium difficile EIA [[id number]]  (Normal) Collected: 07/10/25 1018    Order Status: Completed Specimen: Stool Updated: 07/11/25 0550     C. DIFFICILE GDH AG Negative     Clostridioides difficile Toxin A/B Negative    Urine culture [[id number]] Collected: 07/07/25 1944    Order Status: Completed Specimen: Urine, Catheterized Updated:  07/09/25 1136     Urine Culture Multiple organisms isolated. None in predominance. Repeat if clinically necessary.    Clostridium difficile EIA [[id number]]     Order Status: Canceled Specimen: Stool     Culture, Respiratory with Gram Stain [[id number]]     Order Status: Sent Specimen: Respiratory         7/11 cont current treatment plan

## 2025-07-11 NOTE — SUBJECTIVE & OBJECTIVE
Past Medical History:   Diagnosis Date    Arthritis     Asthma     Coronary artery disease     Environmental and seasonal allergies     Land catheter in place     Hard of hearing     Hyperlipidemia     Neurogenic bladder     Pneumonia, unspecified organism     Presence of indwelling Land catheter     Unspecified chronic bronchitis     Vitamin D deficiency        Past Surgical History:   Procedure Laterality Date    BLADDER SUSPENSION      BREAST BIOPSY Bilateral 1996    BREAST SURGERY      lumpectomy, isotopes    CHOLECYSTECTOMY      had gal bladder removed    COLECTOMY      had part of colon removed    CYSTOSCOPY W/ RETROGRADES Bilateral 12/19/2019    Procedure: CYSTOSCOPY, WITH RETROGRADE PYELOGRAM;  Surgeon: Prasad Rocha MD;  Location: [facility] OR;  Service: Urology;  Laterality: Bilateral;    DILATION OF URETHRA N/A 12/19/2019    Procedure: DILATION, URETHRA;  Surgeon: Prasad Rocha MD;  Location: [facility] OR;  Service: Urology;  Laterality: N/A;    HYSTERECTOMY      LEFT HEART CATHETERIZATION Left 11/27/2019    Procedure: Left heart cath;  Surgeon: Urban Paiz MD;  Location: [facility] CATH LAB;  Service: Cardiology;  Laterality: Left;    MODIFIED RADICAL MASTECTOMY W/ AXILLARY LYMPH NODE DISSECTION Right 1/26/2022    Procedure: MASTECTOMY, MODIFIED RADICAL;  Surgeon: Keisha Cortez MD;  Location: [facility] OR;  Service: General;  Laterality: Right;    OOPHORECTOMY      SENTINEL LYMPH NODE BIOPSY Right 1/26/2022    Procedure: BIOPSY, LYMPH NODE, SENTINEL;  Surgeon: Keisha Cortez MD;  Location: [facility];  Service: General;  Laterality: Right;  0800    SIMPLE MASTECTOMY Left 1/26/2022    Procedure: MASTECTOMY, SIMPLE;  Surgeon: Keisha Cortez MD;  Location: [facility];  Service: General;  Laterality: Left;  FROZEN SECTION       Review of patient's allergies indicates:   Allergen Reactions    Macrobid [nitrofurantoin monohyd/m-cryst] Swelling    Bactrim [sulfamethoxazole-trimethoprim] Diarrhea     Ciprofloxacin Other (See Comments)     GI problems    Codeine Other (See Comments)     headache    Latex, natural rubber Rash       No current facility-administered medications on file prior to encounter.     Current Outpatient Medications on File Prior to Encounter   Medication Sig    acarbose (PRECOSE) 100 MG Tab Take 1 tablet (100 mg total) by mouth 3 (three) times daily with meals.    albuterol (PROVENTIL/VENTOLIN HFA) 90 mcg/actuation inhaler Inhale 2 puffs into the lungs every 6 (six) hours as needed for Wheezing. Rescue    alendronate (FOSAMAX) 70 MG tablet Take 1 tablet (70 mg total) by mouth every 7 days.    aspirin (ECOTRIN) 81 MG EC tablet Take 81 mg by mouth once daily. 3 days a week    atorvastatin (LIPITOR) 40 MG tablet Take 1 tablet (40 mg total) by mouth once daily. (Patient taking differently: Take 20 mg by mouth once daily.)    blood sugar diagnostic (ONETOUCH ULTRA TEST) Strp Inject 1 each into the skin 3 (three) times daily before meals.    calcium citrate (CALCITRATE) 200 mg (950 mg) tablet Take 1 tablet by mouth once daily. (Patient taking differently: Take 1 tablet by mouth every Mon, Wed, Fri.)    carvediloL (COREG) 6.25 MG tablet Take 1 tablet (6.25 mg total) by mouth 2 (two) times daily with meals.    cholecalciferol, vitamin D3, (VITAMIN D3) 50 mcg (2,000 unit) Tab Take 1 tablet (2,000 Units total) by mouth twice a week. 10,000 twice a week (Patient taking differently: Take 1 tablet by mouth twice a week. 10,000 3 times weekly)    clobetasoL (TEMOVATE) 0.05 % cream Apply twice a day for 2 weeks then nightly for 2 months. May decrease to three nights a week after that.    co-enzyme Q-10 30 mg capsule Take 1 capsule (30 mg total) by mouth every evening.    cranberry fruit extract (CRANBERRY CONCENTRATE ORAL) Take 2 capsules by mouth once daily. (Patient taking differently: Take 1 capsule by mouth once daily.)    dicyclomine (BENTYL) 20 mg tablet Take 1 tablet (20 mg total) by mouth 4 (four)  times daily before meals and nightly.    ferrous sulfate 325 (65 FE) MG EC tablet Take 325 mg by mouth. 3 days a week    FLUoxetine 10 MG capsule Take 1 capsule (10 mg total) by mouth once daily.    furosemide (LASIX) 20 MG tablet Take 1 tablet by mouth once daily    gabapentin (NEURONTIN) 600 MG tablet Take 1 tablet (600 mg total) by mouth 3 (three) times daily.    guaiFENesin (MUCINEX) 600 mg 12 hr tablet Take 2 tablets (1,200 mg total) by mouth 2 (two) times daily.    hydroCHLOROthiazide (MICROZIDE) 12.5 mg capsule Take 12.5 mg by mouth once daily.    insulin aspart U-100 (NOVOLOG FLEXPEN U-100 INSULIN) 100 unit/mL (3 mL) InPn pen Take sliding scale insulin 4 times a day as instructed- max dose of 40 units/day.    insulin glargine U-100, Lantus, (LANTUS SOLOSTAR U-100 INSULIN) 100 unit/mL (3 mL) InPn pen Inject 16 Units into the skin once daily.    letrozole (FEMARA) 2.5 mg Tab Take 1 tablet (2.5 mg total) by mouth once daily.    LIDOcaine (LIDODERM) 5 % Place onto the skin once daily. Remove & Discard patch within 12 hours or as directed by MD    lisinopriL (PRINIVIL,ZESTRIL) 40 MG tablet Take 1 tablet (40 mg total) by mouth every evening.    metFORMIN (GLUCOPHAGE-XR) 500 MG ER 24hr tablet Take 1 tablet (500 mg total) by mouth daily with breakfast.    multivitamin capsule Take 1 capsule by mouth once daily. (Patient taking differently: Take 1 capsule by mouth once daily. Taking 3 times weekly)    omega-3 fatty acids/fish oil (FISH OIL-OMEGA-3 FATTY ACIDS) 300-1,000 mg capsule Take 1 capsule by mouth once a week.    potassium chloride (KLOR-CON) 10 MEQ TbSR Take 99 mEq by mouth once. (Patient taking differently: Take 99 mEq by mouth once. Taking 3 x weekly)    acetaminophen (TYLENOL) 650 MG TbSR Take 650 mg by mouth 2 (two) times a day.    amoxicillin-clavulanate 875-125mg (AUGMENTIN) 875-125 mg per tablet Take 1 tablet by mouth 2 (two) times daily.    blood-glucose meter Misc 1 Device by Misc.(Non-Drug; Combo  "Route) route once daily. One Touch Dx. Code:E11.9    blood-glucose sensor (FREESTYLE JOSEPHINE 2 PLUS SENSOR) Rose Use as instructed    flash glucose scanning reader (FREESTYLE JOSEPHINE 2 READER) Misc Use as instructed    flash glucose sensor (FREESTYLE JOSEPHINE 2 SENSOR) Kit Use as instructed    fluconazole (DIFLUCAN) 150 MG Tab Take 1 tablet (150 mg total) by mouth once daily.    furosemide (LASIX) 20 MG tablet Take 1 tablet (20 mg total) by mouth once daily.    glucagon (BAQSIMI) 3 mg/actuation Spry 2 pack.  Spray one device (3 mg) in one nostril to treat severe hypoglycemia. Disp 1 box (2 devices)    loperamide (IMODIUM) 2 mg capsule Take 2 mg by mouth 4 (four) times daily as needed for Diarrhea.    montelukast (SINGULAIR) 10 mg tablet Take 1 tablet (10 mg total) by mouth once daily.    mucus clearing device (AEROBIKA OSCILLATING PEP SYSTM MISC) 12 puffs by Misc.(Non-Drug; Combo Route) route daily as needed.    NYAMYC powder     ONETOUCH DELICA PLUS LANCET 33 gauge Misc USE   TO CHECK GLUCOSE 4 TIMES DAILY BEFORE  MEALS    pen needle, diabetic (BD ULTRA-FINE MICRO PEN NEEDLE) 32 gauge x 1/4" Ndle use to inject ozempic once a week    pen needle, diabetic (BD ULTRA-FINE MICRO PEN NEEDLE) 32 gauge x 1/4" Ndle Use to inject insulin 4 to 5 times a day as instructed    trimethoprim (TRIMPEX) 100 mg Tab Take 100 mg by mouth every 12 (twelve) hours.     Family History       Problem Relation (Age of Onset)    Bell's palsy Sister    Bone cancer Mother, Father (86)    Breast cancer Mother, Sister, Sister    Cancer Mother (42), Father    Heart disease Mother          Tobacco Use    Smoking status: Never    Smokeless tobacco: Never   Substance and Sexual Activity    Alcohol use: Not Currently     Comment: rare    Drug use: Never    Sexual activity: Not Currently     Partners: Male     Comment:      Review of Systems   Constitutional:  Positive for activity change, chills and fatigue. Negative for fever.   HENT:  Positive for " postnasal drip. Negative for sinus pressure and sore throat.    Respiratory:  Positive for cough and shortness of breath. Negative for wheezing.    Cardiovascular:  Negative for chest pain, palpitations and leg swelling.   Gastrointestinal:  Negative for abdominal pain, nausea and vomiting.   Skin:  Negative for rash and wound.   Neurological:  Positive for weakness. Negative for syncope.     Objective:     Vital Signs (Most Recent):  Temp: 98 °F (36.7 °C) (07/11/25 0812)  Pulse: 88 (07/11/25 0812)  Resp: 18 (07/11/25 0812)  BP: (!) 151/67 (07/11/25 0421)  SpO2: (!) 92 % (07/11/25 0812) Vital Signs (24h Range):  Temp:  [96.9 °F (36.1 °C)-98.7 °F (37.1 °C)] 98 °F (36.7 °C)  Pulse:  [76-88] 88  Resp:  [18-24] 18  SpO2:  [92 %-98 %] 92 %  BP: (125-166)/(67-71) 151/67     Weight: 88.9 kg (196 lb)  Body mass index is 33.64 kg/m².     Physical Exam  Constitutional:       Appearance: She is ill-appearing.   HENT:      Nose: Nose normal.      Mouth/Throat:      Mouth: Mucous membranes are moist.   Eyes:      Extraocular Movements: Extraocular movements intact.      Pupils: Pupils are equal, round, and reactive to light.   Cardiovascular:      Rate and Rhythm: Normal rate and regular rhythm.   Pulmonary:      Effort: Pulmonary effort is normal.      Breath sounds: Rhonchi present.      Comments: O2 via nc  Abdominal:      General: Bowel sounds are normal.      Palpations: Abdomen is soft.   Genitourinary:     Comments: Land with yellow urine  Musculoskeletal:      Right lower leg: Edema present.      Left lower leg: Edema present.   Skin:     General: Skin is warm.   Neurological:      General: No focal deficit present.      Mental Status: She is alert.              CRANIAL NERVES     CN III, IV, VI   Pupils are equal, round, and reactive to light.       Significant Labs: All pertinent labs within the past 24 hours have been reviewed.  Recent Lab Results  (Last 5 results in the past 24 hours)        07/11/25  5755    07/10/25  1944   07/10/25  1652   07/10/25  1137   07/10/25  1018        Albumin 2.2                              ALT 15               Anion Gap 10               AST 26               Baso # 0.14               Basophil % 0.9               BILIRUBIN TOTAL 0.2  Comment: For infants and newborns, interpretation of results should be based   on gestational age, weight and in agreement with clinical   observations.    Premature Infant recommended reference ranges:   0-24 hours:  <8.0 mg/dL   24-48 hours: <12.0 mg/dL   3-5 days:    <15.0 mg/dL   6-29 days:   <15.0 mg/dL               BUN 38               C difficile Toxins A+B, EIA         Negative       C. diff Antigen         Negative       Calcium 9.0               Chloride 102               CO2 24               Creatinine 2.5               eGFR 19  Comment: Estimated GFR calculated using the CKD-EPI creatinine (2021) equation.               Eos # 0.70               Eos % 4.7               Glucose 194               Gran # (ANC) 10.95               Hematocrit 34.0               Hemoglobin 11.1               Immature Grans (Abs) 0.58  Comment: Mild elevation in immature granulocytes is non specific and can be seen in a variety of conditions including stress response, acute inflammation, trauma and pregnancy. Correlation with other laboratory and clinical findings is essential.               Immature Granulocytes 3.9               Lymph # 1.53               Lymph % 10.4               Magnesium  2.1               MCH 27.8               MCHC 32.6               MCV 85               Mono # 0.85               Mono % 5.8               MPV 9.1               Neut % 74.3               nRBC 0               Platelet Count 502               POCT Glucose   196   215   241         Potassium 5.1               PROTEIN TOTAL 7.1               RBC 3.99               RDW 13.8               Sodium 136               WBC 14.75                                      Significant Imaging: I  have reviewed all pertinent imaging results/findings within the past 24 hours.

## 2025-07-11 NOTE — PLAN OF CARE
Problem: Physical Therapy  Goal: Physical Therapy Goal  Description: Goals to be met by 2025   Patient will increase functional independence with mobility by performin. Supine to sit with Modified Independent  2. Sit to supine with Modified Independent  3. Bed to chair transfer with Modified Independent with rolling walker using Stand step  TECHNIQUE  4. Gait  x 100  feet with Supervision with rolling walker and O2 supplement   5. Lower extremity exercise program x10 reps with assistance as needed   Outcome: Progressing, increase distance ambulated, patient is now off contact isolation per attending nurse.

## 2025-07-12 LAB
ABSOLUTE EOSINOPHIL (OHS): 0.48 K/UL
ABSOLUTE MONOCYTE (OHS): 0.83 K/UL (ref 0.3–1)
ABSOLUTE NEUTROPHIL COUNT (OHS): 10.07 K/UL (ref 1.8–7.7)
ALBUMIN SERPL BCP-MCNC: 2 G/DL (ref 3.5–5.2)
ALP SERPL-CCNC: 102 UNIT/L (ref 40–150)
ALT SERPL W/O P-5'-P-CCNC: 13 UNIT/L (ref 10–44)
ANION GAP (OHS): 9 MMOL/L (ref 8–16)
AST SERPL-CCNC: 19 UNIT/L (ref 11–45)
BASOPHILS # BLD AUTO: 0.1 K/UL
BASOPHILS NFR BLD AUTO: 0.7 %
BILIRUB SERPL-MCNC: 0.2 MG/DL (ref 0.1–1)
BUN SERPL-MCNC: 31 MG/DL (ref 8–23)
CALCIUM SERPL-MCNC: 8.8 MG/DL (ref 8.7–10.5)
CHLORIDE SERPL-SCNC: 103 MMOL/L (ref 95–110)
CO2 SERPL-SCNC: 25 MMOL/L (ref 23–29)
CREAT SERPL-MCNC: 2 MG/DL (ref 0.5–1.4)
ERYTHROCYTE [DISTWIDTH] IN BLOOD BY AUTOMATED COUNT: 13.5 % (ref 11.5–14.5)
GFR SERPLBLD CREATININE-BSD FMLA CKD-EPI: 24 ML/MIN/1.73/M2
GLUCOSE SERPL-MCNC: 154 MG/DL (ref 70–110)
HCT VFR BLD AUTO: 33.9 % (ref 37–48.5)
HGB BLD-MCNC: 11 GM/DL (ref 12–16)
IMM GRANULOCYTES # BLD AUTO: 0.53 K/UL (ref 0–0.04)
IMM GRANULOCYTES NFR BLD AUTO: 4 % (ref 0–0.5)
LYMPHOCYTES # BLD AUTO: 1.39 K/UL (ref 1–4.8)
MAGNESIUM SERPL-MCNC: 1.9 MG/DL (ref 1.6–2.6)
MCH RBC QN AUTO: 27.8 PG (ref 27–31)
MCHC RBC AUTO-ENTMCNC: 32.4 G/DL (ref 32–36)
MCV RBC AUTO: 86 FL (ref 82–98)
NUCLEATED RBC (/100WBC) (OHS): 0 /100 WBC
PLATELET # BLD AUTO: 495 K/UL (ref 150–450)
PMV BLD AUTO: 9 FL (ref 9.2–12.9)
POCT GLUCOSE: 123 MG/DL (ref 70–110)
POCT GLUCOSE: 214 MG/DL (ref 70–110)
POTASSIUM SERPL-SCNC: 4.2 MMOL/L (ref 3.5–5.1)
PROT SERPL-MCNC: 6.8 GM/DL (ref 6–8.4)
RBC # BLD AUTO: 3.95 M/UL (ref 4–5.4)
RELATIVE EOSINOPHIL (OHS): 3.6 %
RELATIVE LYMPHOCYTE (OHS): 10.4 % (ref 18–48)
RELATIVE MONOCYTE (OHS): 6.2 % (ref 4–15)
RELATIVE NEUTROPHIL (OHS): 75.1 % (ref 38–73)
SODIUM SERPL-SCNC: 137 MMOL/L (ref 136–145)
WBC # BLD AUTO: 13.4 K/UL (ref 3.9–12.7)

## 2025-07-12 PROCEDURE — 36415 COLL VENOUS BLD VENIPUNCTURE: CPT | Performed by: INTERNAL MEDICINE

## 2025-07-12 PROCEDURE — 99900035 HC TECH TIME PER 15 MIN (STAT)

## 2025-07-12 PROCEDURE — 25000003 PHARM REV CODE 250: Performed by: EMERGENCY MEDICINE

## 2025-07-12 PROCEDURE — 99900031 HC PATIENT EDUCATION (STAT)

## 2025-07-12 PROCEDURE — 21400001 HC TELEMETRY ROOM

## 2025-07-12 PROCEDURE — 85025 COMPLETE CBC W/AUTO DIFF WBC: CPT | Performed by: INTERNAL MEDICINE

## 2025-07-12 PROCEDURE — 94640 AIRWAY INHALATION TREATMENT: CPT

## 2025-07-12 PROCEDURE — 25000003 PHARM REV CODE 250: Performed by: INTERNAL MEDICINE

## 2025-07-12 PROCEDURE — 83735 ASSAY OF MAGNESIUM: CPT | Performed by: INTERNAL MEDICINE

## 2025-07-12 PROCEDURE — 27000221 HC OXYGEN, UP TO 24 HOURS

## 2025-07-12 PROCEDURE — 94761 N-INVAS EAR/PLS OXIMETRY MLT: CPT

## 2025-07-12 PROCEDURE — 80053 COMPREHEN METABOLIC PANEL: CPT | Performed by: INTERNAL MEDICINE

## 2025-07-12 PROCEDURE — 63600175 PHARM REV CODE 636 W HCPCS: Performed by: INTERNAL MEDICINE

## 2025-07-12 PROCEDURE — 97530 THERAPEUTIC ACTIVITIES: CPT | Mod: CQ

## 2025-07-12 PROCEDURE — 97116 GAIT TRAINING THERAPY: CPT | Mod: CQ

## 2025-07-12 PROCEDURE — 25000242 PHARM REV CODE 250 ALT 637 W/ HCPCS: Performed by: INTERNAL MEDICINE

## 2025-07-12 RX ADMIN — ALBUTEROL SULFATE 2.5 MG: 2.5 SOLUTION RESPIRATORY (INHALATION) at 07:07

## 2025-07-12 RX ADMIN — DICLOFENAC SODIUM 2 G: 10 GEL TOPICAL at 02:07

## 2025-07-12 RX ADMIN — LISINOPRIL 40 MG: 20 TABLET ORAL at 08:07

## 2025-07-12 RX ADMIN — INSULIN GLARGINE 16 UNITS: 100 INJECTION, SOLUTION SUBCUTANEOUS at 08:07

## 2025-07-12 RX ADMIN — GABAPENTIN 600 MG: 300 CAPSULE ORAL at 02:07

## 2025-07-12 RX ADMIN — GUAIFENESIN 600 MG: 600 TABLET, EXTENDED RELEASE ORAL at 08:07

## 2025-07-12 RX ADMIN — FLUOXETINE HYDROCHLORIDE 10 MG: 10 CAPSULE ORAL at 08:07

## 2025-07-12 RX ADMIN — CARVEDILOL 6.25 MG: 3.12 TABLET, FILM COATED ORAL at 08:07

## 2025-07-12 RX ADMIN — CARVEDILOL 6.25 MG: 3.12 TABLET, FILM COATED ORAL at 05:07

## 2025-07-12 RX ADMIN — PIPERACILLIN AND TAZOBACTAM 4.5 G: 4; .5 INJECTION, POWDER, LYOPHILIZED, FOR SOLUTION INTRAVENOUS; PARENTERAL at 02:07

## 2025-07-12 RX ADMIN — GABAPENTIN 600 MG: 300 CAPSULE ORAL at 08:07

## 2025-07-12 RX ADMIN — ASPIRIN 81 MG: 81 TABLET, COATED ORAL at 08:07

## 2025-07-12 RX ADMIN — PIPERACILLIN AND TAZOBACTAM 4.5 G: 4; .5 INJECTION, POWDER, LYOPHILIZED, FOR SOLUTION INTRAVENOUS; PARENTERAL at 12:07

## 2025-07-12 RX ADMIN — LOPERAMIDE HYDROCHLORIDE 2 MG: 2 CAPSULE ORAL at 01:07

## 2025-07-12 RX ADMIN — ENOXAPARIN SODIUM 30 MG: 30 INJECTION SUBCUTANEOUS at 05:07

## 2025-07-12 RX ADMIN — MUPIROCIN: 20 OINTMENT TOPICAL at 08:07

## 2025-07-12 RX ADMIN — ATORVASTATIN CALCIUM 20 MG: 20 TABLET, FILM COATED ORAL at 08:07

## 2025-07-12 RX ADMIN — ALBUTEROL SULFATE 2.5 MG: 2.5 SOLUTION RESPIRATORY (INHALATION) at 01:07

## 2025-07-12 NOTE — PT/OT/SLP PROGRESS
"Physical Therapy Treatment    Patient Name:  Kelsea Cadet   MRN:  [id number]    Recommendations:     Discharge Recommendations: Low Intensity Therapy  Discharge Equipment Recommendations: none  Barriers to discharge: Inaccessible home, Decreased caregiver support, and medical     Assessment:     Kelsea Cadet is a 85 y.o. female admitted with a medical diagnosis of Multifocal pneumonia.  She presents with the following impairments/functional limitations: weakness, impaired joint extensibility, impaired endurance, impaired self care skills, impaired functional mobility, decreased lower extremity function, impaired muscle length, gait instability, impaired balance, impaired cardiopulmonary response to activity, impaired coordination .    Rehab Prognosis: Good and Fair; patient would benefit from acute skilled PT services to address these deficits and reach maximum level of function.    Recent Surgery: * No surgery found *      Plan:     During this hospitalization, patient to be seen  (3-5x a week) to address the identified rehab impairments via gait training, therapeutic activities, therapeutic exercises, neuromuscular re-education and progress toward the following goals:    Plan of Care Expires:  07/18/25    Subjective     Chief Complaint: "I need help getting up my knees are weak "  Patient/Family Comments/goals: to move better family in room   Pain/Comfort:  Pain Rating 1: 0/10      Objective:     Communicated with nursing  prior to session.  Patient found in restroom with RT and CNA present having difficulty standing pt from toilet machado cath upon PT entry to room.     General Precautions: Standard, fall, respiratory  Orthopedic Precautions: N/A  Braces: N/A  Respiratory Status: Nasal cannula, flow 1 L/min     Functional Mobility:  Bed Mobility:     Sit to Supine: supervision and use of bed rail with pt self managing BLE into bed increased time   Transfers:     Sit to Stand:  moderate assistance with rolling " walker  Toilet Transfer: maximal assistance and to stand from toilet  with  grab bars  using  also rw vc tech for self assist and standing posture to decrease assist . Slow transition + loose stools assist with toilet hygiene   Gait: pt ambulated ~20ft in room slow jovanna vc for standing posture and proximity within rw for safety   Static standing balance use of rw cga for assist with cleaning loose stools vc for standing posture to improve balance   Static sitting unsupported EOB for cleaning accidental loose stools supervision   Side stepping cga to meaghan to manage rw  with vc for safety to pivot in restroom     Patient left supine with call button in reach, nursing , cna, and family  present, and pt kinsey tx required physical assist of one person for safety with ambulation short distances with use of rw for fall prevention would benefit from cont. Therapies to improve functional independence . Pt motivated ..    GOALS:   Multidisciplinary Problems       Physical Therapy Goals          Problem: Physical Therapy    Goal Priority Disciplines Outcome Interventions   Physical Therapy Goal     PT, PT/OT Progressing    Description: Goals to be met by 2025   Patient will increase functional independence with mobility by performin. Supine to sit with Modified Independent  2. Sit to supine with Modified Independent  3. Bed to chair transfer with Modified Independent with rolling walker using Stand step  TECHNIQUE  4. Gait  x 100  feet with Supervision with rolling walker and O2 supplement   5. Lower extremity exercise program x10 reps with assistance as needed                        DME Justifications:   Kelsea's mobility limitation cannot be sufficiently resolved by the use of a cane. Her functional mobility deficit can be sufficiently resolved with the use of a Rolling Walker. Patient's mobility limitation significantly impairs their ability to participate in one of more activities of daily living.  The use of  a RW will significantly improve the patient's ability to participate in [facility]AD[facility] and the patient will use it on regular basis in the home.    Time Tracking:     PT Received On: 07/12/25  PT Start Time: 1325     PT Stop Time: 1350  PT Total Time (min): 25 min     Billable Minutes: Therapeutic Activity 25min    Treatment Type: Treatment  PT/PTA: PTA (supervising PT Amanuel Snyder)     Number of PTA visits since last PT visit: 1 07/12/2025

## 2025-07-12 NOTE — PLAN OF CARE
Problem: Physical Therapy  Goal: Physical Therapy Goal  Description: Goals to be met by 2025   Patient will increase functional independence with mobility by performin. Supine to sit with Modified Independent  2. Sit to supine with Modified Independent  3. Bed to chair transfer with Modified Independent with rolling walker using Stand step  TECHNIQUE  4. Gait  x 100  feet with Supervision with rolling walker and O2 supplement   5. Lower extremity exercise program x10 reps with assistance as needed   Outcome: Progressing

## 2025-07-12 NOTE — SUBJECTIVE & OBJECTIVE
Interval History: Patient seen and examined.     Review of Systems   Constitutional:  Positive for activity change, chills and fatigue. Negative for fever.   HENT:  Positive for postnasal drip. Negative for sinus pressure and sore throat.    Respiratory:  Positive for cough and shortness of breath. Negative for wheezing.    Cardiovascular:  Negative for chest pain, palpitations and leg swelling.   Gastrointestinal:  Negative for abdominal pain, nausea and vomiting.   Skin:  Negative for rash and wound.   Neurological:  Positive for weakness. Negative for syncope.     Objective:     Vital Signs (Most Recent):  Temp: 98.7 °F (37.1 °C) (07/12/25 0819)  Pulse: 96 (07/12/25 0832)  Resp: 20 (07/12/25 0721)  BP: (!) 129/94 (07/12/25 0824)  SpO2: 95 % (07/12/25 0819) Vital Signs (24h Range):  Temp:  [97.8 °F (36.6 °C)-98.8 °F (37.1 °C)] 98.7 °F (37.1 °C)  Pulse:  [75-98] 96  Resp:  [16-20] 20  SpO2:  [94 %-96 %] 95 %  BP: (129-205)/() 129/94     Weight: 88.9 kg (196 lb)  Body mass index is 33.64 kg/m².    Intake/Output Summary (Last 24 hours) at 7/12/2025 1000  Last data filed at 7/12/2025 0553  Gross per 24 hour   Intake 700 ml   Output 1803 ml   Net -1103 ml         Physical Exam  Constitutional:       Appearance: She is ill-appearing. She is not diaphoretic.   HENT:      Head: Normocephalic and atraumatic.      Nose: No congestion or rhinorrhea.      Mouth/Throat:      Mouth: Mucous membranes are moist.   Eyes:      Extraocular Movements: Extraocular movements intact.   Cardiovascular:      Rate and Rhythm: Normal rate.   Pulmonary:      Effort: Pulmonary effort is normal. No respiratory distress.      Breath sounds: Rhonchi present. No wheezing.      Comments: O2 via nc  Abdominal:      General: Bowel sounds are normal. There is no distension.      Palpations: Abdomen is soft.      Tenderness: There is no abdominal tenderness. There is no guarding or rebound.   Genitourinary:     Comments: Land with yellow  urine  Musculoskeletal:      Right lower leg: Edema present.      Left lower leg: Edema present.   Skin:     General: Skin is warm and dry.   Neurological:      General: No focal deficit present.      Mental Status: She is alert.               Significant Labs: All pertinent labs within the past 24 hours have been reviewed.  Recent Lab Results         07/12/25  0602   07/11/25  2116   07/11/25  1636   07/11/25  1140        Albumin 2.0                          ALT 13             Anion Gap 9             AST 19             Baso # 0.10             Basophil % 0.7             BILIRUBIN TOTAL 0.2  Comment: For infants and newborns, interpretation of results should be based   on gestational age, weight and in agreement with clinical   observations.    Premature Infant recommended reference ranges:   0-24 hours:  <8.0 mg/dL   24-48 hours: <12.0 mg/dL   3-5 days:    <15.0 mg/dL   6-29 days:   <15.0 mg/dL             BUN 31             Calcium 8.8             Chloride 103             CO2 25             Creatinine 2.0             eGFR 24  Comment: Estimated GFR calculated using the CKD-EPI creatinine (2021) equation.             Eos # 0.48             Eos % 3.6             Glucose 154             Gran # (ANC) 10.07             Hematocrit 33.9             Hemoglobin 11.0             Immature Grans (Abs) 0.53  Comment: Mild elevation in immature granulocytes is non specific and can be seen in a variety of conditions including stress response, acute inflammation, trauma and pregnancy. Correlation with other laboratory and clinical findings is essential.             Immature Granulocytes 4.0             Lymph # 1.39             Lymph % 10.4             Magnesium  1.9             MCH 27.8             MCHC 32.4             MCV 86             Mono # 0.83             Mono % 6.2             MPV 9.0             Neut % 75.1             nRBC 0             Platelet Count 495             POCT Glucose   123   226   257       Potassium  4.2             PROTEIN TOTAL 6.8             RBC 3.95             RDW 13.5             Sodium 137             WBC 13.40                     Significant Imaging: I have reviewed all pertinent imaging results/findings within the past 24 hours.

## 2025-07-12 NOTE — PLAN OF CARE
Problem: Physical Therapy  Goal: Physical Therapy Goal  Description: Goals to be met by 2025   Patient will increase functional independence with mobility by performin. Supine to sit with Modified Independent  2. Sit to supine with Modified Independent  3. Bed to chair transfer with Modified Independent with rolling walker using Stand step  TECHNIQUE  4. Gait  x 100  feet with Supervision with rolling walker and O2 supplement   5. Lower extremity exercise program x10 reps with assistance as needed   2025 1414 by Nicolasa Alvares, PTA  Outcome: Progressing  2025 1412 by Nicolasa Alvares, PTA  Outcome: Progressing

## 2025-07-12 NOTE — ASSESSMENT & PLAN NOTE
Patient has a diagnosis of pneumonia. The cause of the pneumonia is suspected to be bacterial in etiology but organism is not known. The pneumonia is worsening due to sob, cough. The patient has the following signs/symptoms of pneumonia: persistent hypoxia , cough, and sputum production. The patient does have a current oxygen requirement and the patient does not have a home oxygen requirement. I have reviewed the pertinent imaging. The following cultures have been collected: Blood cultures The culture results are listed below.     Current antimicrobial regimen consists of the antibiotics listed below. Will monitor patient closely and continue current treatment plan unchanged.    Antibiotics (From admission, onward)      Start     Stop Route Frequency Ordered    07/10/25 1300  piperacillin-tazobactam (ZOSYN) 4.5 g in D5W 100 mL IVPB (MB+)         -- IV Every 12 hours (non-standard times) 07/10/25 0943            Microbiology Results (last 7 days)       Procedure Component Value Units Date/Time    Blood culture x two cultures. Draw prior to antibiotics. [[id number]]  (Normal) Collected: 07/07/25 1835    Order Status: Completed Specimen: Blood from Peripheral, Forearm, Right Updated: 07/12/25 0701     Blood Culture No Growth After 96 hours    Blood culture x two cultures. Draw prior to antibiotics. [[id number]]  (Normal) Collected: 07/07/25 1843    Order Status: Completed Specimen: Blood from Peripheral, Hand, Left Updated: 07/12/25 0701     Blood Culture No Growth After 96 hours    Clostridium difficile EIA [[id number]]  (Normal) Collected: 07/10/25 1018    Order Status: Completed Specimen: Stool Updated: 07/11/25 0550     C. DIFFICILE GDH AG Negative     Clostridioides difficile Toxin A/B Negative    Urine culture [[id number]] Collected: 07/07/25 1944    Order Status: Completed Specimen: Urine, Catheterized Updated: 07/09/25 1136     Urine Culture Multiple organisms isolated. None in predominance. Repeat if  clinically necessary.    Clostridium difficile EIA [[id number]]     Order Status: Canceled Specimen: Stool     Culture, Respiratory with Gram Stain [[id number]]     Order Status: Canceled Specimen: Respiratory         7/11 cont current treatment plan  7/12 iv abx on board, oxygen, wean as tolerated

## 2025-07-12 NOTE — PROGRESS NOTES
[facility] Medicine  Progress Note    Patient Name: Kelsea Cadet  MRN: [id number]  Patient Class: IP- Inpatient   Admission Date: 7/7/2025  Length of Stay: 5 days  Attending Physician: Harika Mina MD  Primary Care Provider: Gabby Jackson NP        Subjective     Principal Problem:Multifocal pneumonia        HPI:  Kelsea Cadet is a 85 y.o. female with PMHX of hypertension, pulmonary hypertension, cardiomyopathy, type 2 diabetes, hyperlipidemia, CKD, chronic indwelling Land, neurogenic bladder who presents to the emergency department C/O malaise.     Patient presents with malaise.  Was having fever last week and worsening cough. Saw PCP and had XR and was treated with a Z-Oswaldo.  Patient seemed to be getting better but today has been complaining of fatigue, malaise, and nausea.  Reports some shortness of breath.  No longer having fever. Pt reports she has been coughign but unable to get the mucus up.      Reports she has recently switched cards to dr perera and saw him a few weeks ago. Pt reports taking all of her meds    Overview/Hospital Course:  7/9 ND pt repotrs feeling better this am - diureisng well  7/10 ND pt feeling better - less sob.   Pt diruesed well - will hold lasix today due to maria fernanda  7/11 ND less sob and cough - cxr reviewed from yesterday - cont to hold lasix due to maria fernanda  7/12 AA, weekend crosscover, IV antibiotics on board for pneumonia, Lasix on hold, MARIA FERNANDA improving, renal function trending down, continue to monitor urine output    Interval History: Patient seen and examined.     Review of Systems   Constitutional:  Positive for activity change, chills and fatigue. Negative for fever.   HENT:  Positive for postnasal drip. Negative for sinus pressure and sore throat.    Respiratory:  Positive for cough and shortness of breath. Negative for wheezing.    Cardiovascular:  Negative for chest pain, palpitations and leg swelling.   Gastrointestinal:  Negative for abdominal  pain, nausea and vomiting.   Skin:  Negative for rash and wound.   Neurological:  Positive for weakness. Negative for syncope.     Objective:     Vital Signs (Most Recent):  Temp: 98.7 °F (37.1 °C) (07/12/25 0819)  Pulse: 96 (07/12/25 0832)  Resp: 20 (07/12/25 0721)  BP: (!) 129/94 (07/12/25 0824)  SpO2: 95 % (07/12/25 0819) Vital Signs (24h Range):  Temp:  [97.8 °F (36.6 °C)-98.8 °F (37.1 °C)] 98.7 °F (37.1 °C)  Pulse:  [75-98] 96  Resp:  [16-20] 20  SpO2:  [94 %-96 %] 95 %  BP: (129-205)/() 129/94     Weight: 88.9 kg (196 lb)  Body mass index is 33.64 kg/m².    Intake/Output Summary (Last 24 hours) at 7/12/2025 1000  Last data filed at 7/12/2025 0553  Gross per 24 hour   Intake 700 ml   Output 1803 ml   Net -1103 ml         Physical Exam  Constitutional:       Appearance: She is ill-appearing. She is not diaphoretic.   HENT:      Head: Normocephalic and atraumatic.      Nose: No congestion or rhinorrhea.      Mouth/Throat:      Mouth: Mucous membranes are moist.   Eyes:      Extraocular Movements: Extraocular movements intact.   Cardiovascular:      Rate and Rhythm: Normal rate.   Pulmonary:      Effort: Pulmonary effort is normal. No respiratory distress.      Breath sounds: Rhonchi present. No wheezing.      Comments: O2 via nc  Abdominal:      General: Bowel sounds are normal. There is no distension.      Palpations: Abdomen is soft.      Tenderness: There is no abdominal tenderness. There is no guarding or rebound.   Genitourinary:     Comments: Land with yellow urine  Musculoskeletal:      Right lower leg: Edema present.      Left lower leg: Edema present.   Skin:     General: Skin is warm and dry.   Neurological:      General: No focal deficit present.      Mental Status: She is alert.               Significant Labs: All pertinent labs within the past 24 hours have been reviewed.  Recent Lab Results         07/12/25  0602   07/11/25  2116   07/11/25  1636   07/11/25  1140        Albumin 2.0                           ALT 13             Anion Gap 9             AST 19             Baso # 0.10             Basophil % 0.7             BILIRUBIN TOTAL 0.2  Comment: For infants and newborns, interpretation of results should be based   on gestational age, weight and in agreement with clinical   observations.    Premature Infant recommended reference ranges:   0-24 hours:  <8.0 mg/dL   24-48 hours: <12.0 mg/dL   3-5 days:    <15.0 mg/dL   6-29 days:   <15.0 mg/dL             BUN 31             Calcium 8.8             Chloride 103             CO2 25             Creatinine 2.0             eGFR 24  Comment: Estimated GFR calculated using the CKD-EPI creatinine (2021) equation.             Eos # 0.48             Eos % 3.6             Glucose 154             Gran # (ANC) 10.07             Hematocrit 33.9             Hemoglobin 11.0             Immature Grans (Abs) 0.53  Comment: Mild elevation in immature granulocytes is non specific and can be seen in a variety of conditions including stress response, acute inflammation, trauma and pregnancy. Correlation with other laboratory and clinical findings is essential.             Immature Granulocytes 4.0             Lymph # 1.39             Lymph % 10.4             Magnesium  1.9             MCH 27.8             MCHC 32.4             MCV 86             Mono # 0.83             Mono % 6.2             MPV 9.0             Neut % 75.1             nRBC 0             Platelet Count 495             POCT Glucose   123   226   257       Potassium 4.2             PROTEIN TOTAL 6.8             RBC 3.95             RDW 13.5             Sodium 137             WBC 13.40                     Significant Imaging: I have reviewed all pertinent imaging results/findings within the past 24 hours.      Assessment & Plan  Multifocal pneumonia  Patient has a diagnosis of pneumonia. The cause of the pneumonia is suspected to be bacterial in etiology but organism is not known. The pneumonia is worsening  due to sob, cough. The patient has the following signs/symptoms of pneumonia: persistent hypoxia , cough, and sputum production. The patient does have a current oxygen requirement and the patient does not have a home oxygen requirement. I have reviewed the pertinent imaging. The following cultures have been collected: Blood cultures The culture results are listed below.     Current antimicrobial regimen consists of the antibiotics listed below. Will monitor patient closely and continue current treatment plan unchanged.    Antibiotics (From admission, onward)      Start     Stop Route Frequency Ordered    07/10/25 1300  piperacillin-tazobactam (ZOSYN) 4.5 g in D5W 100 mL IVPB (MB+)         -- IV Every 12 hours (non-standard times) 07/10/25 0943            Microbiology Results (last 7 days)       Procedure Component Value Units Date/Time    Blood culture x two cultures. Draw prior to antibiotics. [[id number]]  (Normal) Collected: 07/07/25 1835    Order Status: Completed Specimen: Blood from Peripheral, Forearm, Right Updated: 07/12/25 0701     Blood Culture No Growth After 96 hours    Blood culture x two cultures. Draw prior to antibiotics. [[id number]]  (Normal) Collected: 07/07/25 1843    Order Status: Completed Specimen: Blood from Peripheral, Hand, Left Updated: 07/12/25 0701     Blood Culture No Growth After 96 hours    Clostridium difficile EIA [[id number]]  (Normal) Collected: 07/10/25 1018    Order Status: Completed Specimen: Stool Updated: 07/11/25 0550     C. DIFFICILE GDH AG Negative     Clostridioides difficile Toxin A/B Negative    Urine culture [[id number]] Collected: 07/07/25 1944    Order Status: Completed Specimen: Urine, Catheterized Updated: 07/09/25 1136     Urine Culture Multiple organisms isolated. None in predominance. Repeat if clinically necessary.    Clostridium difficile EIA [[id number]]     Order Status: Canceled Specimen: Stool     Culture, Respiratory with Gram Stain [[id number]]      "Order Status: Canceled Specimen: Respiratory         7/11 cont current treatment plan  7/12 iv abx on board, oxygen, wean as tolerated   Essential hypertension  Patient's blood pressure range in the last 24 hours was: BP  Min: 129/94  Max: 205/95.The patient's inpatient anti-hypertensive regimen is listed below:  Current Antihypertensives  , Daily, Oral  carvediloL tablet 6.25 mg, 2 times daily with meals, Oral  lisinopriL tablet 40 mg, Nightly, Oral    Plan  - BP is controlled, no changes needed to their regimen    Type 2 diabetes mellitus without complication, with long-term current use of insulin  Ada diet  Accuchecks and ssi  Cont lantus  7/9 A1c 6.4% - cont plan  Urinary retention  Has chronic machado    Cough  Tx pneumonia    CHF (congestive heart failure)  Patient has Diastolic (HFpEF) heart failure that is Acute on chronic. On presentation their CHF was decompensated. Evidence of decompensated CHF on presentation includes: edema, dyspnea on exertion (KIDD), and shortness of breath. The etiology of their decompensation is likely dietary indiscretion. Most recent BNP and echo results are listed below.  No results for input(s): "BNP" in the last 72 hours.    Latest ECHO  Results for orders placed in visit on 09/20/23    Echo    Interpretation Summary    Left Ventricle: Moderately increased wall thickness. There is moderate concentric hypertrophy. Normal wall motion. Grade II diastolic dysfunction.    Left Atrium: Left atrium is moderately dilated.    Right Ventricle: Normal right ventricular cavity size. Systolic function is normal.    Right Atrium: Right atrium is mildly dilated.    Aortic Valve: The aortic valve is a trileaflet valve. Mildly calcified left, right and noncoronary cusps. Mildly restricted motion. By planimetry the aortic valve area measures 1.8 cm².    Mitral Valve: There is mild bileaflet sclerosis.    Current Heart Failure Medications  , Daily, Oral  carvediloL tablet 6.25 mg, 2 times daily with " meals, Oral  lisinopriL tablet 40 mg, Nightly, Oral    Plan  - Monitor strict I&Os and daily weights.    - Place on telemetry  - Low sodium diet  - Place on fluid restriction of 2 L.   - Cardiology has been consulted  - The patient's volume status is improving but not at their baseline as indicated by edema, dyspnea on exertion (KIDD), and shortness of breath  7/9 cont current meds  7/10 hold lasix today due to kacy; repeat cxr this am  7/11 cont meds - holding lasix due to kacy - urinary output still good  7/12 renal function improving, monitor output   Hypokalemia  Patient's most recent potassium results are listed below.   Recent Labs     07/10/25  0541 07/11/25  0354 07/12/25  0602   K 4.6 5.1 4.2     Plan  - Replete potassium per protocol  - Monitor potassium Daily  - Patient's hypokalemia is on supplements - await labs from today  7/10 decrease potasium replacement  7/11 stop potassium replacement and monitor  Stable, replete prn   VTE Risk Mitigation (From admission, onward)           Ordered     enoxaparin injection 30 mg  Every 24 hours         07/09/25 1020     IP VTE HIGH RISK PATIENT  Once         07/07/25 2105     Place PK hose  Until discontinued         07/07/25 2105                    Discharge Planning   CATRACHITO: 7/14/2025     Code Status: Full Code   Medical Readiness for Discharge Date:   Discharge Plan A: Home Health   Discharge Delays: None known at this time              Please place Justification for DME      Kieran Kitchen MD  Department of Hospital Medicine   [facility]

## 2025-07-12 NOTE — CARE UPDATE
07/12/25 0737   PRE-TX-O2   Device (Oxygen Therapy) (S)  nasal cannula with humidification   Flow (L/min) (Oxygen Therapy) (S)  1   Oxygen Concentration (%) 24   SpO2 (!) (S)  94 %   Pulse 86

## 2025-07-12 NOTE — PLAN OF CARE
Plan of care reviewed and ongoing with pt. 22 gauge IV to L hand saline locked, 2L NC tolerating well, machado catheter intact. Tolerated medications well during the night. Call light and personal items within reach of patient during the night.       Problem: Infection  Goal: Absence of Infection Signs and Symptoms  Outcome: Not Progressing     Problem: Adult Inpatient Plan of Care  Goal: Plan of Care Review  Outcome: Not Progressing  Goal: Patient-Specific Goal (Individualized)  Outcome: Not Progressing  Goal: Absence of Hospital-Acquired Illness or Injury  Outcome: Not Progressing  Goal: Optimal Comfort and Wellbeing  Outcome: Not Progressing  Goal: Readiness for Transition of Care  Outcome: Not Progressing     Problem: Diabetes Comorbidity  Goal: Blood Glucose Level Within Targeted Range  Outcome: Not Progressing     Problem: Pneumonia  Goal: Fluid Balance  Outcome: Not Progressing  Goal: Resolution of Infection Signs and Symptoms  Outcome: Not Progressing  Goal: Effective Oxygenation and Ventilation  Outcome: Not Progressing     Problem: Fall Injury Risk  Goal: Absence of Fall and Fall-Related Injury  Outcome: Not Progressing     Problem: Wound  Goal: Optimal Coping  Outcome: Not Progressing  Goal: Optimal Functional Ability  Outcome: Not Progressing  Goal: Absence of Infection Signs and Symptoms  Outcome: Not Progressing  Goal: Improved Oral Intake  Outcome: Not Progressing  Goal: Optimal Pain Control and Function  Outcome: Not Progressing  Goal: Skin Health and Integrity  Outcome: Not Progressing  Goal: Optimal Wound Healing  Outcome: Not Progressing     Problem: Skin Injury Risk Increased  Goal: Skin Health and Integrity  Outcome: Not Progressing

## 2025-07-12 NOTE — ASSESSMENT & PLAN NOTE
Patient's blood pressure range in the last 24 hours was: BP  Min: 129/94  Max: 205/95.The patient's inpatient anti-hypertensive regimen is listed below:  Current Antihypertensives  , Daily, Oral  carvediloL tablet 6.25 mg, 2 times daily with meals, Oral  lisinopriL tablet 40 mg, Nightly, Oral    Plan  - BP is controlled, no changes needed to their regimen

## 2025-07-12 NOTE — ASSESSMENT & PLAN NOTE
"Patient has Diastolic (HFpEF) heart failure that is Acute on chronic. On presentation their CHF was decompensated. Evidence of decompensated CHF on presentation includes: edema, dyspnea on exertion (KIDD), and shortness of breath. The etiology of their decompensation is likely dietary indiscretion. Most recent BNP and echo results are listed below.  No results for input(s): "BNP" in the last 72 hours.    Latest ECHO  Results for orders placed in visit on 09/20/23    Echo    Interpretation Summary    Left Ventricle: Moderately increased wall thickness. There is moderate concentric hypertrophy. Normal wall motion. Grade II diastolic dysfunction.    Left Atrium: Left atrium is moderately dilated.    Right Ventricle: Normal right ventricular cavity size. Systolic function is normal.    Right Atrium: Right atrium is mildly dilated.    Aortic Valve: The aortic valve is a trileaflet valve. Mildly calcified left, right and noncoronary cusps. Mildly restricted motion. By planimetry the aortic valve area measures 1.8 cm².    Mitral Valve: There is mild bileaflet sclerosis.    Current Heart Failure Medications  , Daily, Oral  carvediloL tablet 6.25 mg, 2 times daily with meals, Oral  lisinopriL tablet 40 mg, Nightly, Oral    Plan  - Monitor strict I&Os and daily weights.    - Place on telemetry  - Low sodium diet  - Place on fluid restriction of 2 L.   - Cardiology has been consulted  - The patient's volume status is improving but not at their baseline as indicated by edema, dyspnea on exertion (KIDD), and shortness of breath  7/9 cont current meds  7/10 hold lasix today due to kacy; repeat cxr this am  7/11 cont meds - holding lasix due to kacy - urinary output still good  7/12 renal function improving, monitor output   "

## 2025-07-12 NOTE — ASSESSMENT & PLAN NOTE
Patient's most recent potassium results are listed below.   Recent Labs     07/10/25  0541 07/11/25  0354 07/12/25  0602   K 4.6 5.1 4.2     Plan  - Replete potassium per protocol  - Monitor potassium Daily  - Patient's hypokalemia is on supplements - await labs from today  7/10 decrease potasium replacement  7/11 stop potassium replacement and monitor  Stable, replete prn

## 2025-07-13 LAB
ABSOLUTE EOSINOPHIL (OHS): 0.58 K/UL
ABSOLUTE MONOCYTE (OHS): 0.9 K/UL (ref 0.3–1)
ABSOLUTE NEUTROPHIL COUNT (OHS): 9.42 K/UL (ref 1.8–7.7)
ALBUMIN SERPL BCP-MCNC: 2.2 G/DL (ref 3.5–5.2)
ALP SERPL-CCNC: 106 UNIT/L (ref 40–150)
ALT SERPL W/O P-5'-P-CCNC: 13 UNIT/L (ref 10–44)
ANION GAP (OHS): 9 MMOL/L (ref 8–16)
AST SERPL-CCNC: 21 UNIT/L (ref 11–45)
BACTERIA BLD CULT: NORMAL
BACTERIA BLD CULT: NORMAL
BASOPHILS # BLD AUTO: 0.12 K/UL
BASOPHILS NFR BLD AUTO: 0.9 %
BILIRUB SERPL-MCNC: 0.2 MG/DL (ref 0.1–1)
BUN SERPL-MCNC: 25 MG/DL (ref 8–23)
CALCIUM SERPL-MCNC: 9 MG/DL (ref 8.7–10.5)
CHLORIDE SERPL-SCNC: 102 MMOL/L (ref 95–110)
CO2 SERPL-SCNC: 26 MMOL/L (ref 23–29)
CREAT SERPL-MCNC: 1.7 MG/DL (ref 0.5–1.4)
ERYTHROCYTE [DISTWIDTH] IN BLOOD BY AUTOMATED COUNT: 13.6 % (ref 11.5–14.5)
GFR SERPLBLD CREATININE-BSD FMLA CKD-EPI: 30 ML/MIN/1.73/M2
GLUCOSE SERPL-MCNC: 159 MG/DL (ref 70–110)
HCT VFR BLD AUTO: 34.3 % (ref 37–48.5)
HGB BLD-MCNC: 11.2 GM/DL (ref 12–16)
IMM GRANULOCYTES # BLD AUTO: 0.46 K/UL (ref 0–0.04)
IMM GRANULOCYTES NFR BLD AUTO: 3.5 % (ref 0–0.5)
LYMPHOCYTES # BLD AUTO: 1.71 K/UL (ref 1–4.8)
MAGNESIUM SERPL-MCNC: 1.8 MG/DL (ref 1.6–2.6)
MCH RBC QN AUTO: 27.9 PG (ref 27–31)
MCHC RBC AUTO-ENTMCNC: 32.7 G/DL (ref 32–36)
MCV RBC AUTO: 85 FL (ref 82–98)
NUCLEATED RBC (/100WBC) (OHS): 0 /100 WBC
PLATELET # BLD AUTO: 486 K/UL (ref 150–450)
PMV BLD AUTO: 9.1 FL (ref 9.2–12.9)
POCT GLUCOSE: 166 MG/DL (ref 70–110)
POTASSIUM SERPL-SCNC: 4.3 MMOL/L (ref 3.5–5.1)
PROT SERPL-MCNC: 7.4 GM/DL (ref 6–8.4)
RBC # BLD AUTO: 4.02 M/UL (ref 4–5.4)
RELATIVE EOSINOPHIL (OHS): 4.4 %
RELATIVE LYMPHOCYTE (OHS): 13 % (ref 18–48)
RELATIVE MONOCYTE (OHS): 6.8 % (ref 4–15)
RELATIVE NEUTROPHIL (OHS): 71.4 % (ref 38–73)
SODIUM SERPL-SCNC: 137 MMOL/L (ref 136–145)
WBC # BLD AUTO: 13.19 K/UL (ref 3.9–12.7)

## 2025-07-13 PROCEDURE — 36415 COLL VENOUS BLD VENIPUNCTURE: CPT | Performed by: INTERNAL MEDICINE

## 2025-07-13 PROCEDURE — 63600175 PHARM REV CODE 636 W HCPCS: Performed by: EMERGENCY MEDICINE

## 2025-07-13 PROCEDURE — 21400001 HC TELEMETRY ROOM

## 2025-07-13 PROCEDURE — 99900035 HC TECH TIME PER 15 MIN (STAT)

## 2025-07-13 PROCEDURE — 80053 COMPREHEN METABOLIC PANEL: CPT | Performed by: INTERNAL MEDICINE

## 2025-07-13 PROCEDURE — 83735 ASSAY OF MAGNESIUM: CPT | Performed by: INTERNAL MEDICINE

## 2025-07-13 PROCEDURE — 25000003 PHARM REV CODE 250: Performed by: INTERNAL MEDICINE

## 2025-07-13 PROCEDURE — 85025 COMPLETE CBC W/AUTO DIFF WBC: CPT | Performed by: INTERNAL MEDICINE

## 2025-07-13 PROCEDURE — 25000242 PHARM REV CODE 250 ALT 637 W/ HCPCS: Performed by: INTERNAL MEDICINE

## 2025-07-13 PROCEDURE — 27000221 HC OXYGEN, UP TO 24 HOURS

## 2025-07-13 PROCEDURE — 25000003 PHARM REV CODE 250: Performed by: EMERGENCY MEDICINE

## 2025-07-13 PROCEDURE — 94761 N-INVAS EAR/PLS OXIMETRY MLT: CPT

## 2025-07-13 PROCEDURE — 99900031 HC PATIENT EDUCATION (STAT)

## 2025-07-13 PROCEDURE — 63600175 PHARM REV CODE 636 W HCPCS: Performed by: INTERNAL MEDICINE

## 2025-07-13 PROCEDURE — 94640 AIRWAY INHALATION TREATMENT: CPT

## 2025-07-13 RX ORDER — NIFEDIPINE 30 MG/1
30 TABLET, EXTENDED RELEASE ORAL DAILY
Status: DISCONTINUED | OUTPATIENT
Start: 2025-07-13 | End: 2025-07-14 | Stop reason: HOSPADM

## 2025-07-13 RX ORDER — CARVEDILOL 12.5 MG/1
12.5 TABLET ORAL 2 TIMES DAILY WITH MEALS
Status: DISCONTINUED | OUTPATIENT
Start: 2025-07-13 | End: 2025-07-14 | Stop reason: HOSPADM

## 2025-07-13 RX ORDER — SODIUM CHLORIDE 0.9 % (FLUSH) 0.9 %
10 SYRINGE (ML) INJECTION EVERY 12 HOURS PRN
Status: DISCONTINUED | OUTPATIENT
Start: 2025-07-13 | End: 2025-07-13

## 2025-07-13 RX ADMIN — ALBUTEROL SULFATE 2.5 MG: 2.5 SOLUTION RESPIRATORY (INHALATION) at 07:07

## 2025-07-13 RX ADMIN — PIPERACILLIN AND TAZOBACTAM 4.5 G: 4; .5 INJECTION, POWDER, LYOPHILIZED, FOR SOLUTION INTRAVENOUS; PARENTERAL at 01:07

## 2025-07-13 RX ADMIN — GABAPENTIN 600 MG: 300 CAPSULE ORAL at 03:07

## 2025-07-13 RX ADMIN — CARVEDILOL 6.25 MG: 3.12 TABLET, FILM COATED ORAL at 08:07

## 2025-07-13 RX ADMIN — ATORVASTATIN CALCIUM 20 MG: 20 TABLET, FILM COATED ORAL at 08:07

## 2025-07-13 RX ADMIN — NIFEDIPINE 30 MG: 30 TABLET, FILM COATED, EXTENDED RELEASE ORAL at 10:07

## 2025-07-13 RX ADMIN — GABAPENTIN 600 MG: 300 CAPSULE ORAL at 08:07

## 2025-07-13 RX ADMIN — LOPERAMIDE HYDROCHLORIDE 2 MG: 2 CAPSULE ORAL at 08:07

## 2025-07-13 RX ADMIN — GUAIFENESIN 600 MG: 600 TABLET, EXTENDED RELEASE ORAL at 09:07

## 2025-07-13 RX ADMIN — ENOXAPARIN SODIUM 30 MG: 30 INJECTION SUBCUTANEOUS at 04:07

## 2025-07-13 RX ADMIN — ONDANSETRON 4 MG: 2 INJECTION INTRAMUSCULAR; INTRAVENOUS at 12:07

## 2025-07-13 RX ADMIN — GUAIFENESIN 600 MG: 600 TABLET, EXTENDED RELEASE ORAL at 08:07

## 2025-07-13 RX ADMIN — FLUOXETINE HYDROCHLORIDE 10 MG: 10 CAPSULE ORAL at 08:07

## 2025-07-13 RX ADMIN — ACETAMINOPHEN 650 MG: 325 TABLET ORAL at 01:07

## 2025-07-13 RX ADMIN — ACETAMINOPHEN 650 MG: 325 TABLET ORAL at 02:07

## 2025-07-13 RX ADMIN — ASPIRIN 81 MG: 81 TABLET, COATED ORAL at 08:07

## 2025-07-13 RX ADMIN — LOPERAMIDE HYDROCHLORIDE 2 MG: 2 CAPSULE ORAL at 12:07

## 2025-07-13 RX ADMIN — FAMOTIDINE 20 MG: 20 TABLET, FILM COATED ORAL at 08:07

## 2025-07-13 RX ADMIN — CARVEDILOL 12.5 MG: 12.5 TABLET, FILM COATED ORAL at 04:07

## 2025-07-13 RX ADMIN — ALBUTEROL SULFATE 2.5 MG: 2.5 SOLUTION RESPIRATORY (INHALATION) at 01:07

## 2025-07-13 RX ADMIN — GABAPENTIN 600 MG: 300 CAPSULE ORAL at 09:07

## 2025-07-13 RX ADMIN — INSULIN GLARGINE 16 UNITS: 100 INJECTION, SOLUTION SUBCUTANEOUS at 08:07

## 2025-07-13 RX ADMIN — LISINOPRIL 40 MG: 20 TABLET ORAL at 09:07

## 2025-07-13 NOTE — ASSESSMENT & PLAN NOTE
"Patient has Diastolic (HFpEF) heart failure that is Acute on chronic. On presentation their CHF was decompensated. Evidence of decompensated CHF on presentation includes: edema, dyspnea on exertion (KIDD), and shortness of breath. The etiology of their decompensation is likely dietary indiscretion. Most recent BNP and echo results are listed below.  No results for input(s): "BNP" in the last 72 hours.    Latest ECHO  Results for orders placed in visit on 09/20/23    Echo    Interpretation Summary    Left Ventricle: Moderately increased wall thickness. There is moderate concentric hypertrophy. Normal wall motion. Grade II diastolic dysfunction.    Left Atrium: Left atrium is moderately dilated.    Right Ventricle: Normal right ventricular cavity size. Systolic function is normal.    Right Atrium: Right atrium is mildly dilated.    Aortic Valve: The aortic valve is a trileaflet valve. Mildly calcified left, right and noncoronary cusps. Mildly restricted motion. By planimetry the aortic valve area measures 1.8 cm².    Mitral Valve: There is mild bileaflet sclerosis.    Current Heart Failure Medications  , Daily, Oral  lisinopriL tablet 40 mg, Nightly, Oral  carvediloL tablet 12.5 mg, 2 times daily with meals, Oral    Plan  - Monitor strict I&Os and daily weights.    - Place on telemetry  - Low sodium diet  - Place on fluid restriction of 2 L.   - Cardiology has been consulted  - The patient's volume status is improving but not at their baseline as indicated by edema, dyspnea on exertion (KIDD), and shortness of breath  7/9 cont current meds  7/10 hold lasix today due to kacy; repeat cxr this am  7/11 cont meds - holding lasix due to kacy - urinary output still good  7/12 renal function improving, monitor output   7/13 renal function slowly improving, diuretics on hold  "

## 2025-07-13 NOTE — ASSESSMENT & PLAN NOTE
Patient has a diagnosis of pneumonia. The cause of the pneumonia is suspected to be bacterial in etiology but organism is not known. The pneumonia is worsening due to sob, cough. The patient has the following signs/symptoms of pneumonia: persistent hypoxia , cough, and sputum production. The patient does have a current oxygen requirement and the patient does not have a home oxygen requirement. I have reviewed the pertinent imaging. The following cultures have been collected: Blood cultures The culture results are listed below.     Current antimicrobial regimen consists of the antibiotics listed below. Will monitor patient closely and continue current treatment plan unchanged.    Antibiotics (From admission, onward)      Start     Stop Route Frequency Ordered    07/10/25 1300  piperacillin-tazobactam (ZOSYN) 4.5 g in D5W 100 mL IVPB (MB+)         -- IV Every 12 hours (non-standard times) 07/10/25 0943            Microbiology Results (last 7 days)       Procedure Component Value Units Date/Time    Blood culture x two cultures. Draw prior to antibiotics. [[id number]]  (Normal) Collected: 07/07/25 1835    Order Status: Completed Specimen: Blood from Peripheral, Forearm, Right Updated: 07/13/25 0701     Blood Culture No Growth After 5 Days    Blood culture x two cultures. Draw prior to antibiotics. [[id number]]  (Normal) Collected: 07/07/25 1843    Order Status: Completed Specimen: Blood from Peripheral, Hand, Left Updated: 07/13/25 0701     Blood Culture No Growth After 5 Days    Clostridium difficile EIA [[id number]]  (Normal) Collected: 07/10/25 1018    Order Status: Completed Specimen: Stool Updated: 07/11/25 0550     C. DIFFICILE GDH AG Negative     Clostridioides difficile Toxin A/B Negative    Urine culture [[id number]] Collected: 07/07/25 1944    Order Status: Completed Specimen: Urine, Catheterized Updated: 07/09/25 1136     Urine Culture Multiple organisms isolated. None in predominance. Repeat if clinically  necessary.    Clostridium difficile EIA [[id number]]     Order Status: Canceled Specimen: Stool     Culture, Respiratory with Gram Stain [[id number]]     Order Status: Canceled Specimen: Respiratory         7/11 cont current treatment plan  7/12 iv abx on board, oxygen, wean as tolerated   7/13 IV abx on board, wean oxygen as tolerated, cultures negative, renal function improving

## 2025-07-13 NOTE — ASSESSMENT & PLAN NOTE
Patient's most recent potassium results are listed below.   Recent Labs     07/11/25  0354 07/12/25  0602 07/13/25  0537   K 5.1 4.2 4.3     Plan  - Replete potassium per protocol  - Monitor potassium Daily  - Patient's hypokalemia is on supplements - await labs from today  7/10 decrease potasium replacement  7/11 stop potassium replacement and monitor  Stable, replete prn

## 2025-07-13 NOTE — PROGRESS NOTES
[facility] Medicine  Progress Note    Patient Name: eKlsea Cadet  MRN: [id number]  Patient Class: IP- Inpatient   Admission Date: 7/7/2025  Length of Stay: 6 days  Attending Physician: Harika Mina MD  Primary Care Provider: Gabby Jackson NP        Subjective     Principal Problem:Multifocal pneumonia        HPI:  Kelsea Cadet is a 85 y.o. female with PMHX of hypertension, pulmonary hypertension, cardiomyopathy, type 2 diabetes, hyperlipidemia, CKD, chronic indwelling Land, neurogenic bladder who presents to the emergency department C/O malaise.     Patient presents with malaise.  Was having fever last week and worsening cough. Saw PCP and had XR and was treated with a Z-Oswaldo.  Patient seemed to be getting better but today has been complaining of fatigue, malaise, and nausea.  Reports some shortness of breath.  No longer having fever. Pt reports she has been coughign but unable to get the mucus up.      Reports she has recently switched cards to dr perera and saw him a few weeks ago. Pt reports taking all of her meds    Overview/Hospital Course:  7/9 ND pt repotrs feeling better this am - diureisng well  7/10 ND pt feeling better - less sob.   Pt diruesed well - will hold lasix today due to maria fernanda  7/11 ND less sob and cough - cxr reviewed from yesterday - cont to hold lasix due to maria fernanda  7/12 AA, weekend crosscover, IV antibiotics on board for pneumonia, Lasix on hold, MARIA FERNANDA improving, renal function trending down, continue to monitor urine output  7/13 AA, weekend crosscover, antibiotics on board, Lasix on hold MARIA FERNANDA slowly improving, renal function trending down blood pressure elevated, blood pressure meds adjusted    Interval History: Patient seen and examined.     Review of Systems   Constitutional:  Positive for activity change, chills and fatigue. Negative for fever.   HENT:  Positive for postnasal drip. Negative for sinus pressure and sore throat.    Respiratory:  Positive for cough  and shortness of breath. Negative for wheezing.    Cardiovascular:  Negative for chest pain, palpitations and leg swelling.   Gastrointestinal:  Negative for abdominal pain, nausea and vomiting.   Skin:  Negative for rash and wound.   Neurological:  Positive for weakness. Negative for syncope.     Objective:     Vital Signs (Most Recent):  Temp: 97.7 °F (36.5 °C) (07/13/25 0759)  Pulse: 83 (07/13/25 0759)  Resp: 18 (07/13/25 0759)  BP: (!) 175/74 (07/13/25 0852)  SpO2: (!) 94 % (07/13/25 0759) Vital Signs (24h Range):  Temp:  [97.6 °F (36.4 °C)-98.5 °F (36.9 °C)] 97.7 °F (36.5 °C)  Pulse:  [76-87] 83  Resp:  [16-20] 18  SpO2:  [94 %-98 %] 94 %  BP: (151-208)/() 175/74     Weight: 88.9 kg (196 lb)  Body mass index is 33.64 kg/m².    Intake/Output Summary (Last 24 hours) at 7/13/2025 0906  Last data filed at 7/13/2025 0459  Gross per 24 hour   Intake 480 ml   Output 2000 ml   Net -1520 ml         Physical Exam  Constitutional:       Appearance: She is ill-appearing. She is not diaphoretic.   HENT:      Head: Normocephalic and atraumatic.      Nose: No congestion or rhinorrhea.      Mouth/Throat:      Mouth: Mucous membranes are moist.   Eyes:      Extraocular Movements: Extraocular movements intact.   Cardiovascular:      Rate and Rhythm: Normal rate.   Pulmonary:      Effort: Pulmonary effort is normal. No respiratory distress.      Breath sounds: Rhonchi present. No wheezing.      Comments: O2 via nc  Abdominal:      General: Bowel sounds are normal. There is no distension.      Palpations: Abdomen is soft.      Tenderness: There is no abdominal tenderness. There is no guarding or rebound.   Genitourinary:     Comments: Land with yellow urine  Musculoskeletal:      Right lower leg: Edema present.      Left lower leg: Edema present.   Skin:     General: Skin is warm and dry.   Neurological:      General: No focal deficit present.      Mental Status: She is alert.               Significant Labs: All pertinent  labs within the past 24 hours have been reviewed.  Recent Lab Results         07/13/25  0537   07/12/25  1136        Albumin 2.2                  ALT 13         Anion Gap 9         AST 21         Baso # 0.12         Basophil % 0.9         BILIRUBIN TOTAL 0.2  Comment: For infants and newborns, interpretation of results should be based   on gestational age, weight and in agreement with clinical   observations.    Premature Infant recommended reference ranges:   0-24 hours:  <8.0 mg/dL   24-48 hours: <12.0 mg/dL   3-5 days:    <15.0 mg/dL   6-29 days:   <15.0 mg/dL         BUN 25         Calcium 9.0         Chloride 102         CO2 26         Creatinine 1.7         eGFR 30  Comment: Estimated GFR calculated using the CKD-EPI creatinine (2021) equation.         Eos # 0.58         Eos % 4.4         Glucose 159         Gran # (ANC) 9.42         Hematocrit 34.3         Hemoglobin 11.2         Immature Grans (Abs) 0.46  Comment: Mild elevation in immature granulocytes is non specific and can be seen in a variety of conditions including stress response, acute inflammation, trauma and pregnancy. Correlation with other laboratory and clinical findings is essential.         Immature Granulocytes 3.5         Lymph # 1.71         Lymph % 13.0         Magnesium  1.8         MCH 27.9         MCHC 32.7         MCV 85         Mono # 0.90         Mono % 6.8         MPV 9.1         Neut % 71.4         nRBC 0         Platelet Count 486         POCT Glucose   214       Potassium 4.3         PROTEIN TOTAL 7.4         RBC 4.02         RDW 13.6         Sodium 137         WBC 13.19                 Significant Imaging: I have reviewed all pertinent imaging results/findings within the past 24 hours.      Assessment & Plan  Multifocal pneumonia  Patient has a diagnosis of pneumonia. The cause of the pneumonia is suspected to be bacterial in etiology but organism is not known. The pneumonia is worsening due to sob, cough. The patient has  the following signs/symptoms of pneumonia: persistent hypoxia , cough, and sputum production. The patient does have a current oxygen requirement and the patient does not have a home oxygen requirement. I have reviewed the pertinent imaging. The following cultures have been collected: Blood cultures The culture results are listed below.     Current antimicrobial regimen consists of the antibiotics listed below. Will monitor patient closely and continue current treatment plan unchanged.    Antibiotics (From admission, onward)      Start     Stop Route Frequency Ordered    07/10/25 1300  piperacillin-tazobactam (ZOSYN) 4.5 g in D5W 100 mL IVPB (MB+)         -- IV Every 12 hours (non-standard times) 07/10/25 0943            Microbiology Results (last 7 days)       Procedure Component Value Units Date/Time    Blood culture x two cultures. Draw prior to antibiotics. [[id number]]  (Normal) Collected: 07/07/25 1835    Order Status: Completed Specimen: Blood from Peripheral, Forearm, Right Updated: 07/13/25 0701     Blood Culture No Growth After 5 Days    Blood culture x two cultures. Draw prior to antibiotics. [[id number]]  (Normal) Collected: 07/07/25 1843    Order Status: Completed Specimen: Blood from Peripheral, Hand, Left Updated: 07/13/25 0701     Blood Culture No Growth After 5 Days    Clostridium difficile EIA [[id number]]  (Normal) Collected: 07/10/25 1018    Order Status: Completed Specimen: Stool Updated: 07/11/25 0550     C. DIFFICILE GDH AG Negative     Clostridioides difficile Toxin A/B Negative    Urine culture [[id number]] Collected: 07/07/25 1944    Order Status: Completed Specimen: Urine, Catheterized Updated: 07/09/25 1136     Urine Culture Multiple organisms isolated. None in predominance. Repeat if clinically necessary.    Clostridium difficile EIA [[id number]]     Order Status: Canceled Specimen: Stool     Culture, Respiratory with Gram Stain [[id number]]     Order Status: Canceled Specimen:  "Respiratory         7/11 cont current treatment plan  7/12 iv abx on board, oxygen, wean as tolerated   7/13 IV abx on board, wean oxygen as tolerated, cultures negative, renal function improving   Essential hypertension  Patient's blood pressure range in the last 24 hours was: BP  Min: 151/70  Max: 208/103.The patient's inpatient anti-hypertensive regimen is listed below:  Current Antihypertensives  , Daily, Oral  lisinopriL tablet 40 mg, Nightly, Oral  carvediloL tablet 12.5 mg, 2 times daily with meals, Oral  NIFEdipine 24 hr tablet 30 mg, Daily, Oral    Plan  - BP is controlled, no changes needed to their regimen    Type 2 diabetes mellitus without complication, with long-term current use of insulin  Ada diet  Accuchecks and ssi  Cont lantus  7/9 A1c 6.4% - cont plan  Urinary retention  Has chronic machado    Cough  Tx pneumonia    CHF (congestive heart failure)  Patient has Diastolic (HFpEF) heart failure that is Acute on chronic. On presentation their CHF was decompensated. Evidence of decompensated CHF on presentation includes: edema, dyspnea on exertion (KIDD), and shortness of breath. The etiology of their decompensation is likely dietary indiscretion. Most recent BNP and echo results are listed below.  No results for input(s): "BNP" in the last 72 hours.    Latest ECHO  Results for orders placed in visit on 09/20/23    Echo    Interpretation Summary    Left Ventricle: Moderately increased wall thickness. There is moderate concentric hypertrophy. Normal wall motion. Grade II diastolic dysfunction.    Left Atrium: Left atrium is moderately dilated.    Right Ventricle: Normal right ventricular cavity size. Systolic function is normal.    Right Atrium: Right atrium is mildly dilated.    Aortic Valve: The aortic valve is a trileaflet valve. Mildly calcified left, right and noncoronary cusps. Mildly restricted motion. By planimetry the aortic valve area measures 1.8 cm².    Mitral Valve: There is mild bileaflet " sclerosis.    Current Heart Failure Medications  , Daily, Oral  lisinopriL tablet 40 mg, Nightly, Oral  carvediloL tablet 12.5 mg, 2 times daily with meals, Oral    Plan  - Monitor strict I&Os and daily weights.    - Place on telemetry  - Low sodium diet  - Place on fluid restriction of 2 L.   - Cardiology has been consulted  - The patient's volume status is improving but not at their baseline as indicated by edema, dyspnea on exertion (KIDD), and shortness of breath  7/9 cont current meds  7/10 hold lasix today due to kacy; repeat cxr this am  7/11 cont meds - holding lasix due to kacy - urinary output still good  7/12 renal function improving, monitor output   7/13 renal function slowly improving, diuretics on hold  Hypokalemia  Patient's most recent potassium results are listed below.   Recent Labs     07/11/25  0354 07/12/25  0602 07/13/25  0537   K 5.1 4.2 4.3     Plan  - Replete potassium per protocol  - Monitor potassium Daily  - Patient's hypokalemia is on supplements - await labs from today  7/10 decrease potasium replacement  7/11 stop potassium replacement and monitor  Stable, replete prn   VTE Risk Mitigation (From admission, onward)           Ordered     enoxaparin injection 30 mg  Every 24 hours         07/09/25 1020     IP VTE HIGH RISK PATIENT  Once         07/07/25 2105     Place PK hose  Until discontinued         07/07/25 2105                    Discharge Planning   CATRACHITO: 7/14/2025     Code Status: Full Code   Medical Readiness for Discharge Date:   Discharge Plan A: Home Health   Discharge Delays: None known at this time              Please place Justification for DME      Kieran Kitchen MD  Department of Hospital Medicine   [facility]

## 2025-07-13 NOTE — SUBJECTIVE & OBJECTIVE
Interval History: Patient seen and examined.     Review of Systems   Constitutional:  Positive for activity change, chills and fatigue. Negative for fever.   HENT:  Positive for postnasal drip. Negative for sinus pressure and sore throat.    Respiratory:  Positive for cough and shortness of breath. Negative for wheezing.    Cardiovascular:  Negative for chest pain, palpitations and leg swelling.   Gastrointestinal:  Negative for abdominal pain, nausea and vomiting.   Skin:  Negative for rash and wound.   Neurological:  Positive for weakness. Negative for syncope.     Objective:     Vital Signs (Most Recent):  Temp: 97.7 °F (36.5 °C) (07/13/25 0759)  Pulse: 83 (07/13/25 0759)  Resp: 18 (07/13/25 0759)  BP: (!) 175/74 (07/13/25 0852)  SpO2: (!) 94 % (07/13/25 0759) Vital Signs (24h Range):  Temp:  [97.6 °F (36.4 °C)-98.5 °F (36.9 °C)] 97.7 °F (36.5 °C)  Pulse:  [76-87] 83  Resp:  [16-20] 18  SpO2:  [94 %-98 %] 94 %  BP: (151-208)/() 175/74     Weight: 88.9 kg (196 lb)  Body mass index is 33.64 kg/m².    Intake/Output Summary (Last 24 hours) at 7/13/2025 0906  Last data filed at 7/13/2025 0459  Gross per 24 hour   Intake 480 ml   Output 2000 ml   Net -1520 ml         Physical Exam  Constitutional:       Appearance: She is ill-appearing. She is not diaphoretic.   HENT:      Head: Normocephalic and atraumatic.      Nose: No congestion or rhinorrhea.      Mouth/Throat:      Mouth: Mucous membranes are moist.   Eyes:      Extraocular Movements: Extraocular movements intact.   Cardiovascular:      Rate and Rhythm: Normal rate.   Pulmonary:      Effort: Pulmonary effort is normal. No respiratory distress.      Breath sounds: Rhonchi present. No wheezing.      Comments: O2 via nc  Abdominal:      General: Bowel sounds are normal. There is no distension.      Palpations: Abdomen is soft.      Tenderness: There is no abdominal tenderness. There is no guarding or rebound.   Genitourinary:     Comments: Land with yellow  urine  Musculoskeletal:      Right lower leg: Edema present.      Left lower leg: Edema present.   Skin:     General: Skin is warm and dry.   Neurological:      General: No focal deficit present.      Mental Status: She is alert.               Significant Labs: All pertinent labs within the past 24 hours have been reviewed.  Recent Lab Results         07/13/25  0537   07/12/25  1136        Albumin 2.2                  ALT 13         Anion Gap 9         AST 21         Baso # 0.12         Basophil % 0.9         BILIRUBIN TOTAL 0.2  Comment: For infants and newborns, interpretation of results should be based   on gestational age, weight and in agreement with clinical   observations.    Premature Infant recommended reference ranges:   0-24 hours:  <8.0 mg/dL   24-48 hours: <12.0 mg/dL   3-5 days:    <15.0 mg/dL   6-29 days:   <15.0 mg/dL         BUN 25         Calcium 9.0         Chloride 102         CO2 26         Creatinine 1.7         eGFR 30  Comment: Estimated GFR calculated using the CKD-EPI creatinine (2021) equation.         Eos # 0.58         Eos % 4.4         Glucose 159         Gran # (ANC) 9.42         Hematocrit 34.3         Hemoglobin 11.2         Immature Grans (Abs) 0.46  Comment: Mild elevation in immature granulocytes is non specific and can be seen in a variety of conditions including stress response, acute inflammation, trauma and pregnancy. Correlation with other laboratory and clinical findings is essential.         Immature Granulocytes 3.5         Lymph # 1.71         Lymph % 13.0         Magnesium  1.8         MCH 27.9         MCHC 32.7         MCV 85         Mono # 0.90         Mono % 6.8         MPV 9.1         Neut % 71.4         nRBC 0         Platelet Count 486         POCT Glucose   214       Potassium 4.3         PROTEIN TOTAL 7.4         RBC 4.02         RDW 13.6         Sodium 137         WBC 13.19                 Significant Imaging: I have reviewed all pertinent imaging  results/findings within the past 24 hours.

## 2025-07-13 NOTE — CARE UPDATE
07/13/25 1331   PRE-TX-O2   Device (Oxygen Therapy) (S)  room air   SpO2 (!) (S)  92 %   Pulse (S)  84

## 2025-07-13 NOTE — ASSESSMENT & PLAN NOTE
Patient's blood pressure range in the last 24 hours was: BP  Min: 151/70  Max: 208/103.The patient's inpatient anti-hypertensive regimen is listed below:  Current Antihypertensives  , Daily, Oral  lisinopriL tablet 40 mg, Nightly, Oral  carvediloL tablet 12.5 mg, 2 times daily with meals, Oral  NIFEdipine 24 hr tablet 30 mg, Daily, Oral    Plan  - BP is controlled, no changes needed to their regimen

## 2025-07-13 NOTE — PLAN OF CARE
Plan of care reviewed and ongoing with pt. 22 gauge IV to L hand saline locked, 1L NC tolerating well, machado catheter intact. BM noted during the night. Tolerated medications well during the night. Call light and personal items within reach of patient during the night.       Problem: Infection  Goal: Absence of Infection Signs and Symptoms  Outcome: Not Progressing     Problem: Adult Inpatient Plan of Care  Goal: Plan of Care Review  Outcome: Not Progressing  Goal: Patient-Specific Goal (Individualized)  Outcome: Not Progressing  Goal: Absence of Hospital-Acquired Illness or Injury  Outcome: Not Progressing  Goal: Optimal Comfort and Wellbeing  Outcome: Not Progressing  Goal: Readiness for Transition of Care  Outcome: Not Progressing     Problem: Diabetes Comorbidity  Goal: Blood Glucose Level Within Targeted Range  Outcome: Not Progressing     Problem: Pneumonia  Goal: Fluid Balance  Outcome: Not Progressing  Goal: Resolution of Infection Signs and Symptoms  Outcome: Not Progressing  Goal: Effective Oxygenation and Ventilation  Outcome: Not Progressing     Problem: Fall Injury Risk  Goal: Absence of Fall and Fall-Related Injury  Outcome: Not Progressing     Problem: Wound  Goal: Optimal Coping  Outcome: Not Progressing  Goal: Optimal Functional Ability  Outcome: Not Progressing  Goal: Absence of Infection Signs and Symptoms  Outcome: Not Progressing  Goal: Improved Oral Intake  Outcome: Not Progressing  Goal: Optimal Pain Control and Function  Outcome: Not Progressing  Goal: Skin Health and Integrity  Outcome: Not Progressing  Goal: Optimal Wound Healing  Outcome: Not Progressing     Problem: Skin Injury Risk Increased  Goal: Skin Health and Integrity  Outcome: Not Progressing

## 2025-07-13 NOTE — PLAN OF CARE
07/13/25 0944   Medicare Message   Important Message from Medicare regarding Discharge Appeal Rights Given to patient/caregiver;Explained to patient/caregiver;Signed/date by patient/caregiver   Date IMM was signed 07/13/25   Time IMM was signed 0943

## 2025-07-14 VITALS
TEMPERATURE: 98 F | DIASTOLIC BLOOD PRESSURE: 78 MMHG | OXYGEN SATURATION: 90 % | RESPIRATION RATE: 20 BRPM | HEART RATE: 85 BPM | SYSTOLIC BLOOD PRESSURE: 133 MMHG | HEIGHT: 64 IN | BODY MASS INDEX: 33.46 KG/M2 | WEIGHT: 196 LBS

## 2025-07-14 LAB
ABSOLUTE EOSINOPHIL (OHS): 0.46 K/UL
ABSOLUTE MONOCYTE (OHS): 0.84 K/UL (ref 0.3–1)
ABSOLUTE NEUTROPHIL COUNT (OHS): 12.68 K/UL (ref 1.8–7.7)
ALBUMIN SERPL BCP-MCNC: 2.4 G/DL (ref 3.5–5.2)
ALP SERPL-CCNC: 110 UNIT/L (ref 40–150)
ALT SERPL W/O P-5'-P-CCNC: 13 UNIT/L (ref 10–44)
ANION GAP (OHS): 11 MMOL/L (ref 8–16)
AST SERPL-CCNC: 16 UNIT/L (ref 11–45)
BASOPHILS # BLD AUTO: 0.12 K/UL
BASOPHILS NFR BLD AUTO: 0.7 %
BILIRUB SERPL-MCNC: 0.3 MG/DL (ref 0.1–1)
BUN SERPL-MCNC: 21 MG/DL (ref 8–23)
CALCIUM SERPL-MCNC: 9.2 MG/DL (ref 8.7–10.5)
CHLORIDE SERPL-SCNC: 102 MMOL/L (ref 95–110)
CO2 SERPL-SCNC: 25 MMOL/L (ref 23–29)
CREAT SERPL-MCNC: 1.6 MG/DL (ref 0.5–1.4)
ERYTHROCYTE [DISTWIDTH] IN BLOOD BY AUTOMATED COUNT: 13.7 % (ref 11.5–14.5)
GFR SERPLBLD CREATININE-BSD FMLA CKD-EPI: 32 ML/MIN/1.73/M2
GLUCOSE SERPL-MCNC: 180 MG/DL (ref 70–110)
HCT VFR BLD AUTO: 39 % (ref 37–48.5)
HGB BLD-MCNC: 12.6 GM/DL (ref 12–16)
IMM GRANULOCYTES # BLD AUTO: 0.39 K/UL (ref 0–0.04)
IMM GRANULOCYTES NFR BLD AUTO: 2.4 % (ref 0–0.5)
LYMPHOCYTES # BLD AUTO: 1.61 K/UL (ref 1–4.8)
MAGNESIUM SERPL-MCNC: 1.8 MG/DL (ref 1.6–2.6)
MCH RBC QN AUTO: 27.8 PG (ref 27–31)
MCHC RBC AUTO-ENTMCNC: 32.3 G/DL (ref 32–36)
MCV RBC AUTO: 86 FL (ref 82–98)
NUCLEATED RBC (/100WBC) (OHS): 0 /100 WBC
PLATELET # BLD AUTO: 473 K/UL (ref 150–450)
PMV BLD AUTO: 8.9 FL (ref 9.2–12.9)
POCT GLUCOSE: 217 MG/DL (ref 70–110)
POTASSIUM SERPL-SCNC: 3.9 MMOL/L (ref 3.5–5.1)
PROT SERPL-MCNC: 7.9 GM/DL (ref 6–8.4)
RBC # BLD AUTO: 4.53 M/UL (ref 4–5.4)
RELATIVE EOSINOPHIL (OHS): 2.9 %
RELATIVE LYMPHOCYTE (OHS): 10 % (ref 18–48)
RELATIVE MONOCYTE (OHS): 5.2 % (ref 4–15)
RELATIVE NEUTROPHIL (OHS): 78.8 % (ref 38–73)
SODIUM SERPL-SCNC: 138 MMOL/L (ref 136–145)
WBC # BLD AUTO: 16.1 K/UL (ref 3.9–12.7)

## 2025-07-14 PROCEDURE — 63600175 PHARM REV CODE 636 W HCPCS: Performed by: INTERNAL MEDICINE

## 2025-07-14 PROCEDURE — 97530 THERAPEUTIC ACTIVITIES: CPT

## 2025-07-14 PROCEDURE — 94640 AIRWAY INHALATION TREATMENT: CPT

## 2025-07-14 PROCEDURE — 97116 GAIT TRAINING THERAPY: CPT

## 2025-07-14 PROCEDURE — 25000003 PHARM REV CODE 250: Performed by: INTERNAL MEDICINE

## 2025-07-14 PROCEDURE — 94761 N-INVAS EAR/PLS OXIMETRY MLT: CPT

## 2025-07-14 PROCEDURE — 83735 ASSAY OF MAGNESIUM: CPT | Performed by: INTERNAL MEDICINE

## 2025-07-14 PROCEDURE — 99900035 HC TECH TIME PER 15 MIN (STAT)

## 2025-07-14 PROCEDURE — 85025 COMPLETE CBC W/AUTO DIFF WBC: CPT | Performed by: INTERNAL MEDICINE

## 2025-07-14 PROCEDURE — 25000003 PHARM REV CODE 250: Performed by: EMERGENCY MEDICINE

## 2025-07-14 PROCEDURE — 27000221 HC OXYGEN, UP TO 24 HOURS

## 2025-07-14 PROCEDURE — 36415 COLL VENOUS BLD VENIPUNCTURE: CPT | Performed by: INTERNAL MEDICINE

## 2025-07-14 PROCEDURE — 99900031 HC PATIENT EDUCATION (STAT)

## 2025-07-14 PROCEDURE — 25000242 PHARM REV CODE 250 ALT 637 W/ HCPCS: Performed by: INTERNAL MEDICINE

## 2025-07-14 PROCEDURE — 80053 COMPREHEN METABOLIC PANEL: CPT | Performed by: INTERNAL MEDICINE

## 2025-07-14 RX ORDER — POTASSIUM CHLORIDE 750 MG/1
99 TABLET, EXTENDED RELEASE ORAL ONCE
Qty: 10 TABLET | Refills: 0 | Status: SHIPPED | OUTPATIENT
Start: 2025-07-14 | End: 2025-07-15 | Stop reason: SDUPTHER

## 2025-07-14 RX ORDER — NYSTATIN 100000 [USP'U]/ML
SUSPENSION ORAL
Qty: 200 ML | Refills: 0 | OUTPATIENT
Start: 2025-07-14

## 2025-07-14 RX ORDER — AMOXICILLIN AND CLAVULANATE POTASSIUM 875; 125 MG/1; MG/1
1 TABLET, FILM COATED ORAL 2 TIMES DAILY
Qty: 14 TABLET | Refills: 0 | Status: SHIPPED | OUTPATIENT
Start: 2025-07-14

## 2025-07-14 RX ORDER — ATORVASTATIN CALCIUM 40 MG/1
20 TABLET, FILM COATED ORAL DAILY
Start: 2025-07-14 | End: 2026-07-14

## 2025-07-14 RX ORDER — FUROSEMIDE 20 MG/1
20 TABLET ORAL DAILY PRN
Start: 2025-07-14 | End: 2025-07-15 | Stop reason: SDUPTHER

## 2025-07-14 RX ORDER — IBUPROFEN 200 MG
1 CAPSULE ORAL
Start: 2025-07-14

## 2025-07-14 RX ORDER — CARVEDILOL 12.5 MG/1
12.5 TABLET ORAL 2 TIMES DAILY WITH MEALS
Qty: 180 TABLET | Refills: 3 | Status: SHIPPED | OUTPATIENT
Start: 2025-07-14 | End: 2025-07-18

## 2025-07-14 RX ORDER — CHOLECALCIFEROL (VITAMIN D3) 50 MCG
1 TABLET ORAL
COMMUNITY
Start: 2025-07-14

## 2025-07-14 RX ORDER — NIFEDIPINE 30 MG/1
30 TABLET, EXTENDED RELEASE ORAL DAILY
Qty: 30 TABLET | Refills: 11 | Status: SHIPPED | OUTPATIENT
Start: 2025-07-14 | End: 2026-07-14

## 2025-07-14 RX ORDER — DICLOFENAC SODIUM 10 MG/G
2 GEL TOPICAL 3 TIMES DAILY PRN
Start: 2025-07-14 | End: 2025-07-15 | Stop reason: SDUPTHER

## 2025-07-14 RX ADMIN — ATORVASTATIN CALCIUM 20 MG: 20 TABLET, FILM COATED ORAL at 10:07

## 2025-07-14 RX ADMIN — INSULIN ASPART 2 UNITS: 100 INJECTION, SOLUTION INTRAVENOUS; SUBCUTANEOUS at 10:07

## 2025-07-14 RX ADMIN — ACETAMINOPHEN 650 MG: 325 TABLET ORAL at 10:07

## 2025-07-14 RX ADMIN — NIFEDIPINE 30 MG: 30 TABLET, FILM COATED, EXTENDED RELEASE ORAL at 10:07

## 2025-07-14 RX ADMIN — CARVEDILOL 12.5 MG: 12.5 TABLET, FILM COATED ORAL at 10:07

## 2025-07-14 RX ADMIN — GABAPENTIN 600 MG: 300 CAPSULE ORAL at 10:07

## 2025-07-14 RX ADMIN — GUAIFENESIN 600 MG: 600 TABLET, EXTENDED RELEASE ORAL at 10:07

## 2025-07-14 RX ADMIN — PIPERACILLIN AND TAZOBACTAM 4.5 G: 4; .5 INJECTION, POWDER, LYOPHILIZED, FOR SOLUTION INTRAVENOUS; PARENTERAL at 12:07

## 2025-07-14 RX ADMIN — ASPIRIN 81 MG: 81 TABLET, COATED ORAL at 10:07

## 2025-07-14 RX ADMIN — FLUOXETINE HYDROCHLORIDE 10 MG: 10 CAPSULE ORAL at 10:07

## 2025-07-14 RX ADMIN — ALBUTEROL SULFATE 2.5 MG: 2.5 SOLUTION RESPIRATORY (INHALATION) at 07:07

## 2025-07-14 RX ADMIN — INSULIN GLARGINE 16 UNITS: 100 INJECTION, SOLUTION SUBCUTANEOUS at 10:07

## 2025-07-14 NOTE — PLAN OF CARE
07/14/25 1204   Post-Acute Status   Post-Acute Authorization HME   HME Status (!) Pending Delivery     Call from [facility] Bet[facility].  is in route with portable oxygen concentrator.

## 2025-07-14 NOTE — NURSING
Patient's daughter, Elizabeth, contacted and updated on patient's discharge plan. Patient will have to wait until portable oxygen is delivered by [facility] before she can leave.

## 2025-07-14 NOTE — PLAN OF CARE
07/14/25 0955   Post-Acute Status   Post-Acute Authorization HME   E Status Referrals Sent     New referral for portable oxygen concentrator and home oxygen concentrator sent to [facility] [place]. Awaiting clinical review.

## 2025-07-14 NOTE — PT/OT/SLP DISCHARGE
Physical Therapy Discharge Summary    Name: Kelsea Cadet  MRN: [id number]   Principal Problem: Multifocal pneumonia     Patient Discharged from acute Physical Therapy on 2025 .  Please refer to prior PT note dated  2025  for functional status.     Assessment:     Patient appropriate for care in another setting.    Objective:     GOALS:   Multidisciplinary Problems       Physical Therapy Goals          Problem: Physical Therapy    Goal Priority Disciplines Outcome Interventions   Physical Therapy Goal     PT, PT/OT Adequate for Care Transition    Description: Goals to be met by 2025   Patient will increase functional independence with mobility by performin. Supine to sit with Modified Independent  2. Sit to supine with Modified Independent  3. Bed to chair transfer with Modified Independent with rolling walker using Stand step  TECHNIQUE  4. Gait  x 100  feet with Supervision with rolling walker and O2 supplement   5. Lower extremity exercise program x10 reps with assistance as needed                        Reasons for Discontinuation of Therapy Services  Transfer to alternate level of care.      Plan:     Patient Discharged to: Home with Home Health Service.      2025

## 2025-07-14 NOTE — ASSESSMENT & PLAN NOTE
Patient's most recent potassium results are listed below.   Recent Labs     07/12/25  0602 07/13/25  0537 07/14/25  0409   K 4.2 4.3 3.9     Plan  - Replete potassium per protocol  - Monitor potassium Daily  - Patient's hypokalemia is on supplements - await labs from today  7/10 decrease potasium replacement  7/11 stop potassium replacement and monitor  Stable, replete prn   7/14 stable

## 2025-07-14 NOTE — PT/OT/SLP PROGRESS
"Physical Therapy Treatment    Patient Name:  Kelsea Cadet   MRN:  [id number]    Recommendations:     Discharge Recommendations: Low Intensity Therapy  Discharge Equipment Recommendations: none  Barriers to discharge: None    Assessment:     Kelsea Cadet is a 85 y.o. female admitted with a medical diagnosis of Multifocal pneumonia.  She presents with the following impairments/functional limitations: weakness, impaired joint extensibility, impaired endurance, impaired self care skills, impaired functional mobility, decreased lower extremity function, decreased upper extremity function, decreased safety awareness, impaired balance, impaired cardiopulmonary response to activity,  and impaired muscle length .  Anticipate discharge today with recommended follow up from home health P.T.  Patient was found supine in bed with nursing students at bedside.  She requires  set up assistance with supine to sit, set up assistance with sit <> stand using a RW,  ambulated ~90 feet with RW with 1 liter of O2 via nasal cannula with  CGA/SBA.  Left sitting at edge of bed  with nursing students.     Rehab Prognosis: Fair; patient would benefit from acute skilled PT services to address these deficits and reach maximum level of function.    Recent Surgery: * No surgery found *      Plan:     During this hospitalization, patient to be seen  (3-5x a week) to address the identified rehab impairments via gait training, therapeutic activities, therapeutic exercises, neuromuscular re-education and progress toward the following goals:    Plan of Care Expires:  07/18/25    Subjective     Chief Complaint: "I am going home today."   Patient/Family Comments/goals: unstated   Pain/Comfort:  Pain Rating 1: 0/10  Pain Rating Post-Intervention 1: 0/10      Objective:     Communicated with nurse and patient  prior to session.  Patient found supine with peripheral IV, oxygen, machado catheter upon PT entry to room.     General Precautions: Standard, " fall, respiratory  Orthopedic Precautions: N/A  Braces: N/A  Respiratory Status: Nasal cannula, flow 1 L/min     Functional Mobility:  Bed Mobility:     Rolling Left:  supervision  Rolling Right: supervision  Scooting: supervision  Bridging: supervision  Supine to Sit: supervision  Transfers:     Sit to Stand:  stand by assistance with rolling walker  Gait: 90 feet with RW and 1 liter of O2 via nasal cannula with CGA/SBA   Balance: independent with static sitting, SBA with static standing using  a RW         AM-PAC 6 CLICK MOBILITY  Turning over in bed (including adjusting bedclothes, sheets and blankets)?: 3  Sitting down on and standing up from a chair with arms (e.g., wheelchair, bedside commode, etc.): 3  Moving from lying on back to sitting on the side of the bed?: 3  Moving to and from a bed to a chair (including a wheelchair)?: 3  Need to walk in hospital room?: 3  Climbing 3-5 steps with a railing?: 3 (based on clinical judgement)  Basic Mobility Total Score: 18       Treatment & Education:  Rolling side <> side  Supine > sit  Scooting to the edge  of the bed   Sitting balance/tolerance  Sit <> stand with RW  Standing balance/tolerance   Gait with RW  and O2 supplement   Bed organization to reduce risk of skin breakdowns   Line management/organization   Continuous verbal cues for situational/environmental awareness     Seated   Both Lower Extremity   Active ROM Sets / Reps / Resistance / Etc.   Hip Flexion 1 x 10    LAQ 1 x 10    Ankle DF/PF 1 x 10         Patient left up in chair with all lines intact, call button in reach, nurse  notified, and nurse students  present..    GOALS:   Multidisciplinary Problems       Physical Therapy Goals          Problem: Physical Therapy    Goal Priority Disciplines Outcome Interventions   Physical Therapy Goal     PT, PT/OT Adequate for Care Transition    Description: Goals to be met by 7/18/2025   Patient will increase functional independence with mobility by  performin. Supine to sit with Modified Independent  2. Sit to supine with Modified Independent  3. Bed to chair transfer with Modified Independent with rolling walker using Stand step  TECHNIQUE  4. Gait  x 100  feet with Supervision with rolling walker and O2 supplement   5. Lower extremity exercise program x10 reps with assistance as needed                        DME Justifications:  No DME recommended requiring DME justifications    Time Tracking:     PT Received On: 25  PT Start Time: 0850     PT Stop Time: 0914  PT Total Time (min): 24 min     Billable Minutes: Gait Training 14 and Therapeutic Activity 10    Treatment Type: Treatment  PT/PTA: PT     Number of PTA visits since last PT visit: 0     2025

## 2025-07-14 NOTE — PLAN OF CARE
AVS given to patient and reviewed with patient and patient's daughter. PIV removed and intact. Tele removed and returned to nurse's station. Land care provided. AVS and patient belongings in hand. Patient wheeled down to personal transportation by staff.   Problem: Infection  Goal: Absence of Infection Signs and Symptoms  Outcome: Met     Problem: Adult Inpatient Plan of Care  Goal: Plan of Care Review  Outcome: Met  Goal: Patient-Specific Goal (Individualized)  Outcome: Met  Goal: Absence of Hospital-Acquired Illness or Injury  Outcome: Met  Goal: Optimal Comfort and Wellbeing  Outcome: Met  Goal: Readiness for Transition of Care  Outcome: Met     Problem: Diabetes Comorbidity  Goal: Blood Glucose Level Within Targeted Range  Outcome: Met     Problem: Pneumonia  Goal: Fluid Balance  Outcome: Met  Goal: Resolution of Infection Signs and Symptoms  Outcome: Met  Goal: Effective Oxygenation and Ventilation  Outcome: Met     Problem: Fall Injury Risk  Goal: Absence of Fall and Fall-Related Injury  Outcome: Met     Problem: Wound  Goal: Optimal Coping  Outcome: Met  Goal: Optimal Functional Ability  Outcome: Met  Goal: Absence of Infection Signs and Symptoms  Outcome: Met  Goal: Improved Oral Intake  Outcome: Met  Goal: Optimal Pain Control and Function  Outcome: Met  Goal: Skin Health and Integrity  Outcome: Met  Goal: Optimal Wound Healing  Outcome: Met     Problem: Skin Injury Risk Increased  Goal: Skin Health and Integrity  Outcome: Met

## 2025-07-14 NOTE — PLAN OF CARE
Problem: Physical Therapy  Goal: Physical Therapy Goal  Description: Goals to be met by 2025   Patient will increase functional independence with mobility by performin. Supine to sit with Modified Independent  2. Sit to supine with Modified Independent  3. Bed to chair transfer with Modified Independent with rolling walker using Stand step  TECHNIQUE  4. Gait  x 100  feet with Supervision with rolling walker and O2 supplement   5. Lower extremity exercise program x10 reps with assistance as needed   Outcome: Adequate for Care Transition, discharge to home

## 2025-07-14 NOTE — RESPIRATORY THERAPY
07/14/25 0850   Patient Assessment/Suction   Level of Consciousness (AVPU) alert   Home Oxygen Qualification   $ Home O2 Qualification Tech time 15 minutes   Room Air SpO2 At Rest (!) 88 %  (88% on room air at rest)   Heart Rate on O2 101 bpm     Patient's O2 saturation 88% on room air at rest. RT placed patient on 1LNC at this time. Oxygen saturation increased to 93%. Patient remains on 1LNC post home O2 eval. SHANNON Parra and Jeffrey RN with Case Management notified.

## 2025-07-14 NOTE — PLAN OF CARE
07/14/25 1232   Post-Acute Status   Post-Acute Authorization HME   HME Status Set-up Complete/Auth obtained     POC arrived to hospital room. Teaching completed with patient. Oxygen set-up complete

## 2025-07-14 NOTE — DISCHARGE SUMMARY
[facility] Medicine  Discharge Summary      Patient Name: Kelsea Cadet  MRN: [id number]  [facility]: [id number]  Patient Class: IP- Inpatient  Admission Date: 7/7/2025  Hospital Length of Stay: 7 days  Discharge Date and Time: 07/14/2025 8:47 AM  Attending Physician: Harika Mina MD   Discharging Provider: Harika Mina MD  Primary Care Provider: Gabby Jackson NP    Primary Care Team: Networked reference to record PCT     HPI:   Kelsea Cadet is a 85 y.o. female with PMHX of hypertension, pulmonary hypertension, cardiomyopathy, type 2 diabetes, hyperlipidemia, CKD, chronic indwelling Land, neurogenic bladder who presents to the emergency department C/O malaise.     Patient presents with malaise.  Was having fever last week and worsening cough. Saw PCP and had XR and was treated with a Z-Oswaldo.  Patient seemed to be getting better but today has been complaining of fatigue, malaise, and nausea.  Reports some shortness of breath.  No longer having fever. Pt reports she has been coughign but unable to get the mucus up.      Reports she has recently switched cards to dr perera and saw him a few weeks ago. Pt reports taking all of her meds    * No surgery found *      Hospital Course:   7/9 ND pt repotrs feeling better this am - diureisng well  7/10 ND pt feeling better - less sob.   Pt diruesed well - will hold lasix today due to maria fernanda  7/11 ND less sob and cough - cxr reviewed from yesterday - cont to hold lasix due to maria fernanda  7/12 AA, weekend crosscover, IV antibiotics on board for pneumonia, Lasix on hold, MARIA FERNANDA improving, renal function trending down, continue to monitor urine output  7/13 AA, weekend crosscover, antibiotics on board, Lasix on hold MARIA FERNANDA slowly improving, renal function trending down blood pressure elevated, blood pressure meds adjusted  7/14 ND pt feelign well - no complaints today -ready to go home     Goals of Care Treatment Preferences:  Code Status: Full Code    Living  Will: Yes                 Consults:   Consults (From admission, onward)          Status Ordering Provider     Inpatient consult to Cardiology  Once        Provider:  Lorenzo Temple MD    Completed ELIZA WOMACK.            Assessment & Plan  Multifocal pneumonia  Patient has a diagnosis of pneumonia. The cause of the pneumonia is suspected to be bacterial in etiology but organism is not known. The pneumonia is worsening due to sob, cough. The patient has the following signs/symptoms of pneumonia: persistent hypoxia , cough, and sputum production. The patient does have a current oxygen requirement and the patient does not have a home oxygen requirement. I have reviewed the pertinent imaging. The following cultures have been collected: Blood cultures The culture results are listed below.     Current antimicrobial regimen consists of the antibiotics listed below. Will monitor patient closely and continue current treatment plan unchanged.    Antibiotics (From admission, onward)      Start     Stop Route Frequency Ordered    07/10/25 1300  piperacillin-tazobactam (ZOSYN) 4.5 g in D5W 100 mL IVPB (MB+)         -- IV Every 12 hours (non-standard times) 07/10/25 0943            Microbiology Results (last 7 days)       Procedure Component Value Units Date/Time    Blood culture x two cultures. Draw prior to antibiotics. [[id number]]  (Normal) Collected: 07/07/25 1835    Order Status: Completed Specimen: Blood from Peripheral, Forearm, Right Updated: 07/13/25 0701     Blood Culture No Growth After 5 Days    Blood culture x two cultures. Draw prior to antibiotics. [[id number]]  (Normal) Collected: 07/07/25 1843    Order Status: Completed Specimen: Blood from Peripheral, Hand, Left Updated: 07/13/25 0701     Blood Culture No Growth After 5 Days    Clostridium difficile EIA [[id number]]  (Normal) Collected: 07/10/25 1018    Order Status: Completed Specimen: Stool Updated: 07/11/25 0550     C. DIFFICILE GDH AG Negative      Clostridioides difficile Toxin A/B Negative    Urine culture [[id number]] Collected: 07/07/25 1944    Order Status: Completed Specimen: Urine, Catheterized Updated: 07/09/25 1136     Urine Culture Multiple organisms isolated. None in predominance. Repeat if clinically necessary.    Clostridium difficile EIA [[id number]]     Order Status: Canceled Specimen: Stool     Culture, Respiratory with Gram Stain [[id number]]     Order Status: Canceled Specimen: Respiratory         7/11 cont current treatment plan  7/12 iv abx on board, oxygen, wean as tolerated   7/13 IV abx on board, wean oxygen as tolerated, cultures negative, renal function improving   7/14 dc home with HH - complete po augmenitn at home - will evaluate for home O2  Essential hypertension  Patient's blood pressure range in the last 24 hours was: BP  Min: 138/72  Max: 175/74.The patient's inpatient anti-hypertensive regimen is listed below:  Current Antihypertensives  lisinopriL tablet 40 mg, Nightly, Oral  carvediloL tablet 12.5 mg, 2 times daily with meals, Oral  NIFEdipine 24 hr tablet 30 mg, Daily, Oral  furosemide (LASIX) tablet, Daily PRN, Oral  carvedilol (COREG) tablet, 2 times daily with meals, Oral  NIFEdipine (PROCARDIA-XL) 24 hr tablet, Daily, Oral    Plan  - BP is controlled, no changes needed to their regimen    Type 2 diabetes mellitus without complication, with long-term current use of insulin  Ada diet  Accuchecks and ssi  Cont lantus  7/9 A1c 6.4% - cont plan  Urinary retention  Has chronic machado    Cough  Tx pneumonia  7/14 imrpoved  CHF (congestive heart failure)  Patient has Diastolic (HFpEF) heart failure that is Acute on chronic. On presentation their CHF was decompensated. Evidence of decompensated CHF on presentation includes: edema, dyspnea on exertion (KIDD), and shortness of breath. The etiology of their decompensation is likely dietary indiscretion. Most recent BNP and echo results are listed below.  No results for input(s):  ""BNP" in the last 72 hours.    Latest ECHO  Results for orders placed in visit on 09/20/23    Echo    Interpretation Summary    Left Ventricle: Moderately increased wall thickness. There is moderate concentric hypertrophy. Normal wall motion. Grade II diastolic dysfunction.    Left Atrium: Left atrium is moderately dilated.    Right Ventricle: Normal right ventricular cavity size. Systolic function is normal.    Right Atrium: Right atrium is mildly dilated.    Aortic Valve: The aortic valve is a trileaflet valve. Mildly calcified left, right and noncoronary cusps. Mildly restricted motion. By planimetry the aortic valve area measures 1.8 cm².    Mitral Valve: There is mild bileaflet sclerosis.    Current Heart Failure Medications  lisinopriL tablet 40 mg, Nightly, Oral  carvediloL tablet 12.5 mg, 2 times daily with meals, Oral  furosemide (LASIX) tablet, Daily PRN, Oral  carvedilol (COREG) tablet, 2 times daily with meals, Oral    Plan  - Monitor strict I&Os and daily weights.    - Place on telemetry  - Low sodium diet  - Place on fluid restriction of 2 L.   - Cardiology has been consulted  - The patient's volume status is improving but not at their baseline as indicated by edema, dyspnea on exertion (KIDD), and shortness of breath  7/9 cont current meds  7/10 hold lasix today due to kacy; repeat cxr this am  7/11 cont meds - holding lasix due to kacy - urinary output still good  7/12 renal function improving, monitor output   7/13 renal function slowly improving, diuretics on hold  7/14 euvoloemic at this time - lasix rpn - coreg was increased to 12.5mg bid  Hypokalemia  Patient's most recent potassium results are listed below.   Recent Labs     07/12/25  0602 07/13/25  0537 07/14/25  0409   K 4.2 4.3 3.9     Plan  - Replete potassium per protocol  - Monitor potassium Daily  - Patient's hypokalemia is on supplements - await labs from today  7/10 decrease potasium replacement  7/11 stop potassium replacement and " monitor  Stable, replete prn   7/14 stable  Final Active Diagnoses:    Diagnosis Date Noted POA    PRINCIPAL PROBLEM:  Multifocal pneumonia [J18.9] 07/08/2025 Yes    Hypokalemia [E87.6] 07/09/2025 Yes    Cough [R05.9] 07/08/2025 Yes    CHF (congestive heart failure) [I50.9] 07/08/2025 Yes    Urinary retention [R33.9] 01/06/2021 Yes    Type 2 diabetes mellitus without complication, with long-term current use of insulin [E11.9, Z79.4] 06/10/2019 Not Applicable    Essential hypertension [I10] 05/13/2019 Yes      Problems Resolved During this Admission:       Discharged Condition: stable    Disposition: Home-Health Care Svc    Follow Up:   Contact information for follow-up providers       Gabby Jackson NP Follow up in 1 week(s).    Specialty: Family Medicine  Contact information:  [facility]  [facility] [address]  [facility] [address]  [phone]                       Contact information for after-discharge care       Home Medical Care       [facility] [place] .    Services: Home Nursing, Home Rehabilitation, Home Medical , Home Living Aide Services  Contact information:  [address] [place] [address] [address]  [facility] [address]  [phone]                                 Patient Instructions:      Ambulatory referral/consult to Home Health   Standing Status: Future   Referral Priority: Routine Referral Type: Home Health   Referral Reason: Specialty Services Required   Referred to Provider: [facility] Requested Specialty: Home Health Services   Number of Visits Requested: 1     Diet diabetic     Diet Cardiac     Activity as tolerated       Significant Diagnostic Studies: Labs: CMP   Recent Labs   Lab 07/13/25  0537 07/14/25  0409    138   K 4.3 3.9    102   CO2 26 25   * 180*   BUN 25* 21   CREATININE 1.7* 1.6*   CALCIUM 9.0 9.2   PROT 7.4 7.9   ALBUMIN 2.2* 2.4*   BILITOT 0.2 0.3   ALKPHOS 106 110   AST 21 16   ALT 13 13   ANIONGAP 9 11   , CBC   Recent  Labs   Lab 07/13/25  0537 07/14/25  0409   WBC 13.19* 16.10*   HGB 11.2* 12.6   HCT 34.3* 39.0   * 473*   , and All labs within the past 24 hours have been reviewed    Pending Diagnostic Studies:       Procedure Component Value Units Date/Time    Echo [[id number]]     Order Status: Sent Lab Status: No result            Medications:  Reconciled Home Medications:      Medication List        START taking these medications      diclofenac sodium 1 % Gel  Commonly known as: VOLTAREN  Apply 2 g topically 3 (three) times daily as needed (Knee pain).     NIFEdipine 30 MG (OSM) 24 hr tablet  Commonly known as: PROCARDIA-XL  Take 1 tablet (30 mg total) by mouth once daily.            CHANGE how you take these medications      carvediloL 12.5 MG tablet  Commonly known as: COREG  Take 1 tablet (12.5 mg total) by mouth 2 (two) times daily with meals.  What changed:   medication strength  how much to take     CRANBERRY CONCENTRATE ORAL  Take 2 capsules by mouth once daily.  What changed: how much to take     furosemide 20 MG tablet  Commonly known as: LASIX  Take 1 tablet (20 mg total) by mouth daily as needed (swelling).  What changed:   when to take this  reasons to take this  Another medication with the same name was removed. Continue taking this medication, and follow the directions you see here.     potassium chloride 10 MEQ Tbsr  Commonly known as: KLOR-CON  Take 10 tablets (100 mEq total) by mouth once. Taking 3 x weekly for 1 dose  What changed:   how much to take  additional instructions            CONTINUE taking these medications      acarbose 100 MG Tab  Commonly known as: PRECOSE  Take 1 tablet (100 mg total) by mouth 3 (three) times daily with meals.     acetaminophen 650 MG Tbsr  Commonly known as: TYLENOL  Take 650 mg by mouth 2 (two) times a day.     AEROBIKA OSCILLATING PEP SYSTM MISC  12 puffs by Misc.(Non-Drug; Combo Route) route daily as needed.     albuterol 90 mcg/actuation inhaler  Commonly known  as: PROVENTIL/VENTOLIN HFA  Inhale 2 puffs into the lungs every 6 (six) hours as needed for Wheezing. Rescue     alendronate 70 MG tablet  Commonly known as: FOSAMAX  Take 1 tablet (70 mg total) by mouth every 7 days.     amoxicillin-clavulanate 875-125mg 875-125 mg per tablet  Commonly known as: AUGMENTIN  Take 1 tablet by mouth 2 (two) times daily.     aspirin 81 MG EC tablet  Commonly known as: ECOTRIN  Take 81 mg by mouth once daily. 3 days a week     atorvastatin 40 MG tablet  Commonly known as: LIPITOR  Take 0.5 tablets (20 mg total) by mouth once daily.     BAQSIMI 3 mg/actuation Spry  Generic drug: glucagon  2 pack.  Spray one device (3 mg) in one nostril to treat severe hypoglycemia. Disp 1 box (2 devices)     blood sugar diagnostic Strp  Commonly known as: ONETOUCH ULTRA TEST  Inject 1 each into the skin 3 (three) times daily before meals.     blood-glucose meter Misc  1 Device by Misc.(Non-Drug; Combo Route) route once daily. One Touch Dx. Code:E11.9     calcium citrate 200 mg (950 mg) tablet  Commonly known as: CALCITRATE  Take 1 tablet (200 mg total) by mouth every Mon, Wed, Fri.     cholecalciferol (vitamin D3) 50 mcg (2,000 unit) Tab  Commonly known as: VITAMIN D3  Take 1 tablet (2,000 Units total) by mouth twice a week. 10,000 3 times weekly     clobetasoL 0.05 % cream  Commonly known as: TEMOVATE  Apply twice a day for 2 weeks then nightly for 2 months. May decrease to three nights a week after that.     co-enzyme Q-10 30 mg capsule  Take 1 capsule (30 mg total) by mouth every evening.     dicyclomine 20 mg tablet  Commonly known as: BENTYL  Take 1 tablet (20 mg total) by mouth 4 (four) times daily before meals and nightly.     ferrous sulfate 325 (65 FE) MG EC tablet  Take 325 mg by mouth. 3 days a week     fish oil-omega-3 fatty acids 300-1,000 mg capsule  Take 1 capsule by mouth once a week.     FLUoxetine 10 MG capsule  Take 1 capsule (10 mg total) by mouth once daily.     FREESTYLE JOSEPHINE 2 PLUS  "SENSOR Rose  Generic drug: blood-glucose sensor  Use as instructed     FREESTYLE JOSEPHINE 2 READER [facility]  Generic drug: flash glucose scanning reader  Use as instructed     FREESTYLE JOSEPHINE 2 SENSOR Kit  Generic drug: flash glucose sensor  Use as instructed     gabapentin 600 MG tablet  Commonly known as: NEURONTIN  Take 1 tablet (600 mg total) by mouth 3 (three) times daily.     guaiFENesin 600 mg 12 hr tablet  Commonly known as: MUCINEX  Take 2 tablets (1,200 mg total) by mouth 2 (two) times daily.     insulin aspart U-100 100 unit/mL (3 mL) Inpn pen  Commonly known as: NovoLOG Flexpen U-100 Insulin  Take sliding scale insulin 4 times a day as instructed- max dose of 40 units/day.     LANTUS SOLOSTAR U-100 INSULIN 100 unit/mL (3 mL) Inpn pen  Generic drug: insulin glargine U-100 (Lantus)  Inject 16 Units into the skin once daily.     letrozole 2.5 mg Tab  Commonly known as: FEMARA  Take 1 tablet (2.5 mg total) by mouth once daily.     LIDOcaine 5 %  Commonly known as: LIDODERM  Place onto the skin once daily. Remove & Discard patch within 12 hours or as directed by MD     lisinopriL 40 MG tablet  Commonly known as: PRINIVIL,ZESTRIL  Take 1 tablet (40 mg total) by mouth every evening.     loperamide 2 mg capsule  Commonly known as: IMODIUM  Take 2 mg by mouth 4 (four) times daily as needed for Diarrhea.     montelukast 10 mg tablet  Commonly known as: SINGULAIR  Take 1 tablet (10 mg total) by mouth once daily.     multivitamin capsule  Take 1 capsule by mouth once daily. Taking 3 times weekly     NYAMYC powder  Generic drug: nystatin     ONETOUCH DELICA PLUS LANCET 33 gauge Misc  Generic drug: lancets  USE   TO CHECK GLUCOSE 4 TIMES DAILY BEFORE  MEALS     * pen needle, diabetic 32 gauge x 1/4" Ndle  Commonly known as: BD ULTRA-FINE MICRO PEN NEEDLE  use to inject ozempic once a week     * pen needle, diabetic 32 gauge x 1/4" Ndle  Commonly known as: BD ULTRA-FINE MICRO PEN NEEDLE  Use to inject insulin 4 to 5 times a " day as instructed     trimethoprim 100 mg Tab  Commonly known as: TRIMPEX  Take 100 mg by mouth every 12 (twelve) hours.           * This list has 2 medication(s) that are the same as other medications prescribed for you. Read the directions carefully, and ask your doctor or other care provider to review them with you.                STOP taking these medications      azithromycin 250 MG tablet  Commonly known as: Z-JAIRO     fluconazole 150 MG Tab  Commonly known as: DIFLUCAN     hydroCHLOROthiazide 12.5 mg capsule  Commonly known as: MICROZIDE     metFORMIN 500 MG ER 24hr tablet  Commonly known as: GLUCOPHAGE-XR              Indwelling Lines/Drains at time of discharge:   Lines/Drains/Airways       Drain  Duration                  Urethral Catheter 07/07/25 1905 Coude 22 Fr. 6 days                        Time spent on the discharge of patient: 35 minutes         Harika Mina MD  Department of Hospital Medicine  [facility]

## 2025-07-14 NOTE — PLAN OF CARE
Plan of care reviewed and ongoing with pt. 22 gauge IV to L hand saline locked, room air tolerating well, machado catheter intact. Tolerated medications well during the night. Call light and personal items within reach of patient during the night.        Problem: Infection  Goal: Absence of Infection Signs and Symptoms  7/14/2025 0631 by Merlene Leonard LPN  Outcome: Progressing  7/14/2025 0628 by Merlene Leonard LPN  Outcome: Not Progressing     Problem: Adult Inpatient Plan of Care  Goal: Plan of Care Review  7/14/2025 0631 by Merlene Leonard LPN  Outcome: Progressing  7/14/2025 0628 by Merlene Leonard LPN  Outcome: Not Progressing  Goal: Patient-Specific Goal (Individualized)  7/14/2025 0631 by Merlene Leonard LPN  Outcome: Progressing  7/14/2025 0628 by Merlene Leonard LPN  Outcome: Not Progressing  Goal: Absence of Hospital-Acquired Illness or Injury  7/14/2025 0631 by Merlene Leonard LPN  Outcome: Progressing  7/14/2025 0628 by Merlene Leonard LPN  Outcome: Not Progressing  Goal: Optimal Comfort and Wellbeing  7/14/2025 0631 by Merlene Leonard LPN  Outcome: Progressing  7/14/2025 0628 by Merlene Leonard LPN  Outcome: Not Progressing  Goal: Readiness for Transition of Care  7/14/2025 0631 by Merlene Leonard LPN  Outcome: Progressing  7/14/2025 0628 by Merlene Leonard LPN  Outcome: Not Progressing     Problem: Diabetes Comorbidity  Goal: Blood Glucose Level Within Targeted Range  7/14/2025 0631 by Merlene Leonard LPN  Outcome: Progressing  7/14/2025 0628 by Merlene Leonard LPN  Outcome: Not Progressing     Problem: Pneumonia  Goal: Fluid Balance  7/14/2025 0631 by Merlene Leonard LPN  Outcome: Progressing  7/14/2025 0628 by Merlene Leonard LPN  Outcome: Not Progressing  Goal: Resolution of Infection Signs and Symptoms  7/14/2025 0631 by Merlene Leonard LPN  Outcome: Progressing  7/14/2025 0628 by Merlene Leonard LPN  Outcome: Not Progressing  Goal: Effective Oxygenation and Ventilation  7/14/2025 0631 by Horacio,  ANTONIO Blunt  Outcome: Progressing  7/14/2025 0628 by Merlene Leonard LPN  Outcome: Not Progressing     Problem: Fall Injury Risk  Goal: Absence of Fall and Fall-Related Injury  7/14/2025 0631 by Merlene Leonard LPN  Outcome: Progressing  7/14/2025 0628 by Merlene Leonard LPN  Outcome: Not Progressing     Problem: Wound  Goal: Optimal Coping  7/14/2025 0631 by Merlene Leonard LPN  Outcome: Progressing  7/14/2025 0628 by Merlene Leonard LPN  Outcome: Not Progressing  Goal: Optimal Functional Ability  7/14/2025 0631 by Merlene Leonard LPN  Outcome: Progressing  7/14/2025 0628 by Merlene Leonard LPN  Outcome: Not Progressing  Goal: Absence of Infection Signs and Symptoms  7/14/2025 0631 by Merlene Leonard LPN  Outcome: Progressing  7/14/2025 0628 by Merlene Leonard LPN  Outcome: Not Progressing  Goal: Improved Oral Intake  7/14/2025 0631 by Merlene Leonard LPN  Outcome: Progressing  7/14/2025 0628 by Merlene Leonard LPN  Outcome: Not Progressing  Goal: Optimal Pain Control and Function  7/14/2025 0631 by Merlene Leonard LPN  Outcome: Progressing  7/14/2025 0628 by Merlene Leonard LPN  Outcome: Not Progressing  Goal: Skin Health and Integrity  7/14/2025 0631 by Merlene Leonard LPN  Outcome: Progressing  7/14/2025 0628 by Merlene Leonard LPN  Outcome: Not Progressing  Goal: Optimal Wound Healing  7/14/2025 0631 by Merlene Leonard LPN  Outcome: Progressing  7/14/2025 0628 by Merlene Leonard LPN  Outcome: Not Progressing     Problem: Skin Injury Risk Increased  Goal: Skin Health and Integrity  7/14/2025 0631 by Merlene Leonard LPN  Outcome: Progressing  7/14/2025 0628 by Merlene Leonard LPN  Outcome: Not Progressing

## 2025-07-14 NOTE — PLAN OF CARE
[facility]  Discharge Final Note    Primary Care Provider: Gabby Jackson NP    Expected Discharge Date: 7/14/2025    Final Discharge Note (most recent)       Final Note - 07/14/25 0910          Final Note    Assessment Type Final Discharge Note     Anticipated Discharge Disposition Home-Health Care [facility]     What phone number can be called within the next 1-3 days to see how you are doing after discharge? [phone]     Hospital Resources/Appts/Education Provided Provided patient/caregiver with written discharge plan information        Post-Acute Status    Post-Acute Authorization Home Health     Home Health Status Set-up Complete/Auth obtained     Discharge Delays None known at this time                     Important Message from Medicare  Important Message from Medicare regarding Discharge Appeal Rights: Given to patient/caregiver, Explained to patient/caregiver, Signed/date by patient/caregiver     Date IMM was signed: 07/13/25  Time IMM was signed: 0943     Follow-up providers       Gabby Jackson NP   Specialty: Family Medicine   Relationship: PCP - General    [address]  [address]  [place] [address]   Phone: [phone]       Next Steps: Follow up in 1 week(s)              After-discharge care                Home Medical Care       [facility] [place]   Service: Home Nursing, Home Rehabilitation, Home Medical , Home Living Aide Services    [address] [facility] [address]  [place] [address]   Phone: [phone]                           Patient is discharging with [facility].

## 2025-07-14 NOTE — CONSULTS
Patient has been set up with [facility] and D/C summary has been sent.

## 2025-07-14 NOTE — ASSESSMENT & PLAN NOTE
Patient has a diagnosis of pneumonia. The cause of the pneumonia is suspected to be bacterial in etiology but organism is not known. The pneumonia is worsening due to sob, cough. The patient has the following signs/symptoms of pneumonia: persistent hypoxia , cough, and sputum production. The patient does have a current oxygen requirement and the patient does not have a home oxygen requirement. I have reviewed the pertinent imaging. The following cultures have been collected: Blood cultures The culture results are listed below.     Current antimicrobial regimen consists of the antibiotics listed below. Will monitor patient closely and continue current treatment plan unchanged.    Antibiotics (From admission, onward)      Start     Stop Route Frequency Ordered    07/10/25 1300  piperacillin-tazobactam (ZOSYN) 4.5 g in D5W 100 mL IVPB (MB+)         -- IV Every 12 hours (non-standard times) 07/10/25 0943            Microbiology Results (last 7 days)       Procedure Component Value Units Date/Time    Blood culture x two cultures. Draw prior to antibiotics. [[id number]]  (Normal) Collected: 07/07/25 1835    Order Status: Completed Specimen: Blood from Peripheral, Forearm, Right Updated: 07/13/25 0701     Blood Culture No Growth After 5 Days    Blood culture x two cultures. Draw prior to antibiotics. [[id number]]  (Normal) Collected: 07/07/25 1843    Order Status: Completed Specimen: Blood from Peripheral, Hand, Left Updated: 07/13/25 0701     Blood Culture No Growth After 5 Days    Clostridium difficile EIA [[id number]]  (Normal) Collected: 07/10/25 1018    Order Status: Completed Specimen: Stool Updated: 07/11/25 0550     C. DIFFICILE GDH AG Negative     Clostridioides difficile Toxin A/B Negative    Urine culture [[id number]] Collected: 07/07/25 1944    Order Status: Completed Specimen: Urine, Catheterized Updated: 07/09/25 1136     Urine Culture Multiple organisms isolated. None in predominance. Repeat if clinically  necessary.    Clostridium difficile EIA [[id number]]     Order Status: Canceled Specimen: Stool     Culture, Respiratory with Gram Stain [[id number]]     Order Status: Canceled Specimen: Respiratory         7/11 cont current treatment plan  7/12 iv abx on board, oxygen, wean as tolerated   7/13 IV abx on board, wean oxygen as tolerated, cultures negative, renal function improving   7/14 dc home with HH - complete po augmenitn at home - will evaluate for home O2

## 2025-07-14 NOTE — ASSESSMENT & PLAN NOTE
Patient's blood pressure range in the last 24 hours was: BP  Min: 138/72  Max: 175/74.The patient's inpatient anti-hypertensive regimen is listed below:  Current Antihypertensives  lisinopriL tablet 40 mg, Nightly, Oral  carvediloL tablet 12.5 mg, 2 times daily with meals, Oral  NIFEdipine 24 hr tablet 30 mg, Daily, Oral  furosemide (LASIX) tablet, Daily PRN, Oral  carvedilol (COREG) tablet, 2 times daily with meals, Oral  NIFEdipine (PROCARDIA-XL) 24 hr tablet, Daily, Oral    Plan  - BP is controlled, no changes needed to their regimen

## 2025-07-14 NOTE — ASSESSMENT & PLAN NOTE
"Patient has Diastolic (HFpEF) heart failure that is Acute on chronic. On presentation their CHF was decompensated. Evidence of decompensated CHF on presentation includes: edema, dyspnea on exertion (KIDD), and shortness of breath. The etiology of their decompensation is likely dietary indiscretion. Most recent BNP and echo results are listed below.  No results for input(s): "BNP" in the last 72 hours.    Latest ECHO  Results for orders placed in visit on 09/20/23    Echo    Interpretation Summary    Left Ventricle: Moderately increased wall thickness. There is moderate concentric hypertrophy. Normal wall motion. Grade II diastolic dysfunction.    Left Atrium: Left atrium is moderately dilated.    Right Ventricle: Normal right ventricular cavity size. Systolic function is normal.    Right Atrium: Right atrium is mildly dilated.    Aortic Valve: The aortic valve is a trileaflet valve. Mildly calcified left, right and noncoronary cusps. Mildly restricted motion. By planimetry the aortic valve area measures 1.8 cm².    Mitral Valve: There is mild bileaflet sclerosis.    Current Heart Failure Medications  lisinopriL tablet 40 mg, Nightly, Oral  carvediloL tablet 12.5 mg, 2 times daily with meals, Oral  furosemide (LASIX) tablet, Daily PRN, Oral  carvedilol (COREG) tablet, 2 times daily with meals, Oral    Plan  - Monitor strict I&Os and daily weights.    - Place on telemetry  - Low sodium diet  - Place on fluid restriction of 2 L.   - Cardiology has been consulted  - The patient's volume status is improving but not at their baseline as indicated by edema, dyspnea on exertion (KIDD), and shortness of breath  7/9 cont current meds  7/10 hold lasix today due to kacy; repeat cxr this am  7/11 cont meds - holding lasix due to kacy - urinary output still good  7/12 renal function improving, monitor output   7/13 renal function slowly improving, diuretics on hold  7/14 euvoloemic at this time - lasix rpn - coreg was increased to " 12.5mg bid

## 2025-07-14 NOTE — NURSING
Per patient request, called her daughter Elizabeth Reed ([phone]) to review discharge instructions. No answer, left voicemail.

## 2025-07-16 ENCOUNTER — HOSPITAL ENCOUNTER (EMERGENCY)
Facility: HOSPITAL | Age: 85
Discharge: HOME | End: 2025-07-16
Attending: EMERGENCY MEDICINE
Payer: MEDICARE

## 2025-07-16 VITALS
OXYGEN SATURATION: 92 % | RESPIRATION RATE: 20 BRPM | TEMPERATURE: 98 F | WEIGHT: 207.25 LBS | BODY MASS INDEX: 35.38 KG/M2 | DIASTOLIC BLOOD PRESSURE: 70 MMHG | HEART RATE: 86 BPM | HEIGHT: 64 IN | SYSTOLIC BLOOD PRESSURE: 154 MMHG

## 2025-07-16 DIAGNOSIS — T38.3X1A INSULIN OVERDOSE, ACCIDENTAL OR UNINTENTIONAL, INITIAL ENCOUNTER: Primary | ICD-10-CM

## 2025-07-16 LAB
POCT GLUCOSE: 124 MG/DL (ref 70–110)
POCT GLUCOSE: 129 MG/DL (ref 70–110)
POCT GLUCOSE: 163 MG/DL (ref 70–110)
POCT GLUCOSE: 79 MG/DL (ref 70–110)
POCT GLUCOSE: 99 MG/DL (ref 70–110)

## 2025-07-16 PROCEDURE — 99282 EMERGENCY DEPT VISIT SF MDM: CPT

## 2025-07-16 PROCEDURE — 82962 GLUCOSE BLOOD TEST: CPT

## 2025-07-16 RX ORDER — LISINOPRIL 40 MG/1
40 TABLET ORAL NIGHTLY
Qty: 90 TABLET | Refills: 3 | Status: SHIPPED | OUTPATIENT
Start: 2025-07-16 | End: 2026-07-16

## 2025-07-17 ENCOUNTER — TELEPHONE (OUTPATIENT)
Dept: UROLOGY | Facility: CLINIC | Age: 85
End: 2025-07-17
Payer: MEDICARE

## 2025-07-17 NOTE — ED PROVIDER NOTES
[facility] - EMERGENCY DEPARTMENT  EMERGENCY DEPARTMENT ENCOUNTER    Pt Name:  Kelsea Cadet  MRN:  [id number]  YOB: 1940  Date of evaluation: 7/16/2025  Provider: Justice Nguyen MD      CHIEF COMPLAINT:     Chief Complaint   Patient presents with    Drug Overdose     Per EMS patient to ED for taking 16 units of NovaLog at 1800. Patient reports she normally takes 4 units at bedtime. CBG in route 148.       HPI history provided by the patient.    HISTORY IF PRESENT ILLNESS:  (location/symptom, timing/onset, context/setting, quality, duration, modifying factors, severity)   Note limiting factors.     Kelsea Cadet is a 85 y.o. female who presents to the emergency department after taking too much of her NovoLog.  Patient grabbed the wrong pin.  At night she is supposed to give herself 16 units of Lantus.  Instead she gave herself 16 units of NovoLog.  Currently, patient is not having any symptoms.  EN route CBD was 148.  Here in the ED her blood sugar was 163.  Patient ate some Jell-O and some Gatorade 0 prior to giving herself the insulin.    Nursing notes were reviewed    REVIEW OF SYSTEMS:     Review of Systems   All other systems reviewed and are negative.      PAST MEDICAL HISTORY:     Past Medical History:   Diagnosis Date    Arthritis     Asthma     Coronary artery disease     Environmental and seasonal allergies     Land catheter in place     Hard of hearing     Hyperlipidemia     Neurogenic bladder     Pneumonia, unspecified organism     Presence of indwelling Land catheter     Unspecified chronic bronchitis     Vitamin D deficiency        SURGICAL HISTORY:     Past Surgical History:   Procedure Laterality Date    BLADDER SUSPENSION      BREAST BIOPSY Bilateral 1996    BREAST SURGERY      lumpectomy, isotopes    CHOLECYSTECTOMY      had gal bladder removed    COLECTOMY      had part of colon removed    CYSTOSCOPY W/ RETROGRADES Bilateral 12/19/2019    Procedure: CYSTOSCOPY, WITH RETROGRADE  PYELOGRAM;  Surgeon: Prasad Rocha MD;  Location: [facility] OR;  Service: Urology;  Laterality: Bilateral;    DILATION OF URETHRA N/A 12/19/2019    Procedure: DILATION, URETHRA;  Surgeon: Prasad Rocha MD;  Location: [facility] OR;  Service: Urology;  Laterality: N/A;    HYSTERECTOMY      LEFT HEART CATHETERIZATION Left 11/27/2019    Procedure: Left heart cath;  Surgeon: Urban Paiz MD;  Location: [facility] CATH LAB;  Service: Cardiology;  Laterality: Left;    MODIFIED RADICAL MASTECTOMY W/ AXILLARY LYMPH NODE DISSECTION Right 1/26/2022    Procedure: MASTECTOMY, MODIFIED RADICAL;  Surgeon: Keisha Cortez MD;  Location: [facility] OR;  Service: General;  Laterality: Right;    OOPHORECTOMY      SENTINEL LYMPH NODE BIOPSY Right 1/26/2022    Procedure: BIOPSY, LYMPH NODE, SENTINEL;  Surgeon: Keisha Cortez MD;  Location: [facility] OR;  Service: General;  Laterality: Right;  0800    SIMPLE MASTECTOMY Left 1/26/2022    Procedure: MASTECTOMY, SIMPLE;  Surgeon: Keisha Cortez MD;  Location: [facility];  Service: General;  Laterality: Left;  FROZEN SECTION       CURRENT MEDICATIONS:     Previous Medications    ACARBOSE (PRECOSE) 100 MG TAB    Take 1 tablet (100 mg total) by mouth 3 (three) times daily with meals.    ACETAMINOPHEN (TYLENOL) 650 MG TBSR    Take 650 mg by mouth 2 (two) times a day.    ALBUTEROL (PROVENTIL/VENTOLIN HFA) 90 MCG/ACTUATION INHALER    Inhale 2 puffs into the lungs every 6 (six) hours as needed for Wheezing. Rescue    ALENDRONATE (FOSAMAX) 70 MG TABLET    Take 1 tablet (70 mg total) by mouth every 7 days.    AMOXICILLIN-CLAVULANATE 875-125MG (AUGMENTIN) 875-125 MG PER TABLET    Take 1 tablet by mouth 2 (two) times daily.    ASPIRIN (ECOTRIN) 81 MG EC TABLET    Take 81 mg by mouth once daily. 3 days a week    ATORVASTATIN (LIPITOR) 40 MG TABLET    Take 0.5 tablets (20 mg total) by mouth once daily.    BLOOD SUGAR DIAGNOSTIC (ONETOUCH ULTRA TEST) STRP    Inject 1 each into the skin 3 (three) times daily  before meals.    BLOOD-GLUCOSE METER MISC    1 Device by Misc.(Non-Drug; Combo Route) route once daily. One Touch Dx. Code:E11.9    BLOOD-GLUCOSE SENSOR (FREESTYLE JOSEPHINE 2 PLUS SENSOR) TONY    Use as instructed    CALCIUM CITRATE (CALCITRATE) 200 MG (950 MG) TABLET    Take 1 tablet (200 mg total) by mouth every Mon, Wed, Fri.    CARVEDILOL (COREG) 12.5 MG TABLET    Take 1 tablet (12.5 mg total) by mouth 2 (two) times daily with meals.    CHOLECALCIFEROL, VITAMIN D3, (VITAMIN D3) 50 MCG (2,000 UNIT) TAB    Take 1 tablet (2,000 Units total) by mouth twice a week. 10,000 3 times weekly    CLOBETASOL (TEMOVATE) 0.05 % CREAM    Apply twice a day for 2 weeks then nightly for 2 months. May decrease to three nights a week after that.    CO-ENZYME Q-10 30 MG CAPSULE    Take 1 capsule (30 mg total) by mouth every evening.    CRANBERRY FRUIT EXTRACT (CRANBERRY CONCENTRATE ORAL)    Take 2 capsules by mouth once daily.    DICLOFENAC SODIUM (VOLTAREN) 1 % GEL    Apply 2 g topically 3 (three) times daily as needed (Knee pain).    DICYCLOMINE (BENTYL) 20 MG TABLET    Take 1 tablet (20 mg total) by mouth 4 (four) times daily before meals and nightly.    FERROUS SULFATE 325 (65 FE) MG EC TABLET    Take 325 mg by mouth. 3 days a week    FLASH GLUCOSE SCANNING READER (FREESTYLE JOSEPHINE 2 READER) MISC    Use as instructed    FLASH GLUCOSE SENSOR (FREESTYLE JOSEPHINE 2 SENSOR) KIT    Use as instructed    FLUOXETINE 10 MG CAPSULE    Take 1 capsule (10 mg total) by mouth once daily.    FUROSEMIDE (LASIX) 20 MG TABLET    Take 1 tablet (20 mg total) by mouth daily as needed (swelling).    GABAPENTIN (NEURONTIN) 600 MG TABLET    Take 1 tablet (600 mg total) by mouth 3 (three) times daily.    GLUCAGON (BAQSIMI) 3 MG/ACTUATION SPRY    2 pack.  Spray one device (3 mg) in one nostril to treat severe hypoglycemia. Disp 1 box (2 devices)    GUAIFENESIN (MUCINEX) 600 MG 12 HR TABLET    Take 2 tablets (1,200 mg total) by mouth 2 (two) times daily.     "INSULIN ASPART U-100 (NOVOLOG FLEXPEN U-100 INSULIN) 100 UNIT/ML (3 ML) INPN PEN    Take sliding scale insulin 4 times a day as instructed- max dose of 40 units/day.    INSULIN GLARGINE U-100, LANTUS, (LANTUS SOLOSTAR U-100 INSULIN) 100 UNIT/ML (3 ML) INPN PEN    Inject 16 Units into the skin once daily.    LETROZOLE (FEMARA) 2.5 MG TAB    Take 1 tablet (2.5 mg total) by mouth once daily.    LIDOCAINE (LIDODERM) 5 %    Place onto the skin once daily. Remove & Discard patch within 12 hours or as directed by MD    LISINOPRIL (PRINIVIL,ZESTRIL) 40 MG TABLET    Take 1 tablet (40 mg total) by mouth every evening.    LOPERAMIDE (IMODIUM) 2 MG CAPSULE    Take 2 mg by mouth 4 (four) times daily as needed for Diarrhea.    MONTELUKAST (SINGULAIR) 10 MG TABLET    Take 1 tablet (10 mg total) by mouth once daily.    MUCUS CLEARING DEVICE (AEROBIKA OSCILLATING PEP SYSTM MISC)    12 puffs by Misc.(Non-Drug; Combo Route) route daily as needed.    MULTIVITAMIN CAPSULE    Take 1 capsule by mouth once daily. Taking 3 times weekly    NIFEDIPINE (PROCARDIA-XL) 30 MG (OSM) 24 HR TABLET    Take 1 tablet (30 mg total) by mouth once daily.    NYAMYC POWDER        OMEGA-3 FATTY ACIDS/FISH OIL (FISH OIL-OMEGA-3 FATTY ACIDS) 300-1,000 MG CAPSULE    Take 1 capsule by mouth once a week.    ONETOUCH DELICA PLUS LANCET 33 GAUGE MISC    USE   TO CHECK GLUCOSE 4 TIMES DAILY BEFORE  MEALS    PEN NEEDLE, DIABETIC (BD ULTRA-FINE MICRO PEN NEEDLE) 32 GAUGE X 1/4" NDLE    use to inject ozempic once a week    PEN NEEDLE, DIABETIC (BD ULTRA-FINE MICRO PEN NEEDLE) 32 GAUGE X 1/4" NDLE    Use to inject insulin 4 to 5 times a day as instructed    POTASSIUM CHLORIDE (KLOR-CON) 10 MEQ TBSR    1 pill po 3 x week    TRIMETHOPRIM (TRIMPEX) 100 MG TAB    Take 100 mg by mouth every 12 (twelve) hours.       ALLERGIES:     Macrobid [nitrofurantoin monohyd/m-cryst]; Bactrim [sulfamethoxazole-trimethoprim]; Ciprofloxacin; Codeine; and Latex, natural rubber    SOCIAL " HISTORY:     Social History     Socioeconomic History    Marital status:     Number of children: 3   Tobacco Use    Smoking status: Never    Smokeless tobacco: Never   Substance and Sexual Activity    Alcohol use: Not Currently     Comment: rare    Drug use: Never    Sexual activity: Not Currently     Partners: Male     Comment:      Social Drivers of Health     Financial Resource Strain: Low Risk  (7/8/2025)    Overall Financial Resource Strain (CARDIA)     Difficulty of Paying Living Expenses: Not hard at all   Food Insecurity: No Food Insecurity (7/8/2025)    Hunger Vital Sign     Worried About Running Out of Food in the Last Year: Never true     Ran Out of Food in the Last Year: Never true   Transportation Needs: No Transportation Needs (7/8/2025)    PRAPARE - Transportation     Lack of Transportation (Medical): No     Lack of Transportation (Non-Medical): No   Physical Activity: Inactive (3/12/2025)    Exercise Vital Sign     Days of Exercise per Week: 0 days     Minutes of Exercise per Session: 0 min   Stress: No Stress Concern Present (7/8/2025)    [employer] - Occupational Stress Questionnaire     Feeling of Stress : Not at all   Housing Stability: Low Risk  (7/8/2025)    Housing Stability Vital Sign     Unable to Pay for Housing in the Last Year: No     Number of Times Moved in the Last Year: 0     Homeless in the Last Year: No       SCREENINGS:           PHYSICAL EXAM:     ED Triage Vitals [07/16/25 1923]   /60   Pulse 75   Resp 16   Temp 98.1 °F (36.7 °C)   SpO2 95 %        Physical Exam  Vitals and nursing note reviewed.   Constitutional:       Appearance: Normal appearance. She is not ill-appearing.   HENT:      Head: Normocephalic and atraumatic.      Nose: Nose normal.      Mouth/Throat:      Mouth: Mucous membranes are moist.      Pharynx: Oropharynx is clear.   Eyes:      Extraocular Movements: Extraocular movements intact.      Pupils: Pupils are  "equal, round, and reactive to light.   Cardiovascular:      Rate and Rhythm: Normal rate and regular rhythm.      Heart sounds: No murmur heard.  Pulmonary:      Effort: Pulmonary effort is normal.      Breath sounds: Normal breath sounds. No wheezing, rhonchi or rales.   Musculoskeletal:         General: Normal range of motion.   Skin:     General: Skin is warm and dry.      Capillary Refill: Capillary refill takes less than 2 seconds.   Neurological:      General: No focal deficit present.      Mental Status: She is alert and oriented to person, place, and time.      Cranial Nerves: No cranial nerve deficit.   Psychiatric:         Mood and Affect: Mood normal.         Behavior: Behavior normal.         DIAGNOSTIC RESULTS:           RADIOLOGY:  Non plain film images such as CT, ultrasound and MRI are read by the radiologist.  Plain radiographic images were visualized and preliminarily interpreted by the emergency physician below findings:        No orders to display           LABS:  Labs Reviewed   POCT GLUCOSE - Abnormal       Result Value    POCT Glucose 163 (*)    POCT GLUCOSE - Abnormal    POCT Glucose 129 (*)    POCT GLUCOSE - Abnormal    POCT Glucose 124 (*)    POCT GLUCOSE    POCT Glucose 79     POCT GLUCOSE    POCT Glucose 99         All other labs were within normal range her not returned as of this dictation.    EMERGENCY DEPARTMENT COURSE IN DIFFERENTIAL DIAGNOSIS/MDM:     Vitals:   Vitals:    07/16/25 1923 07/16/25 1948 07/16/25 2033 07/16/25 2317   BP: 134/60 126/61 (!) 143/65 (!) 154/70   BP Location: Right arm  Left arm Left arm   Patient Position:  Lying Lying Lying   Pulse: 75 75 74 86   Resp: 16 20 20    Temp: 98.1 °F (36.7 °C)   97.7 °F (36.5 °C)   TempSrc: Oral   Oral   SpO2: 95% (!) 94% (!) 94% (!) 92%   Weight: 94 kg (207 lb 3.7 oz)      Height: 5' 4" (1.626 m)           Medical Decision Making  Patient presented to the ED after taking too much of her short-acting insulin.  Patient needed a " 3-5 hour observation time based on the medications half life.  Her initial were sub 100s.  Patient was treated with some pudding.  Her blood sugar recovered and she had two 1 hour apart blood sugars have greater than 100.  Patient tells me she is feeling fine.  I feel it is safe to discharge the patient at this time.  She lives with her daughter.  I instructed them to do 1 more blood sugar test prior to going to bed when they get home.  Daughter will oblige.  They were told that if anything changes they were invited to return to the ED.    Blood sugars were     Reassessment:          Medications - No data to display    CONSULTS:  None  Unless otherwise noted below, none.    Procedures      Additional MDM:   Differential Diagnosis:   Differential diagnoses include, but not limited to SI, medication overdose, dementia.           FINAL IMPRESSION:     1. Insulin overdose, accidental or unintentional, initial encounter         DISPOSITION/PLAN:      ED Disposition Condition    Discharge Stable            PATIENT REFERRED TO:    Gabby Jackson, NP  [address]  [address] [address]  [phone]    Call in 3 days  As needed, For follow up      DISCHARGE MEDICATIONS:  New Prescriptions    No medications on file           (Please note that portions of this chart were completed with the voice recognition program.  Efforts were made to edit the dictations but occasionally words are mis-transcribed.)    Justice Nguyen MD (electronically signed) Emergency Physician                                 Justice Nguyen MD  07/16/25 1739

## 2025-07-17 NOTE — TELEPHONE ENCOUNTER
Shante with [facility] states that they have been changing the patient's 20fr catheter twice a month but she was recently admitted to [facility] and their note states to change once a month. Shante would like to know if you're okay with this or if you still want them to change twice a month? Also would like to know if you want them to continue with 60cc of tap water irrigations? Please advise.

## 2025-07-17 NOTE — ED NOTES
Informed of pt glucose. States pt can eat at present so glucose does not get any lower. Vanilla pudding given per pt request. Will continue to monitor.

## 2025-07-18 ENCOUNTER — TELEPHONE (OUTPATIENT)
Dept: PRIMARY CARE CLINIC | Facility: CLINIC | Age: 85
End: 2025-07-18
Payer: MEDICARE

## 2025-07-18 DIAGNOSIS — I50.30 HEART FAILURE WITH PRESERVED EJECTION FRACTION, NYHA CLASS II: ICD-10-CM

## 2025-07-18 DIAGNOSIS — I25.10 CORONARY ARTERY DISEASE, UNSPECIFIED VESSEL OR LESION TYPE, UNSPECIFIED WHETHER ANGINA PRESENT, UNSPECIFIED WHETHER NATIVE OR TRANSPLANTED HEART: Primary | ICD-10-CM

## 2025-07-18 RX ORDER — METOPROLOL SUCCINATE 25 MG/1
25 TABLET, EXTENDED RELEASE ORAL 2 TIMES DAILY
Qty: 180 TABLET | Refills: 3 | Status: SHIPPED | OUTPATIENT
Start: 2025-07-18 | End: 2026-07-18

## 2025-07-18 NOTE — TELEPHONE ENCOUNTER
Shante from [facility] called and stated that there was an indication between the inhaler and carvedilol. Vo judit rose stop carvedilol and start metoprolol.  Shante was given the vo order

## 2025-07-18 NOTE — TELEPHONE ENCOUNTER
"Notified Shante with [facility] "I would continue tap water irrigations and ONCE a month machado exchanges" she v/u.  "

## 2025-07-21 ENCOUNTER — LAB REQUISITION (OUTPATIENT)
Dept: LAB | Facility: HOSPITAL | Age: 85
End: 2025-07-21
Payer: MEDICARE

## 2025-07-21 DIAGNOSIS — E11.9 TYPE 2 DIABETES MELLITUS WITHOUT COMPLICATIONS: ICD-10-CM

## 2025-07-21 DIAGNOSIS — I50.1 LEFT VENTRICULAR FAILURE, UNSPECIFIED: ICD-10-CM

## 2025-07-21 DIAGNOSIS — I25.10 ATHEROSCLEROTIC HEART DISEASE OF NATIVE CORONARY ARTERY WITHOUT ANGINA PECTORIS: ICD-10-CM

## 2025-07-21 LAB
ABSOLUTE EOSINOPHIL (OHS): 0.37 K/UL
ABSOLUTE MONOCYTE (OHS): 0.53 K/UL (ref 0.3–1)
ABSOLUTE NEUTROPHIL COUNT (OHS): 9.1 K/UL (ref 1.8–7.7)
ALBUMIN SERPL BCP-MCNC: 2.4 G/DL (ref 3.5–5.2)
ALP SERPL-CCNC: 740 UNIT/L (ref 40–150)
ALT SERPL W/O P-5'-P-CCNC: 160 UNIT/L (ref 10–44)
ANION GAP (OHS): 10 MMOL/L (ref 8–16)
AST SERPL-CCNC: 205 UNIT/L (ref 11–45)
BASOPHILS # BLD AUTO: 0.07 K/UL
BASOPHILS NFR BLD AUTO: 0.6 %
BILIRUB SERPL-MCNC: 0.3 MG/DL (ref 0.1–1)
BUN SERPL-MCNC: 16 MG/DL (ref 8–23)
CALCIUM SERPL-MCNC: 8.2 MG/DL (ref 8.7–10.5)
CHLORIDE SERPL-SCNC: 105 MMOL/L (ref 95–110)
CO2 SERPL-SCNC: 25 MMOL/L (ref 23–29)
CREAT SERPL-MCNC: 1.2 MG/DL (ref 0.5–1.4)
ERYTHROCYTE [DISTWIDTH] IN BLOOD BY AUTOMATED COUNT: 14.1 % (ref 11.5–14.5)
GFR SERPLBLD CREATININE-BSD FMLA CKD-EPI: 45 ML/MIN/1.73/M2
GLUCOSE SERPL-MCNC: 99 MG/DL (ref 70–110)
HCT VFR BLD AUTO: 35.3 % (ref 37–48.5)
HGB BLD-MCNC: 11.2 GM/DL (ref 12–16)
IMM GRANULOCYTES # BLD AUTO: 0.06 K/UL (ref 0–0.04)
IMM GRANULOCYTES NFR BLD AUTO: 0.5 % (ref 0–0.5)
LYMPHOCYTES # BLD AUTO: 1.63 K/UL (ref 1–4.8)
MCH RBC QN AUTO: 27.3 PG (ref 27–31)
MCHC RBC AUTO-ENTMCNC: 31.7 G/DL (ref 32–36)
MCV RBC AUTO: 86 FL (ref 82–98)
NUCLEATED RBC (/100WBC) (OHS): 0 /100 WBC
PLATELET # BLD AUTO: 504 K/UL (ref 150–450)
PMV BLD AUTO: 10.1 FL (ref 9.2–12.9)
POTASSIUM SERPL-SCNC: 4.7 MMOL/L (ref 3.5–5.1)
PROT SERPL-MCNC: 7 GM/DL (ref 6–8.4)
RBC # BLD AUTO: 4.11 M/UL (ref 4–5.4)
RELATIVE EOSINOPHIL (OHS): 3.1 %
RELATIVE LYMPHOCYTE (OHS): 13.9 % (ref 18–48)
RELATIVE MONOCYTE (OHS): 4.5 % (ref 4–15)
RELATIVE NEUTROPHIL (OHS): 77.4 % (ref 38–73)
SODIUM SERPL-SCNC: 140 MMOL/L (ref 136–145)
WBC # BLD AUTO: 11.76 K/UL (ref 3.9–12.7)

## 2025-07-21 PROCEDURE — 84075 ASSAY ALKALINE PHOSPHATASE: CPT | Performed by: NURSE PRACTITIONER

## 2025-07-21 PROCEDURE — 85025 COMPLETE CBC W/AUTO DIFF WBC: CPT | Performed by: NURSE PRACTITIONER

## 2025-07-22 ENCOUNTER — TELEPHONE (OUTPATIENT)
Dept: PRIMARY CARE CLINIC | Facility: CLINIC | Age: 85
End: 2025-07-22
Payer: MEDICARE

## 2025-07-22 NOTE — TELEPHONE ENCOUNTER
Pt's daughter called and cx her appt today because the daughter is sick with the virus and she is the only one that can bring her to the appt. I r/s her for Friday. The daughter stated that dr edwards the order to changer her catheter once a month. The daughter discussed with him that it needs to be changed every other week. Vo per milton jimenez to change every other week. I called [facility] and vo given to the nurse

## 2025-08-05 ENCOUNTER — OFFICE VISIT (OUTPATIENT)
Dept: PRIMARY CARE CLINIC | Facility: CLINIC | Age: 85
End: 2025-08-05
Payer: MEDICARE

## 2025-08-05 ENCOUNTER — OUTPATIENT CASE MANAGEMENT (OUTPATIENT)
Dept: ADMINISTRATIVE | Facility: OTHER | Age: 85
End: 2025-08-05
Payer: MEDICARE

## 2025-08-05 VITALS
DIASTOLIC BLOOD PRESSURE: 67 MMHG | SYSTOLIC BLOOD PRESSURE: 135 MMHG | WEIGHT: 181.38 LBS | OXYGEN SATURATION: 92 % | BODY MASS INDEX: 30.96 KG/M2 | HEIGHT: 64 IN | HEART RATE: 75 BPM

## 2025-08-05 DIAGNOSIS — N31.9 NEUROGENIC BLADDER: ICD-10-CM

## 2025-08-05 DIAGNOSIS — I80.8 PHLEBITIS OF RIGHT ARM: ICD-10-CM

## 2025-08-05 DIAGNOSIS — E11.9 TYPE 2 DIABETES MELLITUS WITHOUT COMPLICATION, WITH LONG-TERM CURRENT USE OF INSULIN: ICD-10-CM

## 2025-08-05 DIAGNOSIS — J45.20 MILD INTERMITTENT ASTHMA, UNSPECIFIED WHETHER COMPLICATED: Primary | ICD-10-CM

## 2025-08-05 DIAGNOSIS — Z79.4 TYPE 2 DIABETES MELLITUS WITHOUT COMPLICATION, WITH LONG-TERM CURRENT USE OF INSULIN: ICD-10-CM

## 2025-08-05 LAB
ALBUMIN/CREAT UR: 32.2 UG/MG
CREAT UR-MCNC: 43.5 MG/DL (ref 15–325)
MICROALBUMIN UR-MCNC: 14 UG/ML (ref ?–5000)

## 2025-08-05 PROCEDURE — 99999 PR PBB SHADOW E&M-EST. PATIENT-LVL IV: CPT | Mod: PBBFAC,,, | Performed by: NURSE PRACTITIONER

## 2025-08-05 PROCEDURE — 99214 OFFICE O/P EST MOD 30 MIN: CPT | Mod: S$PBB,,, | Performed by: NURSE PRACTITIONER

## 2025-08-05 PROCEDURE — 99214 OFFICE O/P EST MOD 30 MIN: CPT | Mod: PBBFAC | Performed by: NURSE PRACTITIONER

## 2025-08-05 PROCEDURE — 82570 ASSAY OF URINE CREATININE: CPT | Performed by: NURSE PRACTITIONER

## 2025-08-05 NOTE — PROGRESS NOTES
[facility] Note    HPI:  Kelsea Cadet is a 85 y.o. female who presents today for Follow-up, Otalgia, and Headache (Pt here for hfu/earache/ headache)     Will follow up with Dr. Munoz for MOSCOSO and Dr. Turner for phlebitis     Review of Systems   Constitutional: Negative.    HENT:  Positive for ear pain (left).    Eyes: Negative.    Respiratory:  Positive for shortness of breath and wheezing.    Cardiovascular: Negative.    Gastrointestinal: Negative.    Genitourinary: Negative.    Musculoskeletal: Negative.    Skin: Negative.    Neurological: Negative.    Endo/Heme/Allergies: Negative.    Psychiatric/Behavioral: Negative.        A review of systems was performed and was negative except as noted above.    I personally reviewed allergies, past medical, surgical, social and family history and updated as appropriate.    Medications:  Current Medications[1]     Health Maintenance:  Immunization History   Administered Date(s) Administered    COVID-19, MRNA, LN-S, PF (MODERNA FULL 0.5 ML DOSE) 04/08/2021, 09/29/2021    Influenza (FLUAD) - Quadrivalent - Adjuvanted - PF *Preferred* (65+) 10/23/2020    Influenza - Quadrivalent - High Dose - PF (65 years and older) 11/09/2022    Influenza - Quadrivalent - PF *Preferred* (6 months and older) 01/14/2000, 12/11/2001, 11/19/2002, 11/03/2003, 12/08/2005, 11/07/2008    Influenza - Trivalent - Afluria, Fluzone MDV 01/14/2000, 12/11/2001, 11/19/2002, 11/03/2003, 12/08/2005, 11/07/2008    Influenza - Trivalent - Fluzone High Dose - PF (65 years and older) 12/03/2019    Pneumococcal Conjugate - 13 Valent 05/13/2019, 10/21/2020    Pneumococcal Conjugate - 20 Valent 02/07/2025    Pneumococcal Polysaccharide - 23 Valent 12/11/2001    Td (ADULT) 07/23/2001    Tdap 07/23/2001, 05/13/2019    Zoster Recombinant 02/07/2025, 04/07/2025      Health Maintenance   Topic Date Due    RSV Vaccine (Age 60+ and Pregnant patients) (1 - 1-dose 75+ series) Never done    Diabetes Urine  "Screening  11/18/2023    Diabetic Eye Exam  09/03/2025    COVID-19 Vaccine (3 - Moderna risk series) 02/07/2026 (Originally 10/27/2021)    Lipid Panel  12/11/2025    Hemoglobin A1c  01/08/2026    TETANUS VACCINE  05/13/2029    DEXA Scan  05/14/2034    Shingles Vaccine  Completed    Pneumococcal Vaccines (Age 50+)  Completed    Influenza Vaccine  Discontinued     Health Maintenance Topics with due status: Not Due       Topic Last Completion Date    TETANUS VACCINE 05/13/2019    DEXA Scan 05/14/2024    Lipid Panel 12/11/2024    Hemoglobin A1c 07/08/2025     Health Maintenance Due   Topic Date Due    RSV Vaccine (Age 60+ and Pregnant patients) (1 - 1-dose 75+ series) Never done    Diabetes Urine Screening  11/18/2023    Diabetic Eye Exam  09/03/2025       PHYSICAL EXAM:  Vitals:    08/05/25 1502   BP: 135/67   BP Location: Left arm   Patient Position: Sitting   Pulse: 75   SpO2: (!) 92%   Weight: 82.3 kg (181 lb 6.4 oz)   Height: 5' 4" (1.626 m)     Body mass index is 31.14 kg/m².  Physical Exam  Constitutional:       Appearance: Normal appearance. She is normal weight.   HENT:      Head: Normocephalic.      Right Ear: Tympanic membrane, ear canal and external ear normal.      Left Ear: Tympanic membrane, ear canal and external ear normal.      Nose: Nose normal.      Mouth/Throat:      Mouth: Mucous membranes are moist.   Cardiovascular:      Rate and Rhythm: Normal rate and regular rhythm.      Heart sounds: Normal heart sounds.   Pulmonary:      Effort: Pulmonary effort is normal.      Breath sounds: Decreased air movement present. Wheezing present.   Musculoskeletal:         General: Normal range of motion.      Cervical back: Normal range of motion.   Skin:     General: Skin is warm and dry.      Comments: 2 knots on inner side of right forearm   Neurological:      General: No focal deficit present.      Mental Status: She is alert and oriented to person, place, and time.          ASSESSMENT/PLAN:  1. Mild " intermittent asthma, unspecified whether complicated    2. Neurogenic bladder    3. Type 2 diabetes mellitus without complication, with long-term current use of insulin  -     Microalbumin/Creatinine Ratio, Urine; Future; Expected date: 08/05/2025    4. Phlebitis of right arm        Other than changes above, continue current medications and maintain follow up with specialists.      No follow-ups on file.   Recent Results (from the past 12 weeks)   Respiratory Infection Panel (PCR), Nasopharyngeal    Collection Time: 07/03/25  3:49 PM    Specimen: Nasopharyngeal Swab   Result Value Ref Range    Respiratory Infection Panel Source Nasopharyngeal Swab     Adenovirus Not Detected Not Detected    Coronavirus 229E, Common Cold Virus Not Detected Not Detected    Coronavirus HKU1, Common Cold Virus Not Detected Not Detected    Coronavirus NL63, Common Cold Virus Not Detected Not Detected    Coronavirus OC43, Common Cold Virus Not Detected Not Detected    SARS-CoV2 (COVID-19) Qualitative PCR Not Detected Not Detected    Human Metapneumovirus Not Detected Not Detected    Human Rhinovirus/Enterovirus Not Detected Not Detected    Influenza A Not Detected Not Detected    Influenza B Not Detected Not Detected    Parainfluenza Virus 1 Not Detected Not Detected    Parainfluenza Virus 2 Not Detected Not Detected    Parainfluenza Virus 3 Not Detected Not Detected    Parainfluenza Virus 4 Not Detected Not Detected    Respiratory Syncytial Virus Not Detected Not Detected    Bordetella Parapertussis ([id number]) Not Detected Not Detected    Bordetella pertussis (ptxP) Not Detected Not Detected    Chlamydia pneumoniae Not Detected Not Detected    Mycoplasma pneumoniae Not Detected Not Detected   EKG 12-lead    Collection Time: 07/07/25  6:15 PM   Result Value Ref Range    QRS Duration 162 ms    [facility] QTC Calculation 511 ms   Blood culture x two cultures. Draw prior to antibiotics.    Collection Time: 07/07/25  6:35 PM    Specimen: Peripheral,  Forearm, Right; Blood   Result Value Ref Range    Blood Culture No Growth After 5 Days    Comprehensive Metabolic Panel    Collection Time: 07/07/25  6:43 PM   Result Value Ref Range    Sodium 129 (L) 136 - 145 mmol/L    Potassium 3.4 (L) 3.5 - 5.1 mmol/L    Chloride 92 (L) 95 - 110 mmol/L    CO2 24 23 - 29 mmol/L    Glucose 175 (H) 70 - 110 mg/dL    BUN 17 8 - 23 mg/dL    Creatinine 0.8 0.5 - 1.4 mg/dL    Calcium 8.7 8.7 - 10.5 mg/dL    Protein Total 7.2 6.0 - 8.4 gm/dL    Albumin 2.6 (L) 3.5 - 5.2 g/dL    Bilirubin Total 0.5 0.1 - 1.0 mg/dL     40 - 150 unit/L    AST 23 11 - 45 unit/L    ALT 13 10 - 44 unit/L    Anion Gap 13 8 - 16 mmol/L    eGFR >60 >60 mL/min/1.73/m2   Magnesium    Collection Time: 07/07/25  6:43 PM   Result Value Ref Range    Magnesium  1.8 1.6 - 2.6 mg/dL   CBC with Differential    Collection Time: 07/07/25  6:43 PM   Result Value Ref Range    WBC 15.22 (H) 3.90 - 12.70 K/uL    RBC 4.08 4.00 - 5.40 M/uL    HGB 11.3 (L) 12.0 - 16.0 gm/dL    HCT 34.1 (L) 37.0 - 48.5 %    MCV 84 82 - 98 fL    MCH 27.7 27.0 - 31.0 pg    MCHC 33.1 32.0 - 36.0 g/dL    RDW 13.0 11.5 - 14.5 %    Platelet Count 379 150 - 450 K/uL    MPV 10.1 9.2 - 12.9 fL    Nucleated RBC 0 <=0 /100 WBC   Blood culture x two cultures. Draw prior to antibiotics.    Collection Time: 07/07/25  6:43 PM    Specimen: Peripheral, Hand, Left; Blood   Result Value Ref Range    Blood Culture No Growth After 5 Days    Lactic acid, plasma #1    Collection Time: 07/07/25  6:43 PM   Result Value Ref Range    Lactic Acid Level 1.4 0.5 - 2.2 mmol/L   NT-Pro Natriuretic Peptide    Collection Time: 07/07/25  6:43 PM   Result Value Ref Range    NT-proBNP 10,746 (H) <450 pg/mL   Procalcitonin    Collection Time: 07/07/25  6:43 PM   Result Value Ref Range    Procalcitonin 0.44 (H) <0.25 ng/mL   Light Blue Top Hold    Collection Time: 07/07/25  6:43 PM   Result Value Ref Range    Extra Tube Hold    Manual Differential    Collection Time: 07/07/25   6:43 PM   Result Value Ref Range    Gran # (ANC) 13.1 K/uL    Segmented Neutrophil % 82.0 (H) 38.0 - 73.0 %    Bands % 4.0 %    Lymphocyte % 5.0 (L) 18.0 - 48.0 %    Monocyte % 9.0 4.0 - 15.0 %   Urinalysis, Reflex to Urine Culture Urine, Clean Catch    Collection Time: 07/07/25  7:44 PM    Specimen: Urine, Catheterized   Result Value Ref Range    Color, UA Yellow Straw, Neli, Yellow, Light-Orange    Appearance, UA Clear Clear    pH, UA 7.0 5.0 - 8.0    Spec Grav UA 1.020 1.005 - 1.030    Protein, UA 2+ (A) Negative    Glucose, UA Negative Negative    Ketones, UA 1+ (A) Negative    Bilirubin, UA Negative Negative    Blood, UA 1+ (A) Negative    Nitrites, UA Negative Negative    Urobilinogen, UA 1.0 <2.0 EU/dL    Leukocyte Esterase, UA 2+ (A) Negative   GREY TOP URINE HOLD    Collection Time: 07/07/25  7:44 PM   Result Value Ref Range    Extra Tube Hold    Urinalysis Microscopic    Collection Time: 07/07/25  7:44 PM   Result Value Ref Range    RBC, UA 17 (H) 0 - 4 /HPF    WBC, UA 63 (H) 0 - 5 /HPF    Bacteria, UA None None, Rare, Occasional /HPF    Squamous Epithelial Cells, UA 1 <=5 /HPF    Hyaline Casts, UA 0 0 - 1 /LPF    Microscopic Comment     Urine culture    Collection Time: 07/07/25  7:44 PM    Specimen: Urine, Catheterized   Result Value Ref Range    Urine Culture       Multiple organisms isolated. None in predominance. Repeat if clinically necessary.   Lactic acid, plasma #2    Collection Time: 07/07/25  9:26 PM   Result Value Ref Range    Lactic Acid Level 1.3 0.5 - 2.2 mmol/L   POCT glucose    Collection Time: 07/08/25  2:34 AM   Result Value Ref Range    POCT Glucose 166 (H) 70 - 110 mg/dL   Basic metabolic panel    Collection Time: 07/08/25  4:30 AM   Result Value Ref Range    Sodium 130 (L) 136 - 145 mmol/L    Potassium 2.9 (L) 3.5 - 5.1 mmol/L    Chloride 95 95 - 110 mmol/L    CO2 26 23 - 29 mmol/L    Glucose 173 (H) 70 - 110 mg/dL    BUN 16 8 - 23 mg/dL    Creatinine 0.8 0.5 - 1.4 mg/dL    Calcium  8.5 (L) 8.7 - 10.5 mg/dL    Anion Gap 9 8 - 16 mmol/L    eGFR >60 >60 mL/min/1.73/m2   CBC with Differential    Collection Time: 07/08/25  4:30 AM   Result Value Ref Range    WBC 15.52 (H) 3.90 - 12.70 K/uL    RBC 4.12 4.00 - 5.40 M/uL    HGB 11.5 (L) 12.0 - 16.0 gm/dL    HCT 34.7 (L) 37.0 - 48.5 %    MCV 84 82 - 98 fL    MCH 27.9 27.0 - 31.0 pg    MCHC 33.1 32.0 - 36.0 g/dL    RDW 13.1 11.5 - 14.5 %    Platelet Count 365 150 - 450 K/uL    MPV 9.4 9.2 - 12.9 fL    Nucleated RBC 0 <=0 /100 WBC   Manual Differential    Collection Time: 07/08/25  4:30 AM   Result Value Ref Range    Gran # (ANC) 12.9 K/uL    Segmented Neutrophil % 75.0 (H) 38.0 - 73.0 %    Bands % 8.0 %    Lymphocyte % 5.0 (L) 18.0 - 48.0 %    Monocyte % 11.0 4.0 - 15.0 %    Eosinophil % 1.0 0.0 - 8.0 %   Hemoglobin A1c if not done in past 3 months    Collection Time: 07/08/25  4:30 AM   Result Value Ref Range    Hemoglobin A1c 6.4 (H) 4.0 - 5.6 %    Estimated Average Glucose 137 (H) 68 - 131 mg/dL   Echo    Collection Time: 07/08/25  2:21 PM   Result Value Ref Range    BSA 2 m2    LVOT stroke volume 68.5 cm3    LVIDd 4.5 3.5 - 6.0 cm    LV Systolic Volume 20 mL    LV Systolic Volume Index 10.3 mL/m2    LVIDs 2.4 2.1 - 4.0 cm    LV Diastolic Volume 90 mL    LV Diastolic Volume Index 46.39 mL/m2    Left Ventricular End Systolic Volume by Teichholz Method 19.65 mL    Left Ventricular End Diastolic Volume by Teichholz Method 90.26 mL    IVS 1.0 0.6 - 1.1 cm    LVOT diameter 2.0 cm    LVOT area 3.1 cm2    FS 46.7 (A) 28 - 44 %    Left Ventricle Relative Wall Thickness 0.49 cm    PW 1.1 0.6 - 1.1 cm    LV mass 164.0 g    LV Mass Index 84.5 g/m2    MV Peak E Floyd 1.16 m/s    TDI LATERAL 0.06 m/s    TDI SEPTAL 0.06 m/s    E/E' ratio 19 m/s    MV Peak A Floyd 2.06 m/s    TR Max Floyd 3.4 m/s    E/A ratio 0.56     E wave deceleration time 316 msec    LV SEPTAL E/E' RATIO 19.3 m/s    LV LATERAL E/E' RATIO 19.3 m/s    LVOT peak floyd 1.1 m/s    Left Ventricular Outflow  Tract Mean Velocity 0.82 cm/s    Left Ventricular Outflow Tract Mean Gradient 2.84 mmHg    RV- sylvester basal diam 3.1 cm    RV/LV Ratio 0.69 cm    LA size 4.5 cm    Left Atrium Major Axis 6.1 cm    RA Major Axis 5.21 cm    AV mean gradient 21 mmHg    AV peak gradient 41 mmHg    Ao peak kiet 3.2 m/s    Ao VTI 54.8 cm    LVOT peak VTI 21.8 cm    AV valve area 1.2 cm²    AV Velocity Ratio 0.34     AV index (prosthetic) 0.40     NENA by Velocity Ratio 1.1 cm²    Mr max kiet 2.96 m/s    MV stenosis pressure 1/2 time 91.71 ms    MV valve area p 1/2 method 2.40 cm2    Triscuspid Valve Regurgitation Peak Gradient 47 mmHg    RVOT peak kiet 1.04 m/s    Ao root annulus 3.1 cm    IVC diameter 1.79 cm    Mean e' 0.06 m/s    ZLVIDS -2.73     ZLVIDD -2.03     RVDD 3.08 cm    AORTIC VALVE CUSP SEPERATION 0.46 cm    TV resting pulmonary artery pressure 49 mmHg    RV TB RVSP 6 mmHg    Est. RA pres 3 mmHg   POCT glucose    Collection Time: 07/08/25  3:35 PM   Result Value Ref Range    POCT Glucose 222 (H) 70 - 110 mg/dL   POCT glucose    Collection Time: 07/08/25  8:28 PM   Result Value Ref Range    POCT Glucose 299 (H) 70 - 110 mg/dL   POCT glucose    Collection Time: 07/09/25  7:48 AM   Result Value Ref Range    POCT Glucose 176 (H) 70 - 110 mg/dL   Comprehensive Metabolic Panel    Collection Time: 07/09/25  8:23 AM   Result Value Ref Range    Sodium 132 (L) 136 - 145 mmol/L    Potassium 4.1 3.5 - 5.1 mmol/L    Chloride 95 95 - 110 mmol/L    CO2 27 23 - 29 mmol/L    Glucose 185 (H) 70 - 110 mg/dL    BUN 31 (H) 8 - 23 mg/dL    Creatinine 2.0 (H) 0.5 - 1.4 mg/dL    Calcium 8.6 (L) 8.7 - 10.5 mg/dL    Protein Total 6.9 6.0 - 8.4 gm/dL    Albumin 2.3 (L) 3.5 - 5.2 g/dL    Bilirubin Total 0.3 0.1 - 1.0 mg/dL     40 - 150 unit/L    AST 22 11 - 45 unit/L    ALT 11 10 - 44 unit/L    Anion Gap 10 8 - 16 mmol/L    eGFR 24 (L) >60 mL/min/1.73/m2   Magnesium    Collection Time: 07/09/25  8:23 AM   Result Value Ref Range    Magnesium  1.9 1.6 -  2.6 mg/dL   CBC with Differential    Collection Time: 07/09/25  8:23 AM   Result Value Ref Range    WBC 14.56 (H) 3.90 - 12.70 K/uL    RBC 4.31 4.00 - 5.40 M/uL    HGB 12.1 12.0 - 16.0 gm/dL    HCT 36.8 (L) 37.0 - 48.5 %    MCV 85 82 - 98 fL    MCH 28.1 27.0 - 31.0 pg    MCHC 32.9 32.0 - 36.0 g/dL    RDW 13.4 11.5 - 14.5 %    Platelet Count 462 (H) 150 - 450 K/uL    MPV 9.6 9.2 - 12.9 fL    Nucleated RBC 0 <=0 /100 WBC    Neut % 75.9 (H) 38 - 73 %    Lymph % 9.7 (L) 18 - 48 %    Mono % 7.1 4 - 15 %    Eos % 5.4 <=8 %    Basophil % 0.6 <=1.9 %    Imm Grans % 1.3 (H) 0.0 - 0.5 %    Neut # 11.05 (H) 1.8 - 7.7 K/uL    Lymph # 1.41 1 - 4.8 K/uL    Mono # 1.04 (H) 0.3 - 1 K/uL    Eos # 0.78 (H) <=0.5 K/uL    Baso # 0.09 <=0.2 K/uL    Imm Grans # 0.19 (H) 0.00 - 0.04 K/uL   POCT glucose    Collection Time: 07/09/25 11:04 AM   Result Value Ref Range    POCT Glucose 251 (H) 70 - 110 mg/dL   POCT glucose    Collection Time: 07/09/25 11:53 AM   Result Value Ref Range    POCT Glucose 255 (H) 70 - 110 mg/dL   POCT glucose    Collection Time: 07/09/25  4:17 PM   Result Value Ref Range    POCT Glucose 225 (H) 70 - 110 mg/dL   POCT glucose    Collection Time: 07/09/25  8:57 PM   Result Value Ref Range    POCT Glucose 231 (H) 70 - 110 mg/dL   CBC with Differential    Collection Time: 07/10/25  5:40 AM   Result Value Ref Range    WBC 12.99 (H) 3.90 - 12.70 K/uL    RBC 3.99 (L) 4.00 - 5.40 M/uL    HGB 11.2 (L) 12.0 - 16.0 gm/dL    HCT 34.7 (L) 37.0 - 48.5 %    MCV 87 82 - 98 fL    MCH 28.1 27.0 - 31.0 pg    MCHC 32.3 32.0 - 36.0 g/dL    RDW 13.6 11.5 - 14.5 %    Platelet Count 416 150 - 450 K/uL    MPV 10.2 9.2 - 12.9 fL    Nucleated RBC 0 <=0 /100 WBC   Manual Differential    Collection Time: 07/10/25  5:40 AM   Result Value Ref Range    Gran # (ANC) 11.2 K/uL    Segmented Neutrophil % 86.0 (H) 38.0 - 73.0 %    Lymphocyte % 6.0 (L) 18.0 - 48.0 %    Monocyte % 2.0 (L) 4.0 - 15.0 %    Eosinophil % 2.0 0.0 - 8.0 %    Myelocyte % 4.0 %    Comprehensive Metabolic Panel    Collection Time: 07/10/25  5:41 AM   Result Value Ref Range    Sodium 130 (L) 136 - 145 mmol/L    Potassium 4.6 3.5 - 5.1 mmol/L    Chloride 96 95 - 110 mmol/L    CO2 24 23 - 29 mmol/L    Glucose 187 (H) 70 - 110 mg/dL    BUN 39 (H) 8 - 23 mg/dL    Creatinine 2.7 (H) 0.5 - 1.4 mg/dL    Calcium 8.5 (L) 8.7 - 10.5 mg/dL    Protein Total 6.4 6.0 - 8.4 gm/dL    Albumin 2.1 (L) 3.5 - 5.2 g/dL    Bilirubin Total 0.2 0.1 - 1.0 mg/dL     40 - 150 unit/L    AST 20 11 - 45 unit/L    ALT 12 10 - 44 unit/L    Anion Gap 10 8 - 16 mmol/L    eGFR 17 (L) >60 mL/min/1.73/m2   Magnesium    Collection Time: 07/10/25  5:41 AM   Result Value Ref Range    Magnesium  1.9 1.6 - 2.6 mg/dL   POCT glucose    Collection Time: 07/10/25  7:25 AM   Result Value Ref Range    POCT Glucose 190 (H) 70 - 110 mg/dL   Clostridium difficile EIA    Collection Time: 07/10/25 10:18 AM    Specimen: Stool   Result Value Ref Range    C. DIFFICILE GDH AG Negative Negative    Clostridioides difficile Toxin A/B Negative Negative   POCT glucose    Collection Time: 07/10/25 11:37 AM   Result Value Ref Range    POCT Glucose 241 (H) 70 - 110 mg/dL   POCT glucose    Collection Time: 07/10/25  4:52 PM   Result Value Ref Range    POCT Glucose 215 (H) 70 - 110 mg/dL   POCT glucose    Collection Time: 07/10/25  7:44 PM   Result Value Ref Range    POCT Glucose 196 (H) 70 - 110 mg/dL   Comprehensive Metabolic Panel    Collection Time: 07/11/25  3:54 AM   Result Value Ref Range    Sodium 136 136 - 145 mmol/L    Potassium 5.1 3.5 - 5.1 mmol/L    Chloride 102 95 - 110 mmol/L    CO2 24 23 - 29 mmol/L    Glucose 194 (H) 70 - 110 mg/dL    BUN 38 (H) 8 - 23 mg/dL    Creatinine 2.5 (H) 0.5 - 1.4 mg/dL    Calcium 9.0 8.7 - 10.5 mg/dL    Protein Total 7.1 6.0 - 8.4 gm/dL    Albumin 2.2 (L) 3.5 - 5.2 g/dL    Bilirubin Total 0.2 0.1 - 1.0 mg/dL     40 - 150 unit/L    AST 26 11 - 45 unit/L    ALT 15 10 - 44 unit/L    Anion Gap 10 8 - 16  mmol/L    eGFR 19 (L) >60 mL/min/1.73/m2   Magnesium    Collection Time: 07/11/25  3:54 AM   Result Value Ref Range    Magnesium  2.1 1.6 - 2.6 mg/dL   CBC with Differential    Collection Time: 07/11/25  3:54 AM   Result Value Ref Range    WBC 14.75 (H) 3.90 - 12.70 K/uL    RBC 3.99 (L) 4.00 - 5.40 M/uL    HGB 11.1 (L) 12.0 - 16.0 gm/dL    HCT 34.0 (L) 37.0 - 48.5 %    MCV 85 82 - 98 fL    MCH 27.8 27.0 - 31.0 pg    MCHC 32.6 32.0 - 36.0 g/dL    RDW 13.8 11.5 - 14.5 %    Platelet Count 502 (H) 150 - 450 K/uL    MPV 9.1 (L) 9.2 - 12.9 fL    Nucleated RBC 0 <=0 /100 WBC    Neut % 74.3 (H) 38 - 73 %    Lymph % 10.4 (L) 18 - 48 %    Mono % 5.8 4 - 15 %    Eos % 4.7 <=8 %    Basophil % 0.9 <=1.9 %    Imm Grans % 3.9 (H) 0.0 - 0.5 %    Neut # 10.95 (H) 1.8 - 7.7 K/uL    Lymph # 1.53 1 - 4.8 K/uL    Mono # 0.85 0.3 - 1 K/uL    Eos # 0.70 (H) <=0.5 K/uL    Baso # 0.14 <=0.2 K/uL    Imm Grans # 0.58 (H) 0.00 - 0.04 K/uL   POCT glucose    Collection Time: 07/11/25 11:40 AM   Result Value Ref Range    POCT Glucose 257 (H) 70 - 110 mg/dL   POCT glucose    Collection Time: 07/11/25  4:36 PM   Result Value Ref Range    POCT Glucose 226 (H) 70 - 110 mg/dL   POCT glucose    Collection Time: 07/11/25  9:16 PM   Result Value Ref Range    POCT Glucose 123 (H) 70 - 110 mg/dL   Comprehensive Metabolic Panel    Collection Time: 07/12/25  6:02 AM   Result Value Ref Range    Sodium 137 136 - 145 mmol/L    Potassium 4.2 3.5 - 5.1 mmol/L    Chloride 103 95 - 110 mmol/L    CO2 25 23 - 29 mmol/L    Glucose 154 (H) 70 - 110 mg/dL    BUN 31 (H) 8 - 23 mg/dL    Creatinine 2.0 (H) 0.5 - 1.4 mg/dL    Calcium 8.8 8.7 - 10.5 mg/dL    Protein Total 6.8 6.0 - 8.4 gm/dL    Albumin 2.0 (L) 3.5 - 5.2 g/dL    Bilirubin Total 0.2 0.1 - 1.0 mg/dL     40 - 150 unit/L    AST 19 11 - 45 unit/L    ALT 13 10 - 44 unit/L    Anion Gap 9 8 - 16 mmol/L    eGFR 24 (L) >60 mL/min/1.73/m2   Magnesium    Collection Time: 07/12/25  6:02 AM   Result Value Ref Range     Magnesium  1.9 1.6 - 2.6 mg/dL   CBC with Differential    Collection Time: 07/12/25  6:02 AM   Result Value Ref Range    WBC 13.40 (H) 3.90 - 12.70 K/uL    RBC 3.95 (L) 4.00 - 5.40 M/uL    HGB 11.0 (L) 12.0 - 16.0 gm/dL    HCT 33.9 (L) 37.0 - 48.5 %    MCV 86 82 - 98 fL    MCH 27.8 27.0 - 31.0 pg    MCHC 32.4 32.0 - 36.0 g/dL    RDW 13.5 11.5 - 14.5 %    Platelet Count 495 (H) 150 - 450 K/uL    MPV 9.0 (L) 9.2 - 12.9 fL    Nucleated RBC 0 <=0 /100 WBC    Neut % 75.1 (H) 38 - 73 %    Lymph % 10.4 (L) 18 - 48 %    Mono % 6.2 4 - 15 %    Eos % 3.6 <=8 %    Basophil % 0.7 <=1.9 %    Imm Grans % 4.0 (H) 0.0 - 0.5 %    Neut # 10.07 (H) 1.8 - 7.7 K/uL    Lymph # 1.39 1 - 4.8 K/uL    Mono # 0.83 0.3 - 1 K/uL    Eos # 0.48 <=0.5 K/uL    Baso # 0.10 <=0.2 K/uL    Imm Grans # 0.53 (H) 0.00 - 0.04 K/uL   POCT glucose    Collection Time: 07/12/25 11:36 AM   Result Value Ref Range    POCT Glucose 214 (H) 70 - 110 mg/dL   Comprehensive Metabolic Panel    Collection Time: 07/13/25  5:37 AM   Result Value Ref Range    Sodium 137 136 - 145 mmol/L    Potassium 4.3 3.5 - 5.1 mmol/L    Chloride 102 95 - 110 mmol/L    CO2 26 23 - 29 mmol/L    Glucose 159 (H) 70 - 110 mg/dL    BUN 25 (H) 8 - 23 mg/dL    Creatinine 1.7 (H) 0.5 - 1.4 mg/dL    Calcium 9.0 8.7 - 10.5 mg/dL    Protein Total 7.4 6.0 - 8.4 gm/dL    Albumin 2.2 (L) 3.5 - 5.2 g/dL    Bilirubin Total 0.2 0.1 - 1.0 mg/dL     40 - 150 unit/L    AST 21 11 - 45 unit/L    ALT 13 10 - 44 unit/L    Anion Gap 9 8 - 16 mmol/L    eGFR 30 (L) >60 mL/min/1.73/m2   Magnesium    Collection Time: 07/13/25  5:37 AM   Result Value Ref Range    Magnesium  1.8 1.6 - 2.6 mg/dL   CBC with Differential    Collection Time: 07/13/25  5:37 AM   Result Value Ref Range    WBC 13.19 (H) 3.90 - 12.70 K/uL    RBC 4.02 4.00 - 5.40 M/uL    HGB 11.2 (L) 12.0 - 16.0 gm/dL    HCT 34.3 (L) 37.0 - 48.5 %    MCV 85 82 - 98 fL    MCH 27.9 27.0 - 31.0 pg    MCHC 32.7 32.0 - 36.0 g/dL    RDW 13.6 11.5 - 14.5 %     Platelet Count 486 (H) 150 - 450 K/uL    MPV 9.1 (L) 9.2 - 12.9 fL    Nucleated RBC 0 <=0 /100 WBC    Neut % 71.4 38 - 73 %    Lymph % 13.0 (L) 18 - 48 %    Mono % 6.8 4 - 15 %    Eos % 4.4 <=8 %    Basophil % 0.9 <=1.9 %    Imm Grans % 3.5 (H) 0.0 - 0.5 %    Neut # 9.42 (H) 1.8 - 7.7 K/uL    Lymph # 1.71 1 - 4.8 K/uL    Mono # 0.90 0.3 - 1 K/uL    Eos # 0.58 (H) <=0.5 K/uL    Baso # 0.12 <=0.2 K/uL    Imm Grans # 0.46 (H) 0.00 - 0.04 K/uL   POCT glucose    Collection Time: 07/13/25 11:22 AM   Result Value Ref Range    POCT Glucose 166 (H) 70 - 110 mg/dL   Comprehensive Metabolic Panel    Collection Time: 07/14/25  4:09 AM   Result Value Ref Range    Sodium 138 136 - 145 mmol/L    Potassium 3.9 3.5 - 5.1 mmol/L    Chloride 102 95 - 110 mmol/L    CO2 25 23 - 29 mmol/L    Glucose 180 (H) 70 - 110 mg/dL    BUN 21 8 - 23 mg/dL    Creatinine 1.6 (H) 0.5 - 1.4 mg/dL    Calcium 9.2 8.7 - 10.5 mg/dL    Protein Total 7.9 6.0 - 8.4 gm/dL    Albumin 2.4 (L) 3.5 - 5.2 g/dL    Bilirubin Total 0.3 0.1 - 1.0 mg/dL     40 - 150 unit/L    AST 16 11 - 45 unit/L    ALT 13 10 - 44 unit/L    Anion Gap 11 8 - 16 mmol/L    eGFR 32 (L) >60 mL/min/1.73/m2   Magnesium    Collection Time: 07/14/25  4:09 AM   Result Value Ref Range    Magnesium  1.8 1.6 - 2.6 mg/dL   CBC with Differential    Collection Time: 07/14/25  4:09 AM   Result Value Ref Range    WBC 16.10 (H) 3.90 - 12.70 K/uL    RBC 4.53 4.00 - 5.40 M/uL    HGB 12.6 12.0 - 16.0 gm/dL    HCT 39.0 37.0 - 48.5 %    MCV 86 82 - 98 fL    MCH 27.8 27.0 - 31.0 pg    MCHC 32.3 32.0 - 36.0 g/dL    RDW 13.7 11.5 - 14.5 %    Platelet Count 473 (H) 150 - 450 K/uL    MPV 8.9 (L) 9.2 - 12.9 fL    Nucleated RBC 0 <=0 /100 WBC    Neut % 78.8 (H) 38 - 73 %    Lymph % 10.0 (L) 18 - 48 %    Mono % 5.2 4 - 15 %    Eos % 2.9 <=8 %    Basophil % 0.7 <=1.9 %    Imm Grans % 2.4 (H) 0.0 - 0.5 %    Neut # 12.68 (H) 1.8 - 7.7 K/uL    Lymph # 1.61 1 - 4.8 K/uL    Mono # 0.84 0.3 - 1 K/uL    Eos # 0.46  <=0.5 K/uL    Baso # 0.12 <=0.2 K/uL    Imm Grans # 0.39 (H) 0.00 - 0.04 K/uL   POCT glucose    Collection Time: 07/14/25 10:05 AM   Result Value Ref Range    POCT Glucose 217 (H) 70 - 110 mg/dL   POCT glucose    Collection Time: 07/16/25  7:18 PM   Result Value Ref Range    POCT Glucose 163 (H) 70 - 110 mg/dL   POCT glucose    Collection Time: 07/16/25  8:22 PM   Result Value Ref Range    POCT Glucose 79 70 - 110 mg/dL   POCT glucose    Collection Time: 07/16/25  9:17 PM   Result Value Ref Range    POCT Glucose 99 70 - 110 mg/dL   POCT glucose    Collection Time: 07/16/25 10:24 PM   Result Value Ref Range    POCT Glucose 129 (H) 70 - 110 mg/dL   POCT glucose    Collection Time: 07/16/25 11:11 PM   Result Value Ref Range    POCT Glucose 124 (H) 70 - 110 mg/dL   Comprehensive Metabolic Panel    Collection Time: 07/21/25  2:20 PM   Result Value Ref Range    Sodium 140 136 - 145 mmol/L    Potassium 4.7 3.5 - 5.1 mmol/L    Chloride 105 95 - 110 mmol/L    CO2 25 23 - 29 mmol/L    Glucose 99 70 - 110 mg/dL    BUN 16 8 - 23 mg/dL    Creatinine 1.2 0.5 - 1.4 mg/dL    Calcium 8.2 (L) 8.7 - 10.5 mg/dL    Protein Total 7.0 6.0 - 8.4 gm/dL    Albumin 2.4 (L) 3.5 - 5.2 g/dL    Bilirubin Total 0.3 0.1 - 1.0 mg/dL     (H) 40 - 150 unit/L     (H) 11 - 45 unit/L     (H) 10 - 44 unit/L    Anion Gap 10 8 - 16 mmol/L    eGFR 45 (L) >60 mL/min/1.73/m2   CBC with Differential    Collection Time: 07/21/25  2:20 PM   Result Value Ref Range    WBC 11.76 3.90 - 12.70 K/uL    RBC 4.11 4.00 - 5.40 M/uL    HGB 11.2 (L) 12.0 - 16.0 gm/dL    HCT 35.3 (L) 37.0 - 48.5 %    MCV 86 82 - 98 fL    MCH 27.3 27.0 - 31.0 pg    MCHC 31.7 (L) 32.0 - 36.0 g/dL    RDW 14.1 11.5 - 14.5 %    Platelet Count 504 (H) 150 - 450 K/uL    MPV 10.1 9.2 - 12.9 fL    Nucleated RBC 0 <=0 /100 WBC    Neut % 77.4 (H) 38 - 73 %    Lymph % 13.9 (L) 18 - 48 %    Mono % 4.5 4 - 15 %    Eos % 3.1 <=8 %    Basophil % 0.6 <=1.9 %    Imm Grans % 0.5 0.0 - 0.5 %     Neut # 9.10 (H) 1.8 - 7.7 K/uL    Lymph # 1.63 1 - 4.8 K/uL    Mono # 0.53 0.3 - 1 K/uL    Eos # 0.37 <=0.5 K/uL    Baso # 0.07 <=0.2 K/uL    Imm Grans # 0.06 (H) 0.00 - 0.04 K/uL         Gabby Jackson, FNP [facility]                       [1]   Current Outpatient Medications:     acarbose (PRECOSE) 100 MG Tab, Take 1 tablet (100 mg total) by mouth 3 (three) times daily with meals., Disp: 270 tablet, Rfl: 3    acetaminophen (TYLENOL) 650 MG TbSR, Take 650 mg by mouth 2 (two) times a day., Disp: , Rfl:     albuterol (PROVENTIL/VENTOLIN HFA) 90 mcg/actuation inhaler, Inhale 2 puffs into the lungs every 6 (six) hours as needed for Wheezing. Rescue, Disp: 18 g, Rfl: 11    alendronate (FOSAMAX) 70 MG tablet, Take 1 tablet (70 mg total) by mouth every 7 days., Disp: 12 tablet, Rfl: 1    amoxicillin-clavulanate 875-125mg (AUGMENTIN) 875-125 mg per tablet, Take 1 tablet by mouth 2 (two) times daily., Disp: 14 tablet, Rfl: 0    aspirin (ECOTRIN) 81 MG EC tablet, Take 81 mg by mouth once daily. 3 days a week, Disp: , Rfl:     atorvastatin (LIPITOR) 40 MG tablet, Take 0.5 tablets (20 mg total) by mouth once daily., Disp: , Rfl:     blood sugar diagnostic (ONETOUCH ULTRA TEST) Strp, Inject 1 each into the skin 3 (three) times daily before meals., Disp: 300 each, Rfl: 3    blood-glucose meter Misc, 1 Device by Misc.(Non-Drug; Combo Route) route once daily. One Touch Dx. Code:E11.9, Disp: 1 each, Rfl: 0    blood-glucose sensor (FREESTYLE JOSEPHINE 2 PLUS SENSOR) Rose, Use as instructed, Disp: 6 each, Rfl: 6    calcium citrate (CALCITRATE) 200 mg (950 mg) tablet, Take 1 tablet (200 mg total) by mouth every Mon, Wed, Fri., Disp: , Rfl:     cholecalciferol, vitamin D3, (VITAMIN D3) 50 mcg (2,000 unit) Tab, Take 1 tablet (2,000 Units total) by mouth twice a week. 10,000 3 times weekly, Disp: , Rfl:     clobetasoL (TEMOVATE) 0.05 % cream, Apply twice a day for 2 weeks then nightly for 2 months. May decrease to three nights  a week after that., Disp: 60 g, Rfl: 2    co-enzyme Q-10 30 mg capsule, Take 1 capsule (30 mg total) by mouth every evening., Disp: , Rfl:     cranberry fruit extract (CRANBERRY CONCENTRATE ORAL), Take 2 capsules by mouth once daily. (Patient taking differently: Take 1 capsule by mouth once daily.), Disp: , Rfl:     diclofenac sodium (VOLTAREN) 1 % Gel, Apply 2 g topically 3 (three) times daily as needed (Knee pain)., Disp: 200 g, Rfl: 1    dicyclomine (BENTYL) 20 mg tablet, Take 1 tablet (20 mg total) by mouth 4 (four) times daily before meals and nightly., Disp: 120 tablet, Rfl: 11    ferrous sulfate 325 (65 FE) MG EC tablet, Take 325 mg by mouth. 3 days a week, Disp: , Rfl:     flash glucose scanning reader (FREESTYLE JOSEPHINE 2 READER) Misc, Use as instructed, Disp: 1 each, Rfl: 1    flash glucose sensor (FREESTYLE JOSEPHINE 2 SENSOR) Kit, Use as instructed, Disp: 6 kit, Rfl: 3    FLUoxetine 10 MG capsule, Take 1 capsule (10 mg total) by mouth once daily., Disp: 90 capsule, Rfl: 3    furosemide (LASIX) 20 MG tablet, Take 1 tablet (20 mg total) by mouth daily as needed (swelling)., Disp: 30 tablet, Rfl: 1    gabapentin (NEURONTIN) 600 MG tablet, Take 1 tablet (600 mg total) by mouth 3 (three) times daily., Disp: 90 tablet, Rfl: 11    glucagon (BAQSIMI) 3 mg/actuation Spry, 2 pack.  Spray one device (3 mg) in one nostril to treat severe hypoglycemia. Disp 1 box (2 devices), Disp: 2 each, Rfl: 3    guaiFENesin (MUCINEX) 600 mg 12 hr tablet, Take 2 tablets (1,200 mg total) by mouth 2 (two) times daily., Disp: 40 tablet, Rfl: 0    insulin aspart U-100 (NOVOLOG FLEXPEN U-100 INSULIN) 100 unit/mL (3 mL) InPn pen, Take sliding scale insulin 4 times a day as instructed- max dose of 40 units/day., Disp: 15 mL, Rfl: 3    insulin glargine U-100, Lantus, (LANTUS SOLOSTAR U-100 INSULIN) 100 unit/mL (3 mL) InPn pen, Inject 16 Units into the skin once daily., Disp: 15 mL, Rfl: 3    letrozole (FEMARA) 2.5 mg Tab, Take 1 tablet (2.5 mg  "total) by mouth once daily., Disp: 30 tablet, Rfl: 11    LIDOcaine (LIDODERM) 5 %, Place onto the skin once daily. Remove & Discard patch within 12 hours or as directed by MD, Disp: 30 patch, Rfl: 5    lisinopriL (PRINIVIL,ZESTRIL) 40 MG tablet, Take 1 tablet (40 mg total) by mouth every evening., Disp: 90 tablet, Rfl: 3    loperamide (IMODIUM) 2 mg capsule, Take 2 mg by mouth 4 (four) times daily as needed for Diarrhea., Disp: , Rfl:     metoprolol succinate (TOPROL-XL) 25 MG 24 hr tablet, Take 1 tablet (25 mg total) by mouth 2 (two) times daily., Disp: 180 tablet, Rfl: 3    montelukast (SINGULAIR) 10 mg tablet, Take 1 tablet (10 mg total) by mouth once daily., Disp: 90 tablet, Rfl: 3    mucus clearing device (AEROBIKA OSCILLATING PEP SYSTM MISC), 12 puffs by Misc.(Non-Drug; Combo Route) route daily as needed., Disp: , Rfl:     multivitamin capsule, Take 1 capsule by mouth once daily. Taking 3 times weekly, Disp: , Rfl:     NIFEdipine (PROCARDIA-XL) 30 MG (OSM) 24 hr tablet, Take 1 tablet (30 mg total) by mouth once daily., Disp: 30 tablet, Rfl: 11    NYAMYC powder, , Disp: , Rfl:     omega-3 fatty acids/fish oil (FISH OIL-OMEGA-3 FATTY ACIDS) 300-1,000 mg capsule, Take 1 capsule by mouth once a week., Disp: , Rfl:     ONETOUCH DELICA PLUS LANCET 33 gauge Misc, USE   TO CHECK GLUCOSE 4 TIMES DAILY BEFORE  MEALS, Disp: 300 each, Rfl: 3    pen needle, diabetic (BD ULTRA-FINE MICRO PEN NEEDLE) 32 gauge x 1/4" Ndle, use to inject ozempic once a week, Disp: 100 each, Rfl: 3    pen needle, diabetic (BD ULTRA-FINE MICRO PEN NEEDLE) 32 gauge x 1/4" Ndle, Use to inject insulin 4 to 5 times a day as instructed, Disp: 100 each, Rfl: 6    potassium chloride (KLOR-CON) 10 MEQ TbSR, 1 pill po 3 x week, Disp: 30 tablet, Rfl: 0    trimethoprim (TRIMPEX) 100 mg Tab, Take 100 mg by mouth every 12 (twelve) hours., Disp: , Rfl:     "

## 2025-08-08 ENCOUNTER — OUTPATIENT CASE MANAGEMENT (OUTPATIENT)
Dept: ADMINISTRATIVE | Facility: OTHER | Age: 85
End: 2025-08-08
Payer: MEDICARE

## 2025-08-12 ENCOUNTER — TELEPHONE (OUTPATIENT)
Dept: PRIMARY CARE CLINIC | Facility: CLINIC | Age: 85
End: 2025-08-12
Payer: MEDICARE

## 2025-08-15 ENCOUNTER — OUTPATIENT CASE MANAGEMENT (OUTPATIENT)
Dept: ADMINISTRATIVE | Facility: OTHER | Age: 85
End: 2025-08-15
Payer: MEDICARE

## 2025-08-19 ENCOUNTER — OUTPATIENT CASE MANAGEMENT (OUTPATIENT)
Dept: ADMINISTRATIVE | Facility: OTHER | Age: 85
End: 2025-08-19
Payer: MEDICARE

## 2025-08-21 ENCOUNTER — HOSPITAL ENCOUNTER (EMERGENCY)
Facility: HOSPITAL | Age: 85
Discharge: HOME | End: 2025-08-21
Attending: STUDENT IN AN ORGANIZED HEALTH CARE EDUCATION/TRAINING PROGRAM
Payer: MEDICARE

## 2025-08-21 VITALS
OXYGEN SATURATION: 93 % | RESPIRATION RATE: 20 BRPM | TEMPERATURE: 98 F | SYSTOLIC BLOOD PRESSURE: 137 MMHG | DIASTOLIC BLOOD PRESSURE: 64 MMHG | WEIGHT: 183 LBS | HEART RATE: 86 BPM | BODY MASS INDEX: 31.24 KG/M2 | HEIGHT: 64 IN

## 2025-08-21 DIAGNOSIS — R11.2 NAUSEA AND VOMITING, UNSPECIFIED VOMITING TYPE: ICD-10-CM

## 2025-08-21 DIAGNOSIS — N30.00 ACUTE CYSTITIS WITHOUT HEMATURIA: Primary | ICD-10-CM

## 2025-08-21 LAB
BACTERIA #/AREA URNS AUTO: ABNORMAL /HPF
BILIRUB UR QL STRIP.AUTO: NEGATIVE
CAOX CRY URNS QL MICRO: ABNORMAL
CLARITY UR: ABNORMAL
COLOR UR AUTO: YELLOW
GLUCOSE UR QL STRIP: NEGATIVE
HGB UR QL STRIP: ABNORMAL
HOLD SPECIMEN: NORMAL
HYALINE CASTS UR QL AUTO: >10 /LPF (ref 0–1)
KETONES UR QL STRIP: NEGATIVE
LEUKOCYTE ESTERASE UR QL STRIP: ABNORMAL
MICROSCOPIC COMMENT: ABNORMAL
NITRITE UR QL STRIP: NEGATIVE
PH UR STRIP: 8 [PH]
PROT UR QL STRIP: ABNORMAL
RBC #/AREA URNS AUTO: 5 /HPF (ref 0–4)
SP GR UR STRIP: 1.02
SQUAMOUS #/AREA URNS AUTO: 2 /HPF
UROBILINOGEN UR STRIP-ACNC: 1 EU/DL
WBC #/AREA URNS AUTO: >100 /HPF (ref 0–5)

## 2025-08-21 PROCEDURE — 87086 URINE CULTURE/COLONY COUNT: CPT | Performed by: STUDENT IN AN ORGANIZED HEALTH CARE EDUCATION/TRAINING PROGRAM

## 2025-08-21 PROCEDURE — 81003 URINALYSIS AUTO W/O SCOPE: CPT | Performed by: STUDENT IN AN ORGANIZED HEALTH CARE EDUCATION/TRAINING PROGRAM

## 2025-08-21 PROCEDURE — 96372 THER/PROPH/DIAG INJ SC/IM: CPT | Performed by: STUDENT IN AN ORGANIZED HEALTH CARE EDUCATION/TRAINING PROGRAM

## 2025-08-21 PROCEDURE — 63600175 PHARM REV CODE 636 W HCPCS: Performed by: STUDENT IN AN ORGANIZED HEALTH CARE EDUCATION/TRAINING PROGRAM

## 2025-08-21 PROCEDURE — 25000003 PHARM REV CODE 250: Performed by: STUDENT IN AN ORGANIZED HEALTH CARE EDUCATION/TRAINING PROGRAM

## 2025-08-21 PROCEDURE — 99284 EMERGENCY DEPT VISIT MOD MDM: CPT | Mod: 25

## 2025-08-21 RX ORDER — LIDOCAINE HYDROCHLORIDE 10 MG/ML
2 INJECTION, SOLUTION EPIDURAL; INFILTRATION; INTRACAUDAL; PERINEURAL
Status: COMPLETED | OUTPATIENT
Start: 2025-08-21 | End: 2025-08-21

## 2025-08-21 RX ORDER — ONDANSETRON 4 MG/1
4 TABLET, ORALLY DISINTEGRATING ORAL
Status: COMPLETED | OUTPATIENT
Start: 2025-08-21 | End: 2025-08-21

## 2025-08-21 RX ORDER — CEPHALEXIN 500 MG/1
500 CAPSULE ORAL EVERY 8 HOURS
Qty: 15 CAPSULE | Refills: 0 | Status: SHIPPED | OUTPATIENT
Start: 2025-08-22 | End: 2025-08-27

## 2025-08-21 RX ORDER — CEFTRIAXONE 1 G/1
1 INJECTION, POWDER, FOR SOLUTION INTRAMUSCULAR; INTRAVENOUS
Status: COMPLETED | OUTPATIENT
Start: 2025-08-21 | End: 2025-08-21

## 2025-08-21 RX ORDER — ONDANSETRON 4 MG/1
4 TABLET, ORALLY DISINTEGRATING ORAL EVERY 6 HOURS PRN
Qty: 20 TABLET | Refills: 0 | Status: SHIPPED | OUTPATIENT
Start: 2025-08-21 | End: 2025-08-26

## 2025-08-21 RX ORDER — CARVEDILOL 6.25 MG/1
6.25 TABLET ORAL 2 TIMES DAILY
COMMUNITY
Start: 2025-07-24

## 2025-08-21 RX ORDER — HYDROCHLOROTHIAZIDE 12.5 MG/1
12.5 CAPSULE ORAL
COMMUNITY
Start: 2025-07-27

## 2025-08-21 RX ADMIN — LIDOCAINE HYDROCHLORIDE 20 MG: 10 INJECTION, SOLUTION EPIDURAL; INFILTRATION; INTRACAUDAL; PERINEURAL at 03:08

## 2025-08-21 RX ADMIN — ONDANSETRON 4 MG: 4 TABLET, ORALLY DISINTEGRATING ORAL at 03:08

## 2025-08-21 RX ADMIN — CEFTRIAXONE SODIUM 1 G: 1 INJECTION, POWDER, FOR SOLUTION INTRAMUSCULAR; INTRAVENOUS at 03:08

## 2025-08-22 LAB — BACTERIA UR CULT: NORMAL

## 2025-08-28 ENCOUNTER — OFFICE VISIT (OUTPATIENT)
Dept: PRIMARY CARE CLINIC | Facility: CLINIC | Age: 85
End: 2025-08-28
Payer: MEDICARE

## 2025-08-28 VITALS
BODY MASS INDEX: 31.24 KG/M2 | HEART RATE: 72 BPM | SYSTOLIC BLOOD PRESSURE: 151 MMHG | WEIGHT: 183 LBS | HEIGHT: 64 IN | DIASTOLIC BLOOD PRESSURE: 67 MMHG | OXYGEN SATURATION: 96 % | TEMPERATURE: 98 F

## 2025-08-28 DIAGNOSIS — L90.0 LICHEN SCLEROSUS: ICD-10-CM

## 2025-08-28 DIAGNOSIS — B37.31 CANDIDIASIS OF VULVA AND VAGINA: Primary | ICD-10-CM

## 2025-08-28 PROCEDURE — 99999 PR PBB SHADOW E&M-EST. PATIENT-LVL III: CPT | Mod: PBBFAC,,, | Performed by: NURSE PRACTITIONER

## 2025-08-28 PROCEDURE — 99213 OFFICE O/P EST LOW 20 MIN: CPT | Mod: S$PBB,,, | Performed by: NURSE PRACTITIONER

## 2025-08-28 PROCEDURE — 99213 OFFICE O/P EST LOW 20 MIN: CPT | Mod: PBBFAC | Performed by: NURSE PRACTITIONER

## 2025-08-28 RX ORDER — CLOBETASOL PROPIONATE 0.5 MG/G
CREAM TOPICAL
Qty: 60 G | Refills: 2 | Status: SHIPPED | OUTPATIENT
Start: 2025-08-28

## 2025-08-28 RX ORDER — NYSTATIN 100000 [USP'U]/G
POWDER TOPICAL 2 TIMES DAILY
Qty: 60 G | Refills: 0 | Status: SHIPPED | OUTPATIENT
Start: 2025-08-28

## 2025-09-05 ENCOUNTER — TELEPHONE (OUTPATIENT)
Dept: PRIMARY CARE CLINIC | Facility: CLINIC | Age: 85
End: 2025-09-05
Payer: MEDICARE

## 2025-09-05 RX ORDER — FLUCONAZOLE 150 MG/1
150 TABLET ORAL DAILY
Qty: 2 TABLET | Refills: 0 | Status: SHIPPED | OUTPATIENT
Start: 2025-09-05

## (undated) DEVICE — DRAIN PERFORATED FLAT 3/4 10MM

## (undated) DEVICE — GLOVE SURGEONS ULTRA TOUCH 6.5

## (undated) DEVICE — LINER GLOVE POWDERFREE SZ 6.5

## (undated) DEVICE — SUT CTD VICRYL VIL BR CR/SH

## (undated) DEVICE — SET PULL UP PROBE COVER 5X48IN

## (undated) DEVICE — CLEANER CAUT TIP STRL 2X2IN

## (undated) DEVICE — BLANKET FULL BODY 85.8X50IN

## (undated) DEVICE — TRAY MAJOR LAPAROTOMY SURG I

## (undated) DEVICE — BLADE SURG CARBON STEEL #10

## (undated) DEVICE — SUT SILK 2.0 BLK 18

## (undated) DEVICE — SUT CTD VICRYL 1VIL BR 54IN

## (undated) DEVICE — SUT MONOCRYL 4-0 PS-2

## (undated) DEVICE — ELECTRODE REM PLYHSV RETURN 9

## (undated) DEVICE — NDL 27G X 1 1/4

## (undated) DEVICE — SPONGE X-RAY LAP DETCT 18X18IN

## (undated) DEVICE — UNDERGLOVES BIOGEL PI SIZE 7.5

## (undated) DEVICE — NDL SAFETY 22G X 1.5 ECLIPSE

## (undated) DEVICE — UNDERGLOVE BIOGEL PI SZ 6.5 LF

## (undated) DEVICE — Device

## (undated) DEVICE — GLOVE SURGICAL LATEX SZ 6.5

## (undated) DEVICE — CLOSURE SKIN STERI STRIP 1/2X4

## (undated) DEVICE — BANDAGE GAUZE COT STRL 4.5X4.1

## (undated) DEVICE — DRAPE CORETEMP FLD WRM 56X62IN

## (undated) DEVICE — GAUZE SPONGE 4X4 12PLY

## (undated) DEVICE — LABEL BARKLEY 9/16X1-7/8IN

## (undated) DEVICE — SUT SA85H SILK 2-0

## (undated) DEVICE — UNDERPAD DISPOSABLE 30X30IN

## (undated) DEVICE — DRAPE SURG SPLIT ADH 77X108IN

## (undated) DEVICE — ADHESIVE MASTISOL VIAL 48/BX

## (undated) DEVICE — SOL IRRI STRL WATER 1000ML

## (undated) DEVICE — BNDG COFLEX FOAM LF2 ST 4X5YD

## (undated) DEVICE — APPLICATOR CHLORAPREP ORN 26ML

## (undated) DEVICE — SEE MEDLINE ITEM 157216

## (undated) DEVICE — COVER OVERHEAD SURG LT BLUE

## (undated) DEVICE — SUT 3-0 ETHILON 18 FS-1

## (undated) DEVICE — DRAPE ABD MAJ PCH 122X78X102IN

## (undated) DEVICE — SPONGE COTTON TRAY 4X4IN

## (undated) DEVICE — EVACUATOR WOUND BULB 100CC

## (undated) DEVICE — SUT SILK 3-0 BLK BR SH 30IN

## (undated) DEVICE — STAPLER SKIN PROXIMATE WIDE

## (undated) DEVICE — LINER MEDI-VAC PPV FLTR 1500CC

## (undated) DEVICE — GLOVE SURG ULTRA TOUCH 7

## (undated) DEVICE — BANDAGE MATRIX HK LOOP 6IN 5YD

## (undated) DEVICE — UNDERGLOVES BIOGEL PI SZ 7 LF

## (undated) DEVICE — SUT 3-0 VICRYL / SH (J416)